# Patient Record
Sex: FEMALE | Race: WHITE | NOT HISPANIC OR LATINO
[De-identification: names, ages, dates, MRNs, and addresses within clinical notes are randomized per-mention and may not be internally consistent; named-entity substitution may affect disease eponyms.]

---

## 2017-01-17 ENCOUNTER — APPOINTMENT (OUTPATIENT)
Dept: OBGYN | Facility: CLINIC | Age: 82
End: 2017-01-17

## 2017-01-17 VITALS
HEIGHT: 62 IN | BODY MASS INDEX: 23.92 KG/M2 | WEIGHT: 130 LBS | SYSTOLIC BLOOD PRESSURE: 144 MMHG | DIASTOLIC BLOOD PRESSURE: 70 MMHG

## 2017-01-19 LAB — BACTERIA UR CULT: NORMAL

## 2017-02-25 ENCOUNTER — EMERGENCY (EMERGENCY)
Facility: HOSPITAL | Age: 82
LOS: 1 days | Discharge: HOME | End: 2017-02-25
Attending: EMERGENCY MEDICINE | Admitting: EMERGENCY MEDICINE
Payer: MEDICARE

## 2017-02-25 VITALS
RESPIRATION RATE: 18 BRPM | TEMPERATURE: 98 F | SYSTOLIC BLOOD PRESSURE: 159 MMHG | HEART RATE: 100 BPM | DIASTOLIC BLOOD PRESSURE: 86 MMHG | OXYGEN SATURATION: 96 %

## 2017-02-25 LAB
ALBUMIN SERPL ELPH-MCNC: 3.6 G/DL — SIGNIFICANT CHANGE UP (ref 3.3–5)
ALP SERPL-CCNC: 99 U/L — SIGNIFICANT CHANGE UP (ref 40–120)
ALT FLD-CCNC: 12 U/L — SIGNIFICANT CHANGE UP (ref 4–33)
APPEARANCE UR: CLEAR — SIGNIFICANT CHANGE UP
APTT BLD: 32.8 SEC — SIGNIFICANT CHANGE UP (ref 27.5–37.4)
AST SERPL-CCNC: 26 U/L — SIGNIFICANT CHANGE UP (ref 4–32)
BASOPHILS # BLD AUTO: 0.01 K/UL — SIGNIFICANT CHANGE UP (ref 0–0.2)
BASOPHILS NFR BLD AUTO: 0.1 % — SIGNIFICANT CHANGE UP (ref 0–2)
BILIRUB SERPL-MCNC: 0.4 MG/DL — SIGNIFICANT CHANGE UP (ref 0.2–1.2)
BILIRUB UR-MCNC: NEGATIVE — SIGNIFICANT CHANGE UP
BLOOD UR QL VISUAL: NEGATIVE — SIGNIFICANT CHANGE UP
BUN SERPL-MCNC: 14 MG/DL — SIGNIFICANT CHANGE UP (ref 7–23)
CALCIUM SERPL-MCNC: 8.9 MG/DL — SIGNIFICANT CHANGE UP (ref 8.4–10.5)
CHLORIDE SERPL-SCNC: 98 MMOL/L — SIGNIFICANT CHANGE UP (ref 98–107)
CO2 SERPL-SCNC: 25 MMOL/L — SIGNIFICANT CHANGE UP (ref 22–31)
COLOR SPEC: SIGNIFICANT CHANGE UP
CREAT SERPL-MCNC: 1.13 MG/DL — SIGNIFICANT CHANGE UP (ref 0.5–1.3)
EOSINOPHIL # BLD AUTO: 0.08 K/UL — SIGNIFICANT CHANGE UP (ref 0–0.5)
EOSINOPHIL NFR BLD AUTO: 1.2 % — SIGNIFICANT CHANGE UP (ref 0–6)
GLUCOSE SERPL-MCNC: 114 MG/DL — HIGH (ref 70–99)
GLUCOSE UR-MCNC: NEGATIVE — SIGNIFICANT CHANGE UP
HBA1C BLD-MCNC: 6.2 % — HIGH (ref 4–5.6)
HCT VFR BLD CALC: 35.3 % — SIGNIFICANT CHANGE UP (ref 34.5–45)
HGB BLD-MCNC: 11.4 G/DL — LOW (ref 11.5–15.5)
IMM GRANULOCYTES NFR BLD AUTO: 0.1 % — SIGNIFICANT CHANGE UP (ref 0–1.5)
INR BLD: 0.93 — SIGNIFICANT CHANGE UP (ref 0.87–1.18)
KETONES UR-MCNC: NEGATIVE — SIGNIFICANT CHANGE UP
LEUKOCYTE ESTERASE UR-ACNC: HIGH
LYMPHOCYTES # BLD AUTO: 1.73 K/UL — SIGNIFICANT CHANGE UP (ref 1–3.3)
LYMPHOCYTES # BLD AUTO: 25.2 % — SIGNIFICANT CHANGE UP (ref 13–44)
MCHC RBC-ENTMCNC: 28.6 PG — SIGNIFICANT CHANGE UP (ref 27–34)
MCHC RBC-ENTMCNC: 32.3 % — SIGNIFICANT CHANGE UP (ref 32–36)
MCV RBC AUTO: 88.7 FL — SIGNIFICANT CHANGE UP (ref 80–100)
MONOCYTES # BLD AUTO: 0.87 K/UL — SIGNIFICANT CHANGE UP (ref 0–0.9)
MONOCYTES NFR BLD AUTO: 12.7 % — SIGNIFICANT CHANGE UP (ref 2–14)
NEUTROPHILS # BLD AUTO: 4.17 K/UL — SIGNIFICANT CHANGE UP (ref 1.8–7.4)
NEUTROPHILS NFR BLD AUTO: 60.7 % — SIGNIFICANT CHANGE UP (ref 43–77)
NITRITE UR-MCNC: NEGATIVE — SIGNIFICANT CHANGE UP
NON-SQ EPI CELLS # UR AUTO: <1 — SIGNIFICANT CHANGE UP
PH UR: 6.5 — SIGNIFICANT CHANGE UP (ref 4.6–8)
PLATELET # BLD AUTO: 255 K/UL — SIGNIFICANT CHANGE UP (ref 150–400)
PMV BLD: 10.4 FL — SIGNIFICANT CHANGE UP (ref 7–13)
POTASSIUM SERPL-MCNC: 3.7 MMOL/L — SIGNIFICANT CHANGE UP (ref 3.5–5.3)
POTASSIUM SERPL-SCNC: 3.7 MMOL/L — SIGNIFICANT CHANGE UP (ref 3.5–5.3)
PROT SERPL-MCNC: 6.9 G/DL — SIGNIFICANT CHANGE UP (ref 6–8.3)
PROT UR-MCNC: NEGATIVE — SIGNIFICANT CHANGE UP
PROTHROM AB SERPL-ACNC: 10.6 SEC — SIGNIFICANT CHANGE UP (ref 10–13.1)
RBC # BLD: 3.98 M/UL — SIGNIFICANT CHANGE UP (ref 3.8–5.2)
RBC # FLD: 14.8 % — HIGH (ref 10.3–14.5)
RBC CASTS # UR COMP ASSIST: SIGNIFICANT CHANGE UP (ref 0–?)
SODIUM SERPL-SCNC: 140 MMOL/L — SIGNIFICANT CHANGE UP (ref 135–145)
SP GR SPEC: 1.01 — SIGNIFICANT CHANGE UP (ref 1–1.03)
SQUAMOUS # UR AUTO: SIGNIFICANT CHANGE UP
UROBILINOGEN FLD QL: NORMAL E.U. — SIGNIFICANT CHANGE UP (ref 0.1–0.2)
WBC # BLD: 6.87 K/UL — SIGNIFICANT CHANGE UP (ref 3.8–10.5)
WBC # FLD AUTO: 6.87 K/UL — SIGNIFICANT CHANGE UP (ref 3.8–10.5)
WBC UR QL: SIGNIFICANT CHANGE UP (ref 0–?)

## 2017-02-25 PROCEDURE — 93010 ELECTROCARDIOGRAM REPORT: CPT | Mod: 59

## 2017-02-25 PROCEDURE — 99285 EMERGENCY DEPT VISIT HI MDM: CPT | Mod: 25,GC

## 2017-02-25 PROCEDURE — 71020: CPT | Mod: 26

## 2017-02-25 PROCEDURE — 70450 CT HEAD/BRAIN W/O DYE: CPT | Mod: 26

## 2017-02-25 RX ORDER — ASPIRIN/CALCIUM CARB/MAGNESIUM 324 MG
81 TABLET ORAL DAILY
Qty: 0 | Refills: 0 | Status: DISCONTINUED | OUTPATIENT
Start: 2017-02-25 | End: 2017-03-01

## 2017-02-25 RX ORDER — SODIUM CHLORIDE 9 MG/ML
1000 INJECTION INTRAMUSCULAR; INTRAVENOUS; SUBCUTANEOUS ONCE
Qty: 0 | Refills: 0 | Status: COMPLETED | OUTPATIENT
Start: 2017-02-25 | End: 2017-02-25

## 2017-02-25 RX ADMIN — SODIUM CHLORIDE 1000 MILLILITER(S): 9 INJECTION INTRAMUSCULAR; INTRAVENOUS; SUBCUTANEOUS at 18:47

## 2017-02-25 NOTE — ED ADULT NURSE NOTE - OBJECTIVE STATEMENT
Patient daughter reported that when she asked her mom in am how she slept her speech was slurred. Patient had a cold for the past week, went to urgent care -on ABT at the moment and Proair, breathing tx. After that she was trying to drink some coffee and drooled a little. At the moment patient A& O x4 steady gait, no weakness.

## 2017-02-25 NOTE — ED PROVIDER NOTE - ATTENDING CONTRIBUTION TO CARE
89F p/w episode of slurred speech and difficulty swallowing x a few minutes today, now improved.  Denies weakness of arms or legs, has mild baseline gait instability, and no visual disturbance.  Has been having cough recently, on proair and cefdinir rx'ed by PMD.  VS:  unremarkable    GEN - NAD; well appearing; A+O x3   HEAD - NC/AT     ENT - PEERL, EOMI, mucous membranes  moist , no discharge      NECK: Neck supple, non-tender without lymphadenopathy, no masses, no JVD  PULM - CTA b/l,  symmetric breath sounds  COR -  normal heart sounds    ABD - , ND, NT, soft, no guarding, no rebound, no masses    BACK - no CVA tenderness, nontender spine     EXTREMS - no edema, no deformity, warm and well perfused    SKIN - no rash or bruising      NEUROLOGIC - alert, CN 2-12 intact, sensation nl, motor 5/5 RUE/LUE/RLE/LLE.  Face symmetric, speech fluent.    IMP:  89F p/w TIA.  Neuro exam unremarkable at present, speech fluent, able to swallow well.  Will check labs, urine, CXR, CT head, neuro eval.  Neuro rec CDU + MRI for continued neuro monitoring as well as medical treatment.

## 2017-02-25 NOTE — ED PROVIDER NOTE - OBJECTIVE STATEMENT
89 y/o F w/ Hx HTN, HLD pw slurred speech.  Pt found by daughter in Am w/ slurred speech and difficulty drinking liquids.  Symptoms lasted for approx 2 min resolved, second episode lasting for 15 seconds.  Notes also 3 days of cough on cefdinir/proair.     PMD Hiral 89 y/o F w/ Hx HTN, HLD pw slurred speech.  Pt found by daughter in Am w/ slurred speech and difficulty drinking liquids.  Symptoms lasted for approx 2 min resolved, second episode lasting for 15 seconds.  Notes also 3 days of cough on cefdinir/proair.   took 325 ASA @ home.  PMD Hiral

## 2017-02-25 NOTE — ED ADULT NURSE REASSESSMENT NOTE - NS ED NURSE REASSESS COMMENT FT1
Patient hemodynamically stable, does not complain of pain, resting comfortably on the stretcher.  Report given to Ella in CDU, patient awaiting transport.

## 2017-02-25 NOTE — ED CDU PROVIDER NOTE - PROGRESS NOTE DETAILS
CDU DONALD Villa Addendum------  This patient was signed out to me by CDU DONALD Hodges and CDU attending Dr. Johnson on 02/25/17 at 1900 hrs; test results reviewed.  In interim, pt has been resting comfortably; no complaints in interim.  Pt. pending MRI/MRA study; neuro team is consulting on pt.  Pt stable at present; will continue to monitor / reassess. EDUARDO Villa Addendum----  Pt. resting comfortably in interim; no issues to date; will continue to monitor / reassess. CDU DONALD Larios- patient states she is feeling better. Speech is back to normal as per patient. Exam : heart- RRR s1s2 nl Lungs- clear bilaterally  abd - soft NT ND extrem- no edema or cyanosis. Motor strength intact, sensation intact.  Plan for MRI today and neurology follow up. Continue to monitor, CDU DONALD Larios - Patient seen by neurologist dr Nissenbaum. MRI reviewed by him . 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 Plan to add Lipitor 40mg daily and Aspirin 80 mg daily. Follow up with neurology as outpatient. CDU DONALD Larios - Patient's PMD Dr Blackman's office called to discuss neurologist's recommendations and MRI findings. Dr Chinchilla covering for the group and has been updated on the findings. He will let Dr Blackman know of the neurologists recommendations and plan. CDU DISCHARGE NOTE -  Attending : Patient had uneventful stay in CDU.     VSS:  Lungs CTA b/l, CV: RR , Ext: no pedal edema, Neuro AOx3 , nonfocal.  Stable for discharge.  Labs and tests reviewed with the patient and copies provided. The patient is to follow up with their doctor as discussed.

## 2017-02-25 NOTE — ED CDU PROVIDER NOTE - CHPI ED SYMPTOMS NEG
no vomiting/no nausea/no numbness/no blurred vision/no fever/no loss of consciousness/no dizziness/? facial weakness/no confusion

## 2017-02-25 NOTE — ED ADULT TRIAGE NOTE - CHIEF COMPLAINT QUOTE
pt presents with daughter for 2 episodes of slurred speech at 8am this morning, now resolved. pt also reports that when she eats, it feels like the food is falling out of her mouth. per daughter, pt was drinking coffee and noticed it falling out of the side of her mouth. daughter also reports pt had an episode of unsteady gait. FS: 126 in triage pt evaluated by Dr. Rsoales. no stroke code called PMHX: htn, high cholesterol

## 2017-02-25 NOTE — ED CDU PROVIDER NOTE - PLAN OF CARE
Resolution of symptoms Your A1C level was elevated. This will need to be followed with your PMD. Avoid food high in sugar and carbohydrates. Read the hand outs provided. Follow up with your PMD Dr Blackman within 3-5 days. Call for an appointment - [phone]. Bring copies of your ER lab reports with you to MD appointment. Stay hydrated and get plenty of rest. Discuss Holter monitoring and echocardiogram  with Dr Blackman at next appointment. Follow up with neurologist Dr Nissenbaum within 5 days. Call [phone] for an appointment.  Return to ER if symptoms return or worsen, severe pain, weakness or numbness occurs, difficulty speaking or ambulating or if other concerns arise.  Your A1C level was elevated. This will need to be followed with your PMD. Avoid food high in sugar and carbohydrates. Read the hand outs provided. Follow up with your PMD Dr Blackman within 3-5 days. Call for an appointment - [phone]. Bring copies of your ER lab reports with you to MD appointment. Stay hydrated and get plenty of rest. Discuss Holter monitoring and echocardiogram  with Dr Blackman at next appointment. Follow up with neurologist Dr Nissenbaum within 5 days. Call [phone] for an appointment. Take Aspirin 81mg once daily. Take Lipitor 40 mg once daily at night.  Return to ER if symptoms return or worsen, severe pain, weakness or numbness occurs, difficulty speaking or ambulating or if other concerns arise.  Your A1C level was elevated. This will need to be followed with your PMD. Avoid food high in sugar and carbohydrates. Read the hand outs provided.

## 2017-02-25 NOTE — ED CDU PROVIDER NOTE - MEDICAL DECISION MAKING DETAILS
89 yo with slurred speech and poss. facial weakness, no sx at time of exam. ASA given PTA, CT head neg., seen by Neuro, for MRI and TIA w/u.

## 2017-02-26 VITALS
OXYGEN SATURATION: 98 % | DIASTOLIC BLOOD PRESSURE: 83 MMHG | RESPIRATION RATE: 16 BRPM | TEMPERATURE: 99 F | SYSTOLIC BLOOD PRESSURE: 127 MMHG | HEART RATE: 98 BPM

## 2017-02-26 LAB
CHOLEST SERPL-MCNC: 182 MG/DL — SIGNIFICANT CHANGE UP (ref 120–199)
HDLC SERPL-MCNC: 65 MG/DL — SIGNIFICANT CHANGE UP (ref 45–65)
LIPID PNL WITH DIRECT LDL SERPL: 83 MG/DL — SIGNIFICANT CHANGE UP
TRIGL SERPL-MCNC: 124 MG/DL — SIGNIFICANT CHANGE UP (ref 10–149)

## 2017-02-26 PROCEDURE — 70548 MR ANGIOGRAPHY NECK W/DYE: CPT | Mod: 26

## 2017-02-26 PROCEDURE — 70551 MRI BRAIN STEM W/O DYE: CPT | Mod: 26

## 2017-02-26 RX ORDER — ASPIRIN/CALCIUM CARB/MAGNESIUM 324 MG
1 TABLET ORAL
Qty: 30 | Refills: 0
Start: 2017-02-26 | End: 2017-03-28

## 2017-02-26 RX ORDER — ATORVASTATIN CALCIUM 80 MG/1
40 TABLET, FILM COATED ORAL AT BEDTIME
Qty: 0 | Refills: 0 | Status: DISCONTINUED | OUTPATIENT
Start: 2017-02-26 | End: 2017-03-01

## 2017-02-26 RX ORDER — ATORVASTATIN CALCIUM 80 MG/1
1 TABLET, FILM COATED ORAL
Qty: 30 | Refills: 0
Start: 2017-02-26 | End: 2017-03-28

## 2017-02-26 RX ADMIN — Medication 81 MILLIGRAM(S): at 11:47

## 2017-03-14 ENCOUNTER — APPOINTMENT (OUTPATIENT)
Dept: OBGYN | Facility: CLINIC | Age: 82
End: 2017-03-14

## 2017-03-21 ENCOUNTER — APPOINTMENT (OUTPATIENT)
Dept: OBGYN | Facility: CLINIC | Age: 82
End: 2017-03-21
Payer: MEDICARE

## 2017-03-21 VITALS
DIASTOLIC BLOOD PRESSURE: 80 MMHG | BODY MASS INDEX: 23.19 KG/M2 | SYSTOLIC BLOOD PRESSURE: 120 MMHG | HEIGHT: 62 IN | WEIGHT: 126 LBS

## 2017-03-21 DIAGNOSIS — Z87.42 PERSONAL HISTORY OF OTHER DISEASES OF THE FEMALE GENITAL TRACT: ICD-10-CM

## 2017-03-21 DIAGNOSIS — I63.9 CEREBRAL INFARCTION, UNSPECIFIED: ICD-10-CM

## 2017-03-21 PROCEDURE — 99213 OFFICE O/P EST LOW 20 MIN: CPT

## 2017-03-21 RX ORDER — METRONIDAZOLE 500 MG/1
500 TABLET ORAL TWICE DAILY
Qty: 10 | Refills: 0 | Status: COMPLETED | COMMUNITY
Start: 2017-01-17 | End: 2017-03-21

## 2017-03-22 PROBLEM — I63.9 STROKE: Status: ACTIVE | Noted: 2017-03-22

## 2017-03-22 PROBLEM — Z87.42 HISTORY OF VAGINAL DISCHARGE: Status: RESOLVED | Noted: 2017-01-17 | Resolved: 2017-03-22

## 2017-05-23 ENCOUNTER — APPOINTMENT (OUTPATIENT)
Dept: OBGYN | Facility: CLINIC | Age: 82
End: 2017-05-23

## 2017-05-23 VITALS
BODY MASS INDEX: 24.29 KG/M2 | DIASTOLIC BLOOD PRESSURE: 68 MMHG | WEIGHT: 132 LBS | SYSTOLIC BLOOD PRESSURE: 138 MMHG | HEIGHT: 62 IN

## 2017-05-25 RX ORDER — ATORVASTATIN CALCIUM 40 MG/1
40 TABLET, FILM COATED ORAL
Qty: 30 | Refills: 0 | Status: DISCONTINUED | COMMUNITY
Start: 2017-02-26 | End: 2017-05-25

## 2017-05-25 RX ORDER — PNEUMOCOCCAL 23-VAL P-SAC VAC 25MCG/0.5
25 VIAL (ML) INJECTION
Qty: 1 | Refills: 0 | Status: COMPLETED | COMMUNITY
Start: 2017-04-27

## 2017-07-25 ENCOUNTER — APPOINTMENT (OUTPATIENT)
Dept: OBGYN | Facility: CLINIC | Age: 82
End: 2017-07-25

## 2017-07-25 VITALS
SYSTOLIC BLOOD PRESSURE: 140 MMHG | HEIGHT: 62 IN | WEIGHT: 134 LBS | BODY MASS INDEX: 24.66 KG/M2 | DIASTOLIC BLOOD PRESSURE: 70 MMHG

## 2017-10-13 ENCOUNTER — APPOINTMENT (OUTPATIENT)
Dept: OBGYN | Facility: CLINIC | Age: 82
End: 2017-10-13
Payer: MEDICARE

## 2017-10-13 VITALS
WEIGHT: 133 LBS | DIASTOLIC BLOOD PRESSURE: 80 MMHG | SYSTOLIC BLOOD PRESSURE: 133 MMHG | HEIGHT: 62 IN | BODY MASS INDEX: 24.48 KG/M2

## 2017-10-13 PROCEDURE — G0101: CPT

## 2017-12-12 ENCOUNTER — APPOINTMENT (OUTPATIENT)
Dept: OBGYN | Facility: CLINIC | Age: 82
End: 2017-12-12
Payer: MEDICARE

## 2017-12-12 VITALS
SYSTOLIC BLOOD PRESSURE: 138 MMHG | BODY MASS INDEX: 24.84 KG/M2 | WEIGHT: 135 LBS | DIASTOLIC BLOOD PRESSURE: 60 MMHG | HEIGHT: 62 IN

## 2017-12-12 PROCEDURE — 99213 OFFICE O/P EST LOW 20 MIN: CPT

## 2018-03-05 ENCOUNTER — APPOINTMENT (OUTPATIENT)
Dept: OBGYN | Facility: CLINIC | Age: 83
End: 2018-03-05
Payer: MEDICARE

## 2018-03-05 VITALS
WEIGHT: 138 LBS | DIASTOLIC BLOOD PRESSURE: 100 MMHG | HEIGHT: 62 IN | SYSTOLIC BLOOD PRESSURE: 140 MMHG | BODY MASS INDEX: 25.4 KG/M2

## 2018-03-05 PROCEDURE — 99213 OFFICE O/P EST LOW 20 MIN: CPT | Mod: 25

## 2018-03-05 PROCEDURE — 57150 TREAT VAGINA INFECTION: CPT

## 2018-05-06 ENCOUNTER — EMERGENCY (EMERGENCY)
Facility: HOSPITAL | Age: 83
LOS: 0 days | Discharge: HOME | End: 2018-05-06
Attending: EMERGENCY MEDICINE
Payer: MEDICARE

## 2018-05-06 VITALS
HEART RATE: 83 BPM | DIASTOLIC BLOOD PRESSURE: 77 MMHG | WEIGHT: 132.94 LBS | OXYGEN SATURATION: 93 % | SYSTOLIC BLOOD PRESSURE: 134 MMHG | RESPIRATION RATE: 16 BRPM | HEIGHT: 60 IN | TEMPERATURE: 98 F

## 2018-05-06 VITALS
OXYGEN SATURATION: 96 % | TEMPERATURE: 97 F | RESPIRATION RATE: 18 BRPM | DIASTOLIC BLOOD PRESSURE: 62 MMHG | HEART RATE: 77 BPM | SYSTOLIC BLOOD PRESSURE: 138 MMHG

## 2018-05-06 DIAGNOSIS — G45.9 TRANSIENT CEREBRAL ISCHEMIC ATTACK, UNSPECIFIED: ICD-10-CM

## 2018-05-06 DIAGNOSIS — R53.1 WEAKNESS: ICD-10-CM

## 2018-05-06 DIAGNOSIS — H81.10 BENIGN PAROXYSMAL VERTIGO, UNSPECIFIED EAR: ICD-10-CM

## 2018-05-06 DIAGNOSIS — Z79.82 LONG TERM (CURRENT) USE OF ASPIRIN: ICD-10-CM

## 2018-05-06 DIAGNOSIS — I10 ESSENTIAL (PRIMARY) HYPERTENSION: ICD-10-CM

## 2018-05-06 LAB
ALBUMIN SERPL ELPH-MCNC: 2.9 G/DL — LOW (ref 3.3–5)
ALP SERPL-CCNC: 105 U/L — SIGNIFICANT CHANGE UP (ref 40–120)
ALT FLD-CCNC: 14 U/L — SIGNIFICANT CHANGE UP (ref 12–78)
ANION GAP SERPL CALC-SCNC: 7 MMOL/L — SIGNIFICANT CHANGE UP (ref 5–17)
AST SERPL-CCNC: 18 U/L — SIGNIFICANT CHANGE UP (ref 15–37)
BILIRUB SERPL-MCNC: 0.3 MG/DL — SIGNIFICANT CHANGE UP (ref 0.2–1.2)
BUN SERPL-MCNC: 11 MG/DL — SIGNIFICANT CHANGE UP (ref 7–23)
CALCIUM SERPL-MCNC: 8.4 MG/DL — LOW (ref 8.5–10.1)
CHLORIDE SERPL-SCNC: 106 MMOL/L — SIGNIFICANT CHANGE UP (ref 96–108)
CO2 SERPL-SCNC: 31 MMOL/L — SIGNIFICANT CHANGE UP (ref 22–31)
CREAT SERPL-MCNC: 0.98 MG/DL — SIGNIFICANT CHANGE UP (ref 0.5–1.3)
GLUCOSE SERPL-MCNC: 127 MG/DL — HIGH (ref 70–99)
HCT VFR BLD CALC: 34 % — LOW (ref 34.5–45)
HGB BLD-MCNC: 10.8 G/DL — LOW (ref 11.5–15.5)
MCHC RBC-ENTMCNC: 28.3 PG — SIGNIFICANT CHANGE UP (ref 27–34)
MCHC RBC-ENTMCNC: 31.8 GM/DL — LOW (ref 32–36)
MCV RBC AUTO: 89.2 FL — SIGNIFICANT CHANGE UP (ref 80–100)
NRBC # BLD: 0 /100 WBCS — SIGNIFICANT CHANGE UP (ref 0–0)
PLATELET # BLD AUTO: 254 K/UL — SIGNIFICANT CHANGE UP (ref 150–400)
POTASSIUM SERPL-MCNC: 2.9 MMOL/L — CRITICAL LOW (ref 3.5–5.3)
POTASSIUM SERPL-SCNC: 2.9 MMOL/L — CRITICAL LOW (ref 3.5–5.3)
PROT SERPL-MCNC: 6.4 GM/DL — SIGNIFICANT CHANGE UP (ref 6–8.3)
RBC # BLD: 3.81 M/UL — SIGNIFICANT CHANGE UP (ref 3.8–5.2)
RBC # FLD: 14.9 % — HIGH (ref 10.3–14.5)
SODIUM SERPL-SCNC: 144 MMOL/L — SIGNIFICANT CHANGE UP (ref 135–145)
WBC # BLD: 8.32 K/UL — SIGNIFICANT CHANGE UP (ref 3.8–10.5)
WBC # FLD AUTO: 8.32 K/UL — SIGNIFICANT CHANGE UP (ref 3.8–10.5)

## 2018-05-06 PROCEDURE — 70450 CT HEAD/BRAIN W/O DYE: CPT | Mod: 26

## 2018-05-06 PROCEDURE — 99284 EMERGENCY DEPT VISIT MOD MDM: CPT | Mod: 25

## 2018-05-06 RX ORDER — POTASSIUM CHLORIDE 20 MEQ
10 PACKET (EA) ORAL ONCE
Refills: 0 | Status: COMPLETED | OUTPATIENT
Start: 2018-05-06 | End: 2018-05-06

## 2018-05-06 RX ORDER — MECLIZINE HCL 12.5 MG
1 TABLET ORAL
Qty: 15 | Refills: 0
Start: 2018-05-06 | End: 2018-05-10

## 2018-05-06 RX ORDER — MECLIZINE HCL 12.5 MG
25 TABLET ORAL ONCE
Refills: 0 | Status: COMPLETED | OUTPATIENT
Start: 2018-05-06 | End: 2018-05-06

## 2018-05-06 RX ORDER — SODIUM CHLORIDE 9 MG/ML
500 INJECTION INTRAMUSCULAR; INTRAVENOUS; SUBCUTANEOUS ONCE
Refills: 0 | Status: COMPLETED | OUTPATIENT
Start: 2018-05-06 | End: 2018-05-06

## 2018-05-06 RX ORDER — POTASSIUM CHLORIDE 20 MEQ
10 PACKET (EA) ORAL ONCE
Refills: 0 | Status: DISCONTINUED | OUTPATIENT
Start: 2018-05-06 | End: 2018-05-06

## 2018-05-06 RX ORDER — POTASSIUM CHLORIDE 20 MEQ
40 PACKET (EA) ORAL ONCE
Refills: 0 | Status: COMPLETED | OUTPATIENT
Start: 2018-05-06 | End: 2018-05-06

## 2018-05-06 RX ADMIN — Medication 100 MILLIEQUIVALENT(S): at 09:44

## 2018-05-06 RX ADMIN — Medication 25 MILLIGRAM(S): at 08:22

## 2018-05-06 RX ADMIN — Medication 40 MILLIEQUIVALENT(S): at 09:52

## 2018-05-06 RX ADMIN — SODIUM CHLORIDE 500 MILLILITER(S): 9 INJECTION INTRAMUSCULAR; INTRAVENOUS; SUBCUTANEOUS at 08:22

## 2018-05-06 NOTE — ED PROVIDER NOTE - PHYSICAL EXAMINATION
Vitals: WNL  Gen: AAOx3, NAD, sitting comfortably in stretcher, calm, cooperative, no active vomiting, fully alert and answering all questions appropriately with good recall; good historian, daughter at bedside  Head: ncat, perrla, eomi b/l, no nystagmus  Neck: supple, no lymphadenopathy, no midline deviation  Heart: rrr, no m/r/g  Lungs: CTA b/l, no rales/ronchi/wheezes  Abd: soft, nontender, non-distended, no rebound or guarding  Ext: no clubbing/cyanosis/edema  Neuro: sensation and muscle strength intact b/l, steady gait

## 2018-05-06 NOTE — ED PROVIDER NOTE - OBJECTIVE STATEMENT
89 yo F with vertigo early this morning.  Pt. was calling out from bed that she couldn't move.  Pt. had an intense spinning sensation that was made worse with sitting up.  Pt. felt nauseas 2/2 spinning.  Symptoms resolved by the time she came to ER.  In bed patient is apprehensive about sitting up to reproduce symptoms.  She denies inciting event.  She was feeling fine otherwise.  Daughter at bedside was concerned because she had a TIA in the past.  ROS: negative for fever, cough, headache, chest pain, shortness of breath, abd pain, nausea, vomiting, diarrhea, rash, paresthesia, and weakness.   PMH: HTN, gerd, TIA; Meds: ASA, famotidine, Lipitor, amlodipine, furosemide; SH: Denies smoking/drinking/drug use

## 2018-05-06 NOTE — ED PROVIDER NOTE - PROGRESS NOTE DETAILS
Results reported to patient--hypokalemia, potassium replaced, pt. will f/u with pcp for further eval and recheck of levels, ct normal, remainder of labs wnl  Pt. reports feeling better after meds  pt. agrees to f/u with primary care outpt.  pt. understands to return to ED if symptoms worsen; will d/c with meclizine

## 2018-05-06 NOTE — ED PROVIDER NOTE - MEDICAL DECISION MAKING DETAILS
89 yo F with acute benign vertigo most likely, hx TIA, r/o acute stroke  -basic labs, iv line, ns bolus 500 mL, meclizine, ct brain, ua, cx

## 2018-05-08 ENCOUNTER — APPOINTMENT (OUTPATIENT)
Dept: OBGYN | Facility: CLINIC | Age: 83
End: 2018-05-08
Payer: MEDICARE

## 2018-05-08 PROCEDURE — 99213 OFFICE O/P EST LOW 20 MIN: CPT

## 2018-08-21 ENCOUNTER — APPOINTMENT (OUTPATIENT)
Dept: OBGYN | Facility: CLINIC | Age: 83
End: 2018-08-21
Payer: MEDICARE

## 2018-08-21 VITALS — DIASTOLIC BLOOD PRESSURE: 60 MMHG | HEIGHT: 62 IN | SYSTOLIC BLOOD PRESSURE: 116 MMHG

## 2018-08-21 DIAGNOSIS — N89.8 OTHER SPECIFIED NONINFLAMMATORY DISORDERS OF VAGINA: ICD-10-CM

## 2018-08-21 PROCEDURE — 57150 TREAT VAGINA INFECTION: CPT

## 2018-08-21 PROCEDURE — 99213 OFFICE O/P EST LOW 20 MIN: CPT | Mod: 25

## 2018-09-05 ENCOUNTER — APPOINTMENT (OUTPATIENT)
Dept: OBGYN | Facility: CLINIC | Age: 83
End: 2018-09-05
Payer: MEDICARE

## 2018-09-05 DIAGNOSIS — R30.0 DYSURIA: ICD-10-CM

## 2018-09-05 PROCEDURE — 99211 OFF/OP EST MAY X REQ PHY/QHP: CPT

## 2018-09-06 PROBLEM — R30.0 BURNING WITH URINATION: Status: ACTIVE | Noted: 2018-09-05

## 2018-09-07 RX ORDER — CIPROFLOXACIN HYDROCHLORIDE 500 MG/1
500 TABLET, FILM COATED ORAL
Qty: 6 | Refills: 0 | Status: ACTIVE | COMMUNITY
Start: 2018-09-07 | End: 1900-01-01

## 2018-09-08 LAB — BACTERIA UR CULT: ABNORMAL

## 2018-11-19 ENCOUNTER — APPOINTMENT (OUTPATIENT)
Dept: OBGYN | Facility: CLINIC | Age: 83
End: 2018-11-19
Payer: MEDICARE

## 2018-11-19 VITALS
DIASTOLIC BLOOD PRESSURE: 70 MMHG | HEIGHT: 62 IN | WEIGHT: 133 LBS | SYSTOLIC BLOOD PRESSURE: 106 MMHG | BODY MASS INDEX: 24.48 KG/M2

## 2018-11-19 PROCEDURE — 99213 OFFICE O/P EST LOW 20 MIN: CPT

## 2019-02-19 ENCOUNTER — APPOINTMENT (OUTPATIENT)
Dept: OBGYN | Facility: CLINIC | Age: 84
End: 2019-02-19

## 2019-03-08 ENCOUNTER — APPOINTMENT (OUTPATIENT)
Dept: OBGYN | Facility: CLINIC | Age: 84
End: 2019-03-08
Payer: MEDICARE

## 2019-03-08 VITALS
DIASTOLIC BLOOD PRESSURE: 60 MMHG | WEIGHT: 132 LBS | HEIGHT: 62 IN | BODY MASS INDEX: 24.29 KG/M2 | SYSTOLIC BLOOD PRESSURE: 138 MMHG

## 2019-03-08 PROCEDURE — 99213 OFFICE O/P EST LOW 20 MIN: CPT

## 2019-03-11 NOTE — PHYSICAL EXAM
[No Lesions] : no genitalia lesions [Vulvar Atrophy] : vulvar atrophy [Normal] : vagina [Labia Majora] : labia major [Atrophy] : atrophy [No Bleeding] : there was no active vaginal bleeding [[email or web address]] : pessary cleaned

## 2019-05-14 ENCOUNTER — RX RENEWAL (OUTPATIENT)
Age: 84
End: 2019-05-14

## 2019-05-14 DIAGNOSIS — I10 ESSENTIAL (PRIMARY) HYPERTENSION: ICD-10-CM

## 2019-05-15 ENCOUNTER — APPOINTMENT (OUTPATIENT)
Dept: CARDIOLOGY | Facility: CLINIC | Age: 84
End: 2019-05-15

## 2019-06-04 ENCOUNTER — APPOINTMENT (OUTPATIENT)
Dept: OBGYN | Facility: CLINIC | Age: 84
End: 2019-06-04
Payer: MEDICARE

## 2019-06-04 VITALS
BODY MASS INDEX: 25.49 KG/M2 | DIASTOLIC BLOOD PRESSURE: 80 MMHG | WEIGHT: 135 LBS | HEIGHT: 61 IN | SYSTOLIC BLOOD PRESSURE: 140 MMHG

## 2019-06-04 DIAGNOSIS — Z46.89 ENCOUNTER FOR FITTING AND ADJUSTMENT OF OTHER SPECIFIED DEVICES: ICD-10-CM

## 2019-06-04 PROCEDURE — 99213 OFFICE O/P EST LOW 20 MIN: CPT

## 2019-06-04 NOTE — PHYSICAL EXAM
[No Lesions] : no genitalia lesions [Vulvar Atrophy] : vulvar atrophy [Normal] : vagina [Atrophy] : atrophy [Labia Majora] : labia major [No Bleeding] : there was no active vaginal bleeding [Uterine Adnexae] : were not tender and not enlarged

## 2019-07-02 ENCOUNTER — EMERGENCY (EMERGENCY)
Facility: HOSPITAL | Age: 84
LOS: 1 days | Discharge: HOME | End: 2019-07-02
Attending: EMERGENCY MEDICINE | Admitting: EMERGENCY MEDICINE
Payer: MEDICARE

## 2019-07-02 VITALS
RESPIRATION RATE: 16 BRPM | TEMPERATURE: 97 F | OXYGEN SATURATION: 100 % | DIASTOLIC BLOOD PRESSURE: 73 MMHG | HEART RATE: 62 BPM | SYSTOLIC BLOOD PRESSURE: 127 MMHG

## 2019-07-02 VITALS
RESPIRATION RATE: 16 BRPM | HEART RATE: 88 BPM | OXYGEN SATURATION: 99 % | DIASTOLIC BLOOD PRESSURE: 43 MMHG | SYSTOLIC BLOOD PRESSURE: 109 MMHG

## 2019-07-02 PROCEDURE — 99283 EMERGENCY DEPT VISIT LOW MDM: CPT | Mod: GC

## 2019-07-02 RX ORDER — MULTIVIT WITH MIN/MFOLATE/K2 340-15/3 G
1 POWDER (GRAM) ORAL ONCE
Refills: 0 | Status: COMPLETED | OUTPATIENT
Start: 2019-07-02 | End: 2019-07-02

## 2019-07-02 RX ADMIN — Medication 30 MILLILITER(S): at 19:44

## 2019-07-02 RX ADMIN — Medication 1 ENEMA: at 16:53

## 2019-07-02 RX ADMIN — Medication 1 BOTTLE: at 18:28

## 2019-07-02 NOTE — ED PROVIDER NOTE - PROGRESS NOTE DETAILS
pt feeling much better after digital impaction yielded large volume solid stool. will dc to f/u with pcp.

## 2019-07-02 NOTE — ED PROVIDER NOTE - NS ED ROS FT
GENERAL: No fever or chills  EYES: no change in vision  HEENT: no trouble swallowing or speaking  CARDIAC: no chest pain or palpitations   PULMONARY: no cough or SOB  GI: +constipation. no abdominal pain, nausea, vomiting, diarrhea  : No changes in urination  SKIN: no rashes  NEURO: no headache, numbness, or weakness  MSK: No joint pain     ~Waldo Zabala PGY1

## 2019-07-02 NOTE — ED ADULT TRIAGE NOTE - CHIEF COMPLAINT QUOTE
states " I am constipated since 1 week and unable to move " feels the pressure and pass a small hard stool and unable to move the rest ".

## 2019-07-02 NOTE — ED PROVIDER NOTE - NSFOLLOWUPINSTRUCTIONS_ED_ALL_ED_FT
Follow up with your PCP in 24-48 hours.   Take fiber supplements as instructed.   Return to the ER if you develop any new or worsening symptoms such as fever, chest pain, shortness of breath, numbness, weakness, abdominal pain, nausea, vomiting, or visual changes.

## 2019-07-02 NOTE — ED PROVIDER NOTE - PHYSICAL EXAMINATION
Gen: AAOx3, non-toxic  Head: NCAT  HEENT: EOMI, oral mucosa moist, normal conjunctiva  Lung: CTAB, no respiratory distress, no wheezes/rhonchi/rales B/L, speaking in full sentences  CV: RRR, no murmurs, rubs or gallops  Abd: soft, NTND, no guarding, no CVA tenderness  Rectal: no bleeding. small external hemorrhoids  MSK: no visible deformities  Neuro: No focal sensory or motor deficits  Skin: Warm, well perfused, no rash  Psych: normal affect.     ~Waldo Zabala PGY1

## 2019-07-02 NOTE — ED ADULT NURSE NOTE - OBJECTIVE STATEMENT
pt received at intake in rm 2 alert and oriented x 3. pt states she has not been able to pass stool for a week but has been passing gas. pt reports feeling full because she cannot pass stool. pt denies sob, chest pain, fevers, chills, N/V/D. BS present x 4 quadrants, abdomen is soft, distended and non tender to touch.

## 2019-07-02 NOTE — ED PROVIDER NOTE - CLINICAL SUMMARY MEDICAL DECISION MAKING FREE TEXT BOX
+constipation x 1.5 weeks. no abdominal pain or vomiting. abdomen non-tender. no concern for obstruction or acute surgical process. pt well appearing, no other symptoms. will give enema and reassess.

## 2019-07-02 NOTE — ED PROVIDER NOTE - OBJECTIVE STATEMENT
89F with PMH of HTN and HLD p/w constipation x 1.5 weeks. Last BM was last Thursday, states it was hard/small volume. Before that she reports more or less daily BMs. Also reports one episode of small volume BRBPR when trying to go to the bathroom 2 days ago. Denies any other symptoms including fever, night sweats, abdominal pain, nausea, vomiting, decreased PO intake, chest pain, SOB, or urinary symptoms. 89F with PMH of HTN and HLD p/w constipation x 1.5 weeks. Last BM was last Thursday, states it was hard/small volume. Before that she reports more or less daily BMs. Denies any other symptoms including fever, night sweats, abdominal pain, nausea, vomiting, decreased PO intake, chest pain, SOB, or urinary symptoms.

## 2019-07-02 NOTE — ED PROVIDER NOTE - ATTENDING CONTRIBUTION TO CARE
agree with resident note  "89F with PMH of HTN and HLD p/w constipation x 1.5 weeks. Last BM was last Thursday, states it was hard/small volume. Before that she reports more or less daily BMs. "  Denies fever, prior abdominal surgeries, vomiting    PE: well appearing; VSS: CTAB/L; s1 s2 no m/r/g abd soft/NT/ND ext: no edema    Imp: constipation; will give laxitives; if successful will d/c if not rectal by resident for possible disimpaction; if still not improved may warrant a CT

## 2019-07-06 ENCOUNTER — EMERGENCY (EMERGENCY)
Facility: HOSPITAL | Age: 84
LOS: 0 days | Discharge: HOME | End: 2019-07-06
Attending: EMERGENCY MEDICINE
Payer: MEDICARE

## 2019-07-06 VITALS
HEART RATE: 99 BPM | TEMPERATURE: 97 F | HEIGHT: 62 IN | OXYGEN SATURATION: 95 % | RESPIRATION RATE: 16 BRPM | WEIGHT: 139.99 LBS | DIASTOLIC BLOOD PRESSURE: 61 MMHG | SYSTOLIC BLOOD PRESSURE: 115 MMHG

## 2019-07-06 VITALS
RESPIRATION RATE: 16 BRPM | DIASTOLIC BLOOD PRESSURE: 62 MMHG | OXYGEN SATURATION: 97 % | TEMPERATURE: 98 F | HEART RATE: 87 BPM | SYSTOLIC BLOOD PRESSURE: 128 MMHG

## 2019-07-06 DIAGNOSIS — E78.00 PURE HYPERCHOLESTEROLEMIA, UNSPECIFIED: ICD-10-CM

## 2019-07-06 DIAGNOSIS — E87.6 HYPOKALEMIA: ICD-10-CM

## 2019-07-06 DIAGNOSIS — K59.00 CONSTIPATION, UNSPECIFIED: ICD-10-CM

## 2019-07-06 DIAGNOSIS — Z79.82 LONG TERM (CURRENT) USE OF ASPIRIN: ICD-10-CM

## 2019-07-06 DIAGNOSIS — I10 ESSENTIAL (PRIMARY) HYPERTENSION: ICD-10-CM

## 2019-07-06 DIAGNOSIS — R42 DIZZINESS AND GIDDINESS: ICD-10-CM

## 2019-07-06 LAB
ALBUMIN SERPL ELPH-MCNC: 2.6 G/DL — LOW (ref 3.3–5)
ALP SERPL-CCNC: 164 U/L — HIGH (ref 40–120)
ALT FLD-CCNC: 16 U/L — SIGNIFICANT CHANGE UP (ref 12–78)
ANION GAP SERPL CALC-SCNC: 11 MMOL/L — SIGNIFICANT CHANGE UP (ref 5–17)
APPEARANCE UR: CLEAR — SIGNIFICANT CHANGE UP
AST SERPL-CCNC: 19 U/L — SIGNIFICANT CHANGE UP (ref 15–37)
BACTERIA # UR AUTO: ABNORMAL
BASOPHILS # BLD AUTO: 0.04 K/UL — SIGNIFICANT CHANGE UP (ref 0–0.2)
BASOPHILS NFR BLD AUTO: 0.5 % — SIGNIFICANT CHANGE UP (ref 0–2)
BILIRUB SERPL-MCNC: 0.5 MG/DL — SIGNIFICANT CHANGE UP (ref 0.2–1.2)
BILIRUB UR-MCNC: NEGATIVE — SIGNIFICANT CHANGE UP
BUN SERPL-MCNC: 13 MG/DL — SIGNIFICANT CHANGE UP (ref 7–23)
CALCIUM SERPL-MCNC: 8.4 MG/DL — LOW (ref 8.5–10.1)
CHLORIDE SERPL-SCNC: 97 MMOL/L — SIGNIFICANT CHANGE UP (ref 96–108)
CO2 SERPL-SCNC: 32 MMOL/L — HIGH (ref 22–31)
COLOR SPEC: YELLOW — SIGNIFICANT CHANGE UP
CREAT SERPL-MCNC: 1.38 MG/DL — HIGH (ref 0.5–1.3)
DIFF PNL FLD: NEGATIVE — SIGNIFICANT CHANGE UP
EOSINOPHIL # BLD AUTO: 0.06 K/UL — SIGNIFICANT CHANGE UP (ref 0–0.5)
EOSINOPHIL NFR BLD AUTO: 0.8 % — SIGNIFICANT CHANGE UP (ref 0–6)
EPI CELLS # UR: ABNORMAL
GLUCOSE SERPL-MCNC: 112 MG/DL — HIGH (ref 70–99)
GLUCOSE UR QL: NEGATIVE MG/DL — SIGNIFICANT CHANGE UP
HCT VFR BLD CALC: 37.4 % — SIGNIFICANT CHANGE UP (ref 34.5–45)
HGB BLD-MCNC: 11.8 G/DL — SIGNIFICANT CHANGE UP (ref 11.5–15.5)
IMM GRANULOCYTES NFR BLD AUTO: 0.3 % — SIGNIFICANT CHANGE UP (ref 0–1.5)
KETONES UR-MCNC: NEGATIVE — SIGNIFICANT CHANGE UP
LEUKOCYTE ESTERASE UR-ACNC: ABNORMAL
LYMPHOCYTES # BLD AUTO: 2.17 K/UL — SIGNIFICANT CHANGE UP (ref 1–3.3)
LYMPHOCYTES # BLD AUTO: 29.4 % — SIGNIFICANT CHANGE UP (ref 13–44)
MCHC RBC-ENTMCNC: 28.5 PG — SIGNIFICANT CHANGE UP (ref 27–34)
MCHC RBC-ENTMCNC: 31.6 GM/DL — LOW (ref 32–36)
MCV RBC AUTO: 90.3 FL — SIGNIFICANT CHANGE UP (ref 80–100)
MONOCYTES # BLD AUTO: 0.73 K/UL — SIGNIFICANT CHANGE UP (ref 0–0.9)
MONOCYTES NFR BLD AUTO: 9.9 % — SIGNIFICANT CHANGE UP (ref 2–14)
NEUTROPHILS # BLD AUTO: 4.35 K/UL — SIGNIFICANT CHANGE UP (ref 1.8–7.4)
NEUTROPHILS NFR BLD AUTO: 59.1 % — SIGNIFICANT CHANGE UP (ref 43–77)
NITRITE UR-MCNC: NEGATIVE — SIGNIFICANT CHANGE UP
NRBC # BLD: 0 /100 WBCS — SIGNIFICANT CHANGE UP (ref 0–0)
NT-PROBNP SERPL-SCNC: 247 PG/ML — SIGNIFICANT CHANGE UP (ref 0–450)
PH UR: 8 — SIGNIFICANT CHANGE UP (ref 5–8)
PLATELET # BLD AUTO: 330 K/UL — SIGNIFICANT CHANGE UP (ref 150–400)
POTASSIUM SERPL-MCNC: 2.6 MMOL/L — CRITICAL LOW (ref 3.5–5.3)
POTASSIUM SERPL-SCNC: 2.6 MMOL/L — CRITICAL LOW (ref 3.5–5.3)
PROT SERPL-MCNC: 7 GM/DL — SIGNIFICANT CHANGE UP (ref 6–8.3)
PROT UR-MCNC: NEGATIVE MG/DL — SIGNIFICANT CHANGE UP
RBC # BLD: 4.14 M/UL — SIGNIFICANT CHANGE UP (ref 3.8–5.2)
RBC # FLD: 15.5 % — HIGH (ref 10.3–14.5)
RBC CASTS # UR COMP ASSIST: SIGNIFICANT CHANGE UP /HPF (ref 0–4)
SODIUM SERPL-SCNC: 140 MMOL/L — SIGNIFICANT CHANGE UP (ref 135–145)
SP GR SPEC: 1.01 — SIGNIFICANT CHANGE UP (ref 1.01–1.02)
UROBILINOGEN FLD QL: NEGATIVE MG/DL — SIGNIFICANT CHANGE UP
WBC # BLD: 7.37 K/UL — SIGNIFICANT CHANGE UP (ref 3.8–10.5)
WBC # FLD AUTO: 7.37 K/UL — SIGNIFICANT CHANGE UP (ref 3.8–10.5)
WBC UR QL: ABNORMAL

## 2019-07-06 PROCEDURE — 99284 EMERGENCY DEPT VISIT MOD MDM: CPT

## 2019-07-06 RX ORDER — POTASSIUM CHLORIDE 20 MEQ
40 PACKET (EA) ORAL ONCE
Refills: 0 | Status: COMPLETED | OUTPATIENT
Start: 2019-07-06 | End: 2019-07-06

## 2019-07-06 RX ORDER — CIPROFLOXACIN LACTATE 400MG/40ML
500 VIAL (ML) INTRAVENOUS ONCE
Refills: 0 | Status: COMPLETED | OUTPATIENT
Start: 2019-07-06 | End: 2019-07-06

## 2019-07-06 RX ORDER — POTASSIUM CHLORIDE 20 MEQ
10 PACKET (EA) ORAL ONCE
Refills: 0 | Status: COMPLETED | OUTPATIENT
Start: 2019-07-06 | End: 2019-07-06

## 2019-07-06 RX ORDER — SODIUM CHLORIDE 9 MG/ML
500 INJECTION INTRAMUSCULAR; INTRAVENOUS; SUBCUTANEOUS ONCE
Refills: 0 | Status: COMPLETED | OUTPATIENT
Start: 2019-07-06 | End: 2019-07-06

## 2019-07-06 RX ADMIN — Medication 10 MILLIEQUIVALENT(S): at 21:45

## 2019-07-06 RX ADMIN — Medication 10 MILLIEQUIVALENT(S): at 22:52

## 2019-07-06 RX ADMIN — Medication 40 MILLIEQUIVALENT(S): at 19:45

## 2019-07-06 RX ADMIN — Medication 500 MILLIGRAM(S): at 23:12

## 2019-07-06 RX ADMIN — SODIUM CHLORIDE 500 MILLILITER(S): 9 INJECTION INTRAMUSCULAR; INTRAVENOUS; SUBCUTANEOUS at 19:45

## 2019-07-06 RX ADMIN — Medication 100 MILLIEQUIVALENT(S): at 21:52

## 2019-07-06 RX ADMIN — Medication 100 MILLIEQUIVALENT(S): at 19:45

## 2019-07-06 RX ADMIN — SODIUM CHLORIDE 500 MILLILITER(S): 9 INJECTION INTRAMUSCULAR; INTRAVENOUS; SUBCUTANEOUS at 20:45

## 2019-07-06 NOTE — ED ADULT NURSE NOTE - NSIMPLEMENTINTERV_GEN_ALL_ED
Implemented All Fall Risk Interventions:  [place] to call system. Call bell, personal items and telephone within reach. Instruct patient to call for assistance. Room bathroom lighting operational. Non-slip footwear when patient is off stretcher. Physically safe environment: no spills, clutter or unnecessary equipment. Stretcher in lowest position, wheels locked, appropriate side rails in place. Provide visual cue, wrist band, yellow gown, etc. Monitor gait and stability. Monitor for mental status changes and reorient to person, place, and time. Review medications for side effects contributing to fall risk. Reinforce activity limits and safety measures with patient and family.

## 2019-07-06 NOTE — ED PROVIDER NOTE - PROGRESS NOTE DETAILS
patient signed out pending ua. ua reviewed. patient given cipro. she is in good condition and d/tim with care instructions. f/up with pmd. Discussed results and outcome of testing with the patient.  Patient advised to please follow up with their primary care doctor within the next 24 hours and return to the Emergency Department for worsening symptoms or any other concerns.  Patient advised that their doctor may call  to follow up on the specific results of the tests performed today in the emergency department.

## 2019-07-06 NOTE — ED PROVIDER NOTE - OBJECTIVE STATEMENT
Pt is a 89 yo lady with a pmhx of HTn, HL who presents to the ED with lightheadedness. It started this morning. Had a rectal disimpaction after constipation. No n/v/d, no chest pain, no sob, no spinning sensation, no headache. Daughter feels she may be dehydrated.

## 2019-07-06 NOTE — ED ADULT NURSE NOTE - OBJECTIVE STATEMENT
pt c/o dizziness and weakness started today, unable to stand. denies loc. recently seen for constipation on tues. pt denies dizziness at this time. at baseline ambulatory with minimal assist.

## 2019-07-07 LAB
CULTURE RESULTS: SIGNIFICANT CHANGE UP
SPECIMEN SOURCE: SIGNIFICANT CHANGE UP

## 2019-07-30 ENCOUNTER — MEDICATION RENEWAL (OUTPATIENT)
Age: 84
End: 2019-07-30

## 2019-08-06 ENCOUNTER — RX RENEWAL (OUTPATIENT)
Age: 84
End: 2019-08-06

## 2019-08-08 ENCOUNTER — MEDICATION RENEWAL (OUTPATIENT)
Age: 84
End: 2019-08-08

## 2019-08-09 ENCOUNTER — MEDICATION RENEWAL (OUTPATIENT)
Age: 84
End: 2019-08-09

## 2019-08-09 RX ORDER — ATORVASTATIN CALCIUM 10 MG/1
10 TABLET, FILM COATED ORAL DAILY
Qty: 90 | Refills: 1 | Status: ACTIVE | COMMUNITY
Start: 2017-05-30 | End: 1900-01-01

## 2019-09-06 ENCOUNTER — APPOINTMENT (OUTPATIENT)
Dept: OBGYN | Facility: CLINIC | Age: 84
End: 2019-09-06

## 2019-10-03 ENCOUNTER — MEDICATION RENEWAL (OUTPATIENT)
Age: 84
End: 2019-10-03

## 2019-10-04 ENCOUNTER — APPOINTMENT (OUTPATIENT)
Dept: OBGYN | Facility: CLINIC | Age: 84
End: 2019-10-04
Payer: MEDICARE

## 2019-10-04 VITALS
SYSTOLIC BLOOD PRESSURE: 130 MMHG | HEIGHT: 61 IN | BODY MASS INDEX: 23.79 KG/M2 | DIASTOLIC BLOOD PRESSURE: 60 MMHG | WEIGHT: 126 LBS

## 2019-10-04 DIAGNOSIS — N81.10 CYSTOCELE, UNSPECIFIED: ICD-10-CM

## 2019-10-04 PROCEDURE — 99213 OFFICE O/P EST LOW 20 MIN: CPT

## 2019-10-04 NOTE — PHYSICAL EXAM
[No Lesions] : no genitalia lesions [Normal] : vagina [Labia Majora] : labia major [No Bleeding] : there was no active vaginal bleeding

## 2019-10-15 ENCOUNTER — MEDICATION RENEWAL (OUTPATIENT)
Age: 84
End: 2019-10-15

## 2019-11-14 RX ORDER — FUROSEMIDE 40 MG/1
40 TABLET ORAL
Qty: 90 | Refills: 1 | Status: ACTIVE | COMMUNITY
Start: 2017-05-19 | End: 1900-01-01

## 2019-11-21 ENCOUNTER — APPOINTMENT (OUTPATIENT)
Dept: CARDIOLOGY | Facility: CLINIC | Age: 84
End: 2019-11-21

## 2019-11-21 DIAGNOSIS — Z00.00 ENCOUNTER FOR GENERAL ADULT MEDICAL EXAMINATION W/OUT ABNORMAL FINDINGS: ICD-10-CM

## 2019-12-05 ENCOUNTER — LABORATORY RESULT (OUTPATIENT)
Age: 84
End: 2019-12-05

## 2019-12-14 LAB
25(OH)D3 SERPL-MCNC: 30 NG/ML
ALBUMIN SERPL ELPH-MCNC: 3.3 G/DL
ALP BLD-CCNC: 146 U/L
ALT SERPL-CCNC: 5 U/L
ANION GAP SERPL CALC-SCNC: 14 MMOL/L
APPEARANCE: ABNORMAL
AST SERPL-CCNC: 9 U/L
BASOPHILS # BLD AUTO: 0.04 K/UL
BASOPHILS NFR BLD AUTO: 0.6 %
BILIRUB SERPL-MCNC: 0.3 MG/DL
BILIRUBIN URINE: NEGATIVE
BLOOD URINE: NEGATIVE
BUN SERPL-MCNC: 15 MG/DL
CALCIUM SERPL-MCNC: 8.8 MG/DL
CHLORIDE SERPL-SCNC: 99 MMOL/L
CHOLEST SERPL-MCNC: 166 MG/DL
CHOLEST/HDLC SERPL: 2.5 RATIO
CO2 SERPL-SCNC: 30 MMOL/L
COLOR: YELLOW
CREAT SERPL-MCNC: 1.36 MG/DL
EOSINOPHIL # BLD AUTO: 0.07 K/UL
EOSINOPHIL NFR BLD AUTO: 1.1 %
ESTIMATED AVERAGE GLUCOSE: 126 MG/DL
GLUCOSE QUALITATIVE U: NEGATIVE
GLUCOSE SERPL-MCNC: 84 MG/DL
HBA1C MFR BLD HPLC: 6 %
HCT VFR BLD CALC: 36.4 %
HDLC SERPL-MCNC: 66 MG/DL
HGB BLD-MCNC: 11.3 G/DL
IMM GRANULOCYTES NFR BLD AUTO: 0.2 %
KETONES URINE: NEGATIVE
LDLC SERPL CALC-MCNC: 78 MG/DL
LEUKOCYTE ESTERASE URINE: ABNORMAL
LYMPHOCYTES # BLD AUTO: 2.34 K/UL
LYMPHOCYTES NFR BLD AUTO: 35.2 %
MAGNESIUM SERPL-MCNC: 2.1 MG/DL
MAN DIFF?: NORMAL
MCHC RBC-ENTMCNC: 29 PG
MCHC RBC-ENTMCNC: 31 GM/DL
MCV RBC AUTO: 93.3 FL
MONOCYTES # BLD AUTO: 0.49 K/UL
MONOCYTES NFR BLD AUTO: 7.4 %
NEUTROPHILS # BLD AUTO: 3.7 K/UL
NEUTROPHILS NFR BLD AUTO: 55.5 %
NITRITE URINE: NEGATIVE
PH URINE: 5.5
PHOSPHATE SERPL-MCNC: 3.7 MG/DL
PLATELET # BLD AUTO: 318 K/UL
POTASSIUM SERPL-SCNC: 3.6 MMOL/L
PROT SERPL-MCNC: 5.8 G/DL
PROTEIN URINE: NORMAL
RBC # BLD: 3.9 M/UL
RBC # FLD: 15.4 %
SODIUM SERPL-SCNC: 143 MMOL/L
SPECIFIC GRAVITY URINE: 1.02
T3RU NFR SERPL: 1 TBI
T4 FREE SERPL-MCNC: 0.9 NG/DL
T4 SERPL-MCNC: 5.3 UG/DL
TRIGL SERPL-MCNC: 110 MG/DL
TSH SERPL-ACNC: 1.84 UIU/ML
URATE SERPL-MCNC: 10.5 MG/DL
UROBILINOGEN URINE: ABNORMAL
WBC # FLD AUTO: 6.65 K/UL

## 2019-12-15 ENCOUNTER — RX RENEWAL (OUTPATIENT)
Age: 84
End: 2019-12-15

## 2020-01-04 ENCOUNTER — INPATIENT (INPATIENT)
Facility: HOSPITAL | Age: 85
LOS: 11 days | Discharge: NURSING, REHAB OR LONG-TERM CARE | End: 2020-01-16
Attending: INTERNAL MEDICINE | Admitting: INTERNAL MEDICINE
Payer: MEDICARE

## 2020-01-04 VITALS
TEMPERATURE: 98 F | OXYGEN SATURATION: 100 % | WEIGHT: 130.07 LBS | HEIGHT: 64 IN | DIASTOLIC BLOOD PRESSURE: 56 MMHG | SYSTOLIC BLOOD PRESSURE: 105 MMHG | HEART RATE: 80 BPM | RESPIRATION RATE: 19 BRPM

## 2020-01-04 DIAGNOSIS — I10 ESSENTIAL (PRIMARY) HYPERTENSION: ICD-10-CM

## 2020-01-04 DIAGNOSIS — R55 SYNCOPE AND COLLAPSE: ICD-10-CM

## 2020-01-04 DIAGNOSIS — S72.002A FRACTURE OF UNSPECIFIED PART OF NECK OF LEFT FEMUR, INITIAL ENCOUNTER FOR CLOSED FRACTURE: ICD-10-CM

## 2020-01-04 DIAGNOSIS — E78.00 PURE HYPERCHOLESTEROLEMIA, UNSPECIFIED: ICD-10-CM

## 2020-01-04 DIAGNOSIS — K29.50 UNSPECIFIED CHRONIC GASTRITIS WITHOUT BLEEDING: ICD-10-CM

## 2020-01-04 DIAGNOSIS — E87.6 HYPOKALEMIA: ICD-10-CM

## 2020-01-04 LAB
ALBUMIN SERPL ELPH-MCNC: 2.4 G/DL — LOW (ref 3.3–5)
ALLERGY+IMMUNOLOGY DIAG STUDY NOTE: SIGNIFICANT CHANGE UP
ALP SERPL-CCNC: 218 U/L — HIGH (ref 40–120)
ALT FLD-CCNC: 11 U/L — LOW (ref 12–78)
ANION GAP SERPL CALC-SCNC: 9 MMOL/L — SIGNIFICANT CHANGE UP (ref 5–17)
APTT BLD: 32.7 SEC — SIGNIFICANT CHANGE UP (ref 27.5–36.3)
AST SERPL-CCNC: 14 U/L — LOW (ref 15–37)
BASOPHILS # BLD AUTO: 0.05 K/UL — SIGNIFICANT CHANGE UP (ref 0–0.2)
BASOPHILS NFR BLD AUTO: 0.5 % — SIGNIFICANT CHANGE UP (ref 0–2)
BILIRUB SERPL-MCNC: 0.5 MG/DL — SIGNIFICANT CHANGE UP (ref 0.2–1.2)
BLD GP AB SCN SERPL QL: SIGNIFICANT CHANGE UP
BUN SERPL-MCNC: 15 MG/DL — SIGNIFICANT CHANGE UP (ref 7–23)
CALCIUM SERPL-MCNC: 8.5 MG/DL — SIGNIFICANT CHANGE UP (ref 8.5–10.1)
CHLORIDE SERPL-SCNC: 101 MMOL/L — SIGNIFICANT CHANGE UP (ref 96–108)
CO2 SERPL-SCNC: 29 MMOL/L — SIGNIFICANT CHANGE UP (ref 22–31)
CREAT SERPL-MCNC: 1.37 MG/DL — HIGH (ref 0.5–1.3)
DIR ANTIGLOB POLYSPECIFIC INTERPRETATION: SIGNIFICANT CHANGE UP
EOSINOPHIL # BLD AUTO: 0.11 K/UL — SIGNIFICANT CHANGE UP (ref 0–0.5)
EOSINOPHIL NFR BLD AUTO: 1 % — SIGNIFICANT CHANGE UP (ref 0–6)
GLUCOSE SERPL-MCNC: 139 MG/DL — HIGH (ref 70–99)
HCT VFR BLD CALC: 36.3 % — SIGNIFICANT CHANGE UP (ref 34.5–45)
HGB BLD-MCNC: 11.8 G/DL — SIGNIFICANT CHANGE UP (ref 11.5–15.5)
IMM GRANULOCYTES NFR BLD AUTO: 0.6 % — SIGNIFICANT CHANGE UP (ref 0–1.5)
INR BLD: 0.99 RATIO — SIGNIFICANT CHANGE UP (ref 0.88–1.16)
LYMPHOCYTES # BLD AUTO: 1.68 K/UL — SIGNIFICANT CHANGE UP (ref 1–3.3)
LYMPHOCYTES # BLD AUTO: 15.5 % — SIGNIFICANT CHANGE UP (ref 13–44)
MCHC RBC-ENTMCNC: 29.9 PG — SIGNIFICANT CHANGE UP (ref 27–34)
MCHC RBC-ENTMCNC: 32.5 GM/DL — SIGNIFICANT CHANGE UP (ref 32–36)
MCV RBC AUTO: 92.1 FL — SIGNIFICANT CHANGE UP (ref 80–100)
MONOCYTES # BLD AUTO: 0.61 K/UL — SIGNIFICANT CHANGE UP (ref 0–0.9)
MONOCYTES NFR BLD AUTO: 5.6 % — SIGNIFICANT CHANGE UP (ref 2–14)
NEUTROPHILS # BLD AUTO: 8.35 K/UL — HIGH (ref 1.8–7.4)
NEUTROPHILS NFR BLD AUTO: 76.8 % — SIGNIFICANT CHANGE UP (ref 43–77)
NRBC # BLD: 0 /100 WBCS — SIGNIFICANT CHANGE UP (ref 0–0)
PLATELET # BLD AUTO: 350 K/UL — SIGNIFICANT CHANGE UP (ref 150–400)
POTASSIUM SERPL-MCNC: 2.9 MMOL/L — CRITICAL LOW (ref 3.5–5.3)
POTASSIUM SERPL-SCNC: 2.9 MMOL/L — CRITICAL LOW (ref 3.5–5.3)
PROT SERPL-MCNC: 6.4 GM/DL — SIGNIFICANT CHANGE UP (ref 6–8.3)
PROTHROM AB SERPL-ACNC: 11.1 SEC — SIGNIFICANT CHANGE UP (ref 10–12.9)
RBC # BLD: 3.94 M/UL — SIGNIFICANT CHANGE UP (ref 3.8–5.2)
RBC # FLD: 14.6 % — HIGH (ref 10.3–14.5)
SODIUM SERPL-SCNC: 139 MMOL/L — SIGNIFICANT CHANGE UP (ref 135–145)
WBC # BLD: 10.87 K/UL — HIGH (ref 3.8–10.5)
WBC # FLD AUTO: 10.87 K/UL — HIGH (ref 3.8–10.5)

## 2020-01-04 PROCEDURE — 73502 X-RAY EXAM HIP UNI 2-3 VIEWS: CPT | Mod: 26,LT

## 2020-01-04 PROCEDURE — 71045 X-RAY EXAM CHEST 1 VIEW: CPT | Mod: 26

## 2020-01-04 PROCEDURE — 72125 CT NECK SPINE W/O DYE: CPT | Mod: 26

## 2020-01-04 PROCEDURE — 99221 1ST HOSP IP/OBS SF/LOW 40: CPT | Mod: 57,GC

## 2020-01-04 PROCEDURE — 93010 ELECTROCARDIOGRAM REPORT: CPT

## 2020-01-04 PROCEDURE — 70450 CT HEAD/BRAIN W/O DYE: CPT | Mod: 26

## 2020-01-04 PROCEDURE — 99285 EMERGENCY DEPT VISIT HI MDM: CPT

## 2020-01-04 PROCEDURE — 86077 PHYS BLOOD BANK SERV XMATCH: CPT

## 2020-01-04 PROCEDURE — 73552 X-RAY EXAM OF FEMUR 2/>: CPT | Mod: 26,LT

## 2020-01-04 RX ORDER — AMLODIPINE BESYLATE 2.5 MG/1
5 TABLET ORAL DAILY
Refills: 0 | Status: DISCONTINUED | OUTPATIENT
Start: 2020-01-04 | End: 2020-01-06

## 2020-01-04 RX ORDER — ONDANSETRON 8 MG/1
4 TABLET, FILM COATED ORAL ONCE
Refills: 0 | Status: COMPLETED | OUTPATIENT
Start: 2020-01-04 | End: 2020-01-04

## 2020-01-04 RX ORDER — MORPHINE SULFATE 50 MG/1
2 CAPSULE, EXTENDED RELEASE ORAL EVERY 6 HOURS
Refills: 0 | Status: DISCONTINUED | OUTPATIENT
Start: 2020-01-04 | End: 2020-01-06

## 2020-01-04 RX ORDER — MORPHINE SULFATE 50 MG/1
2 CAPSULE, EXTENDED RELEASE ORAL ONCE
Refills: 0 | Status: DISCONTINUED | OUTPATIENT
Start: 2020-01-04 | End: 2020-01-04

## 2020-01-04 RX ORDER — SODIUM CHLORIDE 9 MG/ML
1000 INJECTION INTRAMUSCULAR; INTRAVENOUS; SUBCUTANEOUS ONCE
Refills: 0 | Status: COMPLETED | OUTPATIENT
Start: 2020-01-04 | End: 2020-01-04

## 2020-01-04 RX ORDER — HEPARIN SODIUM 5000 [USP'U]/ML
5000 INJECTION INTRAVENOUS; SUBCUTANEOUS EVERY 8 HOURS
Refills: 0 | Status: COMPLETED | OUTPATIENT
Start: 2020-01-04 | End: 2020-01-05

## 2020-01-04 RX ORDER — SODIUM CHLORIDE 9 MG/ML
1000 INJECTION, SOLUTION INTRAVENOUS
Refills: 0 | Status: DISCONTINUED | OUTPATIENT
Start: 2020-01-04 | End: 2020-01-06

## 2020-01-04 RX ORDER — ACETAMINOPHEN 500 MG
1000 TABLET ORAL ONCE
Refills: 0 | Status: COMPLETED | OUTPATIENT
Start: 2020-01-04 | End: 2020-01-04

## 2020-01-04 RX ORDER — FAMOTIDINE 10 MG/ML
20 INJECTION INTRAVENOUS DAILY
Refills: 0 | Status: DISCONTINUED | OUTPATIENT
Start: 2020-01-04 | End: 2020-01-06

## 2020-01-04 RX ORDER — POTASSIUM CHLORIDE 20 MEQ
40 PACKET (EA) ORAL ONCE
Refills: 0 | Status: COMPLETED | OUTPATIENT
Start: 2020-01-04 | End: 2020-01-04

## 2020-01-04 RX ORDER — ATORVASTATIN CALCIUM 80 MG/1
10 TABLET, FILM COATED ORAL AT BEDTIME
Refills: 0 | Status: DISCONTINUED | OUTPATIENT
Start: 2020-01-04 | End: 2020-01-06

## 2020-01-04 RX ORDER — POTASSIUM CHLORIDE 20 MEQ
10 PACKET (EA) ORAL
Refills: 0 | Status: COMPLETED | OUTPATIENT
Start: 2020-01-04 | End: 2020-01-04

## 2020-01-04 RX ADMIN — Medication 100 MILLIEQUIVALENT(S): at 15:00

## 2020-01-04 RX ADMIN — Medication 10 MILLIEQUIVALENT(S): at 14:48

## 2020-01-04 RX ADMIN — Medication 100 MILLIEQUIVALENT(S): at 17:08

## 2020-01-04 RX ADMIN — ONDANSETRON 4 MILLIGRAM(S): 8 TABLET, FILM COATED ORAL at 10:39

## 2020-01-04 RX ADMIN — Medication 40 MILLIEQUIVALENT(S): at 13:44

## 2020-01-04 RX ADMIN — ATORVASTATIN CALCIUM 10 MILLIGRAM(S): 80 TABLET, FILM COATED ORAL at 22:41

## 2020-01-04 RX ADMIN — SODIUM CHLORIDE 1000 MILLILITER(S): 9 INJECTION INTRAMUSCULAR; INTRAVENOUS; SUBCUTANEOUS at 11:45

## 2020-01-04 RX ADMIN — Medication 1000 MILLIGRAM(S): at 11:30

## 2020-01-04 RX ADMIN — MORPHINE SULFATE 2 MILLIGRAM(S): 50 CAPSULE, EXTENDED RELEASE ORAL at 11:13

## 2020-01-04 RX ADMIN — Medication 100 MILLIEQUIVALENT(S): at 13:48

## 2020-01-04 RX ADMIN — SODIUM CHLORIDE 75 MILLILITER(S): 9 INJECTION, SOLUTION INTRAVENOUS at 22:42

## 2020-01-04 RX ADMIN — HEPARIN SODIUM 5000 UNIT(S): 5000 INJECTION INTRAVENOUS; SUBCUTANEOUS at 22:41

## 2020-01-04 RX ADMIN — SODIUM CHLORIDE 1000 MILLILITER(S): 9 INJECTION INTRAMUSCULAR; INTRAVENOUS; SUBCUTANEOUS at 10:40

## 2020-01-04 RX ADMIN — MORPHINE SULFATE 2 MILLIGRAM(S): 50 CAPSULE, EXTENDED RELEASE ORAL at 11:30

## 2020-01-04 RX ADMIN — Medication 400 MILLIGRAM(S): at 11:18

## 2020-01-04 RX ADMIN — Medication 1000 MILLIGRAM(S): at 11:32

## 2020-01-04 RX ADMIN — MORPHINE SULFATE 2 MILLIGRAM(S): 50 CAPSULE, EXTENDED RELEASE ORAL at 20:58

## 2020-01-04 NOTE — H&P ADULT - PROBLEM SELECTOR PROBLEM 5
Attempted to contact patient, no answer, left voice message to call back.    
Pt calls back. See her BP readings below.   She states she has bilateral edema in lower extremities on a daily basis. Below the knees. She works for [employer] and it is after she stands all day. In the AM it is better.   She states she cannot get any kind of OTC compression stockings on, they are too small.     No pain. About the same in size.     She states she used to have a water pill, but then would be in the bathroom all day.     Please advise   
Pt calls back. She states the stockings she has are prescription compression stockings that she got a few years ago from Cardiology in [place]. She cannot get them pulled on anymore. She recalls getting them at a medical supply store.   She is going to call the supply store to see what size/compression they are.   She will call back if needed.   
Pt calls, giving update on BPs. States she was instructed to decrease Lisinopril from 10mg to 5 mg daily. Has been taking the medication at night, but is skipping tonight's dose to transition to taking in AM.    03/17 AM - 137/81            PM - 145/85  03/18 AM - 142/96            PM - none  03/19 AM - 112/72            PM - 135/83  03/20 AM - 145/92    Routed to PCP as update.  
The instructions were to take 1/2 tablet for 2-3 nights, then go up to the full tablet. She can stay at 5 mg for another week, but then if still getting readings over 140/90, go up to 10 mg.   I can do DME to get larger stockings through the [facility] [employer] supply service. If they can not get them large enough, she may have to get fitted jobst stockings. Does she want me to send order?  
Other chronic gastritis

## 2020-01-04 NOTE — H&P ADULT - ASSESSMENT
IMPROVE VTE Individual Risk Assessment    RISK                                                                Points    [  ] Previous VTE                                                  3    [  ] Thrombophilia                                               2    [  ] Lower limb paralysis                                      2        (unable to hold up >15 seconds)      [  ] Current Cancer                                              2         (within 6 months)    [  ] Immobilization > 24 hrs                                1    [  ] ICU/CCU stay > 24 hours                              1    [  ] Age > 60                                                      1    IMPROVE VTE Score _________    IMPROVE Score 0-1: Low Risk, No VTE prophylaxis required for most patients, encourage ambulation.   IMPROVE Score 2-3: At risk, pharmacologic VTE prophylaxis is indicated for most patients (in the absence of a contraindication)  IMPROVE Score > or = 4: High Risk, pharmacologic VTE prophylaxis is indicated for most patients (in the absence of a contraindication)    89 y/o presents to ED s/p fall this morning. Pt states she does not recall how she fell. Pt's left leg collapsed under her and she fell on right side. Pt normally ambulates alone, the event was unwitnessed and she denies head trauma, +left hip pain, and dry mouth. Pt could not ambulate after fall secondary to pain. Pt denies neck, back pain

## 2020-01-04 NOTE — CONSULT NOTE ADULT - SUBJECTIVE AND OBJECTIVE BOX
89y Female community ambulator presents c/o left hip pain and inability to ambulate sp mechanical fall. She did have head strike, questionable syncopal event (CT head/CSp negative in ED). Denies numbness/tingling. Denies fever/chills. Denies pain/injury elsewhere. She ambulates without assistance at baseline, Aspirin 81 mg for anticoagulation.     FALL , HIP PAIN  MEWS Score  Other chronic gastritis  Hypercholesterolemia  Benign hypertension  Closed fracture of left hip, initial encounter  Other chronic gastritis  Hypercholesterolemia  Benign hypertension  Syncope and collapse  Closed fracture of left hip, initial encounter  FALL , HIP PAIN  Syncope and collapse    PAST MEDICAL & SURGICAL HISTORY:  Other chronic gastritis  Hypercholesterolemia  Benign hypertension    MEDICATIONS  (STANDING):  amLODIPine   Tablet 5 milliGRAM(s) Oral daily  atorvastatin 10 milliGRAM(s) Oral at bedtime  famotidine    Tablet 20 milliGRAM(s) Oral daily  potassium chloride  10 mEq/100 mL IVPB 10 milliEquivalent(s) IV Intermittent every 1 hour    Allergies    No Known Allergies    Intolerances                            11.8   10.87 )-----------( 350      ( 04 Jan 2020 11:14 )             36.3     04 Jan 2020 11:14    139    |  101    |  15     ----------------------------<  139    2.9     |  29     |  1.37     Ca    8.5        04 Jan 2020 11:14    TPro  6.4    /  Alb  2.4    /  TBili  0.5    /  DBili  x      /  AST  14     /  ALT  11     /  AlkPhos  218    04 Jan 2020 11:14    PT/INR - ( 04 Jan 2020 11:14 )   PT: 11.1 sec;   INR: 0.99 ratio         PTT - ( 04 Jan 2020 11:14 )  PTT:32.7 sec  Vital Signs Last 24 Hrs  T(C): 36.3 (01-04-20 @ 12:03), Max: 36.6 (01-04-20 @ 09:37)  T(F): 97.4 (01-04-20 @ 12:03), Max: 97.8 (01-04-20 @ 09:37)  HR: 87 (01-04-20 @ 14:03) (77 - 87)  BP: 116/63 (01-04-20 @ 14:03) (83/43 - 126/65)  BP(mean): --  RR: 21 (01-04-20 @ 14:03) (13 - 21)  SpO2: 100% (01-04-20 @ 09:37) (100% - 100%)    Imaging:   XR L Hip: L femoral neck fracture    Physical Exam  General: NAD, resting comfortably  LLE:   Skin intact  +TTP over hip  Limited ROM d/t pain, unable to SLR  +TA/EHL/FHL/GS  SILT L2-S1  DP/PT 2+  Compartments soft and compressible  Calves NTTP b/l    BLUE: No bony tenderness or deformity, skin intact  RLE: No bony tenderness or deformity, skin intact  Spine: No tenderness or stepoffs, skin intact    A/P: 89y Female with L femoral neck fracture  -Pain control  -NPO after midnight for OR on 1/6  -NWB LLE, bedrest  -DVT ppx-hold prior to midnight day of surgery  -FU labs/imaging  -Admit to medical team  -Medical clearance/optimization for OR appreciated.  -Discussed above with Ortho Attending

## 2020-01-04 NOTE — ED PROVIDER NOTE - SECONDARY DIAGNOSIS.
Patient calling, stated that he had surgery on 03/02/17 by Dr. Chappell and will like to know if he is to come in to have staples removed.  Patient will also like to know if there are any restrictions and when is he able to return to work.  Best contact number for patient is [phone].   Syncope and collapse

## 2020-01-04 NOTE — H&P ADULT - HISTORY OF PRESENT ILLNESS
HPI Objective Statement: 89 y/o presents to ED s/p fall this morning. Pt states she does not recall how she fell. Pt's left leg collapsed under her and she fell on right side. Pt normally ambulates alone, the event was unwitnessed and she denies head trauma, +left hip pain, and dry mouth. Pt could not ambulate after fall secondary to pain. Pt denies neck, back pain, Ha, dizziness, abdominal pain (-) chest pain (-) sob Pt reports last bowel movement unknown. Pt lives at home   Admitted with fracture Left Hip.

## 2020-01-04 NOTE — CONSULT NOTE ADULT - ATTENDING COMMENTS
left femoral neck fx, displaced.  plan for left hip hemiarthroplasty.  med mgmt.  pain control.  dvtp left femoral neck fx, displaced.  plan for left hip hemiarthroplasty. patient not cleared for surgery today.  will plan for surgery 1/6.  med mgmt.  pain control.  dvtp

## 2020-01-04 NOTE — ED ADULT TRIAGE NOTE - CHIEF COMPLAINT QUOTE
patient  c/o of L hip pain patient had an unwitnessed fall today , can not remember what happened , patient had\s a hematoma R eye, patient A&Ox3 at the time of triage denied chest pain denied difficulty breathing , denied headache

## 2020-01-04 NOTE — ED ADULT NURSE NOTE - OBJECTIVE STATEMENT
pt alert, orient x 2, s/p fall, found on bathroom floor, hematoma to left forehead noted, pt c/o left hip pain. pt also c/o constipation

## 2020-01-04 NOTE — H&P ADULT - NSHPPHYSICALEXAM_GEN_ALL_CORE
GENERAL: NAD, well-groomed, well-developed  HEAD:  Atraumatic, Normocephalic  EYES: EOMI, PERRLA\, conjunctiva and sclera clear. Hematoma rt. eye.  ENMT:  Moist mucous membranes, Good dentition, No lesions  NECK: Supple, No JVD, Normal thyroid  NERVOUS SYSTEM:  Alert & Oriented X3, Good concentration; Motor Strength 5/5 B/L upper and lower extremities; DTRs 2+ intact and symmetric  CHEST/LUNG: Clear to percussion bilaterally; No rales, rhonchi, wheezing, or rubs  HEART: Regular rate and rhythm; No murmurs, rubs, or gallops  ABDOMEN: Soft, Nontender, Nondistended; Bowel sounds present  EXTREMITIES:  2+ Peripheral Pulses, No clubbing, cyanosis, or edema. Left leg externally rotated.   LYMPH: No lymphadenopathy noted  SKIN: No rashes or lesions      Vital Signs Last 24 Hrs  T(C): 36.3 (04 Jan 2020 12:03), Max: 36.6 (04 Jan 2020 09:37)  T(F): 97.4 (04 Jan 2020 12:03), Max: 97.8 (04 Jan 2020 09:37)  HR: 87 (04 Jan 2020 14:03) (77 - 87)  BP: 116/63 (04 Jan 2020 14:03) (83/43 - 126/65)  BP(mean): --  RR: 21 (04 Jan 2020 14:03) (13 - 21)  SpO2: 100% (04 Jan 2020 09:37) (100% - 100%) GENERAL: NAD, well-groomed, well-developed  HEAD:  Atraumatic, Normocephalic  EYES: EOMI, PERRLA\, conjunctiva and sclera clear. Roxy Orbital ecchymosis left eye with supra orbital hematoma.  ENMT:  Moist mucous membranes, Good dentition, No lesions  NECK: Supple, No JVD, Normal thyroid  NERVOUS SYSTEM:  Alert & Oriented X3, Good concentration; Motor Strength 5/5 B/L upper and lower extremities; DTRs 2+ intact and symmetric  CHEST/LUNG: Clear to percussion bilaterally; No rales, rhonchi, wheezing, or rubs  HEART: Regular rate and rhythm; No murmurs, rubs, or gallops  ABDOMEN: Soft, Nontender, Nondistended; Bowel sounds present  EXTREMITIES:  2+ Peripheral Pulses, No clubbing, cyanosis, or edema. Left leg externally rotated.   LYMPH: No lymphadenopathy noted  SKIN: No rashes or lesions      Vital Signs Last 24 Hrs  T(C): 36.3 (04 Jan 2020 12:03), Max: 36.6 (04 Jan 2020 09:37)  T(F): 97.4 (04 Jan 2020 12:03), Max: 97.8 (04 Jan 2020 09:37)  HR: 87 (04 Jan 2020 14:03) (77 - 87)  BP: 116/63 (04 Jan 2020 14:03) (83/43 - 126/65)  BP(mean): --  RR: 21 (04 Jan 2020 14:03) (13 - 21)  SpO2: 100% (04 Jan 2020 09:37) (100% - 100%)

## 2020-01-04 NOTE — ED PROVIDER NOTE - OBJECTIVE STATEMENT
89 y/o presents to ED s/p fall this morning. Pt states she does not recall how she fell. Pt's left leg collapsed under her and she fell on right side. Pt normally ambulates alone. Pt reports head trauma, left hip pain, and dry mouth. Pt could not ambulate after fall secondary to pain. Pt denies neck, back pain, Ha, dizziness, abdominal pain. Pt reports last bowel movement unknown. Pt lives at home and was BIB by son and step daughter. 89 y/o presents to ED s/p fall this morning. Pt states she does not recall how she fell. Pt's left leg collapsed under her and she fell on right side. Pt normally ambulates alone, the event was unwitnessed and she denies head trauma, +left hip pain, and dry mouth. Pt could not ambulate after fall secondary to pain. Pt denies neck, back pain, Ha, dizziness, abdominal pain (-) chest pain (-) sob Pt reports last bowel movement unknown. Pt lives at home and was BIB by son and step daughter.

## 2020-01-04 NOTE — H&P ADULT - NSHPLABSRESULTS_GEN_ALL_CORE
11.8   10.87 )-----------( 350      ( 04 Jan 2020 11:14 )             36.3     01-04    139  |  101  |  15  ----------------------------<  139<H>  2.9<LL>   |  29  |  1.37<H>    Ca    8.5      04 Jan 2020 11:14    TPro  6.4  /  Alb  2.4<L>  /  TBili  0.5  /  DBili  x   /  AST  14<L>  /  ALT  11<L>  /  AlkPhos  218<H>  01-04    PT/INR - ( 04 Jan 2020 11:14 )   PT: 11.1 sec;   INR: 0.99 ratio         PTT - ( 04 Jan 2020 11:14 )  PTT:32.7 sec    CAPILLARY BLOOD GLUCOSE                Urine Culture:      Blood Culture:    < from: CT Head No Cont (01.04.20 @ 13:21) >    IMPRESSION:       No evidence of acute intracranial abnormality.  No evidence of hemorrhage.      Age-related involutional changes as above.    < end of copied text >    < from: CT Cervical Spine No Cont (01.04.20 @ 13:22) >    Cervical vertebral body heights are maintained. No vertebral fracture is seen. No destructive bone lesion is found.  Alignment is preserved.  Facet joints appear intact and aligned.    Cervical intervertebral disc spaces show multilevel degenerative disc disease and spondylosis with loss of intervertebral disc height and associated degenerative endplate changes. There is multilevel narrowing of the neural foramina on a degenerative basis. There is no evidence of high-grade central canal stenosis.   MR would be required to evaluate the intervertebral discs at higher sensitivity for disc pathology.    The skull base appears intact.  No neck mass is recognized.  Paraspinal soft tissues appear intact. Visualized lymph nodes appear to be within physiologic size limits.    < end of copied text >

## 2020-01-04 NOTE — ED PROVIDER NOTE - CLINICAL SUMMARY MEDICAL DECISION MAKING FREE TEXT BOX
pt presented s/p syncope with a left hip fracture, pt admitted for syncope workup and orthopedic intervention

## 2020-01-04 NOTE — ED PROVIDER NOTE - CARE PLAN
Principal Discharge DX:	Closed fracture of left hip, initial encounter  Secondary Diagnosis:	Syncope and collapse

## 2020-01-04 NOTE — ED PROVIDER NOTE - PSYCHIATRIC, MLM
Nori Alert and oriented to person, place, time/situation. normal mood and affect. no apparent risk to self or others.

## 2020-01-04 NOTE — ED PROVIDER NOTE - MUSCULOSKELETAL, MLM
Spine appears normal, range of motion is not limited, + right hip tenderness, kyphosis on spine in thoracic area, left LE shortened and externally rotated

## 2020-01-05 ENCOUNTER — TRANSCRIPTION ENCOUNTER (OUTPATIENT)
Age: 85
End: 2020-01-05

## 2020-01-05 LAB
ANION GAP SERPL CALC-SCNC: 10 MMOL/L — SIGNIFICANT CHANGE UP (ref 5–17)
BASOPHILS # BLD AUTO: 0.02 K/UL — SIGNIFICANT CHANGE UP (ref 0–0.2)
BASOPHILS NFR BLD AUTO: 0.2 % — SIGNIFICANT CHANGE UP (ref 0–2)
BUN SERPL-MCNC: 13 MG/DL — SIGNIFICANT CHANGE UP (ref 7–23)
CALCIUM SERPL-MCNC: 7.9 MG/DL — LOW (ref 8.5–10.1)
CHLORIDE SERPL-SCNC: 103 MMOL/L — SIGNIFICANT CHANGE UP (ref 96–108)
CO2 SERPL-SCNC: 27 MMOL/L — SIGNIFICANT CHANGE UP (ref 22–31)
CREAT SERPL-MCNC: 1.37 MG/DL — HIGH (ref 0.5–1.3)
EOSINOPHIL # BLD AUTO: 0.01 K/UL — SIGNIFICANT CHANGE UP (ref 0–0.5)
EOSINOPHIL NFR BLD AUTO: 0.1 % — SIGNIFICANT CHANGE UP (ref 0–6)
GLUCOSE SERPL-MCNC: 114 MG/DL — HIGH (ref 70–99)
HCT VFR BLD CALC: 35.6 % — SIGNIFICANT CHANGE UP (ref 34.5–45)
HGB BLD-MCNC: 11.2 G/DL — LOW (ref 11.5–15.5)
IMM GRANULOCYTES NFR BLD AUTO: 0.3 % — SIGNIFICANT CHANGE UP (ref 0–1.5)
LYMPHOCYTES # BLD AUTO: 1.04 K/UL — SIGNIFICANT CHANGE UP (ref 1–3.3)
LYMPHOCYTES # BLD AUTO: 10.5 % — LOW (ref 13–44)
MCHC RBC-ENTMCNC: 29.6 PG — SIGNIFICANT CHANGE UP (ref 27–34)
MCHC RBC-ENTMCNC: 31.5 GM/DL — LOW (ref 32–36)
MCV RBC AUTO: 93.9 FL — SIGNIFICANT CHANGE UP (ref 80–100)
MONOCYTES # BLD AUTO: 0.79 K/UL — SIGNIFICANT CHANGE UP (ref 0–0.9)
MONOCYTES NFR BLD AUTO: 8 % — SIGNIFICANT CHANGE UP (ref 2–14)
NEUTROPHILS # BLD AUTO: 8.02 K/UL — HIGH (ref 1.8–7.4)
NEUTROPHILS NFR BLD AUTO: 80.9 % — HIGH (ref 43–77)
NRBC # BLD: 0 /100 WBCS — SIGNIFICANT CHANGE UP (ref 0–0)
PLATELET # BLD AUTO: 321 K/UL — SIGNIFICANT CHANGE UP (ref 150–400)
POTASSIUM SERPL-MCNC: 3.7 MMOL/L — SIGNIFICANT CHANGE UP (ref 3.5–5.3)
POTASSIUM SERPL-SCNC: 3.7 MMOL/L — SIGNIFICANT CHANGE UP (ref 3.5–5.3)
RBC # BLD: 3.79 M/UL — LOW (ref 3.8–5.2)
RBC # FLD: 15.1 % — HIGH (ref 10.3–14.5)
SODIUM SERPL-SCNC: 140 MMOL/L — SIGNIFICANT CHANGE UP (ref 135–145)
WBC # BLD: 9.91 K/UL — SIGNIFICANT CHANGE UP (ref 3.8–10.5)
WBC # FLD AUTO: 9.91 K/UL — SIGNIFICANT CHANGE UP (ref 3.8–10.5)

## 2020-01-05 PROCEDURE — 93880 EXTRACRANIAL BILAT STUDY: CPT | Mod: 26

## 2020-01-05 RX ORDER — ACETAMINOPHEN 500 MG
1000 TABLET ORAL ONCE
Refills: 0 | Status: COMPLETED | OUTPATIENT
Start: 2020-01-05 | End: 2020-01-05

## 2020-01-05 RX ADMIN — MORPHINE SULFATE 2 MILLIGRAM(S): 50 CAPSULE, EXTENDED RELEASE ORAL at 11:16

## 2020-01-05 RX ADMIN — Medication 400 MILLIGRAM(S): at 23:57

## 2020-01-05 RX ADMIN — MORPHINE SULFATE 2 MILLIGRAM(S): 50 CAPSULE, EXTENDED RELEASE ORAL at 18:15

## 2020-01-05 RX ADMIN — ATORVASTATIN CALCIUM 10 MILLIGRAM(S): 80 TABLET, FILM COATED ORAL at 23:03

## 2020-01-05 RX ADMIN — AMLODIPINE BESYLATE 5 MILLIGRAM(S): 2.5 TABLET ORAL at 06:18

## 2020-01-05 RX ADMIN — HEPARIN SODIUM 5000 UNIT(S): 5000 INJECTION INTRAVENOUS; SUBCUTANEOUS at 13:14

## 2020-01-05 RX ADMIN — MORPHINE SULFATE 2 MILLIGRAM(S): 50 CAPSULE, EXTENDED RELEASE ORAL at 11:36

## 2020-01-05 RX ADMIN — FAMOTIDINE 20 MILLIGRAM(S): 10 INJECTION INTRAVENOUS at 11:16

## 2020-01-05 RX ADMIN — HEPARIN SODIUM 5000 UNIT(S): 5000 INJECTION INTRAVENOUS; SUBCUTANEOUS at 06:18

## 2020-01-05 RX ADMIN — MORPHINE SULFATE 2 MILLIGRAM(S): 50 CAPSULE, EXTENDED RELEASE ORAL at 05:40

## 2020-01-05 RX ADMIN — MORPHINE SULFATE 2 MILLIGRAM(S): 50 CAPSULE, EXTENDED RELEASE ORAL at 05:22

## 2020-01-05 RX ADMIN — HEPARIN SODIUM 5000 UNIT(S): 5000 INJECTION INTRAVENOUS; SUBCUTANEOUS at 23:03

## 2020-01-05 RX ADMIN — MORPHINE SULFATE 2 MILLIGRAM(S): 50 CAPSULE, EXTENDED RELEASE ORAL at 18:30

## 2020-01-05 NOTE — PROGRESS NOTE ADULT - SUBJECTIVE AND OBJECTIVE BOX
Patient is a 89y old  Female who presents with a chief complaint of Fall at home. Fracture left hip. (05 Jan 2020 08:40)      INTERVAL HPI/OVERNIGHT EVENTS:    MEDICATIONS  (STANDING):  amLODIPine   Tablet 5 milliGRAM(s) Oral daily  atorvastatin 10 milliGRAM(s) Oral at bedtime  famotidine    Tablet 20 milliGRAM(s) Oral daily  heparin  Injectable 5000 Unit(s) SubCutaneous every 8 hours  lactated ringers. 1000 milliLiter(s) (75 mL/Hr) IV Continuous <Continuous>    MEDICATIONS  (PRN):  morphine  - Injectable 2 milliGRAM(s) IV Push every 6 hours PRN Severe Pain (7 - 10)      Allergies    No Known Allergies    Intolerances        REVIEW OF SYSTEMS:  CONSTITUTIONAL: No fever, weight loss, or fatigue  EYES: No eye pain, visual disturbances, or discharge  ENMT:  No difficulty hearing, tinnitus, vertigo; No sinus or throat pain  NECK: No pain or stiffness  BREASTS: No pain, masses, or nipple discharge  RESPIRATORY: No cough, wheezing, chills or hemoptysis; No shortness of breath  CARDIOVASCULAR: No chest pain, palpitations, dizziness, or leg swelling  GASTROINTESTINAL: No abdominal or epigastric pain. No nausea, vomiting, or hematemesis; No diarrhea or constipation. No melena or hematochezia.  GENITOURINARY: No dysuria, frequency, hematuria, or incontinence  NEUROLOGICAL: No headaches, memory loss, loss of strength, numbness, or tremors  SKIN: No itching, burning, rashes, or lesions   LYMPH NODES: No enlarged glands  ENDOCRINE: No heat or cold intolerance; No hair loss  MUSCULOSKELETAL: No joint pain or swelling; No muscle, back, or extremity pain  PSYCHIATRIC: No depression, anxiety, mood swings, or difficulty sleeping  HEME/LYMPH: No easy bruising, or bleeding gums  ALLERGY AND IMMUNOLOGIC: No hives or eczema    Vital Signs Last 24 Hrs  T(C): 36.8 (05 Jan 2020 05:15), Max: 36.8 (05 Jan 2020 00:10)  T(F): 98.3 (05 Jan 2020 05:15), Max: 98.3 (05 Jan 2020 00:10)  HR: 108 (05 Jan 2020 05:15) (77 - 108)  BP: 128/55 (05 Jan 2020 05:15) (83/43 - 139/71)  BP(mean): --  RR: 18 (05 Jan 2020 05:15) (13 - 25)  SpO2: 90% (05 Jan 2020 05:15) (90% - 100%)    PHYSICAL EXAM:  GENERAL: NAD, well-groomed, well-developed  HEAD:  Atraumatic, Normocephalic  EYES: EOMI, PERRL, conjunctiva and sclera clear  ENMT:  Moist mucous membranes, Good dentition, No lesions  NECK: Supple, No JVD, Normal thyroid  NERVOUS SYSTEM:  Alert & Oriented X3, Good concentration; Motor Strength 5/5 B/L upper and lower extremities; DTRs 2+ intact and symmetric  CHEST/LUNG: Clear to percussion bilaterally; No rales, rhonchi, wheezing, or rubs  HEART: Regular rate and rhythm; No murmurs, rubs, or gallops  ABDOMEN: Soft, Nontender, Nondistended; Bowel sounds present  EXTREMITIES:  2+ Peripheral Pulses, No clubbing, cyanosis, or edema  LYMPH: No lymphadenopathy noted  SKIN: No rashes or lesions    LABS:                        11.2   9.91  )-----------( 321      ( 05 Jan 2020 07:39 )             35.6     01-05    140  |  103  |  13  ----------------------------<  114<H>  3.7   |  27  |  1.37<H>    Ca    7.9<L>      05 Jan 2020 07:39    TPro  6.4  /  Alb  2.4<L>  /  TBili  0.5  /  DBili  x   /  AST  14<L>  /  ALT  11<L>  /  AlkPhos  218<H>  01-04    PT/INR - ( 04 Jan 2020 11:14 )   PT: 11.1 sec;   INR: 0.99 ratio         PTT - ( 04 Jan 2020 11:14 )  PTT:32.7 sec    CAPILLARY BLOOD GLUCOSE                        RADIOLOGY & ADDITIONAL TESTS:    Imaging Personally Reviewed:  [ ] YES  [ ] NO    Consultant(s) Notes Reviewed:  [ ] YES  [ ] NO    Care Discussed with Consultants/Other Providers [ ] YES  [ ] NO    PROBLEMS:  CLOSED FRACTURE OF LEFT HIP; INITIAL ENCOUNTER;SYNCOPE  FALL , HIP PAIN  Closed fracture of left hip, initial encounter  Hypokalemia due to excessive renal loss of potassium  Other chronic gastritis  Hypercholesterolemia  Benign hypertension  Syncope and collapse  Closed fracture of left hip, initial encounter      Care discussed with family,         [  ]   yes  [  ]  No    imp:    stable[ ]    unstable[  ]     improving [   ]       unchanged  [  ]                Plans:  Continue present plans  [  ]               New consult [  ]   specialty  .......               order aaron[  ]    test name.                  Discharge Planning  [  ]

## 2020-01-05 NOTE — CONSULT NOTE ADULT - SUBJECTIVE AND OBJECTIVE BOX
HPI:  HPI:  HPI Objective Statement: 89 y/o presents to ED s/p fall this morning. Pt states she does not recall how she fell. Pt's left leg collapsed under her and she fell on right side. Pt normally ambulates alone, the event was unwitnessed and she denies head trauma, +left hip pain, and dry mouth. Pt could not ambulate after fall secondary to pain. Pt denies neck, back pain, Ha, dizziness, abdominal pain (-) chest pain (-) sob Pt reports last bowel movement unknown. Pt lives at home   Admitted with fracture Left Hip. (2020 15:04)      Chief Complaint:  Patient is a 89y old  Female who presents with a chief complaint of Fall at home. Fracture left hip. (2020 09:37)      Review of Systems:    General:  No wt loss, fevers, chills, night sweats  Eyes:  Good vision, no reported pain  ENT:  No sore throat, pain, runny nose, dysphagia  CV:  No pain, palpitations, hypo/hypertension  Resp:  No dyspnea, cough, tachypnea, wheezing  GI:  No pain, nausea, vomiting, diarrhea, constipation         Social History/Family History  SOCHX:   tobacco,  -  alcohol    FMHX: FA/MO  - contributory       Discussed with:  PMD, Family    Physical Exam:    Vital Signs:  Vital Signs Last 24 Hrs  T(C): 36.5 (2020 17:05), Max: 36.8 (2020 00:10)  T(F): 97.7 (2020 17:05), Max: 98.3 (2020 00:10)  HR: 112 (2020 17:05) (94 - 112)  BP: 153/76 (2020 17:05) (126/57 - 153/76)  BP(mean): --  RR: 18 (2020 17:05) (17 - 18)  SpO2: 94% (2020 17:05) (90% - 94%)  Daily     Daily Weight in k.2 (2020 05:15)  I&O's Summary    2020 07:01  -  2020 22:46  --------------------------------------------------------  IN: 100 mL / OUT: 0 mL / NET: 100 mL          Chest:  Full & symmetric excursion, no increased effort, breath sounds clear  Cardiovascular:  Regular rhythm, S1, S2, no murmur/rub/S3/S4, no carotid/femoral/abdominal bruit, radial/pedal pulses 2+  Abdomen:  Soft, non-tender, non-distended, normoactive bowel sounds, no HSM    Laboratory:                          11.2   9.91  )-----------( 321      ( 2020 07:39 )             35.6         140  |  103  |  13  ----------------------------<  114<H>  3.7   |  27  |  1.37<H>    Ca    7.9<L>      2020 07:39    TPro  6.4  /  Alb  2.4<L>  /  TBili  0.5  /  DBili  x   /  AST  14<L>  /  ALT  11<L>  /  AlkPhos  218<H>  01-04          CAPILLARY BLOOD GLUCOSE        LIVER FUNCTIONS - ( 2020 11:14 )  Alb: 2.4 g/dL / Pro: 6.4 gm/dL / ALK PHOS: 218 U/L / ALT: 11 U/L / AST: 14 U/L / GGT: x           PT/INR - ( 2020 11:14 )   PT: 11.1 sec;   INR: 0.99 ratio         PTT - ( 2020 11:14 )  PTT:32.7 sec          Assessment:  I am asked to assess this patient for cardiovascular risk assessment for left hip surgery  The patient's EKG is without acute changes  The patient's lab testing and other diagnostic testing are reviewed  There is no significant murmur on exam  The patient's cardiovascular risk assessment is increased based on her age, however she is deemed acceptable risk for this orthopedic surgery for corrective surgery of the left hip  May proceed with surgery

## 2020-01-05 NOTE — PROGRESS NOTE ADULT - ASSESSMENT
IMPROVE VTE Individual Risk Assessment    RISK                                                                Points    [  ] Previous VTE                                                  3    [  ] Thrombophilia                                               2    [  ] Lower limb paralysis                                      2        (unable to hold up >15 seconds)      [  ] Current Cancer                                              2         (within 6 months)    [  ] Immobilization > 24 hrs                                1    [  ] ICU/CCU stay > 24 hours                              1    [  ] Age > 60                                                      1    IMPROVE VTE Score _________    IMPROVE Score 0-1: Low Risk, No VTE prophylaxis required for most patients, encourage ambulation.   IMPROVE Score 2-3: At risk, pharmacologic VTE prophylaxis is indicated for most patients (in the absence of a contraindication)  IMPROVE Score > or = 4: High Risk, pharmacologic VTE prophylaxis is indicated for most patients (in the absence of a contraindication)    89 y/o presents to ED s/p fall this morning. Pt states she does not recall how she fell. Pt's left leg collapsed under her and she fell on right side. Pt normally ambulates alone, the event was unwitnessed and she denies head trauma, +left hip pain, and dry mouth. Pt could not ambulate after fall secondary to pain. Pt denies neck, back pain.  01/05/2020 : Pt. asymptomatic. Hypokalemia resolved. Cardiology consult pending. Orthopedics f/u noted. Monitor CBC.

## 2020-01-05 NOTE — PROGRESS NOTE ADULT - ATTENDING COMMENTS
plan for surgical intervention 1/6.  cards ceval pending plan for surgical intervention 1/6.  cards eval pending

## 2020-01-05 NOTE — PROGRESS NOTE ADULT - SUBJECTIVE AND OBJECTIVE BOX
Orthopedics      Patient seen and examined at bedside. Feeling well. Pain controlled. No n/v. No acute events overnight.    Vital Signs Last 24 Hrs  T(C): 36.8 (01-05-20 @ 05:15), Max: 36.8 (01-05-20 @ 00:10)  T(F): 98.3 (01-05-20 @ 05:15), Max: 98.3 (01-05-20 @ 00:10)  HR: 108 (01-05-20 @ 05:15) (77 - 108)  BP: 128/55 (01-05-20 @ 05:15) (83/43 - 139/71)  BP(mean): --  RR: 18 (01-05-20 @ 05:15) (13 - 25)  SpO2: 90% (01-05-20 @ 05:15) (90% - 100%)                        11.2   9.91  )-----------( 321      ( 05 Jan 2020 07:39 )             35.6     05 Jan 2020 07:39    140    |  103    |  13     ----------------------------<  114    3.7     |  27     |  1.37     Ca    7.9        05 Jan 2020 07:39    TPro  6.4    /  Alb  2.4    /  TBili  0.5    /  DBili  x      /  AST  14     /  ALT  11     /  AlkPhos  218    04 Jan 2020 11:14    PT/INR - ( 04 Jan 2020 11:14 )   PT: 11.1 sec;   INR: 0.99 ratio         PTT - ( 04 Jan 2020 11:14 )  PTT:32.7 sec    Exam:  Gen: NAD, resting comfortably  LLE:  NTTP calves b/l  +EHL/FHL/TA/GS  SILT L2-S1  Compartments soft and compressible  2+ Pulses palpable      A/P: 89yF with L FN fracture going to OR on Monday  -FU AM labs  -Pain control  -NWB LLE  -DVT ppx  -Incentive Spirometry  -Elevation to extremity  -Appreciate medical/cardiac clearance prior to OR for urgent surgery

## 2020-01-06 ENCOUNTER — RESULT REVIEW (OUTPATIENT)
Age: 85
End: 2020-01-06

## 2020-01-06 ENCOUNTER — TRANSCRIPTION ENCOUNTER (OUTPATIENT)
Age: 85
End: 2020-01-06

## 2020-01-06 LAB
ANION GAP SERPL CALC-SCNC: 6 MMOL/L — SIGNIFICANT CHANGE UP (ref 5–17)
ANION GAP SERPL CALC-SCNC: 6 MMOL/L — SIGNIFICANT CHANGE UP (ref 5–17)
APPEARANCE UR: ABNORMAL
APTT BLD: 42.3 SEC — HIGH (ref 27.5–36.3)
BILIRUB UR-MCNC: NEGATIVE — SIGNIFICANT CHANGE UP
BUN SERPL-MCNC: 14 MG/DL — SIGNIFICANT CHANGE UP (ref 7–23)
BUN SERPL-MCNC: 14 MG/DL — SIGNIFICANT CHANGE UP (ref 7–23)
CALCIUM SERPL-MCNC: 8.1 MG/DL — LOW (ref 8.5–10.1)
CALCIUM SERPL-MCNC: 8.4 MG/DL — LOW (ref 8.5–10.1)
CHLORIDE SERPL-SCNC: 105 MMOL/L — SIGNIFICANT CHANGE UP (ref 96–108)
CHLORIDE SERPL-SCNC: 107 MMOL/L — SIGNIFICANT CHANGE UP (ref 96–108)
CO2 SERPL-SCNC: 25 MMOL/L — SIGNIFICANT CHANGE UP (ref 22–31)
CO2 SERPL-SCNC: 28 MMOL/L — SIGNIFICANT CHANGE UP (ref 22–31)
COLOR SPEC: ABNORMAL
CREAT SERPL-MCNC: 0.98 MG/DL — SIGNIFICANT CHANGE UP (ref 0.5–1.3)
CREAT SERPL-MCNC: 1.04 MG/DL — SIGNIFICANT CHANGE UP (ref 0.5–1.3)
DIFF PNL FLD: ABNORMAL
GLUCOSE SERPL-MCNC: 102 MG/DL — HIGH (ref 70–99)
GLUCOSE SERPL-MCNC: 90 MG/DL — SIGNIFICANT CHANGE UP (ref 70–99)
GLUCOSE UR QL: NEGATIVE MG/DL — SIGNIFICANT CHANGE UP
HCT VFR BLD CALC: 32.7 % — LOW (ref 34.5–45)
HCT VFR BLD CALC: 33.3 % — LOW (ref 34.5–45)
HGB BLD-MCNC: 10.5 G/DL — LOW (ref 11.5–15.5)
HGB BLD-MCNC: 10.6 G/DL — LOW (ref 11.5–15.5)
INR BLD: 1.08 RATIO — SIGNIFICANT CHANGE UP (ref 0.88–1.16)
KETONES UR-MCNC: ABNORMAL
LEUKOCYTE ESTERASE UR-ACNC: ABNORMAL
MAGNESIUM SERPL-MCNC: 1.8 MG/DL — SIGNIFICANT CHANGE UP (ref 1.6–2.6)
MCHC RBC-ENTMCNC: 29.7 PG — SIGNIFICANT CHANGE UP (ref 27–34)
MCHC RBC-ENTMCNC: 29.9 PG — SIGNIFICANT CHANGE UP (ref 27–34)
MCHC RBC-ENTMCNC: 31.8 GM/DL — LOW (ref 32–36)
MCHC RBC-ENTMCNC: 32.1 GM/DL — SIGNIFICANT CHANGE UP (ref 32–36)
MCV RBC AUTO: 92.6 FL — SIGNIFICANT CHANGE UP (ref 80–100)
MCV RBC AUTO: 94.1 FL — SIGNIFICANT CHANGE UP (ref 80–100)
NITRITE UR-MCNC: NEGATIVE — SIGNIFICANT CHANGE UP
NRBC # BLD: 0 /100 WBCS — SIGNIFICANT CHANGE UP (ref 0–0)
NRBC # BLD: 0 /100 WBCS — SIGNIFICANT CHANGE UP (ref 0–0)
PH UR: 5 — SIGNIFICANT CHANGE UP (ref 5–8)
PHOSPHATE SERPL-MCNC: 3.3 MG/DL — SIGNIFICANT CHANGE UP (ref 2.5–4.5)
PLATELET # BLD AUTO: 249 K/UL — SIGNIFICANT CHANGE UP (ref 150–400)
PLATELET # BLD AUTO: 276 K/UL — SIGNIFICANT CHANGE UP (ref 150–400)
POTASSIUM SERPL-MCNC: 4 MMOL/L — SIGNIFICANT CHANGE UP (ref 3.5–5.3)
POTASSIUM SERPL-MCNC: 4 MMOL/L — SIGNIFICANT CHANGE UP (ref 3.5–5.3)
POTASSIUM SERPL-SCNC: 4 MMOL/L — SIGNIFICANT CHANGE UP (ref 3.5–5.3)
POTASSIUM SERPL-SCNC: 4 MMOL/L — SIGNIFICANT CHANGE UP (ref 3.5–5.3)
PROT UR-MCNC: 30 MG/DL
PROTHROM AB SERPL-ACNC: 12.1 SEC — SIGNIFICANT CHANGE UP (ref 10–12.9)
RBC # BLD: 3.53 M/UL — LOW (ref 3.8–5.2)
RBC # BLD: 3.54 M/UL — LOW (ref 3.8–5.2)
RBC # FLD: 14.9 % — HIGH (ref 10.3–14.5)
RBC # FLD: 15.1 % — HIGH (ref 10.3–14.5)
SODIUM SERPL-SCNC: 138 MMOL/L — SIGNIFICANT CHANGE UP (ref 135–145)
SODIUM SERPL-SCNC: 139 MMOL/L — SIGNIFICANT CHANGE UP (ref 135–145)
SP GR SPEC: 1.01 — SIGNIFICANT CHANGE UP (ref 1.01–1.02)
UROBILINOGEN FLD QL: 1 MG/DL
WBC # BLD: 10.97 K/UL — HIGH (ref 3.8–10.5)
WBC # BLD: 9.18 K/UL — SIGNIFICANT CHANGE UP (ref 3.8–10.5)
WBC # FLD AUTO: 10.97 K/UL — HIGH (ref 3.8–10.5)
WBC # FLD AUTO: 9.18 K/UL — SIGNIFICANT CHANGE UP (ref 3.8–10.5)

## 2020-01-06 PROCEDURE — 72170 X-RAY EXAM OF PELVIS: CPT | Mod: 26

## 2020-01-06 PROCEDURE — 88305 TISSUE EXAM BY PATHOLOGIST: CPT | Mod: 26

## 2020-01-06 PROCEDURE — 27236 TREAT THIGH FRACTURE: CPT | Mod: LT

## 2020-01-06 PROCEDURE — 88311 DECALCIFY TISSUE: CPT | Mod: 26

## 2020-01-06 RX ORDER — POLYETHYLENE GLYCOL 3350 17 G/17G
17 POWDER, FOR SOLUTION ORAL DAILY
Refills: 0 | Status: DISCONTINUED | OUTPATIENT
Start: 2020-01-06 | End: 2020-01-16

## 2020-01-06 RX ORDER — SODIUM CHLORIDE 9 MG/ML
1000 INJECTION, SOLUTION INTRAVENOUS
Refills: 0 | Status: DISCONTINUED | OUTPATIENT
Start: 2020-01-06 | End: 2020-01-12

## 2020-01-06 RX ORDER — FUROSEMIDE 40 MG
40 TABLET ORAL DAILY
Refills: 0 | Status: DISCONTINUED | OUTPATIENT
Start: 2020-01-12 | End: 2020-01-16

## 2020-01-06 RX ORDER — PANTOPRAZOLE SODIUM 20 MG/1
40 TABLET, DELAYED RELEASE ORAL
Refills: 0 | Status: DISCONTINUED | OUTPATIENT
Start: 2020-01-06 | End: 2020-01-16

## 2020-01-06 RX ORDER — OXYCODONE HYDROCHLORIDE 5 MG/1
10 TABLET ORAL EVERY 4 HOURS
Refills: 0 | Status: DISCONTINUED | OUTPATIENT
Start: 2020-01-06 | End: 2020-01-12

## 2020-01-06 RX ORDER — MORPHINE SULFATE 50 MG/1
4 CAPSULE, EXTENDED RELEASE ORAL
Refills: 0 | Status: DISCONTINUED | OUTPATIENT
Start: 2020-01-06 | End: 2020-01-06

## 2020-01-06 RX ORDER — ATORVASTATIN CALCIUM 80 MG/1
10 TABLET, FILM COATED ORAL AT BEDTIME
Refills: 0 | Status: DISCONTINUED | OUTPATIENT
Start: 2020-01-12 | End: 2020-01-16

## 2020-01-06 RX ORDER — MORPHINE SULFATE 50 MG/1
2 CAPSULE, EXTENDED RELEASE ORAL
Refills: 0 | Status: DISCONTINUED | OUTPATIENT
Start: 2020-01-06 | End: 2020-01-06

## 2020-01-06 RX ORDER — SENNA PLUS 8.6 MG/1
2 TABLET ORAL AT BEDTIME
Refills: 0 | Status: DISCONTINUED | OUTPATIENT
Start: 2020-01-06 | End: 2020-01-16

## 2020-01-06 RX ORDER — ONDANSETRON 8 MG/1
4 TABLET, FILM COATED ORAL EVERY 6 HOURS
Refills: 0 | Status: DISCONTINUED | OUTPATIENT
Start: 2020-01-06 | End: 2020-01-16

## 2020-01-06 RX ORDER — HYDROMORPHONE HYDROCHLORIDE 2 MG/ML
0.5 INJECTION INTRAMUSCULAR; INTRAVENOUS; SUBCUTANEOUS EVERY 4 HOURS
Refills: 0 | Status: DISCONTINUED | OUTPATIENT
Start: 2020-01-06 | End: 2020-01-11

## 2020-01-06 RX ORDER — FAMOTIDINE 10 MG/ML
20 INJECTION INTRAVENOUS DAILY
Refills: 0 | Status: DISCONTINUED | OUTPATIENT
Start: 2020-01-12 | End: 2020-01-16

## 2020-01-06 RX ORDER — SODIUM CHLORIDE 9 MG/ML
1000 INJECTION, SOLUTION INTRAVENOUS
Refills: 0 | Status: DISCONTINUED | OUTPATIENT
Start: 2020-01-06 | End: 2020-01-06

## 2020-01-06 RX ORDER — ENOXAPARIN SODIUM 100 MG/ML
30 INJECTION SUBCUTANEOUS EVERY 12 HOURS
Refills: 0 | Status: DISCONTINUED | OUTPATIENT
Start: 2020-01-07 | End: 2020-01-16

## 2020-01-06 RX ORDER — ONDANSETRON 8 MG/1
4 TABLET, FILM COATED ORAL ONCE
Refills: 0 | Status: DISCONTINUED | OUTPATIENT
Start: 2020-01-06 | End: 2020-01-06

## 2020-01-06 RX ORDER — CEFAZOLIN SODIUM 1 G
2000 VIAL (EA) INJECTION EVERY 8 HOURS
Refills: 0 | Status: COMPLETED | OUTPATIENT
Start: 2020-01-06 | End: 2020-01-07

## 2020-01-06 RX ORDER — ACETAMINOPHEN 500 MG
650 TABLET ORAL EVERY 6 HOURS
Refills: 0 | Status: DISCONTINUED | OUTPATIENT
Start: 2020-01-06 | End: 2020-01-16

## 2020-01-06 RX ORDER — AMLODIPINE BESYLATE 2.5 MG/1
5 TABLET ORAL DAILY
Refills: 0 | Status: DISCONTINUED | OUTPATIENT
Start: 2020-01-12 | End: 2020-01-16

## 2020-01-06 RX ORDER — OXYCODONE HYDROCHLORIDE 5 MG/1
5 TABLET ORAL EVERY 4 HOURS
Refills: 0 | Status: DISCONTINUED | OUTPATIENT
Start: 2020-01-06 | End: 2020-01-13

## 2020-01-06 RX ORDER — ASPIRIN/CALCIUM CARB/MAGNESIUM 324 MG
81 TABLET ORAL DAILY
Refills: 0 | Status: DISCONTINUED | OUTPATIENT
Start: 2020-01-12 | End: 2020-01-16

## 2020-01-06 RX ADMIN — Medication 100 MILLIGRAM(S): at 23:07

## 2020-01-06 RX ADMIN — MORPHINE SULFATE 2 MILLIGRAM(S): 50 CAPSULE, EXTENDED RELEASE ORAL at 05:51

## 2020-01-06 RX ADMIN — SODIUM CHLORIDE 75 MILLILITER(S): 9 INJECTION, SOLUTION INTRAVENOUS at 23:08

## 2020-01-06 RX ADMIN — AMLODIPINE BESYLATE 5 MILLIGRAM(S): 2.5 TABLET ORAL at 05:36

## 2020-01-06 RX ADMIN — SODIUM CHLORIDE 75 MILLILITER(S): 9 INJECTION, SOLUTION INTRAVENOUS at 05:36

## 2020-01-06 RX ADMIN — MORPHINE SULFATE 2 MILLIGRAM(S): 50 CAPSULE, EXTENDED RELEASE ORAL at 12:00

## 2020-01-06 RX ADMIN — Medication 1000 MILLIGRAM(S): at 00:27

## 2020-01-06 RX ADMIN — FAMOTIDINE 20 MILLIGRAM(S): 10 INJECTION INTRAVENOUS at 11:47

## 2020-01-06 RX ADMIN — MORPHINE SULFATE 2 MILLIGRAM(S): 50 CAPSULE, EXTENDED RELEASE ORAL at 05:36

## 2020-01-06 RX ADMIN — MORPHINE SULFATE 2 MILLIGRAM(S): 50 CAPSULE, EXTENDED RELEASE ORAL at 11:47

## 2020-01-06 RX ADMIN — SODIUM CHLORIDE 100 MILLILITER(S): 9 INJECTION, SOLUTION INTRAVENOUS at 15:16

## 2020-01-06 NOTE — PROGRESS NOTE ADULT - SUBJECTIVE AND OBJECTIVE BOX
89 y/o presents to ED s/p fall this morning. Pt states she does not recall how she fell. Pt's left leg collapsed under her and she fell on right side. Pt normally ambulates alone, the event was unwitnessed and she denies head trauma, +left hip pain, and dry mouth. Pt could not ambulate after fall secondary to pain. Pt denies neck, back pain, Ha, dizziness, abdominal pain (-) chest pain (-) sob Pt reports last bowel movement unknown. Pt lives at home   Admitted with fracture Left Hip. (2020 15:04)      Chief Complaint:  Patient is a 89y old  Female who presents with a chief complaint of Fall at home. Fracture left hip. (2020 09:37)      Review of Systems:    General:  No wt loss, fevers, chills, night sweats  Eyes:  Good vision, no reported pain  ENT:  No sore throat, pain, runny nose, dysphagia  CV:  No pain, palpitations, hypo/hypertension  Resp:  No dyspnea, cough, tachypnea, wheezing  GI:  No pain, nausea, vomiting, diarrhea, constipation         Social History/Family History  SOCHX:   tobacco,  -  alcohol    FMHX: FA/MO  - contributory       Discussed with:  PMD, Family    Physical Exam:    Vital Signs:  Vital Signs Last 24 Hrs  T(C): 36.5 (2020 17:05), Max: 36.8 (2020 00:10)  T(F): 97.7 (2020 17:05), Max: 98.3 (2020 00:10)  HR: 112 (2020 17:05) (94 - 112)  BP: 153/76 (2020 17:05) (126/57 - 153/76)  BP(mean): --  RR: 18 (2020 17:05) (17 - 18)  SpO2: 94% (2020 17:05) (90% - 94%)  Daily     Daily Weight in k.2 (2020 05:15)  I&O's Summary    2020 07:01  -  2020 22:46  --------------------------------------------------------  IN: 100 mL / OUT: 0 mL / NET: 100 mL          Chest:  Full & symmetric excursion, no increased effort, breath sounds clear  Cardiovascular:  Regular rhythm, S1, S2, no murmur/rub/S3/S4, no carotid/femoral/abdominal bruit, radial/pedal pulses 2+  Abdomen:  Soft, non-tender, non-distended, normoactive bowel sounds, no HSM    Laboratory:                          11.2   9.91  )-----------( 321      ( 2020 07:39 )             35.6     01-05    140  |  103  |  13  ----------------------------<  114<H>  3.7   |  27  |  1.37<H>    Ca    7.9<L>      2020 07:39    TPro  6.4  /  Alb  2.4<L>  /  TBili  0.5  /  DBili  x   /  AST  14<L>  /  ALT  11<L>  /  AlkPhos  218<H>  01-04          CAPILLARY BLOOD GLUCOSE        LIVER FUNCTIONS - ( 2020 11:14 )  Alb: 2.4 g/dL / Pro: 6.4 gm/dL / ALK PHOS: 218 U/L / ALT: 11 U/L / AST: 14 U/L / GGT: x           PT/INR - ( 2020 11:14 )   PT: 11.1 sec;   INR: 0.99 ratio         PTT - ( 2020 11:14 )  PTT:32.7 sec          Assessment:  I am asked to assess this patient for cardiovascular risk assessment for left hip surgery  The patient's EKG is without acute changes  The patient's lab testing and other diagnostic testing are reviewed  There is no significant murmur on exam  The patient's cardiovascular risk assessment is increased based on her age, however she is deemed acceptable risk for this orthopedic surgery for corrective surgery of the left hip  May proceed with surgery

## 2020-01-06 NOTE — DISCHARGE NOTE PROVIDER - NSDCMRMEDTOKEN_GEN_ALL_CORE_FT
amLODIPine 5 mg oral tablet: 1 tab(s) orally once a day  aspirin 81 mg oral tablet: 1 tab(s) orally once a day  atorvastatin 10 mg oral tablet: orally once a day (at bedtime)  Colace: orally once a day  famotidine 10 mg oral tablet: orally once a day (at bedtime)  furosemide 40 mg oral tablet: 1 tab(s) orally once a day amLODIPine 5 mg oral tablet: 1 tab(s) orally once a day  aspirin 81 mg oral tablet: 1 tab(s) orally once a day  atorvastatin 10 mg oral tablet: orally once a day (at bedtime)  Colace: orally once a day  enoxaparin: 40 milligram(s) subcutaneous once a day for 30 days  famotidine 10 mg oral tablet: orally once a day (at bedtime)  furosemide 40 mg oral tablet: 1 tab(s) orally once a day acetaminophen 325 mg oral tablet: 2 tab(s) orally every 6 hours, As needed, Temp greater or equal to 38C (100.4F), Mild Pain (1 - 3)  acetaminophen 325 mg oral tablet: 2 tab(s) orally every 4 hours, As needed, Temp greater or equal to 38C (100.4F), Mild Pain (1 - 3)  amLODIPine 5 mg oral tablet: 1 tab(s) orally once a day  aspirin 81 mg oral delayed release tablet: 1 tab(s) orally once a day  atorvastatin 10 mg oral tablet: 1 tab(s) orally once a day (at bedtime)  enoxaparin: 40 milligram(s) subcutaneous once a day for 30 days  famotidine 20 mg oral tablet: 1 tab(s) orally once a day  furosemide 40 mg oral tablet: 1 tab(s) orally once a day  pantoprazole 40 mg oral delayed release tablet: 1 tab(s) orally once a day (before a meal)  senna oral tablet: 2 tab(s) orally once a day (at bedtime), As needed, Constipation

## 2020-01-06 NOTE — DISCHARGE NOTE PROVIDER - CARE PROVIDERS DIRECT ADDRESSES
,[email or web address]

## 2020-01-06 NOTE — DISCHARGE NOTE PROVIDER - NSDCCPCAREPLAN_GEN_ALL_CORE_FT
PRINCIPAL DISCHARGE DIAGNOSIS  Diagnosis: Closed fracture of left hip, initial encounter  Assessment and Plan of Treatment: Discharge Instructions  Hip Hemiarthroplasty  1. Diet: Resume previous diet  2. Activity: WBAT. Rolling walker. Posterior Hip Dislocation Precautions. Abduction Pillow while in bed for 6 weeks. Daily Physical Therapy. Rolling walker.  3. Call with: fever over 101, wound redness, drainage or open area, calf pain/calf swelling.  4. Wound Care: Remove old and Place new Aquacel bandage to hip wound in 7days. If Aquacel not available use Tegaderm and dry gauze (cannot shower with this). No bandage needed after staple removal.  5. RN to Remove Staples Post Op Day #14 (1/20) so long as wound is healed, no drainage or open area.   6. OK to Shower with Aquacel.  Avoid direct water beating on bandage.   7. DVT PE Prophylaxis: see med rec for details  8. Labs: Check H&H weekly while on Anticoagulation  9. Follow Up: Orthopedics, 2 weeks in office. Call to schedule.         SECONDARY DISCHARGE DIAGNOSES  Diagnosis: Syncope and collapse  Assessment and Plan of Treatment: PRINCIPAL DISCHARGE DIAGNOSIS  Diagnosis: Closed fracture of left hip, initial encounter  Assessment and Plan of Treatment: Discharge Instructions  Hip Hemiarthroplasty  1. Diet: Resume previous diet  2. Activity: WBAT. Rolling walker. Posterior Hip Dislocation Precautions. Abduction Pillow while in bed for 6 weeks. Daily Physical Therapy. Rolling walker.  3. Call with: fever over 101, wound redness, drainage or open area, calf pain/calf swelling.  4. Wound Care: Remove old and Place new Aquacel bandage to hip wound in 7days. If Aquacel not available use Tegaderm and dry gauze (cannot shower with this). No bandage needed after staple removal.  5. RN to Remove Staples Post Op Day #14 (1/20) so long as wound is healed, no drainage or open area.   6. OK to Shower with Aquacel.  Avoid direct water beating on bandage.   7. DVT PE Prophylaxis: Lovenox 40 mg for 30 days  8. Labs: Check H&H weekly while on Anticoagulation  9. Follow Up: Orthopedics, 2 weeks in office. Call to schedule.         SECONDARY DISCHARGE DIAGNOSES  Diagnosis: Syncope and collapse  Assessment and Plan of Treatment:

## 2020-01-06 NOTE — DISCHARGE NOTE PROVIDER - PROVIDER TOKENS
PROVIDER:[TOKEN:[[id number]]] PROVIDER:[TOKEN:[[id number]]],PROVIDER:[TOKEN:[[id number]]]

## 2020-01-06 NOTE — PROGRESS NOTE ADULT - ASSESSMENT
IMPROVE VTE Individual Risk Assessment    RISK                                                                Points    [  ] Previous VTE                                                  3    [  ] Thrombophilia                                               2    [  ] Lower limb paralysis                                      2        (unable to hold up >15 seconds)      [  ] Current Cancer                                              2         (within 6 months)    [  ] Immobilization > 24 hrs                                1    [  ] ICU/CCU stay > 24 hours                              1    [  ] Age > 60                                                      1    IMPROVE VTE Score _________    IMPROVE Score 0-1: Low Risk, No VTE prophylaxis required for most patients, encourage ambulation.   IMPROVE Score 2-3: At risk, pharmacologic VTE prophylaxis is indicated for most patients (in the absence of a contraindication)  IMPROVE Score > or = 4: High Risk, pharmacologic VTE prophylaxis is indicated for most patients (in the absence of a contraindication)    89 y/o presents to ED s/p fall this morning. Pt states she does not recall how she fell. Pt's left leg collapsed under her and she fell on right side. Pt normally ambulates alone, the event was unwitnessed and she denies head trauma, +left hip pain, and dry mouth. Pt could not ambulate after fall secondary to pain. Pt denies neck, back pain.  01/05/2020 : Pt. asymptomatic. Hypokalemia resolved. Cardiology consult pending. Orthopedics f/u noted. Monitor CBC.  01/06/2020 : Pt. for surgery. Cardiology consult noted. Cleared for Corrective hip surgery with age related CV risks. Carotid doppler without any significant stenosis.

## 2020-01-06 NOTE — DISCHARGE NOTE PROVIDER - CARE PROVIDER_API CALL
Sammy Encarnacion)  Orthopaedic Surgery  [address] [facility] [facility], [address]  [place] [address]  Phone: [phone]  Fax: [phone]  Follow Up Time: Sammy Encarnacion)  Orthopaedic Surgery  [address] [facility], [address]  [place] [address]  Phone: [phone]  Fax: [phone]  Follow Up Time:     Kip Diez)  Internal Medicine  [address] [place] [address]  Phone: [phone]  Fax: [phone]  Follow Up Time:

## 2020-01-06 NOTE — PROGRESS NOTE ADULT - SUBJECTIVE AND OBJECTIVE BOX
Patient is a 89y old  Female who presents with a chief complaint of Fall at home. Fracture left hip. (2020 06:16)      INTERVAL HPI/OVERNIGHT EVENTS:    MEDICATIONS  (STANDING):  amLODIPine   Tablet 5 milliGRAM(s) Oral daily  atorvastatin 10 milliGRAM(s) Oral at bedtime  famotidine    Tablet 20 milliGRAM(s) Oral daily  lactated ringers. 1000 milliLiter(s) (75 mL/Hr) IV Continuous <Continuous>    MEDICATIONS  (PRN):  morphine  - Injectable 2 milliGRAM(s) IV Push every 6 hours PRN Severe Pain (7 - 10)      Allergies    No Known Allergies    Intolerances        REVIEW OF SYSTEMS:  CONSTITUTIONAL: No fever, weight loss, or fatigue  EYES: No eye pain, visual disturbances, or discharge  ENMT:  No difficulty hearing, tinnitus, vertigo; No sinus or throat pain  NECK: No pain or stiffness  BREASTS: No pain, masses, or nipple discharge  RESPIRATORY: No cough, wheezing, chills or hemoptysis; No shortness of breath  CARDIOVASCULAR: No chest pain, palpitations, dizziness, or leg swelling  GASTROINTESTINAL: No abdominal or epigastric pain. No nausea, vomiting, or hematemesis; No diarrhea or constipation. No melena or hematochezia.  GENITOURINARY: No dysuria, frequency, hematuria, or incontinence  NEUROLOGICAL: No headaches, memory loss, loss of strength, numbness, or tremors  SKIN: No itching, burning, rashes, or lesions   LYMPH NODES: No enlarged glands  ENDOCRINE: No heat or cold intolerance; No hair loss  MUSCULOSKELETAL: No joint pain or swelling; No muscle, back, or extremity pain  PSYCHIATRIC: No depression, anxiety, mood swings, or difficulty sleeping  HEME/LYMPH: No easy bruising, or bleeding gums  ALLERGY AND IMMUNOLOGIC: No hives or eczema    Vital Signs Last 24 Hrs  T(C): 36.4 (2020 05:26), Max: 36.6 (2020 12:12)  T(F): 97.6 (2020 05:26), Max: 97.8 (2020 12:12)  HR: 94 (2020 05:26) (94 - 112)  BP: 129/65 (2020 05:26) (121/79 - 153/76)  BP(mean): --  RR: 19 (2020 05:26) (18 - 19)  SpO2: 94% (2020 05:26) (92% - 94%)    PHYSICAL EXAM:  GENERAL: NAD, well-groomed, well-developed  HEAD:  Atraumatic, Normocephalic  EYES: EOMI, PERRL, conjunctiva and sclera clear  ENMT:  Moist mucous membranes, Good dentition, No lesions  NECK: Supple, No JVD, Normal thyroid  NERVOUS SYSTEM:  Alert & Oriented X3, Good concentration; Motor Strength 5/5 B/L upper  extremities;  CHEST/LUNG: Clear to percussion bilaterally; No rales, rhonchi, wheezing, or rubs  HEART: Regular rate and rhythm; No murmurs, rubs, or gallops  ABDOMEN: Soft, Nontender, Nondistended; Bowel sounds present  EXTREMITIES:  2+ Peripheral Pulses, No clubbing, cyanosis, or edema. Left hip tenderness.  LYMPH: No lymphadenopathy noted  SKIN: No rashes or lesions    LABS:                        10.5   10.97 )-----------( 276      ( 2020 04:42 )             32.7     01-06    139  |  105  |  14  ----------------------------<  102<H>  4.0   |  28  |  1.04    Ca    8.4<L>      2020 04:42  Phos  3.3     -06  Mg     1.8     -06    TPro  6.4  /  Alb  2.4<L>  /  TBili  0.5  /  DBili  x   /  AST  14<L>  /  ALT  11<L>  /  AlkPhos  218<H>  -04    PT/INR - ( 2020 04:42 )   PT: 12.1 sec;   INR: 1.08 ratio         PTT - ( 2020 04:42 )  PTT:42.3 sec  Urinalysis Basic - ( 2020 06:31 )    Color: Linda / Appearance: Slightly Turbid / S.015 / pH: x  Gluc: x / Ketone: Trace  / Bili: Negative / Urobili: 1 mg/dL   Blood: x / Protein: 30 mg/dL / Nitrite: Negative   Leuk Esterase: Moderate / RBC: 3-5 /HPF / WBC >50   Sq Epi: x / Non Sq Epi: Moderate / Bacteria: Many      CAPILLARY BLOOD GLUCOSE                        RADIOLOGY & ADDITIONAL TESTS:  < from: US Duplex Carotid Arteries Complete, Bilateral (20 @ 11:27) >  1.  Right carotid artery: No evidence of hemodynamically significant stenosis.   2.  Left carotid artery: No evidence of hemodynamically significant stenosis.  3.  Vertebral arteries: Antegrade flow.    < end of copied text >    Imaging Personally Reviewed:  [ ] YES  [ ] NO    Consultant(s) Notes Reviewed:  [ ] YES  [ ] NO    Care Discussed with Consultants/Other Providers [ ] YES  [ ] NO    PROBLEMS:  CLOSED FRACTURE OF LEFT HIP; INITIAL ENCOUNTER;SYNCOPE  FALL , HIP PAIN  Closed fracture of left hip, initial encounter  Hypokalemia due to excessive renal loss of potassium  Other chronic gastritis  Hypercholesterolemia  Benign hypertension  Syncope and collapse  Closed fracture of left hip, initial encounter      Care discussed with family,         [  ]   yes  [  ]  No    imp:    stable[ ]    unstable[  ]     improving [   ]       unchanged  [  ]                Plans:  Continue present plans  [  ]               New consult [  ]   specialty  .......               order aaron[  ]    test name.                  Discharge Planning  [  ]

## 2020-01-06 NOTE — PROGRESS NOTE ADULT - SUBJECTIVE AND OBJECTIVE BOX
Orthopedics    Patient seen and examined at bedside. Feeling well. Pain controlled. No n/v. No acute events overnight.    Vital Signs Last 24 Hrs  T(C): 36.4 (06 Jan 2020 05:26), Max: 36.6 (05 Jan 2020 12:12)  T(F): 97.6 (06 Jan 2020 05:26), Max: 97.8 (05 Jan 2020 12:12)  HR: 94 (06 Jan 2020 05:26) (94 - 112)  BP: 129/65 (06 Jan 2020 05:26) (121/79 - 153/76)  BP(mean): --  RR: 19 (06 Jan 2020 05:26) (18 - 19)  SpO2: 94% (06 Jan 2020 05:26) (92% - 94%)    Exam:  Gen: NAD, resting comfortably  LLE:  NTTP calves b/l  +EHL/FHL/TA/GS  SILT L2-S1  Compartments soft and compressible  2+ Pulses palpable      A/P: 89yF with L Femoral neck fracture  -plan for OR today for Hemiarthroplasty  -NPO, IVF while NPO  -hold all chemical dvt ppx  -cardiology cleared, per Dr. Neumann, TTE not necessary  -FU medicine clearance  -FU AM labs  -Pain control  -NWB LLE  -DVT ppx  -Incentive Spirometry  -Elevation to extremity

## 2020-01-06 NOTE — PROGRESS NOTE ADULT - SUBJECTIVE AND OBJECTIVE BOX
Orthopedics Post-op Check  POD 0  Patient seen and examined at bedside. Pain is controlled. Pt feeling well. No nausea or vomiting.    Vital Signs Last 24 Hrs  T(C): 37 (01-06-20 @ 17:07), Max: 37 (01-06-20 @ 17:07)  T(F): 98.6 (01-06-20 @ 17:07), Max: 98.6 (01-06-20 @ 17:07)  HR: 104 (01-06-20 @ 17:07) (82 - 105)  BP: 142/79 (01-06-20 @ 17:07) (90/45 - 142/79)  BP(mean): 79 (01-06-20 @ 15:18) (68 - 82)  RR: 19 (01-06-20 @ 17:07) (14 - 20)  SpO2: 95% (01-06-20 @ 17:07) (92% - 100%)                        10.6   9.18  )-----------( 249      ( 06 Jan 2020 15:45 )             33.3     06 Jan 2020 15:45    138    |  107    |  14     ----------------------------<  90     4.0     |  25     |  0.98     Ca    8.1        06 Jan 2020 15:45  Phos  3.3       06 Jan 2020 04:42  Mg     1.8       06 Jan 2020 04:42      PT/INR - ( 06 Jan 2020 04:42 )   PT: 12.1 sec;   INR: 1.08 ratio         PTT - ( 06 Jan 2020 04:42 )  PTT:42.3 sec    Exam:  Gen: NAD, resting comfortably  LLE  Dressing c/d/i  +EHL/FHL/TA/GS  SILT L2-S1  +DP/PT 2+  Calf NTTP b/l  Compartments soft and compressible    A/P:  89yFemale Stable POD 0  s/p L hip hemiarthroplasty    -FU labs  -WBAT LLE  -Pain control  -PT/OT  -Ppx ABX  -DVT PE ppx- hold until POD1  -Incentive spirometry

## 2020-01-06 NOTE — DISCHARGE NOTE PROVIDER - HOSPITAL COURSE
89 y/o presents to ED s/p fall this morning. Pt states she does not recall how she fell. Pt's left leg collapsed under her and she fell on right side. Pt normally ambulates alone, the event was unwitnessed and she denies head trauma, +left hip pain, and dry mouth. Pt could not ambulate after fall secondary to pain. Pt denies neck, back pain.    01/05/2020 : Pt. asymptomatic. Hypokalemia resolved. Cardiology consult pending. Orthopedics f/u noted. Monitor CBC.    01/06/2020 : Pt. for surgery. Cardiology consult noted. Cleared for Corrective hip surgery with age related CV risks. Carotid doppler without any significant stenosis.    01/07/2020 : S/P Left Hemiarthroplasty. Alert, awake. PT evaluation.    01/08/2020 : Pt. c/o epigastric discomfort. Had 2 bowel movements yesterday. Abd. soft. Non tender. BS present in all 4 quadrants. PT evaluation noted. For STR.    01/09/2020 : Pt. Lethargic. Arousable. Lungs clear. No epigastric discomfort. Cardiology F/U noted. Troponin normal. PT.    01/10/2020 : Still lethargic this AM. PT to follow. Neurology consult if no change in mental status.    01/11/2020 : Remains [id number] and drowsy. Arousable. Denies any chest pain. Pain left hip. Had chest pain yesterday. EKG unchanged. Cardiology F/U noted. PT. Discharge planning.    01/12/2020 : Pt. more alert. Chest discomfort on coughing only. Lungs clear. PT. Discharge to rehab.     01/13/2020 : More alert, still lethargic. Continues to have pain in the epigastrium and lower chest. BS pos. Abdomen distenths. Neurology consult noted. Plan X-ray abdomen to R/O fecal retention. MR brain.    01/14/2020 : More alert. No fecal retention. For STR.    01/15/2020 : remains lethargic. C/O epigastric discomfort.. Poor PO intake. For Rehab.    0116/2020 : MR noted, no acute pathology. Remains lethargic. For Rehab. Follow CBC. Last Hb 8.5. Pt on Lovenox.

## 2020-01-07 LAB
ANION GAP SERPL CALC-SCNC: 8 MMOL/L — SIGNIFICANT CHANGE UP (ref 5–17)
BUN SERPL-MCNC: 17 MG/DL — SIGNIFICANT CHANGE UP (ref 7–23)
CALCIUM SERPL-MCNC: 8.4 MG/DL — LOW (ref 8.5–10.1)
CHLORIDE SERPL-SCNC: 104 MMOL/L — SIGNIFICANT CHANGE UP (ref 96–108)
CO2 SERPL-SCNC: 26 MMOL/L — SIGNIFICANT CHANGE UP (ref 22–31)
CREAT SERPL-MCNC: 1.06 MG/DL — SIGNIFICANT CHANGE UP (ref 0.5–1.3)
GLUCOSE SERPL-MCNC: 123 MG/DL — HIGH (ref 70–99)
HCT VFR BLD CALC: 32 % — LOW (ref 34.5–45)
HGB BLD-MCNC: 10.3 G/DL — LOW (ref 11.5–15.5)
MCHC RBC-ENTMCNC: 29.9 PG — SIGNIFICANT CHANGE UP (ref 27–34)
MCHC RBC-ENTMCNC: 32.2 GM/DL — SIGNIFICANT CHANGE UP (ref 32–36)
MCV RBC AUTO: 93 FL — SIGNIFICANT CHANGE UP (ref 80–100)
NRBC # BLD: 0 /100 WBCS — SIGNIFICANT CHANGE UP (ref 0–0)
PLATELET # BLD AUTO: 263 K/UL — SIGNIFICANT CHANGE UP (ref 150–400)
POTASSIUM SERPL-MCNC: 4 MMOL/L — SIGNIFICANT CHANGE UP (ref 3.5–5.3)
POTASSIUM SERPL-SCNC: 4 MMOL/L — SIGNIFICANT CHANGE UP (ref 3.5–5.3)
RBC # BLD: 3.44 M/UL — LOW (ref 3.8–5.2)
RBC # FLD: 14.9 % — HIGH (ref 10.3–14.5)
SODIUM SERPL-SCNC: 138 MMOL/L — SIGNIFICANT CHANGE UP (ref 135–145)
WBC # BLD: 13.23 K/UL — HIGH (ref 3.8–10.5)
WBC # FLD AUTO: 13.23 K/UL — HIGH (ref 3.8–10.5)

## 2020-01-07 RX ADMIN — OXYCODONE HYDROCHLORIDE 5 MILLIGRAM(S): 5 TABLET ORAL at 21:22

## 2020-01-07 RX ADMIN — OXYCODONE HYDROCHLORIDE 5 MILLIGRAM(S): 5 TABLET ORAL at 02:51

## 2020-01-07 RX ADMIN — ENOXAPARIN SODIUM 30 MILLIGRAM(S): 100 INJECTION SUBCUTANEOUS at 17:00

## 2020-01-07 RX ADMIN — PANTOPRAZOLE SODIUM 40 MILLIGRAM(S): 20 TABLET, DELAYED RELEASE ORAL at 06:39

## 2020-01-07 RX ADMIN — ENOXAPARIN SODIUM 30 MILLIGRAM(S): 100 INJECTION SUBCUTANEOUS at 06:39

## 2020-01-07 RX ADMIN — POLYETHYLENE GLYCOL 3350 17 GRAM(S): 17 POWDER, FOR SOLUTION ORAL at 11:16

## 2020-01-07 RX ADMIN — Medication 100 MILLIGRAM(S): at 06:39

## 2020-01-07 RX ADMIN — OXYCODONE HYDROCHLORIDE 5 MILLIGRAM(S): 5 TABLET ORAL at 22:33

## 2020-01-07 RX ADMIN — OXYCODONE HYDROCHLORIDE 5 MILLIGRAM(S): 5 TABLET ORAL at 01:51

## 2020-01-07 RX ADMIN — SODIUM CHLORIDE 75 MILLILITER(S): 9 INJECTION, SOLUTION INTRAVENOUS at 15:22

## 2020-01-07 NOTE — PHYSICAL THERAPY INITIAL EVALUATION ADULT - PERTINENT HX OF CURRENT PROBLEM, REHAB EVAL
89 y/o presents to ED s/p fall this morning. Pt states she does not recall how she fell. Pt's left leg collapsed under her and she fell on right side. Pt normally ambulates alone, the event was unwitnessed and she denies head trauma, +left hip pain, and dry mouth. Pt could not ambulate after fall secondary to pain. 89 y/o presents to ED s/p fall. Pt states she does not recall how she fell. Pt's left leg collapsed under her and she fell on right side. Pt normally ambulates alone, the event was unwitnessed and she denies head trauma, +left hip pain, and dry mouth. Pt could not ambulate after fall secondary to pain.

## 2020-01-07 NOTE — PHYSICAL THERAPY INITIAL EVALUATION ADULT - ACTIVE RANGE OF MOTION EXAMINATION, REHAB EVAL
R LE AROM WFLs, L hip flexion AROM 5 degrees, abduction 5 degrees/bilateral upper extremity Active ROM was WFL (within functional limits)

## 2020-01-07 NOTE — PHYSICAL THERAPY INITIAL EVALUATION ADULT - PLANNED THERAPY INTERVENTIONS, PT EVAL
balance training/gait training/transfer training/strengthening/bed mobility training balance training/bed mobility training/ROM/gait training/strengthening/transfer training

## 2020-01-07 NOTE — PHYSICAL THERAPY INITIAL EVALUATION ADULT - GAIT TRAINING, PT EVAL
pt will be able to return to community ambulation distances >500 feet with appropriate device x6 weeks

## 2020-01-07 NOTE — PROGRESS NOTE ADULT - SUBJECTIVE AND OBJECTIVE BOX
Orthopedics      Patient seen and examined at bedside. Feeling well. Pain controlled. No n/v. No acute events overnight.    Vital Signs Last 24 Hrs  T(C): 36.3 (01-07-20 @ 04:00), Max: 37 (01-06-20 @ 17:07)  T(F): 97.4 (01-07-20 @ 04:00), Max: 98.6 (01-06-20 @ 17:07)  HR: 81 (01-07-20 @ 04:00) (81 - 104)  BP: 118/64 (01-07-20 @ 04:00) (90/45 - 142/79)  BP(mean): 79 (01-06-20 @ 15:18) (68 - 82)  RR: 17 (01-07-20 @ 04:00) (14 - 20)  SpO2: 97% (01-07-20 @ 04:00) (93% - 100%)                        10.6   9.18  )-----------( 249      ( 06 Jan 2020 15:45 )             33.3     06 Jan 2020 15:45    138    |  107    |  14     ----------------------------<  90     4.0     |  25     |  0.98     Ca    8.1        06 Jan 2020 15:45  Phos  3.3       06 Jan 2020 04:42  Mg     1.8       06 Jan 2020 04:42      PT/INR - ( 06 Jan 2020 04:42 )   PT: 12.1 sec;   INR: 1.08 ratio         PTT - ( 06 Jan 2020 04:42 )  PTT:42.3 sec    Exam:  Gen: NAD, resting comfortably  LLE:  Dressing c/d/i  NTTP calves b/l  +EHL/FHL/TA/GS  SILT L2-S1  Compartments soft and compressible  2+ Pulses palpable      A/P: 89yF POD 1 s/p L hip hemiarthroplasty  -FU AM labs  -Pain control  -WBAT LLE  -DVT ppx  -Incentive Spirometry  -Elevation to extremity  -Medical management appreciated

## 2020-01-07 NOTE — PHYSICAL THERAPY INITIAL EVALUATION ADULT - RANGE OF MOTION, PT EVAL
pt will increase ROM to L Hip AROM WFLs for increased strength with ambulation, transfers, ADLs by 6 weeks

## 2020-01-07 NOTE — PHYSICAL THERAPY INITIAL EVALUATION ADULT - FOLLOWS COMMANDS/ANSWERS QUESTIONS, REHAB EVAL
able to follow single-step instructions/100% of the time/able to follow multistep instructions/verbal cueing with carryover

## 2020-01-07 NOTE — PROGRESS NOTE ADULT - SUBJECTIVE AND OBJECTIVE BOX
Patient is a 89y old  Female who presents with a chief complaint of Fall at home. Fracture left hip. (2020 06:16)      INTERVAL HPI/OVERNIGHT EVENTS:    MEDICATIONS  (STANDING):  enoxaparin Injectable 30 milliGRAM(s) SubCutaneous every 12 hours  lactated ringers. 1000 milliLiter(s) (75 mL/Hr) IV Continuous <Continuous>  pantoprazole    Tablet 40 milliGRAM(s) Oral before breakfast  polyethylene glycol 3350 17 Gram(s) Oral daily    MEDICATIONS  (PRN):  acetaminophen   Tablet .. 650 milliGRAM(s) Oral every 6 hours PRN Temp greater or equal to 38C (100.4F), Mild Pain (1 - 3)  aluminum hydroxide/magnesium hydroxide/simethicone Suspension 30 milliLiter(s) Oral four times a day PRN Indigestion  HYDROmorphone  Injectable 0.5 milliGRAM(s) IV Push every 4 hours PRN Severe Pain (7 - 10)  ondansetron Injectable 4 milliGRAM(s) IV Push every 6 hours PRN Nausea and/or Vomiting  oxyCODONE    IR 5 milliGRAM(s) Oral every 4 hours PRN Moderate Pain (4 - 6)  oxyCODONE    IR 10 milliGRAM(s) Oral every 4 hours PRN Severe Pain (7 - 10)  senna 2 Tablet(s) Oral at bedtime PRN Constipation      Allergies    No Known Allergies    Intolerances        REVIEW OF SYSTEMS:  CONSTITUTIONAL: No fever, weight loss, or fatigue  EYES: No eye pain, visual disturbances, or discharge  ENMT:  No difficulty hearing, tinnitus, vertigo; No sinus or throat pain  NECK: No pain or stiffness  BREASTS: No pain, masses, or nipple discharge  RESPIRATORY: No cough, wheezing, chills or hemoptysis; No shortness of breath  CARDIOVASCULAR: No chest pain, palpitations, dizziness, or leg swelling  GASTROINTESTINAL: No abdominal or epigastric pain. No nausea, vomiting, or hematemesis; No diarrhea or constipation. No melena or hematochezia.  GENITOURINARY: No dysuria, frequency, hematuria, or incontinence  NEUROLOGICAL: No headaches, memory loss, loss of strength, numbness, or tremors  SKIN: No itching, burning, rashes, or lesions   LYMPH NODES: No enlarged glands  ENDOCRINE: No heat or cold intolerance; No hair loss  MUSCULOSKELETAL: No joint pain or swelling; No muscle, back, or extremity pain  PSYCHIATRIC: No depression, anxiety, mood swings, or difficulty sleeping  HEME/LYMPH: No easy bruising, or bleeding gums  ALLERGY AND IMMUNOLOGIC: No hives or eczema    Vital Signs Last 24 Hrs  T(C): 36.3 (2020 08:00), Max: 37 (2020 17:07)  T(F): 97.4 (2020 08:00), Max: 98.6 (2020 17:07)  HR: 88 (2020 08:00) (81 - 104)  BP: 124/68 (2020 08:00) (90/45 - 142/79)  BP(mean): 79 (2020 15:18) (68 - 82)  RR: 17 (2020 08:00) (14 - 20)  SpO2: 97% (2020 08:00) (93% - 100%)    PHYSICAL EXAM:  GENERAL: NAD, well-groomed, well-developed  HEAD:  Atraumatic, Normocephalic  EYES: EOMI, PERRL, conjunctiva and sclera clear  ENMT:  Moist mucous membranes, Good dentition, No lesions  NECK: Supple, No JVD, Normal thyroid  NERVOUS SYSTEM:  Alert & Oriented X3, Good concentration; Motor Strength 5/5 B/L upper and lower extremities; DTRs 2+ intact and symmetric  CHEST/LUNG: Clear to percussion bilaterally; No rales, rhonchi, wheezing, or rubs  HEART: Regular rate and rhythm; No murmurs, rubs, or gallops  ABDOMEN: Soft, Nontender, Nondistended; Bowel sounds present  EXTREMITIES:  2+ Peripheral Pulses, No clubbing, cyanosis, or edema  LYMPH: No lymphadenopathy noted  SKIN: No rashes or lesions    LABS:                        10.3   13.23 )-----------( 263      ( 2020 08:29 )             32.0     01-07    138  |  104  |  17  ----------------------------<  123<H>  4.0   |  26  |  1.06    Ca    8.4<L>      2020 08:29  Phos  3.3     01-06  Mg     1.8     01-06      PT/INR - ( 2020 04:42 )   PT: 12.1 sec;   INR: 1.08 ratio         PTT - ( 2020 04:42 )  PTT:42.3 sec  Urinalysis Basic - ( 2020 06:31 )    Color: Linda / Appearance: Slightly Turbid / S.015 / pH: x  Gluc: x / Ketone: Trace  / Bili: Negative / Urobili: 1 mg/dL   Blood: x / Protein: 30 mg/dL / Nitrite: Negative   Leuk Esterase: Moderate / RBC: 3-5 /HPF / WBC >50   Sq Epi: x / Non Sq Epi: Moderate / Bacteria: Many      CAPILLARY BLOOD GLUCOSE      RADIOLOGY & ADDITIONAL TESTS:    Imaging Personally Reviewed:  [ ] YES  [ ] NO    Consultant(s) Notes Reviewed:  [ ] YES  [ ] NO    Care Discussed with Consultants/Other Providers [ ] YES  [ ] NO    PROBLEMS:  CLOSED FRACTURE OF LEFT HIP; INITIAL ENCOUNTER;SYNCOPE  FALL , HIP PAIN  Closed fracture of left hip, initial encounter  Hypokalemia due to excessive renal loss of potassium  Other chronic gastritis  Hypercholesterolemia  Benign hypertension  Syncope and collapse  Closed fracture of left hip, initial encounter      Care discussed with family,         [  ]   yes  [  ]  No    imp:    stable[ ]    unstable[  ]     improving [   ]       unchanged  [  ]                Plans:  Continue present plans  [  ]               New consult [  ]   specialty  .......               order aaron[  ]    test name.                  Discharge Planning  [  ]

## 2020-01-07 NOTE — PROGRESS NOTE ADULT - ASSESSMENT
IMPROVE VTE Individual Risk Assessment    RISK                                                                Points    [  ] Previous VTE                                                  3    [  ] Thrombophilia                                               2    [  ] Lower limb paralysis                                      2        (unable to hold up >15 seconds)      [  ] Current Cancer                                              2         (within 6 months)    [  ] Immobilization > 24 hrs                                1    [  ] ICU/CCU stay > 24 hours                              1    [  ] Age > 60                                                      1    IMPROVE VTE Score _________    IMPROVE Score 0-1: Low Risk, No VTE prophylaxis required for most patients, encourage ambulation.   IMPROVE Score 2-3: At risk, pharmacologic VTE prophylaxis is indicated for most patients (in the absence of a contraindication)  IMPROVE Score > or = 4: High Risk, pharmacologic VTE prophylaxis is indicated for most patients (in the absence of a contraindication)    89 y/o presents to ED s/p fall this morning. Pt states she does not recall how she fell. Pt's left leg collapsed under her and she fell on right side. Pt normally ambulates alone, the event was unwitnessed and she denies head trauma, +left hip pain, and dry mouth. Pt could not ambulate after fall secondary to pain. Pt denies neck, back pain.  01/05/2020 : Pt. asymptomatic. Hypokalemia resolved. Cardiology consult pending. Orthopedics f/u noted. Monitor CBC.  01/06/2020 : Pt. for surgery. Cardiology consult noted. Cleared for Corrective hip surgery with age related CV risks. Carotid doppler without any significant stenosis.  01/07/2020 : S/P Left Hemiarthroplasty. Alert, awake. PT evaluation.

## 2020-01-07 NOTE — PHYSICAL THERAPY INITIAL EVALUATION ADULT - ADDITIONAL COMMENTS
Pt lives in a private house with daughter and son in law, there are 4-5 steps to enter with B handrails that are wide apart. Once inside there are no further steps to negotiate. Pt and family live on main level, bathroom is a walk in shower with grab bars, regular toilet height, no grab bars around toilet. Pt does not have/use dme, no previous falls. Pt is mostly a household ambulator.

## 2020-01-08 LAB
ANION GAP SERPL CALC-SCNC: 7 MMOL/L — SIGNIFICANT CHANGE UP (ref 5–17)
BUN SERPL-MCNC: 18 MG/DL — SIGNIFICANT CHANGE UP (ref 7–23)
CALCIUM SERPL-MCNC: 8.1 MG/DL — LOW (ref 8.5–10.1)
CHLORIDE SERPL-SCNC: 102 MMOL/L — SIGNIFICANT CHANGE UP (ref 96–108)
CO2 SERPL-SCNC: 27 MMOL/L — SIGNIFICANT CHANGE UP (ref 22–31)
CREAT SERPL-MCNC: 1.13 MG/DL — SIGNIFICANT CHANGE UP (ref 0.5–1.3)
GLUCOSE SERPL-MCNC: 110 MG/DL — HIGH (ref 70–99)
HCT VFR BLD CALC: 30.6 % — LOW (ref 34.5–45)
HGB BLD-MCNC: 9.8 G/DL — LOW (ref 11.5–15.5)
MCHC RBC-ENTMCNC: 29.7 PG — SIGNIFICANT CHANGE UP (ref 27–34)
MCHC RBC-ENTMCNC: 32 GM/DL — SIGNIFICANT CHANGE UP (ref 32–36)
MCV RBC AUTO: 92.7 FL — SIGNIFICANT CHANGE UP (ref 80–100)
NRBC # BLD: 0 /100 WBCS — SIGNIFICANT CHANGE UP (ref 0–0)
PLATELET # BLD AUTO: 291 K/UL — SIGNIFICANT CHANGE UP (ref 150–400)
POTASSIUM SERPL-MCNC: 3.9 MMOL/L — SIGNIFICANT CHANGE UP (ref 3.5–5.3)
POTASSIUM SERPL-SCNC: 3.9 MMOL/L — SIGNIFICANT CHANGE UP (ref 3.5–5.3)
RBC # BLD: 3.3 M/UL — LOW (ref 3.8–5.2)
RBC # FLD: 15 % — HIGH (ref 10.3–14.5)
SODIUM SERPL-SCNC: 136 MMOL/L — SIGNIFICANT CHANGE UP (ref 135–145)
TROPONIN I SERPL-MCNC: <.015 NG/ML — SIGNIFICANT CHANGE UP (ref 0.01–0.04)
WBC # BLD: 11.36 K/UL — HIGH (ref 3.8–10.5)
WBC # FLD AUTO: 11.36 K/UL — HIGH (ref 3.8–10.5)

## 2020-01-08 PROCEDURE — 93010 ELECTROCARDIOGRAM REPORT: CPT

## 2020-01-08 RX ORDER — ENOXAPARIN SODIUM 100 MG/ML
40 INJECTION SUBCUTANEOUS
Qty: 0 | Refills: 0 | DISCHARGE
Start: 2020-01-08

## 2020-01-08 RX ADMIN — PANTOPRAZOLE SODIUM 40 MILLIGRAM(S): 20 TABLET, DELAYED RELEASE ORAL at 09:29

## 2020-01-08 RX ADMIN — ENOXAPARIN SODIUM 30 MILLIGRAM(S): 100 INJECTION SUBCUTANEOUS at 17:48

## 2020-01-08 RX ADMIN — OXYCODONE HYDROCHLORIDE 10 MILLIGRAM(S): 5 TABLET ORAL at 05:18

## 2020-01-08 RX ADMIN — ENOXAPARIN SODIUM 30 MILLIGRAM(S): 100 INJECTION SUBCUTANEOUS at 05:21

## 2020-01-08 RX ADMIN — Medication 30 MILLILITER(S): at 13:50

## 2020-01-08 RX ADMIN — OXYCODONE HYDROCHLORIDE 10 MILLIGRAM(S): 5 TABLET ORAL at 14:20

## 2020-01-08 RX ADMIN — OXYCODONE HYDROCHLORIDE 10 MILLIGRAM(S): 5 TABLET ORAL at 13:18

## 2020-01-08 NOTE — OCCUPATIONAL THERAPY INITIAL EVALUATION ADULT - PERTINENT HX OF CURRENT PROBLEM, REHAB EVAL
89 y/o presents to [facility] s/p fall. Pt states she does not recall how she fell. Pt's left leg collapsed under her and she fell on right side. Pt normally ambulates alone, the event was unwitnessed and she denies head trauma, +left hip pain, and dry mouth. Pt could not ambulate after fall secondary to pain. Patient had x-ray of femur done on 1/4/20 which revealed impacted fracture of the left femoral neck. Patient is currently s/p L hemihip arthroplasty POD 2

## 2020-01-08 NOTE — PROGRESS NOTE ADULT - ASSESSMENT
IMPROVE VTE Individual Risk Assessment    RISK                                                                Points    [  ] Previous VTE                                                  3    [  ] Thrombophilia                                               2    [  ] Lower limb paralysis                                      2        (unable to hold up >15 seconds)      [  ] Current Cancer                                              2         (within 6 months)    [  ] Immobilization > 24 hrs                                1    [  ] ICU/CCU stay > 24 hours                              1    [  ] Age > 60                                                      1    IMPROVE VTE Score _________    IMPROVE Score 0-1: Low Risk, No VTE prophylaxis required for most patients, encourage ambulation.   IMPROVE Score 2-3: At risk, pharmacologic VTE prophylaxis is indicated for most patients (in the absence of a contraindication)  IMPROVE Score > or = 4: High Risk, pharmacologic VTE prophylaxis is indicated for most patients (in the absence of a contraindication)    89 y/o presents to ED s/p fall this morning. Pt states she does not recall how she fell. Pt's left leg collapsed under her and she fell on right side. Pt normally ambulates alone, the event was unwitnessed and she denies head trauma, +left hip pain, and dry mouth. Pt could not ambulate after fall secondary to pain. Pt denies neck, back pain.  01/05/2020 : Pt. asymptomatic. Hypokalemia resolved. Cardiology consult pending. Orthopedics f/u noted. Monitor CBC.  01/06/2020 : Pt. for surgery. Cardiology consult noted. Cleared for Corrective hip surgery with age related CV risks. Carotid doppler without any significant stenosis.  01/07/2020 : S/P Left Hemiarthroplasty. Alert, awake. PT evaluation.  01/08/2020 : Pt. c/o epigastric discomfort. Had 2 bowel movements yesterday. Abd. soft. Non tender. BS present in all 4 quadrants. PT evaluation noted. For STR.

## 2020-01-08 NOTE — OCCUPATIONAL THERAPY INITIAL EVALUATION ADULT - BALANCE TRAINING, PT EVAL
Patient will be able to increase static and dynamic sitting/standing by 1/2 grade in order to participate in self care tasks and functional mobility/transfers within 5 weeks

## 2020-01-08 NOTE — OCCUPATIONAL THERAPY INITIAL EVALUATION ADULT - ADDITIONAL COMMENTS
As per PT eval and patients family, Pt lives in a private house with daughter and son in law, there are 4-5 steps to enter with B handrails that are wide apart. Once inside there are no further steps to negotiate. Pt and family live on main level, bathroom is a walk in shower with grab bars, regular toilet height, no grab bars around toilet. Pt does not have/use dme, no previous falls. Pt is mostly a household ambulator.

## 2020-01-08 NOTE — CHART NOTE - NSCHARTNOTEFT_GEN_A_CORE
Hospitalist Medicine DNP    CC: RN Called to see patient C/O Chest discomfort.  Patient is seen and evaluated  denying any chest pain, SOB, Indigestion. pain radiation.     HPI:  HPI Objective Statement: 89 y/o presents to ED s/p fall this morning. Pt states she does not recall how she fell. Pt's left leg collapsed under her and she fell on right side. Pt normally ambulates alone, the event was unwitnessed and she denies head trauma, +left hip pain, and dry mouth. Pt could not ambulate after fall secondary to pain. Pt denies neck, back pain, Ha, dizziness, abdominal pain (-) chest pain (-) sob Pt reports last bowel movement unknown. Pt lives at home   Admitted with fracture Left Hip. (04 Jan 2020 15:04)    Vital Signs Last 24 Hrs  T(C): 37 (08 Jan 2020 11:23), Max: 37 (08 Jan 2020 11:23)  T(F): 98.6 (08 Jan 2020 11:23), Max: 98.6 (08 Jan 2020 11:23)  HR: 106 (08 Jan 2020 12:20) (89 - 109)  BP: 117/47 (08 Jan 2020 12:20) (115/53 - 132/61)  BP(mean): --  RR: 20 (08 Jan 2020 11:23) (17 - 20)  SpO2: 94% (08 Jan 2020 12:20) (94% - 97%)    REVIEW OF SYSTEMS:  RESPIRATORY: No cough, wheezing, chills or hemoptysis; No shortness of breath  CARDIOVASCULAR: No chest pain, palpitations, dizziness, or leg swelling  GASTROINTESTINAL: No abdominal or epigastric pain. No nausea, vomiting, or hematemesis; No diarrhea or constipation. No melena or hematochezia.  GENITOURINARY: No dysuria, frequency, hematuria, or incontinence  NEUROLOGICAL: No headaches, memory loss, loss of strength, numbness, or tremors    PHYSICAL EXAM:  GENERAL: NAD, well-groomed, well-developed  NERVOUS SYSTEM:  Alert & Oriented X3, Good concentration; Motor Strength 5/5 B/L upper and lower extremities; DTRs 2+ intact and symmetric  CHEST/LUNG: Clear to percussion bilaterally; No rales, rhonchi, wheezing, or rubs  HEART: Regular rate and rhythm; No murmurs, rubs, or gallops  ABDOMEN: Soft, Nontender, Nondistended; Bowel sounds present  EXTREMITIES:  2+ Peripheral Pulses, No clubbing, cyanosis, or edema    Assessment: Patient 89y with PAST MEDICAL & SURGICAL HISTORY:  Other chronic gastritis, Hypercholesterolemia, Benign hypertension.    admitted with CLOSED FRACTURE OF LEFT HIP; INITIAL Encounter; syncope  FALL , HIP PAIN.    Plan:  STAT EKG. done NSR @ 87 bpm.  Troponin STAT.  Cardiology consult Dr. Neumann called.  Maalox 30 ml po stat given.  Diet changed from clear liquid to Regular DASH /TLC Diet  - Continue current treatment  - follow up labs  - D/w  aware and agree with the plan  - will continue to follow up  Time Spent: 30 Hospitalist Medicine DNP    CC: RN Called to see patient C/O Chest discomfort.  Patient is seen and evaluated  denying any chest pain, SOB, Indigestion. pain radiation.     HPI:  HPI Objective Statement: 89 y/o presents to ED s/p fall this morning. Pt states she does not recall how she fell. Pt's left leg collapsed under her and she fell on right side. Pt normally ambulates alone, the event was unwitnessed and she denies head trauma, +left hip pain, and dry mouth. Pt could not ambulate after fall secondary to pain. Pt denies neck, back pain, Ha, dizziness, abdominal pain (-) chest pain (-) sob Pt reports last bowel movement unknown. Pt lives at home   Admitted with fracture Left Hip. (04 Jan 2020 15:04)    Vital Signs Last 24 Hrs  T(C): 37 (08 Jan 2020 11:23), Max: 37 (08 Jan 2020 11:23)  T(F): 98.6 (08 Jan 2020 11:23), Max: 98.6 (08 Jan 2020 11:23)  HR: 106 (08 Jan 2020 12:20) (89 - 109)  BP: 117/47 (08 Jan 2020 12:20) (115/53 - 132/61)  BP(mean): --  RR: 20 (08 Jan 2020 11:23) (17 - 20)  SpO2: 94% (08 Jan 2020 12:20) (94% - 97%)    REVIEW OF SYSTEMS:  RESPIRATORY: No cough, wheezing, chills or hemoptysis; No shortness of breath  CARDIOVASCULAR: No chest pain, palpitations, dizziness, or leg swelling  GASTROINTESTINAL: No abdominal or epigastric pain. No nausea, vomiting, or hematemesis; No diarrhea or constipation. No melena or hematochezia.  GENITOURINARY: No dysuria, frequency, hematuria, or incontinence  NEUROLOGICAL: No headaches, memory loss, loss of strength, numbness, or tremors    PHYSICAL EXAM:  GENERAL: NAD, well-groomed, well-developed  NERVOUS SYSTEM:  Alert & Oriented X3, Good concentration; Motor Strength 5/5 B/L upper and lower extremities; DTRs 2+ intact and symmetric  CHEST/LUNG: Clear to percussion bilaterally; No rales, rhonchi, wheezing, or rubs  HEART: Regular rate and rhythm; No murmurs, rubs, or gallops  ABDOMEN: Soft, Nontender, Nondistended; Bowel sounds present  EXTREMITIES:  2+ Peripheral Pulses, No clubbing, cyanosis, or edema    Assessment: Patient 89y with PAST MEDICAL & SURGICAL HISTORY:  chronic gastritis, Hypercholesterolemia, Benign hypertension.    admitted with CLOSED FRACTURE OF LEFT HIP; syncope  FALL , HIP PAIN.    Plan:  STAT EKG. done NSR @ 87 bpm.  Troponin STAT.  Cardiology consult Dr. Neumann called.  Maalox 30 ml po stat given.  Diet changed from clear liquid to Regular DASH /TLC Diet  - Continue current treatment  - follow up labs  - D/w  aware and agree with the plan  - will continue to follow up  Time Spent: 30

## 2020-01-08 NOTE — OCCUPATIONAL THERAPY INITIAL EVALUATION ADULT - STRENGTHENING, PT EVAL
Pt will increase right lower extremity strength to 5/5 to improve functional strength needed to engage in functional tasks by 4 weeks

## 2020-01-08 NOTE — PROGRESS NOTE ADULT - SUBJECTIVE AND OBJECTIVE BOX
Orthopedics      Patient seen and examined at bedside. Feeling well. Pain controlled. No n/v. No acute events overnight.    Vital Signs Last 24 Hrs  T(C): 36.3 (01-08-20 @ 05:43), Max: 36.7 (01-08-20 @ 00:14)  T(F): 97.3 (01-08-20 @ 05:43), Max: 98 (01-08-20 @ 00:14)  HR: 90 (01-08-20 @ 05:43) (79 - 109)  BP: 117/53 (01-08-20 @ 05:43) (111/60 - 132/61)  BP(mean): --  RR: 20 (01-08-20 @ 05:43) (17 - 20)  SpO2: 94% (01-08-20 @ 05:43) (94% - 98%)                        10.3   13.23 )-----------( 263      ( 07 Jan 2020 08:29 )             32.0     07 Jan 2020 08:29    138    |  104    |  17     ----------------------------<  123    4.0     |  26     |  1.06     Ca    8.4        07 Jan 2020 08:29          Exam:  Gen: NAD, resting comfortably  LLE:  Dressing c/d/i  NTTP calves b/l  +EHL/FHL/TA/GS  SILT L2-S1  Compartments soft and compressible  2+ Pulses palpable      A/P: 89yF POD 2 s/p L hip hemiarthroplasty  -FU AM labs  -Pain control  -WBAT LLE  -DVT ppx  -Incentive Spirometry  -Elevation to extremity  -Medical management appreciated

## 2020-01-08 NOTE — OCCUPATIONAL THERAPY INITIAL EVALUATION ADULT - GENERAL OBSERVATIONS, REHAB EVAL
Chart reviewed. Events to date noted. Patient is a two person assist for functional tasks and transfers/mobility. PT Selena has the pt and was willing to assist. Pt was encountered supine in bed; NAD, abductor wedge +, benson +, O2 nasal cannula on 3LO2, S/P L hemihip arthroplasty POD 2, LLE WBAT, L hip dressing clean, dry and peeling up from distal end of incision site, AXOX1, confused, cooperative, followed commands

## 2020-01-08 NOTE — OCCUPATIONAL THERAPY INITIAL EVALUATION ADULT - BED MOBILITY TRAINING, PT EVAL
Patient will be able to perform bed mobility tasks moderate assistance, using least restrictive device, within 5 weeks.

## 2020-01-08 NOTE — OCCUPATIONAL THERAPY INITIAL EVALUATION ADULT - RANGE OF MOTION EXAMINATION, LOWER EXTREMITY
LLE AROM hip flexion grossly limited by more then 75%, LLE AROM knee flexion grossly limited by more then 25%, LLE AROM distally to knee WFL/Right LE Active ROM was WFL   (within functional limits)

## 2020-01-08 NOTE — OCCUPATIONAL THERAPY INITIAL EVALUATION ADULT - TRANSFER SAFETY CONCERNS NOTED: SIT/STAND, REHAB EVAL
decreased balance during turns/decreased weight-shifting ability/inability to maintain weight-bearing restrictions w/o assist/decreased sequencing ability/decreased step length

## 2020-01-08 NOTE — PROGRESS NOTE ADULT - SUBJECTIVE AND OBJECTIVE BOX
89 y/o presents to ED s/p fall this morning. Pt states she does not recall how she fell. Pt's left leg collapsed under her and she fell on right side. Pt normally ambulates alone, the event was unwitnessed and she denies head trauma, +left hip pain, and dry mouth. Pt could not ambulate after fall secondary to pain. Pt denies neck, back pain, Ha, dizziness, abdominal pain (-) chest pain (-) sob Pt reports last bowel movement unknown. Pt lives at home   Admitted with fracture Left Hip. (2020 15:04)      Chief Complaint:  Patient is a 89y old  Female who presents with a chief complaint of Fall at home. Fracture left hip. (2020 09:37)      Review of Systems:    General:  No wt loss, fevers, chills, night sweats  Eyes:  Good vision, no reported pain  ENT:  No sore throat, pain, runny nose, dysphagia  CV:  No pain, palpitations, hypo/hypertension  Resp:  No dyspnea, cough, tachypnea, wheezing  GI:  No pain, nausea, vomiting, diarrhea, constipation         Social History/Family History  SOCHX:   tobacco,  -  alcohol    FMHX: FA/MO  - contributory       Discussed with:  PMD, Family    Physical Exam:    Vital Signs:  Vital Signs Last 24 Hrs  T(C): 36.5 (2020 17:05), Max: 36.8 (2020 00:10)  T(F): 97.7 (2020 17:05), Max: 98.3 (2020 00:10)  HR: 112 (2020 17:05) (94 - 112)  BP: 153/76 (2020 17:05) (126/57 - 153/76)  BP(mean): --  RR: 18 (2020 17:05) (17 - 18)  SpO2: 94% (2020 17:05) (90% - 94%)  Daily     Daily Weight in k.2 (2020 05:15)  I&O's Summary    2020 07:01  -  2020 22:46  --------------------------------------------------------  IN: 100 mL / OUT: 0 mL / NET: 100 mL          Chest:  Full & symmetric excursion, no increased effort, breath sounds clear  Cardiovascular:  Regular rhythm, S1, S2, no murmur/rub/S3/S4, no carotid/femoral/abdominal bruit, radial/pedal pulses 2+  Abdomen:  Soft, non-tender, non-distended, normoactive bowel sounds, no HSM    Laboratory:                          11.2   9.91  )-----------( 321      ( 2020 07:39 )             35.6     01-05    140  |  103  |  13  ----------------------------<  114<H>  3.7   |  27  |  1.37<H>    Ca    7.9<L>      2020 07:39    TPro  6.4  /  Alb  2.4<L>  /  TBili  0.5  /  DBili  x   /  AST  14<L>  /  ALT  11<L>  /  AlkPhos  218<H>  01-04          CAPILLARY BLOOD GLUCOSE        LIVER FUNCTIONS - ( 2020 11:14 )  Alb: 2.4 g/dL / Pro: 6.4 gm/dL / ALK PHOS: 218 U/L / ALT: 11 U/L / AST: 14 U/L / GGT: x           PT/INR - ( 2020 11:14 )   PT: 11.1 sec;   INR: 0.99 ratio         PTT - ( 2020 11:14 )  PTT:32.7 sec          Assessment:  I am asked to assess this patient for cardiovascular risk assessment for left hip surgery  The patient's EKG is without acute changes  The patient stated CP, Troponin is normal  TTE ordered  EKG with T wave change that is likely lead placement no real change V1-V3    The patient's lab testing and other diagnostic testing are reviewed  post hip surgery, stable

## 2020-01-08 NOTE — OCCUPATIONAL THERAPY INITIAL EVALUATION ADULT - TRANSFER TRAINING, PT EVAL
Patient will be able to perform functional transfers, using least restrictive device, moderate within 5-6 weeks.

## 2020-01-08 NOTE — PROGRESS NOTE ADULT - SUBJECTIVE AND OBJECTIVE BOX
Patient is a 89y old  Female who presents with a chief complaint of Fall at home. Fracture left hip. (08 Jan 2020 06:16)      INTERVAL HPI/OVERNIGHT EVENTS:    MEDICATIONS  (STANDING):  enoxaparin Injectable 30 milliGRAM(s) SubCutaneous every 12 hours  lactated ringers. 1000 milliLiter(s) (75 mL/Hr) IV Continuous <Continuous>  pantoprazole    Tablet 40 milliGRAM(s) Oral before breakfast  polyethylene glycol 3350 17 Gram(s) Oral daily    MEDICATIONS  (PRN):  acetaminophen   Tablet .. 650 milliGRAM(s) Oral every 6 hours PRN Temp greater or equal to 38C (100.4F), Mild Pain (1 - 3)  aluminum hydroxide/magnesium hydroxide/simethicone Suspension 30 milliLiter(s) Oral four times a day PRN Indigestion  bisacodyl Suppository 10 milliGRAM(s) Rectal daily PRN If no bowel movement by POD#2  HYDROmorphone  Injectable 0.5 milliGRAM(s) IV Push every 4 hours PRN Severe Pain (7 - 10)  ondansetron Injectable 4 milliGRAM(s) IV Push every 6 hours PRN Nausea and/or Vomiting  oxyCODONE    IR 5 milliGRAM(s) Oral every 4 hours PRN Moderate Pain (4 - 6)  oxyCODONE    IR 10 milliGRAM(s) Oral every 4 hours PRN Severe Pain (7 - 10)  senna 2 Tablet(s) Oral at bedtime PRN Constipation      Allergies    No Known Allergies    Intolerances        REVIEW OF SYSTEMS:  CONSTITUTIONAL: No fever, weight loss, or fatigue  EYES: No eye pain, visual disturbances, or discharge  ENMT:  No difficulty hearing, tinnitus, vertigo; No sinus or throat pain  NECK: No pain or stiffness  BREASTS: No pain, masses, or nipple discharge  RESPIRATORY: No cough, wheezing, chills or hemoptysis; No shortness of breath  CARDIOVASCULAR: No chest pain, palpitations, dizziness, or leg swelling  GASTROINTESTINAL: No abdominal or epigastric pain. No nausea, vomiting, or hematemesis; No diarrhea or constipation. No melena or hematochezia.  GENITOURINARY: No dysuria, frequency, hematuria, or incontinence  NEUROLOGICAL: No headaches, memory loss, loss of strength, numbness, or tremors  SKIN: No itching, burning, rashes, or lesions   LYMPH NODES: No enlarged glands  ENDOCRINE: No heat or cold intolerance; No hair loss  MUSCULOSKELETAL: No joint pain or swelling; No muscle, back, or extremity pain  PSYCHIATRIC: No depression, anxiety, mood swings, or difficulty sleeping  HEME/LYMPH: No easy bruising, or bleeding gums  ALLERGY AND IMMUNOLOGIC: No hives or eczema    Vital Signs Last 24 Hrs  T(C): 36.3 (08 Jan 2020 05:43), Max: 36.7 (08 Jan 2020 00:14)  T(F): 97.3 (08 Jan 2020 05:43), Max: 98 (08 Jan 2020 00:14)  HR: 90 (08 Jan 2020 05:43) (90 - 109)  BP: 117/53 (08 Jan 2020 05:43) (111/60 - 132/61)  BP(mean): --  RR: 20 (08 Jan 2020 05:43) (17 - 20)  SpO2: 94% (08 Jan 2020 05:43) (94% - 98%)    PHYSICAL EXAM:  GENERAL: NAD, well-groomed, well-developed  HEAD:  Atraumatic, Normocephalic  EYES: EOMI, PERRL, conjunctiva and sclera clear  ENMT:  Moist mucous membranes, Good dentition, No lesions  NECK: Supple, No JVD, Normal thyroid  NERVOUS SYSTEM:  Alert & Oriented X3, Good concentration; Motor Strength 5/5 B/L upper and lower extremities; DTRs 2+ intact and symmetric  CHEST/LUNG: Clear to percussion bilaterally; No rales, rhonchi, wheezing, or rubs  HEART: Regular rate and rhythm; No murmurs, rubs, or gallops  ABDOMEN: Soft, Nontender, Nondistended; Bowel sounds present  EXTREMITIES:  2+ Peripheral Pulses, No clubbing, cyanosis, or edema  LYMPH: No lymphadenopathy noted  SKIN: No rashes or lesions    LABS:                        9.8    11.36 )-----------( 291      ( 08 Jan 2020 07:29 )             30.6     01-08    136  |  102  |  18  ----------------------------<  110<H>  3.9   |  27  |  1.13    Ca    8.1<L>      08 Jan 2020 07:29          CAPILLARY BLOOD GLUCOSE      RADIOLOGY & ADDITIONAL TESTS:    Imaging Personally Reviewed:  [ ] YES  [ ] NO    Consultant(s) Notes Reviewed:  [ ] YES  [ ] NO    Care Discussed with Consultants/Other Providers [ ] YES  [ ] NO    PROBLEMS:  CLOSED FRACTURE OF LEFT HIP; INITIAL ENCOUNTER;SYNCOPE  FALL , HIP PAIN  Closed fracture of left hip, initial encounter  Hypokalemia due to excessive renal loss of potassium  Other chronic gastritis  Hypercholesterolemia  Benign hypertension  Syncope and collapse  Closed fracture of left hip, initial encounter      Care discussed with family,         [  ]   yes  [  ]  No    imp:    stable[ ]    unstable[  ]     improving [   ]       unchanged  [  ]                Plans:  Continue present plans  [  ]               New consult [  ]   specialty  .......               order aaron[  ]    test name.                  Discharge Planning  [  ]

## 2020-01-08 NOTE — OCCUPATIONAL THERAPY INITIAL EVALUATION ADULT - RANGE OF MOTION, PT EVAL
Pt will have WFL L Hip ROM (within posterior approach precautions) in order to perform mobility independently with 5-6 weeks.

## 2020-01-08 NOTE — OCCUPATIONAL THERAPY INITIAL EVALUATION ADULT - ADL RETRAINING, OT EVAL
Patient will be able to perform functional tasks with moderate assistance, using least restrictive device, within 5 weeks.

## 2020-01-09 DIAGNOSIS — E44.0 MODERATE PROTEIN-CALORIE MALNUTRITION: ICD-10-CM

## 2020-01-09 LAB
ANION GAP SERPL CALC-SCNC: 6 MMOL/L — SIGNIFICANT CHANGE UP (ref 5–17)
BUN SERPL-MCNC: 20 MG/DL — SIGNIFICANT CHANGE UP (ref 7–23)
CALCIUM SERPL-MCNC: 8.1 MG/DL — LOW (ref 8.5–10.1)
CHLORIDE SERPL-SCNC: 104 MMOL/L — SIGNIFICANT CHANGE UP (ref 96–108)
CO2 SERPL-SCNC: 28 MMOL/L — SIGNIFICANT CHANGE UP (ref 22–31)
CREAT SERPL-MCNC: 1.12 MG/DL — SIGNIFICANT CHANGE UP (ref 0.5–1.3)
GLUCOSE SERPL-MCNC: 93 MG/DL — SIGNIFICANT CHANGE UP (ref 70–99)
HCT VFR BLD CALC: 28 % — LOW (ref 34.5–45)
HGB BLD-MCNC: 8.9 G/DL — LOW (ref 11.5–15.5)
MCHC RBC-ENTMCNC: 29.7 PG — SIGNIFICANT CHANGE UP (ref 27–34)
MCHC RBC-ENTMCNC: 31.8 GM/DL — LOW (ref 32–36)
MCV RBC AUTO: 93.3 FL — SIGNIFICANT CHANGE UP (ref 80–100)
NRBC # BLD: 0 /100 WBCS — SIGNIFICANT CHANGE UP (ref 0–0)
PLATELET # BLD AUTO: 262 K/UL — SIGNIFICANT CHANGE UP (ref 150–400)
POTASSIUM SERPL-MCNC: 4.7 MMOL/L — SIGNIFICANT CHANGE UP (ref 3.5–5.3)
POTASSIUM SERPL-SCNC: 4.7 MMOL/L — SIGNIFICANT CHANGE UP (ref 3.5–5.3)
RBC # BLD: 3 M/UL — LOW (ref 3.8–5.2)
RBC # FLD: 15.5 % — HIGH (ref 10.3–14.5)
SODIUM SERPL-SCNC: 138 MMOL/L — SIGNIFICANT CHANGE UP (ref 135–145)
SURGICAL PATHOLOGY STUDY: SIGNIFICANT CHANGE UP
WBC # BLD: 7.87 K/UL — SIGNIFICANT CHANGE UP (ref 3.8–10.5)
WBC # FLD AUTO: 7.87 K/UL — SIGNIFICANT CHANGE UP (ref 3.8–10.5)

## 2020-01-09 PROCEDURE — 93306 TTE W/DOPPLER COMPLETE: CPT | Mod: 26

## 2020-01-09 RX ADMIN — PANTOPRAZOLE SODIUM 40 MILLIGRAM(S): 20 TABLET, DELAYED RELEASE ORAL at 05:26

## 2020-01-09 RX ADMIN — ENOXAPARIN SODIUM 30 MILLIGRAM(S): 100 INJECTION SUBCUTANEOUS at 17:45

## 2020-01-09 RX ADMIN — ENOXAPARIN SODIUM 30 MILLIGRAM(S): 100 INJECTION SUBCUTANEOUS at 05:25

## 2020-01-09 RX ADMIN — OXYCODONE HYDROCHLORIDE 5 MILLIGRAM(S): 5 TABLET ORAL at 17:43

## 2020-01-09 RX ADMIN — OXYCODONE HYDROCHLORIDE 5 MILLIGRAM(S): 5 TABLET ORAL at 18:43

## 2020-01-09 RX ADMIN — OXYCODONE HYDROCHLORIDE 10 MILLIGRAM(S): 5 TABLET ORAL at 03:11

## 2020-01-09 RX ADMIN — OXYCODONE HYDROCHLORIDE 10 MILLIGRAM(S): 5 TABLET ORAL at 02:11

## 2020-01-09 NOTE — DIETITIAN INITIAL EVALUATION ADULT. - ENERGY NEEDS
Ht (cm): 160.0  Wt (kg):  62.8 kg ( 01-09 )     IBW:  52.1kg    %IBW:   120%     UBW:                   %UBW:

## 2020-01-09 NOTE — CHART NOTE - NSCHARTNOTEFT_GEN_A_CORE
Upon Nutritional Assessment by the Registered Dietitian your patient was determined to meet criteria / has evidence of the following diagnosis/diagnoses:          [ ]  Mild Protein Calorie Malnutrition        [x ]  Moderate Protein Calorie Malnutrition        [ ] Severe Protein Calorie Malnutrition        [ ] Unspecified Protein Calorie Malnutrition        [ ] Underweight / BMI <19        [ ] Morbid Obesity / BMI > 40      Findings as based on:  •  Comprehensive nutrition assessment and consultation  •  Calorie counts (nutrient intake analysis)  •  Food acceptance and intake status from observations by staff  •  Follow up  •  Patient education  •  Intervention secondary to interdisciplinary rounds  •   concerns      Treatment:    The following diet has been recommended: Regular, Low sodium, Ensure Enlive x 3/day (provides 1050 kcal, 60 g protein)       PROVIDER Section:     By signing this assessment you are acknowledging and agree with the diagnosis/diagnoses assigned by the Registered Dietitian    Comments:

## 2020-01-09 NOTE — DIETITIAN INITIAL EVALUATION ADULT. - PERTINENT MEDS FT
MEDICATIONS  (STANDING):  enoxaparin Injectable 30 milliGRAM(s) SubCutaneous every 12 hours  lactated ringers. 1000 milliLiter(s) (75 mL/Hr) IV Continuous <Continuous>  pantoprazole    Tablet 40 milliGRAM(s) Oral before breakfast  polyethylene glycol 3350 17 Gram(s) Oral daily    MEDICATIONS  (PRN):  acetaminophen   Tablet .. 650 milliGRAM(s) Oral every 6 hours PRN Temp greater or equal to 38C (100.4F), Mild Pain (1 - 3)  aluminum hydroxide/magnesium hydroxide/simethicone Suspension 30 milliLiter(s) Oral four times a day PRN Indigestion  bisacodyl Suppository 10 milliGRAM(s) Rectal daily PRN If no bowel movement by POD#2  HYDROmorphone  Injectable 0.5 milliGRAM(s) IV Push every 4 hours PRN Severe Pain (7 - 10)  ondansetron Injectable 4 milliGRAM(s) IV Push every 6 hours PRN Nausea and/or Vomiting  oxyCODONE    IR 5 milliGRAM(s) Oral every 4 hours PRN Moderate Pain (4 - 6)  oxyCODONE    IR 10 milliGRAM(s) Oral every 4 hours PRN Severe Pain (7 - 10)  senna 2 Tablet(s) Oral at bedtime PRN Constipation

## 2020-01-09 NOTE — DIETITIAN INITIAL EVALUATION ADULT. - OTHER INFO
Pt alert, confused at times. Pt with assistance from adult children. Shopping & cooking duties are shared. Denies any N/V/D/C per RN pt with diarrhea yesterday, currently resolved. No difficulties with chewing or swallowing. Pt reported poor appetite, no GI distress at this time but does not feel well enough to eat. Per chart review pt consuming avg ~50% of meals. Family requested ensure to supplement between, pt does not have preferences of flavor. Pt unable to recall UBW or diet PTA. Provided encouragement for pt to improve PO intake to promote wound healing, pt verbally agreed. Pt alert, confused at times. Pt with assistance from adult children. Shopping & cooking duties are shared. Denies any N/V/D/C per RN pt with diarrhea yesterday, currently resolved. H/o chronic gastritis noted. No difficulties with chewing or swallowing. Pt reported poor appetite, no GI distress at this time but does not feel well enough to eat. Per chart review pt consuming avg ~50% of meals. Family requested ensure to supplement between, pt does not have preferences of flavor. Pt unable to recall UBW or diet PTA. Provided encouragement for pt to improve PO intake to promote wound healing, pt verbally agreed.

## 2020-01-09 NOTE — DIETITIAN INITIAL EVALUATION ADULT. - PERTINENT LABORATORY DATA
01-09 Na138 mmol/L Glu 93 mg/dL K+ 4.7 mmol/L Cr  1.12 mg/dL BUN 20 mg/dL 01-06 Phos 3.3 mg/dL 01-04 Alb 2.4 g/dL<L>

## 2020-01-09 NOTE — DIETITIAN INITIAL EVALUATION ADULT. - PHYSICAL APPEARANCE
BMI 24.8; no edema noted/well nourished/other (specify) Nutrition focused physical exam conducted:  Subcutaneous fat loss: [ moderate ] Orbital fat pads region, [ moderate ]Buccal fat region, [ severe ]Triceps region,  [ WDL ]Ribs region.  Muscle wasting: [ moderate ]Temples region, [ mild-moderate ]Clavicle region, [ WDL ]Shoulder region, [ moderate ]Scapula region, [ moderate ]Interosseous region,  [ moderate-severe ]thigh region, [severe ]Calf region

## 2020-01-09 NOTE — PROGRESS NOTE ADULT - SUBJECTIVE AND OBJECTIVE BOX
Patient is a 89y old  Female who presents with a chief complaint of Fall at home. Fracture left hip. (09 Jan 2020 06:17)      INTERVAL HPI/OVERNIGHT EVENTS:    MEDICATIONS  (STANDING):  enoxaparin Injectable 30 milliGRAM(s) SubCutaneous every 12 hours  lactated ringers. 1000 milliLiter(s) (75 mL/Hr) IV Continuous <Continuous>  pantoprazole    Tablet 40 milliGRAM(s) Oral before breakfast  polyethylene glycol 3350 17 Gram(s) Oral daily    MEDICATIONS  (PRN):  acetaminophen   Tablet .. 650 milliGRAM(s) Oral every 6 hours PRN Temp greater or equal to 38C (100.4F), Mild Pain (1 - 3)  aluminum hydroxide/magnesium hydroxide/simethicone Suspension 30 milliLiter(s) Oral four times a day PRN Indigestion  bisacodyl Suppository 10 milliGRAM(s) Rectal daily PRN If no bowel movement by POD#2  HYDROmorphone  Injectable 0.5 milliGRAM(s) IV Push every 4 hours PRN Severe Pain (7 - 10)  ondansetron Injectable 4 milliGRAM(s) IV Push every 6 hours PRN Nausea and/or Vomiting  oxyCODONE    IR 5 milliGRAM(s) Oral every 4 hours PRN Moderate Pain (4 - 6)  oxyCODONE    IR 10 milliGRAM(s) Oral every 4 hours PRN Severe Pain (7 - 10)  senna 2 Tablet(s) Oral at bedtime PRN Constipation      Allergies    No Known Allergies    Intolerances        REVIEW OF SYSTEMS:  CONSTITUTIONAL: No fever, weight loss, or fatigue  EYES: No eye pain, visual disturbances, or discharge  ENMT:  No difficulty hearing, tinnitus, vertigo; No sinus or throat pain  NECK: No pain or stiffness  BREASTS: No pain, masses, or nipple discharge  RESPIRATORY: No cough, wheezing, chills or hemoptysis; No shortness of breath  CARDIOVASCULAR: No chest pain, palpitations, dizziness, or leg swelling  GASTROINTESTINAL: No abdominal or epigastric pain. No nausea, vomiting, or hematemesis; No diarrhea or constipation. No melena or hematochezia.  GENITOURINARY: No dysuria, frequency, hematuria, or incontinence  NEUROLOGICAL: No headaches, memory loss, loss of strength, numbness, or tremors  SKIN: No itching, burning, rashes, or lesions   LYMPH NODES: No enlarged glands  ENDOCRINE: No heat or cold intolerance; No hair loss  MUSCULOSKELETAL: No joint pain or swelling; No muscle, back, or extremity pain  PSYCHIATRIC: No depression, anxiety, mood swings, or difficulty sleeping  HEME/LYMPH: No easy bruising, or bleeding gums  ALLERGY AND IMMUNOLOGIC: No hives or eczema    Vital Signs Last 24 Hrs  T(C): 37.3 (09 Jan 2020 11:55), Max: 37.3 (09 Jan 2020 11:55)  T(F): 99.1 (09 Jan 2020 11:55), Max: 99.1 (09 Jan 2020 11:55)  HR: 102 (09 Jan 2020 11:55) (90 - 106)  BP: 125/57 (09 Jan 2020 11:55) (110/49 - 125/57)  BP(mean): --  RR: 18 (09 Jan 2020 11:55) (18 - 20)  SpO2: 94% (09 Jan 2020 11:55) (93% - 95%)    PHYSICAL EXAM:  GENERAL: NAD, well-groomed, well-developed  HEAD:  Atraumatic, Normocephalic  EYES: EOMI, PERRL, conjunctiva and sclera clear  ENMT:  Moist mucous membranes, Good dentition, No lesions  NECK: Supple, No JVD, Normal thyroid  NERVOUS SYSTEM:  Alert & Oriented X3, Good concentration; Motor Strength 5/5 B/L upper and lower extremities; DTRs 2+ intact and symmetric  CHEST/LUNG: Clear to percussion bilaterally; No rales, rhonchi, wheezing, or rubs  HEART: Regular rate and rhythm; No murmurs, rubs, or gallops  ABDOMEN: Soft, Nontender, Nondistended; Bowel sounds present  EXTREMITIES:  2+ Peripheral Pulses, No clubbing, cyanosis, or edema  LYMPH: No lymphadenopathy noted  SKIN: No rashes or lesions    LABS:                        8.9    7.87  )-----------( 262      ( 09 Jan 2020 07:54 )             28.0     01-09    138  |  104  |  20  ----------------------------<  93  4.7   |  28  |  1.12    Ca    8.1<L>      09 Jan 2020 07:54          CAPILLARY BLOOD GLUCOSE          CARDIAC MARKERS ( 08 Jan 2020 15:36 )  <.015 ng/mL / x     / x     / x     / x                    RADIOLOGY & ADDITIONAL TESTS:    Imaging Personally Reviewed:  [ ] YES  [ ] NO    Consultant(s) Notes Reviewed:  [ ] YES  [ ] NO    Care Discussed with Consultants/Other Providers [ ] YES  [ ] NO    PROBLEMS:  CLOSED FRACTURE OF LEFT HIP; INITIAL ENCOUNTER;SYNCOPE  FALL , HIP PAIN  Closed fracture of left hip, initial encounter  Hypokalemia due to excessive renal loss of potassium  Other chronic gastritis  Hypercholesterolemia  Benign hypertension  Syncope and collapse  Closed fracture of left hip, initial encounter      Care discussed with family,         [  ]   yes  [  ]  No    imp:    stable[ ]    unstable[  ]     improving [   ]       unchanged  [  ]                Plans:  Continue present plans  [  ]               New consult [  ]   specialty  .......               order aaron[  ]    test name.                  Discharge Planning  [  ]

## 2020-01-09 NOTE — PROGRESS NOTE ADULT - SUBJECTIVE AND OBJECTIVE BOX
Orthopedics      Patient seen and examined at bedside. Feeling well. Pain controlled. No n/v. No acute events overnight.    Vital Signs Last 24 Hrs  T(C): 36.6 (09 Jan 2020 05:15), Max: 37 (08 Jan 2020 11:23)  T(F): 97.8 (09 Jan 2020 05:15), Max: 98.6 (08 Jan 2020 11:23)  HR: 100 (09 Jan 2020 05:15) (89 - 106)  BP: 122/56 (09 Jan 2020 05:15) (110/49 - 122/56)  BP(mean): --  RR: 18 (09 Jan 2020 05:15) (18 - 20)  SpO2: 95% (09 Jan 2020 05:15) (93% - 95%)      Exam:  Gen: NAD, resting comfortably  LLE:  Dressing c/d/i  NTTP calves b/l  +EHL/FHL/TA/GS  SILT L2-S1  Compartments soft and compressible  2+ Pulses palpable      A/P: 89yF POD 3 s/p L hip hemiarthroplasty  -Dressing changed  -FU AM labs  -Pain control  -WBAT LLE  -DVT ppx  -Incentive Spirometry  -Elevation to extremity  -Medical management appreciated

## 2020-01-09 NOTE — PROGRESS NOTE ADULT - ASSESSMENT
IMPROVE VTE Individual Risk Assessment    RISK                                                                Points    [  ] Previous VTE                                                  3    [  ] Thrombophilia                                               2    [  ] Lower limb paralysis                                      2        (unable to hold up >15 seconds)      [  ] Current Cancer                                              2         (within 6 months)    [  ] Immobilization > 24 hrs                                1    [  ] ICU/CCU stay > 24 hours                              1    [  ] Age > 60                                                      1    IMPROVE VTE Score _________    IMPROVE Score 0-1: Low Risk, No VTE prophylaxis required for most patients, encourage ambulation.   IMPROVE Score 2-3: At risk, pharmacologic VTE prophylaxis is indicated for most patients (in the absence of a contraindication)  IMPROVE Score > or = 4: High Risk, pharmacologic VTE prophylaxis is indicated for most patients (in the absence of a contraindication)    89 y/o presents to ED s/p fall this morning. Pt states she does not recall how she fell. Pt's left leg collapsed under her and she fell on right side. Pt normally ambulates alone, the event was unwitnessed and she denies head trauma, +left hip pain, and dry mouth. Pt could not ambulate after fall secondary to pain. Pt denies neck, back pain.  01/05/2020 : Pt. asymptomatic. Hypokalemia resolved. Cardiology consult pending. Orthopedics f/u noted. Monitor CBC.  01/06/2020 : Pt. for surgery. Cardiology consult noted. Cleared for Corrective hip surgery with age related CV risks. Carotid doppler without any significant stenosis.  01/07/2020 : S/P Left Hemiarthroplasty. Alert, awake. PT evaluation.  01/08/2020 : Pt. c/o epigastric discomfort. Had 2 bowel movements yesterday. Abd. soft. Non tender. BS present in all 4 quadrants. PT evaluation noted. For STR.  01/09/2020 : Pt. Lethargic. Arousable. Lungs clear. No epigastric discomfort. Cardiology F/U noted. Troponin normal. PT.

## 2020-01-09 NOTE — PROGRESS NOTE ADULT - SUBJECTIVE AND OBJECTIVE BOX
89 y/o presents to ED s/p fall this morning. Pt states she does not recall how she fell. Pt's left leg collapsed under her and she fell on right side. Pt normally ambulates alone, the event was unwitnessed and she denies head trauma, +left hip pain, and dry mouth. Pt could not ambulate after fall secondary to pain. Pt denies neck, back pain, Ha, dizziness, abdominal pain (-) chest pain (-) sob Pt reports last bowel movement unknown. Pt lives at home   Admitted with fracture Left Hip. (2020 15:04)      Chief Complaint:  Patient is a 89y old  Female who presents with a chief complaint of Fall at home. Fracture left hip. (2020 09:37)      Review of Systems:    General:  No wt loss, fevers, chills, night sweats  Eyes:  Good vision, no reported pain  ENT:  No sore throat, pain, runny nose, dysphagia  CV:  No pain, palpitations, hypo/hypertension  Resp:  No dyspnea, cough, tachypnea, wheezing  GI:  No pain, nausea, vomiting, diarrhea, constipation         Social History/Family History  SOCHX:   tobacco,  -  alcohol    FMHX: FA/MO  - contributory       Discussed with:  PMD, Family    Physical Exam:    Vital Signs:  Vital Signs Last 24 Hrs  T(C): 36.5 (2020 17:05), Max: 36.8 (2020 00:10)  T(F): 97.7 (2020 17:05), Max: 98.3 (2020 00:10)  HR: 112 (2020 17:05) (94 - 112)  BP: 153/76 (2020 17:05) (126/57 - 153/76)  BP(mean): --  RR: 18 (2020 17:05) (17 - 18)  SpO2: 94% (2020 17:05) (90% - 94%)  Daily     Daily Weight in k.2 (2020 05:15)  I&O's Summary    2020 07:01  -  2020 22:46  --------------------------------------------------------  IN: 100 mL / OUT: 0 mL / NET: 100 mL          Chest:  Full & symmetric excursion, no increased effort, breath sounds clear  Cardiovascular:  Regular rhythm, S1, S2, no murmur/rub/S3/S4, no carotid/femoral/abdominal bruit, radial/pedal pulses 2+  Abdomen:  Soft, non-tender, non-distended, normoactive bowel sounds, no HSM    Laboratory:                          11.2   9.91  )-----------( 321      ( 2020 07:39 )             35.6     01-05    140  |  103  |  13  ----------------------------<  114<H>  3.7   |  27  |  1.37<H>    Ca    7.9<L>      2020 07:39    TPro  6.4  /  Alb  2.4<L>  /  TBili  0.5  /  DBili  x   /  AST  14<L>  /  ALT  11<L>  /  AlkPhos  218<H>  01-04          CAPILLARY BLOOD GLUCOSE        LIVER FUNCTIONS - ( 2020 11:14 )  Alb: 2.4 g/dL / Pro: 6.4 gm/dL / ALK PHOS: 218 U/L / ALT: 11 U/L / AST: 14 U/L / GGT: x           PT/INR - ( 2020 11:14 )   PT: 11.1 sec;   INR: 0.99 ratio         PTT - ( 2020 11:14 )  PTT:32.7 sec          Assessment:  I am asked to assess this patient for cardiovascular risk assessment for left hip surgery  The patient's EKG is without acute changes  The patient stated CP, Troponin is normal  TTE ordered, noted normal ejection fraction, and some mild to moderate valvular heart disease, not concerning at this point  EKG with T wave change that is likely lead placement no real change V1-V3  The patient's lab testing and other diagnostic testing are reviewed  post hip surgery, stable

## 2020-01-10 ENCOUNTER — APPOINTMENT (OUTPATIENT)
Dept: OBGYN | Facility: CLINIC | Age: 85
End: 2020-01-10

## 2020-01-10 LAB
ANION GAP SERPL CALC-SCNC: 7 MMOL/L — SIGNIFICANT CHANGE UP (ref 5–17)
BUN SERPL-MCNC: 16 MG/DL — SIGNIFICANT CHANGE UP (ref 7–23)
CALCIUM SERPL-MCNC: 8.2 MG/DL — LOW (ref 8.5–10.1)
CHLORIDE SERPL-SCNC: 104 MMOL/L — SIGNIFICANT CHANGE UP (ref 96–108)
CK MB BLD-MCNC: 1.3 % — SIGNIFICANT CHANGE UP (ref 0–3.5)
CK MB CFR SERPL CALC: 2.1 NG/ML — SIGNIFICANT CHANGE UP (ref 0.5–3.6)
CK SERPL-CCNC: 158 U/L — SIGNIFICANT CHANGE UP (ref 26–192)
CO2 SERPL-SCNC: 26 MMOL/L — SIGNIFICANT CHANGE UP (ref 22–31)
CREAT SERPL-MCNC: 0.81 MG/DL — SIGNIFICANT CHANGE UP (ref 0.5–1.3)
GLUCOSE SERPL-MCNC: 90 MG/DL — SIGNIFICANT CHANGE UP (ref 70–99)
HCT VFR BLD CALC: 28.3 % — LOW (ref 34.5–45)
HGB BLD-MCNC: 9.1 G/DL — LOW (ref 11.5–15.5)
MCHC RBC-ENTMCNC: 30 PG — SIGNIFICANT CHANGE UP (ref 27–34)
MCHC RBC-ENTMCNC: 32.2 GM/DL — SIGNIFICANT CHANGE UP (ref 32–36)
MCV RBC AUTO: 93.4 FL — SIGNIFICANT CHANGE UP (ref 80–100)
NRBC # BLD: 0 /100 WBCS — SIGNIFICANT CHANGE UP (ref 0–0)
PLATELET # BLD AUTO: 261 K/UL — SIGNIFICANT CHANGE UP (ref 150–400)
POTASSIUM SERPL-MCNC: 4.2 MMOL/L — SIGNIFICANT CHANGE UP (ref 3.5–5.3)
POTASSIUM SERPL-SCNC: 4.2 MMOL/L — SIGNIFICANT CHANGE UP (ref 3.5–5.3)
RBC # BLD: 3.03 M/UL — LOW (ref 3.8–5.2)
RBC # FLD: 15.3 % — HIGH (ref 10.3–14.5)
SODIUM SERPL-SCNC: 137 MMOL/L — SIGNIFICANT CHANGE UP (ref 135–145)
TROPONIN I SERPL-MCNC: <.015 NG/ML — SIGNIFICANT CHANGE UP (ref 0.01–0.04)
WBC # BLD: 9.22 K/UL — SIGNIFICANT CHANGE UP (ref 3.8–10.5)
WBC # FLD AUTO: 9.22 K/UL — SIGNIFICANT CHANGE UP (ref 3.8–10.5)

## 2020-01-10 PROCEDURE — 93010 ELECTROCARDIOGRAM REPORT: CPT

## 2020-01-10 RX ORDER — NITROGLYCERIN 6.5 MG
0.4 CAPSULE, EXTENDED RELEASE ORAL
Refills: 0 | Status: DISCONTINUED | OUTPATIENT
Start: 2020-01-10 | End: 2020-01-16

## 2020-01-10 RX ADMIN — HYDROMORPHONE HYDROCHLORIDE 0.5 MILLIGRAM(S): 2 INJECTION INTRAMUSCULAR; INTRAVENOUS; SUBCUTANEOUS at 12:51

## 2020-01-10 RX ADMIN — HYDROMORPHONE HYDROCHLORIDE 0.5 MILLIGRAM(S): 2 INJECTION INTRAMUSCULAR; INTRAVENOUS; SUBCUTANEOUS at 17:33

## 2020-01-10 RX ADMIN — Medication 30 MILLILITER(S): at 15:44

## 2020-01-10 RX ADMIN — ENOXAPARIN SODIUM 30 MILLIGRAM(S): 100 INJECTION SUBCUTANEOUS at 06:15

## 2020-01-10 RX ADMIN — Medication 0.4 MILLIGRAM(S): at 12:50

## 2020-01-10 RX ADMIN — HYDROMORPHONE HYDROCHLORIDE 0.5 MILLIGRAM(S): 2 INJECTION INTRAMUSCULAR; INTRAVENOUS; SUBCUTANEOUS at 17:28

## 2020-01-10 RX ADMIN — SODIUM CHLORIDE 75 MILLILITER(S): 9 INJECTION, SOLUTION INTRAVENOUS at 01:20

## 2020-01-10 RX ADMIN — ENOXAPARIN SODIUM 30 MILLIGRAM(S): 100 INJECTION SUBCUTANEOUS at 17:32

## 2020-01-10 RX ADMIN — PANTOPRAZOLE SODIUM 40 MILLIGRAM(S): 20 TABLET, DELAYED RELEASE ORAL at 06:15

## 2020-01-10 NOTE — CHART NOTE - NSCHARTNOTEFT_GEN_A_CORE
NP Medicine    called by RN for patient with complaints of cp    seen patient at bedside  ICU Vital Signs Last 24 Hrs  T(C): 37.1 (10 Jason 2020 17:25), Max: 37.8 (09 Jan 2020 23:55)  T(F): 98.7 (10 Jason 2020 17:25), Max: 100 (09 Jan 2020 23:55)  HR: 106 (10 Jason 2020 17:25) (95 - 115)  BP: 136/71 (10 Jason 2020 17:25) (111/53 - 139/56)  BP(mean): --  ABP: --  ABP(mean): --  RR: 18 (10 Jason 2020 17:25) (18 - 18)  SpO2: 95% (10 Jason 2020 17:25) (91% - 95%)      89 y/o female patient PMHx HTN, HLD, Hypokalemia s/p a fall at home; she sustained a Left hip fracture status post Left hip ORIF,  She states she now has cp in the mid-sternal area that has been intermittent. She states the pain is not new, it was worked up a few days ago, and she was seen by Cardio. The pain  is a 4/7,non-radiating, no alleviating factors, aggravating factors is with movement   GENERAL: NAD well-developed  HEAD:  Atraumatic, Normocephalic  EYES: EOMI, PERRLA, conjunctiva and sclera clear  ENMT: No tonsillar erythema, exudates, or enlargement; Moist mucous membranes, Good dentition, No lesions  NECK: Supple, No JVD, Normal thyroid  NERVOUS SYSTEM:  Awake and Alert, periods of confusion; Motor Strength 3/5 B/L upper and lower extremities; DTRs 2+ intact and symmetric  CHEST/LUNG: Clear to percussion bilaterally; No rales, rhonchi, wheezing, or rubs  HEART: Regular rate and tachy ; No murmurs, rubs, or gallops  ABDOMEN: Soft, Nontender, Nondistended; Bowel sounds present  EXTREMITIES:  2+ Peripheral Pulses, No clubbing, cyanosis, or edema  LYMPH: No lymphadenopathy   SKIN: No rashes or lesions, dsg c/d/i    A/P  89 y/o female PMHx HTN, HLD, Hypokalemia s/p a fall at home, and she sustained a fracture status post ORIF. She states she now has cp in the mid-sternal pain does not radiate, 4/7.  musculoskeletal vs ACS   cardio following and worked up for cp, TTE and EKG, cardiac enzymes within normal limits  EKG- sinus tachycardia probable r/t pain  nitro x1  dilaudid for pain NP Medicine    called by RN for patient with complaints of cp    seen patient at bedside  ICU Vital Signs Last 24 Hrs  T(C): 37.1 (10 Jason 2020 17:25), Max: 37.8 (09 Jan 2020 23:55)  T(F): 98.7 (10 Jason 2020 17:25), Max: 100 (09 Jan 2020 23:55)  HR: 106 (10 Jason 2020 17:25) (95 - 115)  BP: 136/71 (10 Jason 2020 17:25) (111/53 - 139/56)  BP(mean): --  ABP: --  ABP(mean): --  RR: 18 (10 Jason 2020 17:25) (18 - 18)  SpO2: 95% (10 Jason 2020 17:25) (91% - 95%)                   9.1    9.22  )-----------( 261      ( 10 Jason 2020 08:26 )             28.3   CARDIAC MARKERS ( 10 Jason 2020 15:22 )  <.015 ng/mL / x     / 158 U/L / x     / 2.1 ng/mL    89 y/o female patient PMHx HTN, HLD, Hypokalemia s/p a fall at home; she sustained a Left hip fracture status post Left hip ORIF,  She states she now has cp in the mid-sternal area that has been intermittent. She states the pain is not new, it was worked up a few days ago, and she was seen by Cardio. The pain  is a 4/7,non-radiating, no alleviating factors, aggravating factors is with movement   GENERAL: NAD well-developed  HEAD:  Atraumatic, Normocephalic  EYES: EOMI, PERRLA, conjunctiva and sclera clear  ENMT: No tonsillar erythema, exudates, or enlargement; Moist mucous membranes, Good dentition, No lesions  NECK: Supple, No JVD, Normal thyroid  NERVOUS SYSTEM:  Awake and Alert, periods of confusion; Motor Strength 3/5 B/L upper and lower extremities; DTRs 2+ intact and symmetric  CHEST/LUNG: Clear to percussion bilaterally; No rales, rhonchi, wheezing, or rubs  HEART: Regular rate and tachy ; No murmurs, rubs, or gallops  ABDOMEN: Soft, Nontender, Nondistended; Bowel sounds present  EXTREMITIES:  2+ Peripheral Pulses, No clubbing, cyanosis, or edema  LYMPH: No lymphadenopathy   SKIN: No rashes or lesions, dsg c/d/i    A/P  89 y/o female PMHx HTN, HLD, Hypokalemia s/p a fall at home, and she sustained a fracture status post ORIF. She states she now has cp in the mid-sternal pain does not radiate, 4/7.  musculoskeletal vs ACS   cardio following and worked up for cp, TTE and EKG, cardiac enzymes within normal limits  EKG- sinus tachycardia probable r/t pain  nitro x1  dilaudid for pain

## 2020-01-10 NOTE — PROGRESS NOTE ADULT - ASSESSMENT
IMPROVE VTE Individual Risk Assessment    RISK                                                                Points    [  ] Previous VTE                                                  3    [  ] Thrombophilia                                               2    [  ] Lower limb paralysis                                      2        (unable to hold up >15 seconds)      [  ] Current Cancer                                              2         (within 6 months)    [  ] Immobilization > 24 hrs                                1    [  ] ICU/CCU stay > 24 hours                              1    [  ] Age > 60                                                      1    IMPROVE VTE Score _________    IMPROVE Score 0-1: Low Risk, No VTE prophylaxis required for most patients, encourage ambulation.   IMPROVE Score 2-3: At risk, pharmacologic VTE prophylaxis is indicated for most patients (in the absence of a contraindication)  IMPROVE Score > or = 4: High Risk, pharmacologic VTE prophylaxis is indicated for most patients (in the absence of a contraindication)    89 y/o presents to ED s/p fall this morning. Pt states she does not recall how she fell. Pt's left leg collapsed under her and she fell on right side. Pt normally ambulates alone, the event was unwitnessed and she denies head trauma, +left hip pain, and dry mouth. Pt could not ambulate after fall secondary to pain. Pt denies neck, back pain.  01/05/2020 : Pt. asymptomatic. Hypokalemia resolved. Cardiology consult pending. Orthopedics f/u noted. Monitor CBC.  01/06/2020 : Pt. for surgery. Cardiology consult noted. Cleared for Corrective hip surgery with age related CV risks. Carotid doppler without any significant stenosis.  01/07/2020 : S/P Left Hemiarthroplasty. Alert, awake. PT evaluation.  01/08/2020 : Pt. c/o epigastric discomfort. Had 2 bowel movements yesterday. Abd. soft. Non tender. BS present in all 4 quadrants. PT evaluation noted. For STR.  01/09/2020 : Pt. Lethargic. Arousable. Lungs clear. No epigastric discomfort. Cardiology F/U noted. Troponin normal. PT.  01/10/2020 : Still lethargic this AM. PT to follow. Neurology consult if no change in mental status.

## 2020-01-10 NOTE — PROGRESS NOTE ADULT - SUBJECTIVE AND OBJECTIVE BOX
Patient seen and examined. Left hip pain controlled.    Physical exam  VS:  Vital Signs Last 24 Hrs  T(C): 37 (10 Jason 2020 04:55), Max: 37.8 (09 Jan 2020 23:55)  T(F): 98.6 (10 Jason 2020 04:55), Max: 100 (09 Jan 2020 23:55)  HR: 103 (10 Jason 2020 04:55) (102 - 112)  BP: 111/53 (10 Jason 2020 04:55) (111/53 - 134/70)  BP(mean): --  RR: 18 (10 Jason 2020 04:55) (18 - 18)  SpO2: 91% (10 Jason 2020 04:55) (91% - 94%)  Gen: NAD  Left LE: Dressing intact. +EHL/FHL/TA/GSC. SILT L3-S1. Capillary refill brisk. Compartments soft and compressible without tenderness.      89F s/p left hip hemiarthroplasty POD# 4    Weight bear as tolerated with posterior hip precautions  Pain control  DVT prophylaxis  Abduction while supine/seated  Physical therapy  Ortho stable for discharge if h/h stable

## 2020-01-10 NOTE — PROGRESS NOTE ADULT - SUBJECTIVE AND OBJECTIVE BOX
89 y/o presents to ED s/p fall this morning. Pt states she does not recall how she fell. Pt's left leg collapsed under her and she fell on right side. Pt normally ambulates alone, the event was unwitnessed and she denies head trauma, +left hip pain, and dry mouth. Pt could not ambulate after fall secondary to pain. Pt denies neck, back pain, Ha, dizziness, abdominal pain (-) chest pain (-) sob Pt reports last bowel movement unknown. Pt lives at home   Admitted with fracture Left Hip. (2020 15:04)      Chief Complaint:  Patient is a 89y old  Female who presents with a chief complaint of Fall at home. Fracture left hip. (2020 09:37)      Review of Systems:    General:  No wt loss, fevers, chills, night sweats  Eyes:  Good vision, no reported pain  ENT:  No sore throat, pain, runny nose, dysphagia  CV:  No pain, palpitations, hypo/hypertension  Resp:  No dyspnea, cough, tachypnea, wheezing  GI:  No pain, nausea, vomiting, diarrhea, constipation         Social History/Family History  SOCHX:   tobacco,  -  alcohol    FMHX: FA/MO  - contributory       Discussed with:  PMD, Family    Physical Exam:    Vital Signs:  Vital Signs Last 24 Hrs  T(C): 36.5 (2020 17:05), Max: 36.8 (2020 00:10)  T(F): 97.7 (2020 17:05), Max: 98.3 (2020 00:10)  HR: 112 (2020 17:05) (94 - 112)  BP: 153/76 (2020 17:05) (126/57 - 153/76)  BP(mean): --  RR: 18 (2020 17:05) (17 - 18)  SpO2: 94% (2020 17:05) (90% - 94%)  Daily     Daily Weight in k.2 (2020 05:15)  I&O's Summary    2020 07:01  -  2020 22:46  --------------------------------------------------------  IN: 100 mL / OUT: 0 mL / NET: 100 mL          Chest:  Full & symmetric excursion, no increased effort, breath sounds clear  Cardiovascular:  Regular rhythm, S1, S2, no murmur/rub/S3/S4, no carotid/femoral/abdominal bruit, radial/pedal pulses 2+  Abdomen:  Soft, non-tender, non-distended, normoactive bowel sounds, no HSM    Laboratory:                          11.2   9.91  )-----------( 321      ( 2020 07:39 )             35.6     01-05    140  |  103  |  13  ----------------------------<  114<H>  3.7   |  27  |  1.37<H>    Ca    7.9<L>      2020 07:39    TPro  6.4  /  Alb  2.4<L>  /  TBili  0.5  /  DBili  x   /  AST  14<L>  /  ALT  11<L>  /  AlkPhos  218<H>  01-04          CAPILLARY BLOOD GLUCOSE        LIVER FUNCTIONS - ( 2020 11:14 )  Alb: 2.4 g/dL / Pro: 6.4 gm/dL / ALK PHOS: 218 U/L / ALT: 11 U/L / AST: 14 U/L / GGT: x           PT/INR - ( 2020 11:14 )   PT: 11.1 sec;   INR: 0.99 ratio         PTT - ( 2020 11:14 )  PTT:32.7 sec          Assessment:  I am asked to assess this patient for cardiovascular risk assessment for left hip surgery  The patient's EKG is without acute changes  The patient stated CP, Troponin is normal  TTE ordered, noted normal ejection fraction, and some mild to moderate valvular heart disease, not concerning at this point  EKG with T wave change that is likely lead placement no real change V1-V3  The patient's lab testing and other diagnostic testing are reviewed  post hip surgery, stable  an episode of chest pain today, treated for musculoskeletal type pain

## 2020-01-10 NOTE — PROGRESS NOTE ADULT - SUBJECTIVE AND OBJECTIVE BOX
Patient is a 89y old  Female who presents with a chief complaint of Fall at home. Fracture left hip. (10 Jason 2020 06:29)      INTERVAL HPI/OVERNIGHT EVENTS:    MEDICATIONS  (STANDING):  enoxaparin Injectable 30 milliGRAM(s) SubCutaneous every 12 hours  lactated ringers. 1000 milliLiter(s) (75 mL/Hr) IV Continuous <Continuous>  pantoprazole    Tablet 40 milliGRAM(s) Oral before breakfast  polyethylene glycol 3350 17 Gram(s) Oral daily    MEDICATIONS  (PRN):  acetaminophen   Tablet .. 650 milliGRAM(s) Oral every 6 hours PRN Temp greater or equal to 38C (100.4F), Mild Pain (1 - 3)  aluminum hydroxide/magnesium hydroxide/simethicone Suspension 30 milliLiter(s) Oral four times a day PRN Indigestion  bisacodyl Suppository 10 milliGRAM(s) Rectal daily PRN If no bowel movement by POD#2  HYDROmorphone  Injectable 0.5 milliGRAM(s) IV Push every 4 hours PRN Severe Pain (7 - 10)  nitroglycerin     SubLingual 0.4 milliGRAM(s) SubLingual every 5 minutes PRN Chest Pain  ondansetron Injectable 4 milliGRAM(s) IV Push every 6 hours PRN Nausea and/or Vomiting  oxyCODONE    IR 5 milliGRAM(s) Oral every 4 hours PRN Moderate Pain (4 - 6)  oxyCODONE    IR 10 milliGRAM(s) Oral every 4 hours PRN Severe Pain (7 - 10)  senna 2 Tablet(s) Oral at bedtime PRN Constipation      Allergies    No Known Allergies    Intolerances        REVIEW OF SYSTEMS: Lethargic  CONSTITUTIONAL: No fever, weight loss, or fatigue  EYES: No eye pain, visual disturbances, or discharge  ENMT:  No difficulty hearing, tinnitus, vertigo; No sinus or throat pain  NECK: No pain or stiffness  BREASTS: No pain, masses, or nipple discharge  RESPIRATORY: No cough, wheezing, chills or hemoptysis; No shortness of breath  CARDIOVASCULAR: No chest pain, palpitations, dizziness, or leg swelling  GASTROINTESTINAL: No abdominal or epigastric pain. No nausea, vomiting, or hematemesis; No diarrhea or constipation. No melena or hematochezia.  GENITOURINARY: No dysuria, frequency, hematuria, or incontinence  NEUROLOGICAL: No headaches, memory loss, loss of strength, numbness, or tremors  SKIN: No itching, burning, rashes, or lesions   LYMPH NODES: No enlarged glands  ENDOCRINE: No heat or cold intolerance; No hair loss  MUSCULOSKELETAL: No joint pain or swelling; No muscle, back, or extremity pain  PSYCHIATRIC: No depression, anxiety, mood swings, or difficulty sleeping  HEME/LYMPH: No easy bruising, or bleeding gums  ALLERGY AND IMMUNOLOGIC: No hives or eczema    Vital Signs Last 24 Hrs  T(C): 36.6 (10 Jason 2020 11:30), Max: 37.8 (09 Jan 2020 23:55)  T(F): 97.8 (10 Jason 2020 11:30), Max: 100 (09 Jan 2020 23:55)  HR: 110 (10 Jason 2020 12:45) (95 - 115)  BP: 125/67 (10 Jason 2020 12:45) (111/53 - 139/56)  BP(mean): --  RR: 18 (10 Jason 2020 11:30) (18 - 18)  SpO2: 95% (10 Jason 2020 11:30) (91% - 95%)    PHYSICAL EXAM:  GENERAL: NAD, well-groomed, well-developed  HEAD:  Atraumatic, Normocephalic  EYES: EOMI, PERRL, conjunctiva and sclera clear  ENMT:  Moist mucous membranes, Good dentition, No lesions  NECK: Supple, No JVD, Normal thyroid  NERVOUS SYSTEM:  Alert & Oriented X3, Good concentration; Motor Strength 5/5 B/L upper and lower extremities; DTRs 2+ intact and symmetric  CHEST/LUNG: Clear to percussion bilaterally; No rales, rhonchi, wheezing, or rubs  HEART: Regular rate and rhythm; No murmurs, rubs, or gallops  ABDOMEN: Soft, Nontender, Nondistended; Bowel sounds present  EXTREMITIES:  2+ Peripheral Pulses, No clubbing, cyanosis, or edema  LYMPH: No lymphadenopathy noted  SKIN: No rashes or lesions    LABS:                        9.1    9.22  )-----------( 261      ( 10 Jason 2020 08:26 )             28.3     01-10    137  |  104  |  16  ----------------------------<  90  4.2   |  26  |  0.81    Ca    8.2<L>      10 Jason 2020 08:26          CAPILLARY BLOOD GLUCOSE          CARDIAC MARKERS ( 10 Jaosn 2020 15:22 )  <.015 ng/mL / x     / 158 U/L / x     / 2.1 ng/mL                RADIOLOGY & ADDITIONAL TESTS:  < from: TTE Echo Doppler w/o Cont (01.09.20 @ 09:14) >  Summary:   1. Left ventricular ejection fraction, by visual estimation, is 60 to 65%.   2. Spectral Doppler shows impaired relaxation pattern of left ventricular myocardial filling (Grade I diastolic dysfunction).   3. Mild mitral annular calcification.   4. Mild mitral valve regurgitation.   5. Mild-moderate tricuspid regurgitation.   6. Estimated pulmonary artery systolic pressure is 41.5 mmHg assuming a right atrial pressure of 5 mmHg, which is consistent with mild pulmonary hypertension.      < end of copied text >    Imaging Personally Reviewed:  [ ] YES  [ ] NO    Consultant(s) Notes Reviewed:  [ ] YES  [ ] NO    Care Discussed with Consultants/Other Providers [ ] YES  [ ] NO    PROBLEMS:  CLOSED FRACTURE OF LEFT HIP; INITIAL ENCOUNTER;SYNCOPE  FALL , HIP PAIN  Closed fracture of left hip, initial encounter  Moderate protein-calorie malnutrition  Hypokalemia due to excessive renal loss of potassium  Other chronic gastritis  Hypercholesterolemia  Benign hypertension  Syncope and collapse  Closed fracture of left hip, initial encounter      Care discussed with family,         [  ]   yes  [  ]  No    imp:    stable[ ]    unstable[  ]     improving [   ]       unchanged  [  ]                Plans:  Continue present plans  [  ]               New consult [  ]   specialty  .......               order aaron[  ]    test name.                  Discharge Planning  [  ]

## 2020-01-11 LAB
ANION GAP SERPL CALC-SCNC: 4 MMOL/L — LOW (ref 5–17)
BUN SERPL-MCNC: 18 MG/DL — SIGNIFICANT CHANGE UP (ref 7–23)
CALCIUM SERPL-MCNC: 8.4 MG/DL — LOW (ref 8.5–10.1)
CHLORIDE SERPL-SCNC: 105 MMOL/L — SIGNIFICANT CHANGE UP (ref 96–108)
CO2 SERPL-SCNC: 29 MMOL/L — SIGNIFICANT CHANGE UP (ref 22–31)
CREAT SERPL-MCNC: 0.84 MG/DL — SIGNIFICANT CHANGE UP (ref 0.5–1.3)
GLUCOSE SERPL-MCNC: 135 MG/DL — HIGH (ref 70–99)
HCT VFR BLD CALC: 26.9 % — LOW (ref 34.5–45)
HGB BLD-MCNC: 8.5 G/DL — LOW (ref 11.5–15.5)
MCHC RBC-ENTMCNC: 29.9 PG — SIGNIFICANT CHANGE UP (ref 27–34)
MCHC RBC-ENTMCNC: 31.6 GM/DL — LOW (ref 32–36)
MCV RBC AUTO: 94.7 FL — SIGNIFICANT CHANGE UP (ref 80–100)
NRBC # BLD: 0 /100 WBCS — SIGNIFICANT CHANGE UP (ref 0–0)
PLATELET # BLD AUTO: 283 K/UL — SIGNIFICANT CHANGE UP (ref 150–400)
POTASSIUM SERPL-MCNC: 4.2 MMOL/L — SIGNIFICANT CHANGE UP (ref 3.5–5.3)
POTASSIUM SERPL-SCNC: 4.2 MMOL/L — SIGNIFICANT CHANGE UP (ref 3.5–5.3)
RBC # BLD: 2.84 M/UL — LOW (ref 3.8–5.2)
RBC # FLD: 15.4 % — HIGH (ref 10.3–14.5)
SODIUM SERPL-SCNC: 138 MMOL/L — SIGNIFICANT CHANGE UP (ref 135–145)
WBC # BLD: 8.58 K/UL — SIGNIFICANT CHANGE UP (ref 3.8–10.5)
WBC # FLD AUTO: 8.58 K/UL — SIGNIFICANT CHANGE UP (ref 3.8–10.5)

## 2020-01-11 RX ADMIN — HYDROMORPHONE HYDROCHLORIDE 0.5 MILLIGRAM(S): 2 INJECTION INTRAMUSCULAR; INTRAVENOUS; SUBCUTANEOUS at 03:06

## 2020-01-11 RX ADMIN — ENOXAPARIN SODIUM 30 MILLIGRAM(S): 100 INJECTION SUBCUTANEOUS at 18:11

## 2020-01-11 RX ADMIN — OXYCODONE HYDROCHLORIDE 5 MILLIGRAM(S): 5 TABLET ORAL at 10:31

## 2020-01-11 RX ADMIN — OXYCODONE HYDROCHLORIDE 5 MILLIGRAM(S): 5 TABLET ORAL at 11:31

## 2020-01-11 RX ADMIN — HYDROMORPHONE HYDROCHLORIDE 0.5 MILLIGRAM(S): 2 INJECTION INTRAMUSCULAR; INTRAVENOUS; SUBCUTANEOUS at 02:51

## 2020-01-11 RX ADMIN — OXYCODONE HYDROCHLORIDE 5 MILLIGRAM(S): 5 TABLET ORAL at 15:53

## 2020-01-11 RX ADMIN — OXYCODONE HYDROCHLORIDE 5 MILLIGRAM(S): 5 TABLET ORAL at 14:53

## 2020-01-11 RX ADMIN — ENOXAPARIN SODIUM 30 MILLIGRAM(S): 100 INJECTION SUBCUTANEOUS at 05:48

## 2020-01-11 RX ADMIN — SODIUM CHLORIDE 75 MILLILITER(S): 9 INJECTION, SOLUTION INTRAVENOUS at 02:51

## 2020-01-11 NOTE — PROGRESS NOTE ADULT - ASSESSMENT
IMPROVE VTE Individual Risk Assessment    RISK                                                                Points    [  ] Previous VTE                                                  3    [  ] Thrombophilia                                               2    [  ] Lower limb paralysis                                      2        (unable to hold up >15 seconds)      [  ] Current Cancer                                              2         (within 6 months)    [  ] Immobilization > 24 hrs                                1    [  ] ICU/CCU stay > 24 hours                              1    [  ] Age > 60                                                      1    IMPROVE VTE Score _________    IMPROVE Score 0-1: Low Risk, No VTE prophylaxis required for most patients, encourage ambulation.   IMPROVE Score 2-3: At risk, pharmacologic VTE prophylaxis is indicated for most patients (in the absence of a contraindication)  IMPROVE Score > or = 4: High Risk, pharmacologic VTE prophylaxis is indicated for most patients (in the absence of a contraindication)    89 y/o presents to ED s/p fall this morning. Pt states she does not recall how she fell. Pt's left leg collapsed under her and she fell on right side. Pt normally ambulates alone, the event was unwitnessed and she denies head trauma, +left hip pain, and dry mouth. Pt could not ambulate after fall secondary to pain. Pt denies neck, back pain.  01/05/2020 : Pt. asymptomatic. Hypokalemia resolved. Cardiology consult pending. Orthopedics f/u noted. Monitor CBC.  01/06/2020 : Pt. for surgery. Cardiology consult noted. Cleared for Corrective hip surgery with age related CV risks. Carotid doppler without any significant stenosis.  01/07/2020 : S/P Left Hemiarthroplasty. Alert, awake. PT evaluation.  01/08/2020 : Pt. c/o epigastric discomfort. Had 2 bowel movements yesterday. Abd. soft. Non tender. BS present in all 4 quadrants. PT evaluation noted. For STR.  01/09/2020 : Pt. Lethargic. Arousable. Lungs clear. No epigastric discomfort. Cardiology F/U noted. Troponin normal. PT.  01/10/2020 : Still lethargic this AM. PT to follow. Neurology consult if no change in mental status.  01/11/2020 : Remains [id number] and drowsy. Arousable. Denies any chest pain. Pain left hip. Had chest pain yesterday. EKG unchanged. Cardiology F/U noted. PT. DEischarge planning.

## 2020-01-11 NOTE — PROGRESS NOTE ADULT - SUBJECTIVE AND OBJECTIVE BOX
89 y/o presents to ED s/p fall this morning. Pt states she does not recall how she fell. Pt's left leg collapsed under her and she fell on right side. Pt normally ambulates alone, the event was unwitnessed and she denies head trauma, +left hip pain, and dry mouth. Pt could not ambulate after fall secondary to pain. Pt denies neck, back pain, Ha, dizziness, abdominal pain (-) chest pain (-) sob Pt reports last bowel movement unknown. Pt lives at home   Admitted with fracture Left Hip. (2020 15:04)      Chief Complaint:  Patient is a 89y old  Female who presents with a chief complaint of Fall at home. Fracture left hip. (2020 09:37)      Review of Systems:    General:  No wt loss, fevers, chills, night sweats  Eyes:  Good vision, no reported pain  ENT:  No sore throat, pain, runny nose, dysphagia  CV:  No pain, palpitations, hypo/hypertension  Resp:  No dyspnea, cough, tachypnea, wheezing  GI:  No pain, nausea, vomiting, diarrhea, constipation         Social History/Family History  SOCHX:   tobacco,  -  alcohol    FMHX: FA/MO  - contributory       Discussed with:  PMD, Family    Physical Exam:    Vital Signs:  Vital Signs Last 24 Hrs  T(C): 36.5 (2020 17:05), Max: 36.8 (2020 00:10)  T(F): 97.7 (2020 17:05), Max: 98.3 (2020 00:10)  HR: 112 (2020 17:05) (94 - 112)  BP: 153/76 (2020 17:05) (126/57 - 153/76)  BP(mean): --  RR: 18 (2020 17:05) (17 - 18)  SpO2: 94% (2020 17:05) (90% - 94%)  Daily     Daily Weight in k.2 (2020 05:15)  I&O's Summary    2020 07:01  -  2020 22:46  --------------------------------------------------------  IN: 100 mL / OUT: 0 mL / NET: 100 mL          Chest:  Full & symmetric excursion, no increased effort, breath sounds clear  Cardiovascular:  Regular rhythm, S1, S2, no murmur/rub/S3/S4, no carotid/femoral/abdominal bruit, radial/pedal pulses 2+  Abdomen:  Soft, non-tender, non-distended, normoactive bowel sounds, no HSM    Laboratory:                          11.2   9.91  )-----------( 321      ( 2020 07:39 )             35.6     01-05    140  |  103  |  13  ----------------------------<  114<H>  3.7   |  27  |  1.37<H>    Ca    7.9<L>      2020 07:39    TPro  6.4  /  Alb  2.4<L>  /  TBili  0.5  /  DBili  x   /  AST  14<L>  /  ALT  11<L>  /  AlkPhos  218<H>  01-04          CAPILLARY BLOOD GLUCOSE        LIVER FUNCTIONS - ( 2020 11:14 )  Alb: 2.4 g/dL / Pro: 6.4 gm/dL / ALK PHOS: 218 U/L / ALT: 11 U/L / AST: 14 U/L / GGT: x           PT/INR - ( 2020 11:14 )   PT: 11.1 sec;   INR: 0.99 ratio         PTT - ( 2020 11:14 )  PTT:32.7 sec          Assessment:  I am asked to assess this patient for cardiovascular risk assessment for left hip surgery  The patient's EKG is without acute changes  The patient stated CP, Troponin is normal  TTE ordered, noted normal ejection fraction, and some mild to moderate valvular heart disease, not concerning at this point  EKG with T wave change that is likely lead placement no real change V1-V3  The patient's lab testing and other diagnostic testing are reviewed  post hip surgery, stable  an episode of chest pain  treated for musculoskeletal type pain

## 2020-01-11 NOTE — PROGRESS NOTE ADULT - SUBJECTIVE AND OBJECTIVE BOX
Orthopedics      Patient seen and examined at bedside. Feeling well. Pain controlled. No n/v. No acute events overnight.    Vital Signs Last 24 Hrs  T(C): 36.3 (01-11-20 @ 05:55), Max: 37.1 (01-10-20 @ 17:25)  T(F): 97.4 (01-11-20 @ 05:55), Max: 98.7 (01-10-20 @ 17:25)  HR: 97 (01-11-20 @ 05:55) (95 - 115)  BP: 133/60 (01-11-20 @ 05:55) (124/65 - 146/79)  BP(mean): --  RR: 18 (01-11-20 @ 05:55) (18 - 18)  SpO2: 100% (01-11-20 @ 05:55) (95% - 100%)                        9.1    9.22  )-----------( 261      ( 10 Jason 2020 08:26 )             28.3     10 Jason 2020 08:26    137    |  104    |  16     ----------------------------<  90     4.2     |  26     |  0.81     Ca    8.2        10 Jason 2020 08:26          Exam:  Gen: NAD, resting comfortably  LLE:  Dressing c/d/i  NTTP calves b/l  +EHL/FHL/TA/GS  SILT L2-S1  Compartments soft and compressible  2+ Pulses palpable      A/P: 89yF POD 5 s/p L hip hemiarthroplasty  -FU AM labs  -Pain control  -WBAT LLE  -DVT ppx  -Incentive Spirometry  -Elevation to extremity  -Medical management appreciated

## 2020-01-11 NOTE — PROGRESS NOTE ADULT - SUBJECTIVE AND OBJECTIVE BOX
Patient is a 89y old  Female who presents with a chief complaint of Fall at home. Fracture left hip. (11 Jan 2020 07:12)      INTERVAL HPI/OVERNIGHT EVENTS:    MEDICATIONS  (STANDING):  enoxaparin Injectable 30 milliGRAM(s) SubCutaneous every 12 hours  lactated ringers. 1000 milliLiter(s) (75 mL/Hr) IV Continuous <Continuous>  pantoprazole    Tablet 40 milliGRAM(s) Oral before breakfast  polyethylene glycol 3350 17 Gram(s) Oral daily    MEDICATIONS  (PRN):  acetaminophen   Tablet .. 650 milliGRAM(s) Oral every 6 hours PRN Temp greater or equal to 38C (100.4F), Mild Pain (1 - 3)  aluminum hydroxide/magnesium hydroxide/simethicone Suspension 30 milliLiter(s) Oral four times a day PRN Indigestion  bisacodyl Suppository 10 milliGRAM(s) Rectal daily PRN If no bowel movement by POD#2  HYDROmorphone  Injectable 0.5 milliGRAM(s) IV Push every 4 hours PRN Severe Pain (7 - 10)  nitroglycerin     SubLingual 0.4 milliGRAM(s) SubLingual every 5 minutes PRN Chest Pain  ondansetron Injectable 4 milliGRAM(s) IV Push every 6 hours PRN Nausea and/or Vomiting  oxyCODONE    IR 5 milliGRAM(s) Oral every 4 hours PRN Moderate Pain (4 - 6)  oxyCODONE    IR 10 milliGRAM(s) Oral every 4 hours PRN Severe Pain (7 - 10)  senna 2 Tablet(s) Oral at bedtime PRN Constipation      Allergies    No Known Allergies    Intolerances        REVIEW OF SYSTEMS: NA    Vital Signs Last 24 Hrs  T(C): 36.3 (11 Jan 2020 05:55), Max: 37.1 (10 Jason 2020 17:25)  T(F): 97.4 (11 Jan 2020 05:55), Max: 98.7 (10 Jason 2020 17:25)  HR: 97 (11 Jan 2020 05:55) (96 - 110)  BP: 133/60 (11 Jan 2020 05:55) (125/67 - 146/79)  BP(mean): --  RR: 18 (11 Jan 2020 05:55) (18 - 18)  SpO2: 100% (11 Jan 2020 05:55) (95% - 100%)    PHYSICAL EXAM:  GENERAL: NAD, well-developed  HEAD:  Atraumatic, Normocephalic  EYES: EOMI, PERRL, conjunctiva and sclera clear  ENMT:  Moist mucous membranes, Good dentition, No lesions  NECK: Supple, No JVD, Normal thyroid  NERVOUS SYSTEM:  Alert & Oriented X3, Good concentration; Motor Strength 4/5 B/L upper and lower extremities;CHEST/LUNG: Clear to percussion bilaterally; No rales, rhonchi, wheezing, or rubs  HEART: Regular rate and rhythm; No murmurs, rubs, or gallops  ABDOMEN: Soft, Nontender, Nondistended; Bowel sounds present  EXTREMITIES:  2+ Peripheral Pulses, No clubbing, cyanosis, or edema  LYMPH: No lymphadenopathy noted  SKIN: No rashes or lesions    LABS:                        8.5    8.58  )-----------( 283      ( 11 Jan 2020 10:36 )             26.9     01-11    138  |  105  |  18  ----------------------------<  135<H>  4.2   |  29  |  0.84    Ca    8.4<L>      11 Jan 2020 10:36          CAPILLARY BLOOD GLUCOSE          CARDIAC MARKERS ( 10 Jason 2020 15:22 )  <.015 ng/mL / x     / 158 U/L / x     / 2.1 ng/mL                RADIOLOGY & ADDITIONAL TESTS:    Imaging Personally Reviewed:  [ ] YES  [ ] NO    Consultant(s) Notes Reviewed:  [ ] YES  [ ] NO    Care Discussed with Consultants/Other Providers [ ] YES  [ ] NO    PROBLEMS:  CLOSED FRACTURE OF LEFT HIP; INITIAL ENCOUNTER;SYNCOPE  FALL , HIP PAIN  Closed fracture of left hip, initial encounter  Moderate protein-calorie malnutrition  Hypokalemia due to excessive renal loss of potassium  Other chronic gastritis  Hypercholesterolemia  Benign hypertension  Syncope and collapse  Closed fracture of left hip, initial encounter      Care discussed with family,         [  ]   yes  [  ]  No    imp:    stable[ ]    unstable[  ]     improving [   ]       unchanged  [  ]                Plans:  Continue present plans  [  ]               New consult [  ]   specialty  .......               order aaron[  ]    test name.                  Discharge Planning  [  ]

## 2020-01-12 LAB
ANION GAP SERPL CALC-SCNC: 7 MMOL/L — SIGNIFICANT CHANGE UP (ref 5–17)
BUN SERPL-MCNC: 18 MG/DL — SIGNIFICANT CHANGE UP (ref 7–23)
CALCIUM SERPL-MCNC: 8.2 MG/DL — LOW (ref 8.5–10.1)
CHLORIDE SERPL-SCNC: 107 MMOL/L — SIGNIFICANT CHANGE UP (ref 96–108)
CO2 SERPL-SCNC: 25 MMOL/L — SIGNIFICANT CHANGE UP (ref 22–31)
CREAT SERPL-MCNC: 0.74 MG/DL — SIGNIFICANT CHANGE UP (ref 0.5–1.3)
GLUCOSE SERPL-MCNC: 88 MG/DL — SIGNIFICANT CHANGE UP (ref 70–99)
HCT VFR BLD CALC: 27.9 % — LOW (ref 34.5–45)
HGB BLD-MCNC: 8.8 G/DL — LOW (ref 11.5–15.5)
MCHC RBC-ENTMCNC: 29.6 PG — SIGNIFICANT CHANGE UP (ref 27–34)
MCHC RBC-ENTMCNC: 31.5 GM/DL — LOW (ref 32–36)
MCV RBC AUTO: 93.9 FL — SIGNIFICANT CHANGE UP (ref 80–100)
NRBC # BLD: 0 /100 WBCS — SIGNIFICANT CHANGE UP (ref 0–0)
PLATELET # BLD AUTO: 296 K/UL — SIGNIFICANT CHANGE UP (ref 150–400)
POTASSIUM SERPL-MCNC: 4.4 MMOL/L — SIGNIFICANT CHANGE UP (ref 3.5–5.3)
POTASSIUM SERPL-SCNC: 4.4 MMOL/L — SIGNIFICANT CHANGE UP (ref 3.5–5.3)
RBC # BLD: 2.97 M/UL — LOW (ref 3.8–5.2)
RBC # FLD: 15.6 % — HIGH (ref 10.3–14.5)
SODIUM SERPL-SCNC: 139 MMOL/L — SIGNIFICANT CHANGE UP (ref 135–145)
WBC # BLD: 7.38 K/UL — SIGNIFICANT CHANGE UP (ref 3.8–10.5)
WBC # FLD AUTO: 7.38 K/UL — SIGNIFICANT CHANGE UP (ref 3.8–10.5)

## 2020-01-12 RX ADMIN — ATORVASTATIN CALCIUM 10 MILLIGRAM(S): 80 TABLET, FILM COATED ORAL at 21:41

## 2020-01-12 RX ADMIN — OXYCODONE HYDROCHLORIDE 5 MILLIGRAM(S): 5 TABLET ORAL at 05:26

## 2020-01-12 RX ADMIN — OXYCODONE HYDROCHLORIDE 10 MILLIGRAM(S): 5 TABLET ORAL at 22:45

## 2020-01-12 RX ADMIN — OXYCODONE HYDROCHLORIDE 10 MILLIGRAM(S): 5 TABLET ORAL at 21:42

## 2020-01-12 RX ADMIN — ENOXAPARIN SODIUM 30 MILLIGRAM(S): 100 INJECTION SUBCUTANEOUS at 05:26

## 2020-01-12 RX ADMIN — ENOXAPARIN SODIUM 30 MILLIGRAM(S): 100 INJECTION SUBCUTANEOUS at 17:51

## 2020-01-12 RX ADMIN — POLYETHYLENE GLYCOL 3350 17 GRAM(S): 17 POWDER, FOR SOLUTION ORAL at 11:30

## 2020-01-12 RX ADMIN — PANTOPRAZOLE SODIUM 40 MILLIGRAM(S): 20 TABLET, DELAYED RELEASE ORAL at 05:26

## 2020-01-12 NOTE — CONSULT NOTE ADULT - SUBJECTIVE AND OBJECTIVE BOX
Subjective Complaints:  Historian:       Consult requested by ER doctor:                  Attending: DR Diez    HPI:  HPI Objective Statement: 89 y/o presents to ED s/p fall this morning. Pt states she does not recall how she fell. Pt's left leg collapsed under her and she fell on right side. Pt normally ambulates alone, the event was unwitnessed and she denies head trauma, +left hip pain, and dry mouth. Pt could not ambulate after fall secondary to pain. Pt denies neck, back pain, Ha, dizziness, abdominal pain (-) chest pain (-) sob Pt reports last bowel movement unknown. Pt lives at home   Admitted with fracture Left Hip. (04 Jan 2020 15:04)    MARIAN HUGHES    PAST MEDICAL & SURGICAL HISTORY:  Other chronic gastritis  Hypercholesterolemia  Benign hypertension  89yFemale    MEDICATIONS  (STANDING):  enoxaparin Injectable 30 milliGRAM(s) SubCutaneous every 12 hours  lactated ringers. 1000 milliLiter(s) (75 mL/Hr) IV Continuous <Continuous>  pantoprazole    Tablet 40 milliGRAM(s) Oral before breakfast  polyethylene glycol 3350 17 Gram(s) Oral daily    MEDICATIONS  (PRN):  acetaminophen   Tablet .. 650 milliGRAM(s) Oral every 6 hours PRN Temp greater or equal to 38C (100.4F), Mild Pain (1 - 3)  aluminum hydroxide/magnesium hydroxide/simethicone Suspension 30 milliLiter(s) Oral four times a day PRN Indigestion  bisacodyl Suppository 10 milliGRAM(s) Rectal daily PRN If no bowel movement by POD#2  HYDROmorphone  Injectable 0.5 milliGRAM(s) IV Push every 4 hours PRN Severe Pain (7 - 10)  nitroglycerin     SubLingual 0.4 milliGRAM(s) SubLingual every 5 minutes PRN Chest Pain  ondansetron Injectable 4 milliGRAM(s) IV Push every 6 hours PRN Nausea and/or Vomiting  oxyCODONE    IR 5 milliGRAM(s) Oral every 4 hours PRN Moderate Pain (4 - 6)  oxyCODONE    IR 10 milliGRAM(s) Oral every 4 hours PRN Severe Pain (7 - 10)  senna 2 Tablet(s) Oral at bedtime PRN Constipation      Allergies    No Known Allergies    Intolerances      FAMILY HISTORY:      REVIEW OF SYSTEMS:  General:  No wt loss, fevers, chills, night sweats  Eyes:  Good vision, no reported pain  ENT:  No sore throat, pain, runny nose, dysphagia  CV:  No pain, palpitatioins, hypo/hypertension  Resp:  No dyspnea, cough, tachypnea, wheezing  GI:  No pain, nausea, vomiting, diarrhea, constipatiion  :  No pain, bleeding, incontinence, nocturia  Muscle:  No pain, weakness  Breast:  No pain, abscess, mass, discharge  Neuro:  No weakness, tingling, memory problems  Psych:  No fatigue, insomnia, mood problems, depression  Endocrine:  No polyuria, polydypsia, cold/heat intolerance  Heme:  No petechiae, ecchymosis, easy bruisability  Skin:  No rash, tattoos, scars, edema      Vital Signs Last 24 Hrs  T(C): 36.2 (12 Jan 2020 10:59), Max: 37.1 (11 Jan 2020 17:36)  T(F): 97.2 (12 Jan 2020 10:59), Max: 98.7 (11 Jan 2020 17:36)  HR: 93 (12 Jan 2020 11:48) (93 - 106)  BP: 125/66 (12 Jan 2020 11:48) (110/65 - 142/61)  BP(mean): 80 (12 Jan 2020 10:59) (80 - 80)  RR: 21 (12 Jan 2020 11:48) (16 - 21)  SpO2: 97% (12 Jan 2020 11:48) (95% - 98%)    GENERAL PHYSICAL EXAM:  General:  Appears stated age, well-groomed, well-nourished, no distress  HEENT:  NC/AT, patent nares w/ pink mucosa, OP clear w/o lesions, PERRL, EOMI, conjunctivae clear, no thyromegaly, nodules, adenopathy, no JVD  Chest:  Full & symmetric excursion, no increased effort, breath sounds clear  Cardiovascular:  Regular rhythm, S1, S2, no murmur/rub/S3/S4, no carotid/femoral/abdominal bruit, radial/pedal pulses 2+, no edema  Abdomen:  Soft, non-tender, non-distended, normoactive bowel sounds, no HSM  Extremities:  Gait & station:   Digits:   Nails:   Joints, Bones, Muscles:   ROM:   Stability:  Skin:  No rash/erythema/ecchymoses/petechiae/wounds/abscess/warm/dry  Musculoskeletal:  Full ROM in all joints w/o swelling/tenderness/effusion    NEUROLOGICAL EXAM:  HENT:  Normocephalic head; atraumatic head.  Neck supple.  ENT: normal looking.  Mental State:  sleepy but arousable   oriented to person, place  Coherent.  Speech clear and intact.  Cooperative.  Responds appropriately.    Cranial Nerves:  II-XII:   Pupils round and reactive to light and accommodation.  Extraocular movements full.  Visual fields full (no homonymous hemianopsia).  Visual acuity wnl.  Facial symmetry intact.  Tongue midline.  Motor Functions:  Motor strength is 5/5except left lower extremity limited by surgery    Sensory Functions:   Intact to touch and pinprick to face and extremities.    Reflexes:  Deep tendon reflexes normoactive to biceps, knees and ankles.  Babinski absent (present).  Cerebellar Testing:    Finger to nose intact.  Nystagmus absent.  Gait unable to stand     LABS:                        8.8    7.38  )-----------( 296      ( 12 Jan 2020 07:23 )             27.9     01-12    139  |  107  |  18  ----------------------------<  88  4.4   |  25  |  0.74    Ca    8.2<L>      12 Jan 2020 07:23              RADIOLOGY & ADDITIONAL STUDIES:    Basic Metabolic Panel: AM Sched. Collection: 12-Jan-2020 05:00 (01-12 @ 00:00)  Complete Blood Count: AM Sched. Collection: 12-Jan-2020 05:00 (01-12 @ 00:00)      Assessment & Opinion:    Recommendations:  Brain MRI.  Carotid doppler.  Echocardiogram.  EEG.   DVT prophylaxis as ordered.  Medications:

## 2020-01-12 NOTE — CONSULT NOTE ADULT - CONSULT REQUESTED DATE/TIME
Subjective:       Patient ID: Celia Hyman is a 17 m.o. female.    Chief Complaint:  Asthma and other (allergy to fin fish, develops vomiting)      HPI    Pt presents w mother and older brother, Nadir Griffith. Celia has hx vomiting w fish on multiple occasions, w various types of fish, incl salmon, catfish, tilapia, tuna. Vomiting starts w/in 20 min of fish ingestion. Also notes assoc facial flushing, redness, but not urticaria.. No assoc angioedema or wheeze. Benadryl minimize facial redness and may decrease likelihood of repeated vomiting.  Tolerates shellfish w/o problem.  Has positive fish immunoCAPs    Occ rhinitis sx's contolled w prn cetirizine     Also w hx WARI vs asthma, followed by pulmonary, improved control w recent addition of Flovent 110     No hx eczema      Environmental History: Pets in the home: dogs (1).  Tobacco Smoke in Home: no    Past Medical History:   Diagnosis Date    Exposure to second hand smoke in pediatric patient     dad smokes    Food allergy     hives after eating fish    Otitis media     Wheezing-associated respiratory infection        Family History   Problem Relation Age of Onset    Asthma Father     Allergies Brother    mom w PN allergy      Review of Systems   Constitutional: Negative for activity change, chills and fever.   HENT: Negative for congestion, ear discharge and rhinorrhea.    Eyes: Negative for discharge, redness and itching.   Respiratory: Negative for cough and wheezing.    Cardiovascular: Negative for cyanosis.   Gastrointestinal: Negative for constipation, diarrhea and vomiting.   Genitourinary: Negative for decreased urine volume.   Musculoskeletal: Negative for joint swelling.   Skin: Negative for rash.   Neurological: Negative for weakness.   Hematological: Does not bruise/bleed easily.   Psychiatric/Behavioral: Negative for agitation and sleep disturbance.        Objective:   Physical Exam   Constitutional: She appears well-developed and  12-Jan-2020 well-nourished. She is active. No distress.   HENT:   Head: Atraumatic.   Right Ear: Tympanic membrane normal.   Left Ear: Tympanic membrane normal.   Nose: Nose normal. No nasal discharge.   Mouth/Throat: Mucous membranes are moist. No tonsillar exudate. Oropharynx is clear. Pharynx is normal.   Eyes: Conjunctivae are normal. Right eye exhibits no discharge. Left eye exhibits no discharge.   Neck: Normal range of motion. No neck adenopathy.   Cardiovascular: Normal rate and regular rhythm.   Pulmonary/Chest: Effort normal and breath sounds normal. No nasal flaring. No respiratory distress. She has no wheezes. She exhibits no retraction.   Abdominal: Soft. Bowel sounds are normal. She exhibits no distension. There is no tenderness.   Musculoskeletal: Normal range of motion. She exhibits no edema.   Lymphadenopathy:     She has no cervical adenopathy.   Neurological: She is alert. She exhibits normal muscle tone.   Skin: Skin is warm. No rash noted. No pallor.   Nursing note and vitals reviewed.    Results for OSCAR CASTRO (MRN [id number]) as of 9/4/2018 12:05   Ref. Range 7/31/2018 10:35 7/31/2018 10:35   A. fumigatus Class Unknown CLASS 0    ALLERGEN SALMON IGE Latest Ref Range: <0.35 kU/L 0.73 (H)    Aspergillus Fumigatus IgE Latest Ref Range: <0.35 kU/L <0.35    Cat Dander Latest Ref Range: <0.35 kU/L <0.35    Cat Epithelium Class Unknown CLASS 0    Cockroach, IgE Latest Ref Range: <0.35 kU/L CLASS 0 <0.35   D. farinae Latest Ref Range: <0.35 kU/L <0.35    D. farinae Class Unknown CLASS 0    Dog Dander Class Unknown CLASS 0    Dog Dander, IgE Latest Ref Range: <0.35 kU/L <0.35    Flounder (Megrim) Latest Ref Range: <0.35 kU/L 0.52 (H)    Flounder Class Unknown CLASS I    Kaleb Grass Latest Ref Range: <0.35 kU/L <0.35    Kaleb Grass Class Unknown CLASS 0    Lobster Class Unknown CLASS 0    Lobster IgE Latest Ref Range: <0.35 kU/L <0.35    Oyster Latest Ref Range: <0.35 kU/L <0.35    Oyster Class Unknown  CLASS 0    Ragweed, Short, Class Unknown CLASS 0    Ragweed, Short, IgE Latest Ref Range: <0.35 kU/L <0.35    Alvarado Class Unknown CLASS II    Shrimp Class Unknown CLASS 0    Shrimp IgE Latest Ref Range: <0.35 kU/L <0.35    IgE Latest Ref Range: 0 - 60 IU/mL 44        Assessment:       1. Fish allergy    2. Wheezing-associated respiratory infection (WARI)     Vs mild persistent asthma     Plan:       Celia was seen today for asthma and other.    Diagnoses and all orders for this visit:    Fish allergy    Wheezing-associated respiratory infection (WARI)    Other orders  -     EPINEPHrine (EPIPEN JR) 0.15 mg/0.3 mL pen injection; Inject 0.3 mLs (0.15 mg total) into the muscle as needed for Anaphylaxis.      1. Strict avoidance fish  2. EpiPen Jr.   Keep on hand  3.  Keep benadryl on hand  4. Food allergy action plan.   5. Avoidance education  6. Extra caution at restaurants, parties, and during travel  7. Re-eval fish allergy one year  8. Continue asthma plan as per pulm. Counseled that hx wheeze is risk factor for more severe adverse reaction to food allergens

## 2020-01-12 NOTE — PROGRESS NOTE ADULT - SUBJECTIVE AND OBJECTIVE BOX
89 y/o presents to ED s/p fall this morning. Pt states she does not recall how she fell. Pt's left leg collapsed under her and she fell on right side. Pt normally ambulates alone, the event was unwitnessed and she denies head trauma, +left hip pain, and dry mouth. Pt could not ambulate after fall secondary to pain. Pt denies neck, back pain, Ha, dizziness, abdominal pain (-) chest pain (-) sob Pt reports last bowel movement unknown. Pt lives at home   Admitted with fracture Left Hip. (2020 15:04)      Chief Complaint:  Patient is a 89y old  Female who presents with a chief complaint of Fall at home. Fracture left hip. (2020 09:37)      Review of Systems:    General:  No wt loss, fevers, chills, night sweats  Eyes:  Good vision, no reported pain  ENT:  No sore throat, pain, runny nose, dysphagia  CV:  No pain, palpitations, hypo/hypertension  Resp:  No dyspnea, cough, tachypnea, wheezing  GI:  No pain, nausea, vomiting, diarrhea, constipation         Social History/Family History  SOCHX:   tobacco,  -  alcohol    FMHX: FA/MO  - contributory       Discussed with:  PMD, Family    Physical Exam:    Vital Signs:  Vital Signs Last 24 Hrs  T(C): 36.5 (2020 17:05), Max: 36.8 (2020 00:10)  T(F): 97.7 (2020 17:05), Max: 98.3 (2020 00:10)  HR: 112 (2020 17:05) (94 - 112)  BP: 153/76 (2020 17:05) (126/57 - 153/76)  BP(mean): --  RR: 18 (2020 17:05) (17 - 18)  SpO2: 94% (2020 17:05) (90% - 94%)  Daily     Daily Weight in k.2 (2020 05:15)  I&O's Summary    2020 07:01  -  2020 22:46  --------------------------------------------------------  IN: 100 mL / OUT: 0 mL / NET: 100 mL          Chest:  Full & symmetric excursion, no increased effort, breath sounds clear  Cardiovascular:  Regular rhythm, S1, S2, no murmur/rub/S3/S4, no carotid/femoral/abdominal bruit, radial/pedal pulses 2+  Abdomen:  Soft, non-tender, non-distended, normoactive bowel sounds, no HSM    Laboratory:                          11.2   9.91  )-----------( 321      ( 2020 07:39 )             35.6     01-05    140  |  103  |  13  ----------------------------<  114<H>  3.7   |  27  |  1.37<H>    Ca    7.9<L>      2020 07:39    TPro  6.4  /  Alb  2.4<L>  /  TBili  0.5  /  DBili  x   /  AST  14<L>  /  ALT  11<L>  /  AlkPhos  218<H>  01-04          CAPILLARY BLOOD GLUCOSE        LIVER FUNCTIONS - ( 2020 11:14 )  Alb: 2.4 g/dL / Pro: 6.4 gm/dL / ALK PHOS: 218 U/L / ALT: 11 U/L / AST: 14 U/L / GGT: x           PT/INR - ( 2020 11:14 )   PT: 11.1 sec;   INR: 0.99 ratio         PTT - ( 2020 11:14 )  PTT:32.7 sec          Assessment:  I am asked to assess this patient for cardiovascular risk assessment for left hip surgery  The patient's EKG is without acute changes  The patient stated CP, Troponin is normal  TTE ordered, noted normal ejection fraction, and some mild to moderate valvular heart disease, not concerning at this point  EKG with T wave change that is likely lead placement no real change V1-V3  The patient's lab testing and other diagnostic testing are reviewed  post hip surgery, stable  an episode of chest pain treated for musculoskeletal type pain  cardiovascular status appears stable for discharge  to rehabilitation

## 2020-01-12 NOTE — PROGRESS NOTE ADULT - SUBJECTIVE AND OBJECTIVE BOX
Patient is a 89y old  Female who presents with a chief complaint of Fall at home. Fracture left hip. (11 Jan 2020 11:29)      INTERVAL HPI/OVERNIGHT EVENTS:    MEDICATIONS  (STANDING):  enoxaparin Injectable 30 milliGRAM(s) SubCutaneous every 12 hours  lactated ringers. 1000 milliLiter(s) (75 mL/Hr) IV Continuous <Continuous>  pantoprazole    Tablet 40 milliGRAM(s) Oral before breakfast  polyethylene glycol 3350 17 Gram(s) Oral daily    MEDICATIONS  (PRN):  acetaminophen   Tablet .. 650 milliGRAM(s) Oral every 6 hours PRN Temp greater or equal to 38C (100.4F), Mild Pain (1 - 3)  aluminum hydroxide/magnesium hydroxide/simethicone Suspension 30 milliLiter(s) Oral four times a day PRN Indigestion  bisacodyl Suppository 10 milliGRAM(s) Rectal daily PRN If no bowel movement by POD#2  HYDROmorphone  Injectable 0.5 milliGRAM(s) IV Push every 4 hours PRN Severe Pain (7 - 10)  nitroglycerin     SubLingual 0.4 milliGRAM(s) SubLingual every 5 minutes PRN Chest Pain  ondansetron Injectable 4 milliGRAM(s) IV Push every 6 hours PRN Nausea and/or Vomiting  oxyCODONE    IR 5 milliGRAM(s) Oral every 4 hours PRN Moderate Pain (4 - 6)  oxyCODONE    IR 10 milliGRAM(s) Oral every 4 hours PRN Severe Pain (7 - 10)  senna 2 Tablet(s) Oral at bedtime PRN Constipation      Allergies    No Known Allergies    Intolerances        REVIEW OF SYSTEMS:  CONSTITUTIONAL: No fever, weight loss, or fatigue  EYES: No eye pain, visual disturbances, or discharge  ENMT:  No difficulty hearing, tinnitus, vertigo; No sinus or throat pain  NECK: No pain or stiffness  BREASTS: No pain, masses, or nipple discharge  RESPIRATORY: No cough, wheezing, chills or hemoptysis; No shortness of breath  CARDIOVASCULAR: No chest pain, palpitations, dizziness, or leg swelling  GASTROINTESTINAL: No abdominal or epigastric pain. No nausea, vomiting, or hematemesis; No diarrhea or constipation. No melena or hematochezia.  GENITOURINARY: No dysuria, frequency, hematuria, or incontinence  NEUROLOGICAL: No headaches, memory loss, loss of strength, numbness, or tremors  SKIN: No itching, burning, rashes, or lesions   LYMPH NODES: No enlarged glands  ENDOCRINE: No heat or cold intolerance; No hair loss  MUSCULOSKELETAL: No joint pain or swelling; No muscle, back, or extremity pain  PSYCHIATRIC: No depression, anxiety, mood swings, or difficulty sleeping  HEME/LYMPH: No easy bruising, or bleeding gums  ALLERGY AND IMMUNOLOGIC: No hives or eczema    Vital Signs Last 24 Hrs  T(C): 36.2 (12 Jan 2020 10:59), Max: 37.1 (11 Jan 2020 17:36)  T(F): 97.2 (12 Jan 2020 10:59), Max: 98.7 (11 Jan 2020 17:36)  HR: 93 (12 Jan 2020 11:48) (93 - 106)  BP: 125/66 (12 Jan 2020 11:48) (110/65 - 142/61)  BP(mean): 80 (12 Jan 2020 10:59) (80 - 80)  RR: 21 (12 Jan 2020 11:48) (16 - 21)  SpO2: 97% (12 Jan 2020 11:48) (95% - 98%)    PHYSICAL EXAM:  GENERAL: NAD, well-groomed, well-developed  HEAD:  Atraumatic, Normocephalic  EYES: EOMI, PERRL, conjunctiva and sclera clear  ENMT:  Moist mucous membranes, Good dentition, No lesions  NECK: Supple, No JVD, Normal thyroid  NERVOUS SYSTEM:  Alert & Oriented X3, Good concentration; Motor Strength 5/5 B/L upper and lower extremities; DTRs 2+ intact and symmetric  CHEST/LUNG: Clear to percussion bilaterally; No rales, rhonchi, wheezing, or rubs  HEART: Regular rate and rhythm; No murmurs, rubs, or gallops  ABDOMEN: Soft, Nontender, Nondistended; Bowel sounds present  EXTREMITIES:  2+ Peripheral Pulses, No clubbing, cyanosis, or edema  LYMPH: No lymphadenopathy noted  SKIN: No rashes or lesions    LABS:                        8.8    7.38  )-----------( 296      ( 12 Jan 2020 07:23 )             27.9     01-12    139  |  107  |  18  ----------------------------<  88  4.4   |  25  |  0.74    Ca    8.2<L>      12 Jan 2020 07:23          CAPILLARY BLOOD GLUCOSE          CARDIAC MARKERS ( 10 Jason 2020 15:22 )  <.015 ng/mL / x     / 158 U/L / x     / 2.1 ng/mL                RADIOLOGY & ADDITIONAL TESTS:    Imaging Personally Reviewed:  [ ] YES  [ ] NO    Consultant(s) Notes Reviewed:  [ ] YES  [ ] NO    Care Discussed with Consultants/Other Providers [ ] YES  [ ] NO    PROBLEMS:  CLOSED FRACTURE OF LEFT HIP; INITIAL ENCOUNTER;SYNCOPE  FALL , HIP PAIN  Closed fracture of left hip, initial encounter  Moderate protein-calorie malnutrition  Hypokalemia due to excessive renal loss of potassium  Other chronic gastritis  Hypercholesterolemia  Benign hypertension  Syncope and collapse  Closed fracture of left hip, initial encounter      Care discussed with family,         [  ]   yes  [  ]  No    imp:    stable[ ]    unstable[  ]     improving [   ]       unchanged  [  ]                Plans:  Continue present plans  [  ]               New consult [  ]   specialty  .......               order aaron[  ]    test name.                  Discharge Planning  [  ]

## 2020-01-12 NOTE — PROGRESS NOTE ADULT - ASSESSMENT
IMPROVE VTE Individual Risk Assessment    RISK                                                                Points    [  ] Previous VTE                                                  3    [  ] Thrombophilia                                               2    [  ] Lower limb paralysis                                      2        (unable to hold up >15 seconds)      [  ] Current Cancer                                              2         (within 6 months)    [  ] Immobilization > 24 hrs                                1    [  ] ICU/CCU stay > 24 hours                              1    [  ] Age > 60                                                      1    IMPROVE VTE Score _________    IMPROVE Score 0-1: Low Risk, No VTE prophylaxis required for most patients, encourage ambulation.   IMPROVE Score 2-3: At risk, pharmacologic VTE prophylaxis is indicated for most patients (in the absence of a contraindication)  IMPROVE Score > or = 4: High Risk, pharmacologic VTE prophylaxis is indicated for most patients (in the absence of a contraindication)    89 y/o presents to ED s/p fall this morning. Pt states she does not recall how she fell. Pt's left leg collapsed under her and she fell on right side. Pt normally ambulates alone, the event was unwitnessed and she denies head trauma, +left hip pain, and dry mouth. Pt could not ambulate after fall secondary to pain. Pt denies neck, back pain.  01/05/2020 : Pt. asymptomatic. Hypokalemia resolved. Cardiology consult pending. Orthopedics f/u noted. Monitor CBC.  01/06/2020 : Pt. for surgery. Cardiology consult noted. Cleared for Corrective hip surgery with age related CV risks. Carotid doppler without any significant stenosis.  01/07/2020 : S/P Left Hemiarthroplasty. Alert, awake. PT evaluation.  01/08/2020 : Pt. c/o epigastric discomfort. Had 2 bowel movements yesterday. Abd. soft. Non tender. BS present in all 4 quadrants. PT evaluation noted. For STR.  01/09/2020 : Pt. Lethargic. Arousable. Lungs clear. No epigastric discomfort. Cardiology F/U noted. Troponin normal. PT.  01/10/2020 : Still lethargic this AM. PT to follow. Neurology consult if no change in mental status.  01/11/2020 : Remains [id number] and drowsy. Arousable. Denies any chest pain. Pain left hip. Had chest pain yesterday. EKG unchanged. Cardiology F/U noted. PT. Discharge planning.  01/12/2020 : Pt. more alert. Chest discomfort on coughing only. Lungs cklear. PT. Discharge to rehab.

## 2020-01-13 LAB
ANION GAP SERPL CALC-SCNC: 7 MMOL/L — SIGNIFICANT CHANGE UP (ref 5–17)
BUN SERPL-MCNC: 20 MG/DL — SIGNIFICANT CHANGE UP (ref 7–23)
CALCIUM SERPL-MCNC: 8.2 MG/DL — LOW (ref 8.5–10.1)
CHLORIDE SERPL-SCNC: 106 MMOL/L — SIGNIFICANT CHANGE UP (ref 96–108)
CO2 SERPL-SCNC: 27 MMOL/L — SIGNIFICANT CHANGE UP (ref 22–31)
CREAT SERPL-MCNC: 0.92 MG/DL — SIGNIFICANT CHANGE UP (ref 0.5–1.3)
GLUCOSE SERPL-MCNC: 99 MG/DL — SIGNIFICANT CHANGE UP (ref 70–99)
HCT VFR BLD CALC: 28.7 % — LOW (ref 34.5–45)
HGB BLD-MCNC: 8.8 G/DL — LOW (ref 11.5–15.5)
MCHC RBC-ENTMCNC: 29.2 PG — SIGNIFICANT CHANGE UP (ref 27–34)
MCHC RBC-ENTMCNC: 30.7 GM/DL — LOW (ref 32–36)
MCV RBC AUTO: 95.3 FL — SIGNIFICANT CHANGE UP (ref 80–100)
NRBC # BLD: 0 /100 WBCS — SIGNIFICANT CHANGE UP (ref 0–0)
PLATELET # BLD AUTO: 362 K/UL — SIGNIFICANT CHANGE UP (ref 150–400)
POTASSIUM SERPL-MCNC: 4.5 MMOL/L — SIGNIFICANT CHANGE UP (ref 3.5–5.3)
POTASSIUM SERPL-SCNC: 4.5 MMOL/L — SIGNIFICANT CHANGE UP (ref 3.5–5.3)
RBC # BLD: 3.01 M/UL — LOW (ref 3.8–5.2)
RBC # FLD: 15.8 % — HIGH (ref 10.3–14.5)
SODIUM SERPL-SCNC: 140 MMOL/L — SIGNIFICANT CHANGE UP (ref 135–145)
WBC # BLD: 7.5 K/UL — SIGNIFICANT CHANGE UP (ref 3.8–10.5)
WBC # FLD AUTO: 7.5 K/UL — SIGNIFICANT CHANGE UP (ref 3.8–10.5)

## 2020-01-13 PROCEDURE — 74018 RADEX ABDOMEN 1 VIEW: CPT | Mod: 26

## 2020-01-13 RX ADMIN — OXYCODONE HYDROCHLORIDE 5 MILLIGRAM(S): 5 TABLET ORAL at 12:13

## 2020-01-13 RX ADMIN — AMLODIPINE BESYLATE 5 MILLIGRAM(S): 2.5 TABLET ORAL at 05:20

## 2020-01-13 RX ADMIN — OXYCODONE HYDROCHLORIDE 5 MILLIGRAM(S): 5 TABLET ORAL at 11:13

## 2020-01-13 RX ADMIN — Medication 40 MILLIGRAM(S): at 05:20

## 2020-01-13 RX ADMIN — ENOXAPARIN SODIUM 30 MILLIGRAM(S): 100 INJECTION SUBCUTANEOUS at 05:18

## 2020-01-13 RX ADMIN — FAMOTIDINE 20 MILLIGRAM(S): 10 INJECTION INTRAVENOUS at 11:13

## 2020-01-13 RX ADMIN — POLYETHYLENE GLYCOL 3350 17 GRAM(S): 17 POWDER, FOR SOLUTION ORAL at 11:09

## 2020-01-13 RX ADMIN — ENOXAPARIN SODIUM 30 MILLIGRAM(S): 100 INJECTION SUBCUTANEOUS at 17:37

## 2020-01-13 RX ADMIN — PANTOPRAZOLE SODIUM 40 MILLIGRAM(S): 20 TABLET, DELAYED RELEASE ORAL at 05:19

## 2020-01-13 RX ADMIN — Medication 81 MILLIGRAM(S): at 11:13

## 2020-01-13 RX ADMIN — ATORVASTATIN CALCIUM 10 MILLIGRAM(S): 80 TABLET, FILM COATED ORAL at 22:16

## 2020-01-13 NOTE — PROGRESS NOTE ADULT - SUBJECTIVE AND OBJECTIVE BOX
89 y/o presents to ED s/p fall this morning. Pt states she does not recall how she fell. Pt's left leg collapsed under her and she fell on right side. Pt normally ambulates alone, the event was unwitnessed and she denies head trauma, +left hip pain, and dry mouth. Pt could not ambulate after fall secondary to pain. Pt denies neck, back pain, Ha, dizziness, abdominal pain (-) chest pain (-) sob Pt reports last bowel movement unknown. Pt lives at home   Admitted with fracture Left Hip. (2020 15:04)      Chief Complaint:  Patient is a 89y old  Female who presents with a chief complaint of Fall at home. Fracture left hip. (2020 09:37)      Review of Systems:    General:  No wt loss, fevers, chills, night sweats  Eyes:  Good vision, no reported pain  ENT:  No sore throat, pain, runny nose, dysphagia  CV:  No pain, palpitations, hypo/hypertension  Resp:  No dyspnea, cough, tachypnea, wheezing  GI:  No pain, nausea, vomiting, diarrhea, constipation         Social History/Family History  SOCHX:   tobacco,  -  alcohol    FMHX: FA/MO  - contributory       Discussed with:  PMD, Family    Physical Exam:    Vital Signs:  Vital Signs Last 24 Hrs  T(C): 36.5 (2020 17:05), Max: 36.8 (2020 00:10)  T(F): 97.7 (2020 17:05), Max: 98.3 (2020 00:10)  HR: 112 (2020 17:05) (94 - 112)  BP: 153/76 (2020 17:05) (126/57 - 153/76)  BP(mean): --  RR: 18 (2020 17:05) (17 - 18)  SpO2: 94% (2020 17:05) (90% - 94%)  Daily     Daily Weight in k.2 (2020 05:15)  I&O's Summary    2020 07:01  -  2020 22:46  --------------------------------------------------------  IN: 100 mL / OUT: 0 mL / NET: 100 mL          Chest:  Full & symmetric excursion, no increased effort, breath sounds clear  Cardiovascular:  Regular rhythm, S1, S2, no murmur/rub/S3/S4, no carotid/femoral/abdominal bruit, radial/pedal pulses 2+  Abdomen:  Soft, non-tender, non-distended, normoactive bowel sounds, no HSM    Laboratory:                          11.2   9.91  )-----------( 321      ( 2020 07:39 )             35.6     01-05    140  |  103  |  13  ----------------------------<  114<H>  3.7   |  27  |  1.37<H>    Ca    7.9<L>      2020 07:39    TPro  6.4  /  Alb  2.4<L>  /  TBili  0.5  /  DBili  x   /  AST  14<L>  /  ALT  11<L>  /  AlkPhos  218<H>  01-04          CAPILLARY BLOOD GLUCOSE        LIVER FUNCTIONS - ( 2020 11:14 )  Alb: 2.4 g/dL / Pro: 6.4 gm/dL / ALK PHOS: 218 U/L / ALT: 11 U/L / AST: 14 U/L / GGT: x           PT/INR - ( 2020 11:14 )   PT: 11.1 sec;   INR: 0.99 ratio         PTT - ( 2020 11:14 )  PTT:32.7 sec          Assessment:  I am asked to assess this patient for cardiovascular risk assessment for left hip surgery  The patient's EKG is without acute changes  The patient stated CP, Troponin is normal  TTE ordered, noted normal ejection fraction, and some mild to moderate valvular heart disease, not concerning at this point  EKG with T wave change that is likely lead placement no real change V1-V3  The patient's lab testing and other diagnostic testing are reviewed  post hip surgery, stable  an episode of chest pain treated for musculoskeletal type pain  cardiovascular status appears stable for discharge  to rehabilitation

## 2020-01-13 NOTE — PROGRESS NOTE ADULT - SUBJECTIVE AND OBJECTIVE BOX
Patient is a 89y old  Female who presents with a chief complaint of Fall at home. Fracture left hip. (12 Jan 2020 18:53)      INTERVAL HPI/OVERNIGHT EVENTS:    MEDICATIONS  (STANDING):  amLODIPine   Tablet 5 milliGRAM(s) Oral daily  aspirin enteric coated 81 milliGRAM(s) Oral daily  atorvastatin 10 milliGRAM(s) Oral at bedtime  enoxaparin Injectable 30 milliGRAM(s) SubCutaneous every 12 hours  famotidine    Tablet 20 milliGRAM(s) Oral daily  furosemide    Tablet 40 milliGRAM(s) Oral daily  pantoprazole    Tablet 40 milliGRAM(s) Oral before breakfast  polyethylene glycol 3350 17 Gram(s) Oral daily    MEDICATIONS  (PRN):  acetaminophen   Tablet .. 650 milliGRAM(s) Oral every 6 hours PRN Temp greater or equal to 38C (100.4F), Mild Pain (1 - 3)  aluminum hydroxide/magnesium hydroxide/simethicone Suspension 30 milliLiter(s) Oral four times a day PRN Indigestion  bisacodyl Suppository 10 milliGRAM(s) Rectal daily PRN If no bowel movement by POD#2  HYDROmorphone  Injectable 0.5 milliGRAM(s) IV Push every 4 hours PRN Severe Pain (7 - 10)  nitroglycerin     SubLingual 0.4 milliGRAM(s) SubLingual every 5 minutes PRN Chest Pain  ondansetron Injectable 4 milliGRAM(s) IV Push every 6 hours PRN Nausea and/or Vomiting  oxyCODONE    IR 5 milliGRAM(s) Oral every 4 hours PRN Moderate Pain (4 - 6)  oxyCODONE    IR 10 milliGRAM(s) Oral every 4 hours PRN Severe Pain (7 - 10)  senna 2 Tablet(s) Oral at bedtime PRN Constipation      Allergies    No Known Allergies    Intolerances        REVIEW OF SYSTEMS:  CONSTITUTIONAL: No fever, weight loss, or fatigue  EYES: No eye pain, visual disturbances, or discharge  ENMT:  No difficulty hearing, tinnitus, vertigo; No sinus or throat pain  NECK: No pain or stiffness  BREASTS: No pain, masses, or nipple discharge  RESPIRATORY: No cough, wheezing, chills or hemoptysis; No shortness of breath  CARDIOVASCULAR: No chest pain, palpitations, dizziness, or leg swelling  GASTROINTESTINAL: No abdominal or epigastric pain. No nausea, vomiting, or hematemesis; No diarrhea or constipation. No melena or hematochezia.  GENITOURINARY: No dysuria, frequency, hematuria, or incontinence  NEUROLOGICAL: No headaches, memory loss, loss of strength, numbness, or tremors  SKIN: No itching, burning, rashes, or lesions   LYMPH NODES: No enlarged glands  ENDOCRINE: No heat or cold intolerance; No hair loss  MUSCULOSKELETAL: No joint pain or swelling; No muscle, back, or extremity pain  PSYCHIATRIC: No depression, anxiety, mood swings, or difficulty sleeping  HEME/LYMPH: No easy bruising, or bleeding gums  ALLERGY AND IMMUNOLOGIC: No hives or eczema    Vital Signs Last 24 Hrs  T(C): 36.4 (13 Jan 2020 11:44), Max: 36.7 (12 Jan 2020 16:40)  T(F): 97.5 (13 Jan 2020 11:44), Max: 98 (12 Jan 2020 16:40)  HR: 93 (13 Jan 2020 12:30) (93 - 105)  BP: 112/62 (13 Jan 2020 12:30) (103/54 - 138/68)  BP(mean): 92 (12 Jan 2020 16:40) (92 - 92)  RR: 18 (13 Jan 2020 11:44) (18 - 22)  SpO2: 94% (13 Jan 2020 12:30) (94% - 98%)    PHYSICAL EXAM:  GENERAL: NAD, well-groomed, well-developed  HEAD:  Atraumatic, Normocephalic  EYES: EOMI, PERRL, conjunctiva and sclera clear  ENMT:  Moist mucous membranes, Good dentition, No lesions  NECK: Supple, No JVD, Normal thyroid  NERVOUS SYSTEM:  Alert & Oriented X3, Good concentration; Motor Strength 5/5 B/L upper and lower extremities; DTRs 2+ intact and symmetric  CHEST/LUNG: Clear to percussion bilaterally; No rales, rhonchi, wheezing, or rubs  HEART: Regular rate and rhythm; No murmurs, rubs, or gallops  ABDOMEN: Soft, Nontender, Nondistended; Bowel sounds present  EXTREMITIES:  2+ Peripheral Pulses, No clubbing, cyanosis, or edema  LYMPH: No lymphadenopathy noted  SKIN: No rashes or lesions    LABS:                        8.8    7.50  )-----------( 362      ( 13 Jan 2020 08:17 )             28.7     01-13    140  |  106  |  20  ----------------------------<  99  4.5   |  27  |  0.92    Ca    8.2<L>      13 Jan 2020 08:17          CAPILLARY BLOOD GLUCOSE                        RADIOLOGY & ADDITIONAL TESTS:    Imaging Personally Reviewed:  [ ] YES  [ ] NO    Consultant(s) Notes Reviewed:  [ ] YES  [ ] NO    Care Discussed with Consultants/Other Providers [ ] YES  [ ] NO    PROBLEMS:  CLOSED FRACTURE OF LEFT HIP; INITIAL ENCOUNTER;SYNCOPE  FALL , HIP PAIN  Closed fracture of left hip, initial encounter  Moderate protein-calorie malnutrition  Hypokalemia due to excessive renal loss of potassium  Other chronic gastritis  Hypercholesterolemia  Benign hypertension  Syncope and collapse  Closed fracture of left hip, initial encounter      Care discussed with family,         [  ]   yes  [  ]  No    imp:    stable[ ]    unstable[  ]     improving [   ]       unchanged  [  ]                Plans:  Continue present plans  [  ]               New consult [  ]   specialty  .......               order aaron[  ]    test name.                  Discharge Planning  [  ]

## 2020-01-13 NOTE — PROGRESS NOTE ADULT - ASSESSMENT
IMPROVE VTE Individual Risk Assessment    RISK                                                                Points    [  ] Previous VTE                                                  3    [  ] Thrombophilia                                               2    [  ] Lower limb paralysis                                      2        (unable to hold up >15 seconds)      [  ] Current Cancer                                              2         (within 6 months)    [  ] Immobilization > 24 hrs                                1    [  ] ICU/CCU stay > 24 hours                              1    [  ] Age > 60                                                      1    IMPROVE VTE Score _________    IMPROVE Score 0-1: Low Risk, No VTE prophylaxis required for most patients, encourage ambulation.   IMPROVE Score 2-3: At risk, pharmacologic VTE prophylaxis is indicated for most patients (in the absence of a contraindication)  IMPROVE Score > or = 4: High Risk, pharmacologic VTE prophylaxis is indicated for most patients (in the absence of a contraindication)    89 y/o presents to ED s/p fall this morning. Pt states she does not recall how she fell. Pt's left leg collapsed under her and she fell on right side. Pt normally ambulates alone, the event was unwitnessed and she denies head trauma, +left hip pain, and dry mouth. Pt could not ambulate after fall secondary to pain. Pt denies neck, back pain.  01/05/2020 : Pt. asymptomatic. Hypokalemia resolved. Cardiology consult pending. Orthopedics f/u noted. Monitor CBC.  01/06/2020 : Pt. for surgery. Cardiology consult noted. Cleared for Corrective hip surgery with age related CV risks. Carotid doppler without any significant stenosis.  01/07/2020 : S/P Left Hemiarthroplasty. Alert, awake. PT evaluation.  01/08/2020 : Pt. c/o epigastric discomfort. Had 2 bowel movements yesterday. Abd. soft. Non tender. BS present in all 4 quadrants. PT evaluation noted. For STR.  01/09/2020 : Pt. Lethargic. Arousable. Lungs clear. No epigastric discomfort. Cardiology F/U noted. Troponin normal. PT.  01/10/2020 : Still lethargic this AM. PT to follow. Neurology consult if no change in mental status.  01/11/2020 : Remains [id number] and drowsy. Arousable. Denies any chest pain. Pain left hip. Had chest pain yesterday. EKG unchanged. Cardiology F/U noted. PT. Discharge planning.  01/12/2020 : Pt. more alert. Chest discomfort on coughing only. Lungs clear. PT. Discharge to rehab.   01/13/2020 : More alert, still lethargic. Continues to have pain in the epigastrium and lower chest. BS pos. Abdomen distenths. Neurology consult noted. Plan X-ray abdomen to R/O fecal retention. MR brain.

## 2020-01-14 LAB
ANION GAP SERPL CALC-SCNC: 10 MMOL/L — SIGNIFICANT CHANGE UP (ref 5–17)
BUN SERPL-MCNC: 21 MG/DL — SIGNIFICANT CHANGE UP (ref 7–23)
CALCIUM SERPL-MCNC: 8.3 MG/DL — LOW (ref 8.5–10.1)
CHLORIDE SERPL-SCNC: 104 MMOL/L — SIGNIFICANT CHANGE UP (ref 96–108)
CO2 SERPL-SCNC: 26 MMOL/L — SIGNIFICANT CHANGE UP (ref 22–31)
CREAT SERPL-MCNC: 0.86 MG/DL — SIGNIFICANT CHANGE UP (ref 0.5–1.3)
GLUCOSE SERPL-MCNC: 87 MG/DL — SIGNIFICANT CHANGE UP (ref 70–99)
HCT VFR BLD CALC: 29.3 % — LOW (ref 34.5–45)
HGB BLD-MCNC: 9.1 G/DL — LOW (ref 11.5–15.5)
MCHC RBC-ENTMCNC: 29.2 PG — SIGNIFICANT CHANGE UP (ref 27–34)
MCHC RBC-ENTMCNC: 31.1 GM/DL — LOW (ref 32–36)
MCV RBC AUTO: 93.9 FL — SIGNIFICANT CHANGE UP (ref 80–100)
NRBC # BLD: 0 /100 WBCS — SIGNIFICANT CHANGE UP (ref 0–0)
PLATELET # BLD AUTO: 402 K/UL — HIGH (ref 150–400)
POTASSIUM SERPL-MCNC: 4.4 MMOL/L — SIGNIFICANT CHANGE UP (ref 3.5–5.3)
POTASSIUM SERPL-SCNC: 4.4 MMOL/L — SIGNIFICANT CHANGE UP (ref 3.5–5.3)
RBC # BLD: 3.12 M/UL — LOW (ref 3.8–5.2)
RBC # FLD: 15.8 % — HIGH (ref 10.3–14.5)
SODIUM SERPL-SCNC: 140 MMOL/L — SIGNIFICANT CHANGE UP (ref 135–145)
WBC # BLD: 6.57 K/UL — SIGNIFICANT CHANGE UP (ref 3.8–10.5)
WBC # FLD AUTO: 6.57 K/UL — SIGNIFICANT CHANGE UP (ref 3.8–10.5)

## 2020-01-14 RX ORDER — ACETAMINOPHEN 500 MG
650 TABLET ORAL EVERY 4 HOURS
Refills: 0 | Status: DISCONTINUED | OUTPATIENT
Start: 2020-01-14 | End: 2020-01-16

## 2020-01-14 RX ADMIN — Medication 81 MILLIGRAM(S): at 11:40

## 2020-01-14 RX ADMIN — Medication 650 MILLIGRAM(S): at 17:11

## 2020-01-14 RX ADMIN — Medication 30 MILLILITER(S): at 22:16

## 2020-01-14 RX ADMIN — FAMOTIDINE 20 MILLIGRAM(S): 10 INJECTION INTRAVENOUS at 11:41

## 2020-01-14 RX ADMIN — AMLODIPINE BESYLATE 5 MILLIGRAM(S): 2.5 TABLET ORAL at 05:04

## 2020-01-14 RX ADMIN — PANTOPRAZOLE SODIUM 40 MILLIGRAM(S): 20 TABLET, DELAYED RELEASE ORAL at 05:05

## 2020-01-14 RX ADMIN — ATORVASTATIN CALCIUM 10 MILLIGRAM(S): 80 TABLET, FILM COATED ORAL at 22:14

## 2020-01-14 RX ADMIN — ENOXAPARIN SODIUM 30 MILLIGRAM(S): 100 INJECTION SUBCUTANEOUS at 05:04

## 2020-01-14 RX ADMIN — Medication 30 MILLILITER(S): at 15:14

## 2020-01-14 RX ADMIN — Medication 650 MILLIGRAM(S): at 18:11

## 2020-01-14 RX ADMIN — Medication 40 MILLIGRAM(S): at 05:04

## 2020-01-14 RX ADMIN — ENOXAPARIN SODIUM 30 MILLIGRAM(S): 100 INJECTION SUBCUTANEOUS at 17:11

## 2020-01-14 RX ADMIN — POLYETHYLENE GLYCOL 3350 17 GRAM(S): 17 POWDER, FOR SOLUTION ORAL at 11:41

## 2020-01-14 NOTE — PROGRESS NOTE ADULT - SUBJECTIVE AND OBJECTIVE BOX
Patient is a 89y old  Female who presents with a chief complaint of Fall at home. Fracture left hip. (13 Jan 2020 14:19)      INTERVAL HPI/OVERNIGHT EVENTS:    MEDICATIONS  (STANDING):  amLODIPine   Tablet 5 milliGRAM(s) Oral daily  aspirin enteric coated 81 milliGRAM(s) Oral daily  atorvastatin 10 milliGRAM(s) Oral at bedtime  enoxaparin Injectable 30 milliGRAM(s) SubCutaneous every 12 hours  famotidine    Tablet 20 milliGRAM(s) Oral daily  furosemide    Tablet 40 milliGRAM(s) Oral daily  pantoprazole    Tablet 40 milliGRAM(s) Oral before breakfast  polyethylene glycol 3350 17 Gram(s) Oral daily    MEDICATIONS  (PRN):  acetaminophen   Tablet .. 650 milliGRAM(s) Oral every 6 hours PRN Temp greater or equal to 38C (100.4F), Mild Pain (1 - 3)  aluminum hydroxide/magnesium hydroxide/simethicone Suspension 30 milliLiter(s) Oral four times a day PRN Indigestion  bisacodyl Suppository 10 milliGRAM(s) Rectal daily PRN If no bowel movement by POD#2  nitroglycerin     SubLingual 0.4 milliGRAM(s) SubLingual every 5 minutes PRN Chest Pain  ondansetron Injectable 4 milliGRAM(s) IV Push every 6 hours PRN Nausea and/or Vomiting  senna 2 Tablet(s) Oral at bedtime PRN Constipation      Allergies    No Known Allergies    Intolerances        REVIEW OF SYSTEMS:  CONSTITUTIONAL: No fever, weight loss, or fatigue  EYES: No eye pain, visual disturbances, or discharge  ENMT:  No difficulty hearing, tinnitus, vertigo; No sinus or throat pain  NECK: No pain or stiffness  BREASTS: No pain, masses, or nipple discharge  RESPIRATORY: No cough, wheezing, chills or hemoptysis; No shortness of breath  CARDIOVASCULAR: No chest pain, palpitations, dizziness, or leg swelling  GASTROINTESTINAL: No abdominal or epigastric pain. No nausea, vomiting, or hematemesis; No diarrhea or constipation. No melena or hematochezia.  GENITOURINARY: No dysuria, frequency, hematuria, or incontinence  NEUROLOGICAL: No headaches, memory loss, loss of strength, numbness, or tremors  SKIN: No itching, burning, rashes, or lesions   LYMPH NODES: No enlarged glands  ENDOCRINE: No heat or cold intolerance; No hair loss  MUSCULOSKELETAL: No joint pain or swelling; No muscle, back, or extremity pain  PSYCHIATRIC: No depression, anxiety, mood swings, or difficulty sleeping  HEME/LYMPH: No easy bruising, or bleeding gums  ALLERGY AND IMMUNOLOGIC: No hives or eczema    Vital Signs Last 24 Hrs  T(C): 36.8 (14 Jan 2020 05:14), Max: 36.8 (13 Jan 2020 23:26)  T(F): 98.2 (14 Jan 2020 05:14), Max: 98.3 (13 Jan 2020 23:26)  HR: 91 (14 Jan 2020 05:14) (91 - 100)  BP: 137/63 (14 Jan 2020 05:14) (112/62 - 139/72)  BP(mean): --  RR: 18 (14 Jan 2020 05:14) (16 - 18)  SpO2: 97% (14 Jan 2020 05:14) (94% - 97%)    PHYSICAL EXAM:  GENERAL: NAD, well-groomed, well-developed  HEAD:  Atraumatic, Normocephalic  EYES: EOMI, PERRL, conjunctiva and sclera clear  ENMT:  Moist mucous membranes, Good dentition, No lesions  NECK: Supple, No JVD, Normal thyroid  NERVOUS SYSTEM:  Alert & Oriented X3, Good concentration; Motor Strength 5/5 B/L upper and lower extremities; DTRs 2+ intact and symmetric  CHEST/LUNG: Clear to percussion bilaterally; No rales, rhonchi, wheezing, or rubs  HEART: Regular rate and rhythm; No murmurs, rubs, or gallops  ABDOMEN: Soft, Nontender, Nondistended; Bowel sounds present  EXTREMITIES:  2+ Peripheral Pulses, No clubbing, cyanosis, or edema  LYMPH: No lymphadenopathy noted  SKIN: No rashes or lesions    LABS:                        9.1    6.57  )-----------( 402      ( 14 Jan 2020 08:25 )             29.3     01-14    140  |  104  |  21  ----------------------------<  87  4.4   |  26  |  0.86    Ca    8.3<L>      14 Jan 2020 08:25          CAPILLARY BLOOD GLUCOSE      RADIOLOGY & ADDITIONAL TESTS:  < from: Xray Kidney Ureter Bladder (01.13.20 @ 16:25) >  Impression:  No evidence of ileus or obstruction. No radiographic evidence of constipation..      < end of copied text >    Imaging Personally Reviewed:  [ ] YES  [ ] NO    Consultant(s) Notes Reviewed:  [ ] YES  [ ] NO    Care Discussed with Consultants/Other Providers [ ] YES  [ ] NO    PROBLEMS:  CLOSED FRACTURE OF LEFT HIP; INITIAL ENCOUNTER;SYNCOPE  FALL , HIP PAIN  Closed fracture of left hip, initial encounter  Moderate protein-calorie malnutrition  Hypokalemia due to excessive renal loss of potassium  Other chronic gastritis  Hypercholesterolemia  Benign hypertension  Syncope and collapse  Closed fracture of left hip, initial encounter      Care discussed with family,         [  ]   yes  [  ]  No    imp:    stable[ ]    unstable[  ]     improving [   ]       unchanged  [  ]                Plans:  Continue present plans  [  ]               New consult [  ]   specialty  .......               order aaron[  ]    test name.                  Discharge Planning  [  ]

## 2020-01-14 NOTE — PROGRESS NOTE ADULT - ASSESSMENT
IMPROVE VTE Individual Risk Assessment    RISK                                                                Points    [  ] Previous VTE                                                  3    [  ] Thrombophilia                                               2    [  ] Lower limb paralysis                                      2        (unable to hold up >15 seconds)      [  ] Current Cancer                                              2         (within 6 months)    [  ] Immobilization > 24 hrs                                1    [  ] ICU/CCU stay > 24 hours                              1    [  ] Age > 60                                                      1    IMPROVE VTE Score _________    IMPROVE Score 0-1: Low Risk, No VTE prophylaxis required for most patients, encourage ambulation.   IMPROVE Score 2-3: At risk, pharmacologic VTE prophylaxis is indicated for most patients (in the absence of a contraindication)  IMPROVE Score > or = 4: High Risk, pharmacologic VTE prophylaxis is indicated for most patients (in the absence of a contraindication)    89 y/o presents to ED s/p fall this morning. Pt states she does not recall how she fell. Pt's left leg collapsed under her and she fell on right side. Pt normally ambulates alone, the event was unwitnessed and she denies head trauma, +left hip pain, and dry mouth. Pt could not ambulate after fall secondary to pain. Pt denies neck, back pain.  01/05/2020 : Pt. asymptomatic. Hypokalemia resolved. Cardiology consult pending. Orthopedics f/u noted. Monitor CBC.  01/06/2020 : Pt. for surgery. Cardiology consult noted. Cleared for Corrective hip surgery with age related CV risks. Carotid doppler without any significant stenosis.  01/07/2020 : S/P Left Hemiarthroplasty. Alert, awake. PT evaluation.  01/08/2020 : Pt. c/o epigastric discomfort. Had 2 bowel movements yesterday. Abd. soft. Non tender. BS present in all 4 quadrants. PT evaluation noted. For STR.  01/09/2020 : Pt. Lethargic. Arousable. Lungs clear. No epigastric discomfort. Cardiology F/U noted. Troponin normal. PT.  01/10/2020 : Still lethargic this AM. PT to follow. Neurology consult if no change in mental status.  01/11/2020 : Remains [id number] and drowsy. Arousable. Denies any chest pain. Pain left hip. Had chest pain yesterday. EKG unchanged. Cardiology F/U noted. PT. Discharge planning.  01/12/2020 : Pt. more alert. Chest discomfort on coughing only. Lungs clear. PT. Discharge to rehab.   01/13/2020 : More alert, still lethargic. Continues to have pain in the epigastrium and lower chest. BS pos. Abdomen distenths. Neurology consult noted. Plan X-ray abdomen to R/O fecal retention. MR brain.  01/14/2020 : More alert. No fecal retention. For STR.

## 2020-01-14 NOTE — PROGRESS NOTE ADULT - SUBJECTIVE AND OBJECTIVE BOX
89 y/o presents to ED s/p fall this morning. Pt states she does not recall how she fell. Pt's left leg collapsed under her and she fell on right side. Pt normally ambulates alone, the event was unwitnessed and she denies head trauma, +left hip pain, and dry mouth. Pt could not ambulate after fall secondary to pain. Pt denies neck, back pain, Ha, dizziness, abdominal pain (-) chest pain (-) sob Pt reports last bowel movement unknown. Pt lives at home   Admitted with fracture Left Hip. (2020 15:04)      Chief Complaint:  Patient is a 89y old  Female who presents with a chief complaint of Fall at home. Fracture left hip. (2020 09:37)      Review of Systems:    General:  No wt loss, fevers, chills, night sweats  Eyes:  Good vision, no reported pain  ENT:  No sore throat, pain, runny nose, dysphagia  CV:  No pain, palpitations, hypo/hypertension  Resp:  No dyspnea, cough, tachypnea, wheezing  GI:  No pain, nausea, vomiting, diarrhea, constipation         Social History/Family History  SOCHX:   tobacco,  -  alcohol    FMHX: FA/MO  - contributory       Discussed with:  PMD, Family    Physical Exam:    Vital Signs:  Vital Signs Last 24 Hrs  T(C): 36.5 (2020 17:05), Max: 36.8 (2020 00:10)  T(F): 97.7 (2020 17:05), Max: 98.3 (2020 00:10)  HR: 112 (2020 17:05) (94 - 112)  BP: 153/76 (2020 17:05) (126/57 - 153/76)  BP(mean): --  RR: 18 (2020 17:05) (17 - 18)  SpO2: 94% (2020 17:05) (90% - 94%)  Daily     Daily Weight in k.2 (2020 05:15)  I&O's Summary    2020 07:01  -  2020 22:46  --------------------------------------------------------  IN: 100 mL / OUT: 0 mL / NET: 100 mL          Chest:  Full & symmetric excursion, no increased effort, breath sounds clear  Cardiovascular:  Regular rhythm, S1, S2, no murmur/rub/S3/S4, no carotid/femoral/abdominal bruit, radial/pedal pulses 2+  Abdomen:  Soft, non-tender, non-distended, normoactive bowel sounds, no HSM    Laboratory:                          11.2   9.91  )-----------( 321      ( 2020 07:39 )             35.6     01-05    140  |  103  |  13  ----------------------------<  114<H>  3.7   |  27  |  1.37<H>    Ca    7.9<L>      2020 07:39    TPro  6.4  /  Alb  2.4<L>  /  TBili  0.5  /  DBili  x   /  AST  14<L>  /  ALT  11<L>  /  AlkPhos  218<H>  01-04          CAPILLARY BLOOD GLUCOSE        LIVER FUNCTIONS - ( 2020 11:14 )  Alb: 2.4 g/dL / Pro: 6.4 gm/dL / ALK PHOS: 218 U/L / ALT: 11 U/L / AST: 14 U/L / GGT: x           PT/INR - ( 2020 11:14 )   PT: 11.1 sec;   INR: 0.99 ratio         PTT - ( 2020 11:14 )  PTT:32.7 sec          Assessment:  I am asked to assess this patient for cardiovascular risk assessment for left hip surgery  The patient's EKG is without acute changes  The patient stated CP, Troponin is normal  TTE ordered, noted normal ejection fraction, and some mild to moderate valvular heart disease, not concerning at this point  EKG with T wave change that is likely lead placement no real change V1-V3  The patient's lab testing and other diagnostic testing are reviewed  post hip surgery, stable  an episode of chest pain treated for musculoskeletal type pain  cardiovascular status appears stable for discharge  to rehabilitation  no fecal retention, discharge planning continues

## 2020-01-15 LAB
ANION GAP SERPL CALC-SCNC: 8 MMOL/L — SIGNIFICANT CHANGE UP (ref 5–17)
BUN SERPL-MCNC: 22 MG/DL — SIGNIFICANT CHANGE UP (ref 7–23)
CALCIUM SERPL-MCNC: 8.5 MG/DL — SIGNIFICANT CHANGE UP (ref 8.5–10.1)
CHLORIDE SERPL-SCNC: 102 MMOL/L — SIGNIFICANT CHANGE UP (ref 96–108)
CO2 SERPL-SCNC: 28 MMOL/L — SIGNIFICANT CHANGE UP (ref 22–31)
CREAT SERPL-MCNC: 0.91 MG/DL — SIGNIFICANT CHANGE UP (ref 0.5–1.3)
GLUCOSE SERPL-MCNC: 96 MG/DL — SIGNIFICANT CHANGE UP (ref 70–99)
HCT VFR BLD CALC: 28.5 % — LOW (ref 34.5–45)
HGB BLD-MCNC: 9.1 G/DL — LOW (ref 11.5–15.5)
MCHC RBC-ENTMCNC: 29.1 PG — SIGNIFICANT CHANGE UP (ref 27–34)
MCHC RBC-ENTMCNC: 31.9 GM/DL — LOW (ref 32–36)
MCV RBC AUTO: 91.1 FL — SIGNIFICANT CHANGE UP (ref 80–100)
NRBC # BLD: 0 /100 WBCS — SIGNIFICANT CHANGE UP (ref 0–0)
PLATELET # BLD AUTO: 422 K/UL — HIGH (ref 150–400)
POTASSIUM SERPL-MCNC: 4.1 MMOL/L — SIGNIFICANT CHANGE UP (ref 3.5–5.3)
POTASSIUM SERPL-SCNC: 4.1 MMOL/L — SIGNIFICANT CHANGE UP (ref 3.5–5.3)
RBC # BLD: 3.13 M/UL — LOW (ref 3.8–5.2)
RBC # FLD: 15.5 % — HIGH (ref 10.3–14.5)
SODIUM SERPL-SCNC: 138 MMOL/L — SIGNIFICANT CHANGE UP (ref 135–145)
WBC # BLD: 7.25 K/UL — SIGNIFICANT CHANGE UP (ref 3.8–10.5)
WBC # FLD AUTO: 7.25 K/UL — SIGNIFICANT CHANGE UP (ref 3.8–10.5)

## 2020-01-15 PROCEDURE — 70551 MRI BRAIN STEM W/O DYE: CPT | Mod: 26

## 2020-01-15 RX ADMIN — POLYETHYLENE GLYCOL 3350 17 GRAM(S): 17 POWDER, FOR SOLUTION ORAL at 13:21

## 2020-01-15 RX ADMIN — Medication 650 MILLIGRAM(S): at 22:03

## 2020-01-15 RX ADMIN — Medication 81 MILLIGRAM(S): at 13:21

## 2020-01-15 RX ADMIN — AMLODIPINE BESYLATE 5 MILLIGRAM(S): 2.5 TABLET ORAL at 05:27

## 2020-01-15 RX ADMIN — PANTOPRAZOLE SODIUM 40 MILLIGRAM(S): 20 TABLET, DELAYED RELEASE ORAL at 07:54

## 2020-01-15 RX ADMIN — ATORVASTATIN CALCIUM 10 MILLIGRAM(S): 80 TABLET, FILM COATED ORAL at 22:02

## 2020-01-15 RX ADMIN — ENOXAPARIN SODIUM 30 MILLIGRAM(S): 100 INJECTION SUBCUTANEOUS at 05:27

## 2020-01-15 RX ADMIN — Medication 40 MILLIGRAM(S): at 05:27

## 2020-01-15 RX ADMIN — Medication 650 MILLIGRAM(S): at 23:03

## 2020-01-15 RX ADMIN — ENOXAPARIN SODIUM 30 MILLIGRAM(S): 100 INJECTION SUBCUTANEOUS at 17:43

## 2020-01-15 RX ADMIN — FAMOTIDINE 20 MILLIGRAM(S): 10 INJECTION INTRAVENOUS at 13:23

## 2020-01-15 NOTE — PROGRESS NOTE ADULT - ASSESSMENT
IMPROVE VTE Individual Risk Assessment    RISK                                                                Points    [  ] Previous VTE                                                  3    [  ] Thrombophilia                                               2    [  ] Lower limb paralysis                                      2        (unable to hold up >15 seconds)      [  ] Current Cancer                                              2         (within 6 months)    [  ] Immobilization > 24 hrs                                1    [  ] ICU/CCU stay > 24 hours                              1    [  ] Age > 60                                                      1    IMPROVE VTE Score _________    IMPROVE Score 0-1: Low Risk, No VTE prophylaxis required for most patients, encourage ambulation.   IMPROVE Score 2-3: At risk, pharmacologic VTE prophylaxis is indicated for most patients (in the absence of a contraindication)  IMPROVE Score > or = 4: High Risk, pharmacologic VTE prophylaxis is indicated for most patients (in the absence of a contraindication)    89 y/o presents to ED s/p fall this morning. Pt states she does not recall how she fell. Pt's left leg collapsed under her and she fell on right side. Pt normally ambulates alone, the event was unwitnessed and she denies head trauma, +left hip pain, and dry mouth. Pt could not ambulate after fall secondary to pain. Pt denies neck, back pain.  01/05/2020 : Pt. asymptomatic. Hypokalemia resolved. Cardiology consult pending. Orthopedics f/u noted. Monitor CBC.  01/06/2020 : Pt. for surgery. Cardiology consult noted. Cleared for Corrective hip surgery with age related CV risks. Carotid doppler without any significant stenosis.  01/07/2020 : S/P Left Hemiarthroplasty. Alert, awake. PT evaluation.  01/08/2020 : Pt. c/o epigastric discomfort. Had 2 bowel movements yesterday. Abd. soft. Non tender. BS present in all 4 quadrants. PT evaluation noted. For STR.  01/09/2020 : Pt. Lethargic. Arousable. Lungs clear. No epigastric discomfort. Cardiology F/U noted. Troponin normal. PT.  01/10/2020 : Still lethargic this AM. PT to follow. Neurology consult if no change in mental status.  01/11/2020 : Remains [id number] and drowsy. Arousable. Denies any chest pain. Pain left hip. Had chest pain yesterday. EKG unchanged. Cardiology F/U noted. PT. Discharge planning.  01/12/2020 : Pt. more alert. Chest discomfort on coughing only. Lungs clear. PT. Discharge to rehab.   01/13/2020 : More alert, still lethargic. Continues to have pain in the epigastrium and lower chest. BS pos. Abdomen distenths. Neurology consult noted. Plan X-ray abdomen to R/O fecal retention. MR brain.  01/14/2020 : More alert. No fecal retention. For STR.  01/15/2020 : remains lethargic. C/O epigastric discomfort.. Poor PO intake. For Rehab.

## 2020-01-15 NOTE — PROGRESS NOTE ADULT - SUBJECTIVE AND OBJECTIVE BOX
Patient is a 89y old  Female who presents with a chief complaint of Fall at home. Fracture left hip. (14 Jan 2020 23:56)      INTERVAL HPI/OVERNIGHT EVENTS:    MEDICATIONS  (STANDING):  amLODIPine   Tablet 5 milliGRAM(s) Oral daily  aspirin enteric coated 81 milliGRAM(s) Oral daily  atorvastatin 10 milliGRAM(s) Oral at bedtime  enoxaparin Injectable 30 milliGRAM(s) SubCutaneous every 12 hours  famotidine    Tablet 20 milliGRAM(s) Oral daily  furosemide    Tablet 40 milliGRAM(s) Oral daily  pantoprazole    Tablet 40 milliGRAM(s) Oral before breakfast  polyethylene glycol 3350 17 Gram(s) Oral daily    MEDICATIONS  (PRN):  acetaminophen   Tablet .. 650 milliGRAM(s) Oral every 6 hours PRN Temp greater or equal to 38C (100.4F), Mild Pain (1 - 3)  acetaminophen   Tablet .. 650 milliGRAM(s) Oral every 4 hours PRN Temp greater or equal to 38C (100.4F), Mild Pain (1 - 3)  aluminum hydroxide/magnesium hydroxide/simethicone Suspension 30 milliLiter(s) Oral four times a day PRN Indigestion  bisacodyl Suppository 10 milliGRAM(s) Rectal daily PRN If no bowel movement by POD#2  nitroglycerin     SubLingual 0.4 milliGRAM(s) SubLingual every 5 minutes PRN Chest Pain  ondansetron Injectable 4 milliGRAM(s) IV Push every 6 hours PRN Nausea and/or Vomiting  senna 2 Tablet(s) Oral at bedtime PRN Constipation      Allergies    No Known Allergies    Intolerances        REVIEW OF SYSTEMS NA  Vital Signs Last 24 Hrs  T(C): 36.4 (15 Jason 2020 03:35), Max: 36.4 (15 Jason 2020 03:35)  T(F): 97.5 (15 Jason 2020 03:35), Max: 97.5 (15 Jason 2020 03:35)  HR: 93 (15 Jason 2020 03:35) (93 - 100)  BP: 141/66 (15 Jason 2020 03:35) (122/52 - 141/66)  BP(mean): --  RR: 18 (15 Jason 2020 03:35) (18 - 18)  SpO2: 97% (15 Jason 2020 03:35) (93% - 97%)    PHYSICAL EXAM:  GENERAL: NAD, well-groomed, well-developed  HEAD:  Atraumatic, Normocephalic  EYES: EOMI, PERRL, conjunctiva and sclera clear  ENMT:  Moist mucous membranes, Good dentition, No lesions  NECK: Supple, No JVD, Normal thyroid  NERVOUS SYSTEM:  Alert & Oriented X3, Poor concentration; Motor Strength 4/5 B/L upper and lower extremities;   CHEST/LUNG: Clear to percussion bilaterally; No rales, rhonchi, wheezing, or rubs  HEART: Regular rate and rhythm; No murmurs, rubs, or gallops  ABDOMEN: Soft, Nontender, Nondistended; Bowel sounds present  EXTREMITIES:  2+ Peripheral Pulses, No clubbing, cyanosis, or edema  LYMPH: No lymphadenopathy noted  SKIN: No rashes or lesions    LABS:                        9.1    7.25  )-----------( 422      ( 15 Jason 2020 08:21 )             28.5     01-15    138  |  102  |  22  ----------------------------<  96  4.1   |  28  |  0.91    Ca    8.5      15 Jason 2020 08:21          CAPILLARY BLOOD GLUCOSE      RADIOLOGY & ADDITIONAL TESTS:  < from: Xray Kidney Ureter Bladder (01.13.20 @ 16:25) >  Impression:  No evidence of ileus or obstruction. No radiographic evidence of constipation..        < end of copied text >    Imaging Personally Reviewed:  [ ] YES  [ ] NO    Consultant(s) Notes Reviewed:  [ ] YES  [ ] NO    Care Discussed with Consultants/Other Providers [ ] YES  [ ] NO    PROBLEMS:  CLOSED FRACTURE OF LEFT HIP; INITIAL ENCOUNTER;SYNCOPE  FALL , HIP PAIN  Closed fracture of left hip, initial encounter  Moderate protein-calorie malnutrition  Hypokalemia due to excessive renal loss of potassium  Other chronic gastritis  Hypercholesterolemia  Benign hypertension  Syncope and collapse  Closed fracture of left hip, initial encounter      Care discussed with family,         [  ]   yes  [  ]  No    imp:    stable[ ]    unstable[  ]     improving [   ]       unchanged  [  ]                Plans:  Continue present plans  [  ]               New consult [  ]   specialty  .......               order aaron[  ]    test name.                  Discharge Planning  [  ]

## 2020-01-16 ENCOUNTER — TRANSCRIPTION ENCOUNTER (OUTPATIENT)
Age: 85
End: 2020-01-16

## 2020-01-16 VITALS
HEART RATE: 89 BPM | TEMPERATURE: 98 F | OXYGEN SATURATION: 96 % | RESPIRATION RATE: 17 BRPM | SYSTOLIC BLOOD PRESSURE: 128 MMHG | DIASTOLIC BLOOD PRESSURE: 62 MMHG

## 2020-01-16 LAB
ANION GAP SERPL CALC-SCNC: 9 MMOL/L — SIGNIFICANT CHANGE UP (ref 5–17)
BUN SERPL-MCNC: 28 MG/DL — HIGH (ref 7–23)
CALCIUM SERPL-MCNC: 8.4 MG/DL — LOW (ref 8.5–10.1)
CHLORIDE SERPL-SCNC: 103 MMOL/L — SIGNIFICANT CHANGE UP (ref 96–108)
CO2 SERPL-SCNC: 29 MMOL/L — SIGNIFICANT CHANGE UP (ref 22–31)
CREAT SERPL-MCNC: 1.1 MG/DL — SIGNIFICANT CHANGE UP (ref 0.5–1.3)
GLUCOSE SERPL-MCNC: 88 MG/DL — SIGNIFICANT CHANGE UP (ref 70–99)
HCT VFR BLD CALC: 26.8 % — LOW (ref 34.5–45)
HGB BLD-MCNC: 8.5 G/DL — LOW (ref 11.5–15.5)
MCHC RBC-ENTMCNC: 29.1 PG — SIGNIFICANT CHANGE UP (ref 27–34)
MCHC RBC-ENTMCNC: 31.7 GM/DL — LOW (ref 32–36)
MCV RBC AUTO: 91.8 FL — SIGNIFICANT CHANGE UP (ref 80–100)
NRBC # BLD: 0 /100 WBCS — SIGNIFICANT CHANGE UP (ref 0–0)
PLATELET # BLD AUTO: 464 K/UL — HIGH (ref 150–400)
POTASSIUM SERPL-MCNC: 4.2 MMOL/L — SIGNIFICANT CHANGE UP (ref 3.5–5.3)
POTASSIUM SERPL-SCNC: 4.2 MMOL/L — SIGNIFICANT CHANGE UP (ref 3.5–5.3)
RBC # BLD: 2.92 M/UL — LOW (ref 3.8–5.2)
RBC # FLD: 15.9 % — HIGH (ref 10.3–14.5)
SODIUM SERPL-SCNC: 141 MMOL/L — SIGNIFICANT CHANGE UP (ref 135–145)
WBC # BLD: 9.84 K/UL — SIGNIFICANT CHANGE UP (ref 3.8–10.5)
WBC # FLD AUTO: 9.84 K/UL — SIGNIFICANT CHANGE UP (ref 3.8–10.5)

## 2020-01-16 RX ORDER — ASPIRIN/CALCIUM CARB/MAGNESIUM 324 MG
1 TABLET ORAL
Qty: 0 | Refills: 0 | DISCHARGE
Start: 2020-01-16

## 2020-01-16 RX ORDER — PANTOPRAZOLE SODIUM 20 MG/1
1 TABLET, DELAYED RELEASE ORAL
Qty: 0 | Refills: 0 | DISCHARGE
Start: 2020-01-16

## 2020-01-16 RX ORDER — FAMOTIDINE 10 MG/ML
1 INJECTION INTRAVENOUS
Qty: 0 | Refills: 0 | DISCHARGE
Start: 2020-01-16

## 2020-01-16 RX ORDER — DOCUSATE SODIUM 100 MG
0 CAPSULE ORAL
Qty: 0 | Refills: 0 | DISCHARGE

## 2020-01-16 RX ORDER — AMLODIPINE BESYLATE 2.5 MG/1
1 TABLET ORAL
Qty: 0 | Refills: 0 | DISCHARGE

## 2020-01-16 RX ORDER — ASPIRIN/CALCIUM CARB/MAGNESIUM 324 MG
1 TABLET ORAL
Qty: 0 | Refills: 0 | DISCHARGE

## 2020-01-16 RX ORDER — FUROSEMIDE 40 MG
1 TABLET ORAL
Qty: 0 | Refills: 0 | DISCHARGE
Start: 2020-01-16

## 2020-01-16 RX ORDER — FUROSEMIDE 40 MG
1 TABLET ORAL
Qty: 0 | Refills: 0 | DISCHARGE

## 2020-01-16 RX ORDER — AMLODIPINE BESYLATE 2.5 MG/1
1 TABLET ORAL
Qty: 0 | Refills: 0 | DISCHARGE
Start: 2020-01-16

## 2020-01-16 RX ORDER — ATORVASTATIN CALCIUM 80 MG/1
0 TABLET, FILM COATED ORAL
Qty: 0 | Refills: 0 | DISCHARGE

## 2020-01-16 RX ORDER — FAMOTIDINE 10 MG/ML
0 INJECTION INTRAVENOUS
Qty: 0 | Refills: 0 | DISCHARGE

## 2020-01-16 RX ORDER — ACETAMINOPHEN 500 MG
2 TABLET ORAL
Qty: 0 | Refills: 0 | DISCHARGE
Start: 2020-01-16

## 2020-01-16 RX ORDER — SENNA PLUS 8.6 MG/1
2 TABLET ORAL
Qty: 0 | Refills: 0 | DISCHARGE
Start: 2020-01-16

## 2020-01-16 RX ORDER — ATORVASTATIN CALCIUM 80 MG/1
1 TABLET, FILM COATED ORAL
Qty: 0 | Refills: 0 | DISCHARGE
Start: 2020-01-16

## 2020-01-16 RX ADMIN — FAMOTIDINE 20 MILLIGRAM(S): 10 INJECTION INTRAVENOUS at 11:23

## 2020-01-16 RX ADMIN — Medication 81 MILLIGRAM(S): at 11:23

## 2020-01-16 RX ADMIN — Medication 40 MILLIGRAM(S): at 05:33

## 2020-01-16 RX ADMIN — PANTOPRAZOLE SODIUM 40 MILLIGRAM(S): 20 TABLET, DELAYED RELEASE ORAL at 08:05

## 2020-01-16 RX ADMIN — ENOXAPARIN SODIUM 30 MILLIGRAM(S): 100 INJECTION SUBCUTANEOUS at 05:33

## 2020-01-16 RX ADMIN — AMLODIPINE BESYLATE 5 MILLIGRAM(S): 2.5 TABLET ORAL at 05:33

## 2020-01-16 NOTE — CHART NOTE - NSCHARTNOTEFT_GEN_A_CORE
Assessment:  Pt seen for follow up for moderate malnutrition.   Pt was sleeping when seen.   Pt admitted with closed fracture of left hip; syncope. Pt is confused with hypercholesterolemia, chronic gastritis, HTN.    Factors impacting intake: [ ] none [ ] nausea  [ ] vomiting [ ] diarrhea [ ] constipation  [ ]chewing problems [ ] swallowing issues  [X ] other: altered mental status; persistent lack of appetite     Diet Prescription: Diet, Regular:   Low Sodium  Supplement Feeding Modality:  Oral  Ensure Enlive Cans or Servings Per Day:  1       Frequency:  Three Times a day (01-09-20 @ 11:55)    Intake: Overall <50%    Current Weight: 68.2kg 01/16, 60.9 kg 01/04, c wt gain of 7.3 kg  % Weight Change: 11.98%  +3 edema left and right flank, right hip, left and right leg 01/13. Weight gain most likely related to edema.    Nutrition focused physical exam conducted; limited due to pt sleeping ; Subcutaneous fat Exam;  [Mod]  Orbital fat pads region,  [Mod]Buccal fat region,  [ Unable ]triceps region, [ Unable ]ribs region.  Muscle Exam; [ Mild ]temples region, [ Mild ]clavicle region, [ Unable ]shoulder region, [ Unable ]Scapula region, [ Mod ]Interosseous region, [ Unable ]thigh region, [ Unable ]Calf region    Pertinent Medications: MEDICATIONS  (STANDING):  amLODIPine   Tablet 5 milliGRAM(s) Oral daily  aspirin enteric coated 81 milliGRAM(s) Oral daily  atorvastatin 10 milliGRAM(s) Oral at bedtime  enoxaparin Injectable 30 milliGRAM(s) SubCutaneous every 12 hours  famotidine    Tablet 20 milliGRAM(s) Oral daily  furosemide    Tablet 40 milliGRAM(s) Oral daily  pantoprazole    Tablet 40 milliGRAM(s) Oral before breakfast  polyethylene glycol 3350 17 Gram(s) Oral daily    MEDICATIONS  (PRN):  acetaminophen   Tablet .. 650 milliGRAM(s) Oral every 6 hours PRN Temp greater or equal to 38C (100.4F), Mild Pain (1 - 3)  acetaminophen   Tablet .. 650 milliGRAM(s) Oral every 4 hours PRN Temp greater or equal to 38C (100.4F), Mild Pain (1 - 3)  aluminum hydroxide/magnesium hydroxide/simethicone Suspension 30 milliLiter(s) Oral four times a day PRN Indigestion  bisacodyl Suppository 10 milliGRAM(s) Rectal daily PRN If no bowel movement by POD#2  nitroglycerin     SubLingual 0.4 milliGRAM(s) SubLingual every 5 minutes PRN Chest Pain  ondansetron Injectable 4 milliGRAM(s) IV Push every 6 hours PRN Nausea and/or Vomiting  senna 2 Tablet(s) Oral at bedtime PRN Constipation    Pertinent Labs: 01-16 Na141 mmol/L Glu 88 mg/dL K+ 4.2 mmol/L Cr  1.10 mg/dL BUN 28 mg/dL<H>     CAPILLARY BLOOD GLUCOSE        Skin: WNL    Estimated Needs:   [X ] no change since previous assessment 01/09/20  [ ] recalculated:     Previous Nutrition Diagnosis:     Malnutrition Moderate malnutrition in context of chronic illness.     Etiology inadequate protein-energy needs in the presence of chronic gastritis.     Signs/Symptoms Physical Findings: moderate subq fat loss and muscle wasting.     Goal/Expected Outcome Pt to meet >75% protein-energy intake via meals/supplements during LOS.- unmet       New Nutrition Diagnosis:   [X ] Malnutrition; Acute Severe Malnutrition     Related to: s/p left hip fracture, altered mental status    As evidenced by: < 50% nutrition needs > 5 days     Goal: Pt to consume 50-75% of meals/supplements     Interventions:   Recommend  [X] Continue with current diet   [ ] Change Diet To:  [X ] Nutrition Supplement- Ensure Enlive 2 x day=750 calories, 40 grams protein, Magic Cup(thickened supplement) 4 oz 2 x daily=580 calories, 18 grams protein, will add additional nutrient dense foods ie yogurts, puddings    [ ] Nutrition Support  [ ] Other:   Monitoring and Evaluation:   [X ] PO intake [ x ] Tolerance to diet prescription [ x ] weights [ x ] labs[ x ] follow up per protocol  [ ] other:

## 2020-01-16 NOTE — PROGRESS NOTE ADULT - PROBLEM SELECTOR PROBLEM 7
Moderate protein-calorie malnutrition

## 2020-01-16 NOTE — PROGRESS NOTE ADULT - PROBLEM SELECTOR PROBLEM 4
Hypercholesterolemia

## 2020-01-16 NOTE — PROGRESS NOTE ADULT - REASON FOR ADMISSION
Fall at home. Fracture left hip.
Local with sedation
Fall at home. Fracture left hip.

## 2020-01-16 NOTE — PROGRESS NOTE ADULT - PROBLEM SELECTOR PLAN 6
Supplemented.

## 2020-01-16 NOTE — CHART NOTE - NSCHARTNOTEFT_GEN_A_CORE
Upon Nutritional Assessment by the Registered Dietitian your patient was determined to meet criteria / has evidence of the following diagnosis/diagnoses:          [ ]  Mild Protein Calorie Malnutrition        [ ]  Moderate Protein Calorie Malnutrition        [x ] Severe Protein Calorie Malnutrition (acute)        [ ] Unspecified Protein Calorie Malnutrition        [ ] Underweight / BMI <19        [ ] Morbid Obesity / BMI > 40      Findings as based on:  •  Comprehensive nutrition assessment and consultation  •  Calorie counts (nutrient intake analysis)  •  Food acceptance and intake status from observations by staff  •  Follow up  •  Patient education  •  Intervention secondary to interdisciplinary rounds  •   concerns      Treatment:    The following diet has been recommended: Low sodium, Ensure Enlive 2 x day=750 calories, 40 grams protein , will add Magic Cup(thickened supplement) 4 oz 2 x daily=580 calories, 18 grams protein, puddings, yogurt       PROVIDER Section:     By signing this assessment you are acknowledging and agree with the diagnosis/diagnoses assigned by the Registered Dietitian    Comments:

## 2020-01-16 NOTE — PROGRESS NOTE ADULT - PROBLEM SELECTOR PROBLEM 3
Benign hypertension

## 2020-01-16 NOTE — DISCHARGE NOTE NURSING/CASE MANAGEMENT/SOCIAL WORK - PATIENT PORTAL LINK FT
You can access the FollowMyHealth Patient Portal offered by [facility] by registering at the following website: [email or web address]. By joining [employer]’s FollowMyHealth portal, you will also be able to view your health information using other applications (apps) compatible with our system.

## 2020-01-16 NOTE — PROGRESS NOTE ADULT - SUBJECTIVE AND OBJECTIVE BOX
89 y/o presents to ED s/p fall this morning. Pt states she does not recall how she fell. Pt's left leg collapsed under her and she fell on right side. Pt normally ambulates alone, the event was unwitnessed and she denies head trauma, +left hip pain, and dry mouth. Pt could not ambulate after fall secondary to pain. Pt denies neck, back pain, Ha, dizziness, abdominal pain (-) chest pain (-) sob Pt reports last bowel movement unknown. Pt lives at home   Admitted with fracture Left Hip. (2020 15:04)      Chief Complaint:  Patient is a 89y old  Female who presents with a chief complaint of Fall at home. Fracture left hip. (2020 09:37)      Review of Systems:    General:  No wt loss, fevers, chills, night sweats  Eyes:  Good vision, no reported pain  ENT:  No sore throat, pain, runny nose, dysphagia  CV:  No pain, palpitations, hypo/hypertension  Resp:  No dyspnea, cough, tachypnea, wheezing  GI:  No pain, nausea, vomiting, diarrhea, constipation         Social History/Family History  SOCHX:   tobacco,  -  alcohol    FMHX: FA/MO  - contributory       Discussed with:  PMD, Family    Physical Exam:    Vital Signs:  Vital Signs Last 24 Hrs  T(C): 36.5 (2020 17:05), Max: 36.8 (2020 00:10)  T(F): 97.7 (2020 17:05), Max: 98.3 (2020 00:10)  HR: 112 (2020 17:05) (94 - 112)  BP: 153/76 (2020 17:05) (126/57 - 153/76)  BP(mean): --  RR: 18 (2020 17:05) (17 - 18)  SpO2: 94% (2020 17:05) (90% - 94%)  Daily     Daily Weight in k.2 (2020 05:15)  I&O's Summary    2020 07:01  -  2020 22:46  --------------------------------------------------------  IN: 100 mL / OUT: 0 mL / NET: 100 mL          Chest:  Full & symmetric excursion, no increased effort, breath sounds clear  Cardiovascular:  Regular rhythm, S1, S2, no murmur/rub/S3/S4, no carotid/femoral/abdominal bruit, radial/pedal pulses 2+  Abdomen:  Soft, non-tender, non-distended, normoactive bowel sounds, no HSM    Laboratory:                          11.2   9.91  )-----------( 321      ( 2020 07:39 )             35.6     01-05    140  |  103  |  13  ----------------------------<  114<H>  3.7   |  27  |  1.37<H>    Ca    7.9<L>      2020 07:39    TPro  6.4  /  Alb  2.4<L>  /  TBili  0.5  /  DBili  x   /  AST  14<L>  /  ALT  11<L>  /  AlkPhos  218<H>  01-04          CAPILLARY BLOOD GLUCOSE        LIVER FUNCTIONS - ( 2020 11:14 )  Alb: 2.4 g/dL / Pro: 6.4 gm/dL / ALK PHOS: 218 U/L / ALT: 11 U/L / AST: 14 U/L / GGT: x           PT/INR - ( 2020 11:14 )   PT: 11.1 sec;   INR: 0.99 ratio         PTT - ( 2020 11:14 )  PTT:32.7 sec          Assessment:  I am asked to assess this patient for cardiovascular risk assessment for left hip surgery  The patient's EKG is without acute changes  The patient stated CP, Troponin is normal  TTE ordered, noted normal ejection fraction, and some mild to moderate valvular heart disease, not concerning at this point  EKG with T wave change that is likely lead placement no real change V1-V3  The patient's lab testing and other diagnostic testing are reviewed  post hip surgery, stable  an episode of chest pain treated for musculoskeletal type pain  cardiovascular status appears stable for discharge  to rehabilitation  no fecal retention, discharge

## 2020-01-16 NOTE — PROGRESS NOTE ADULT - SUBJECTIVE AND OBJECTIVE BOX
Patient is a 89y old  Female who presents with a chief complaint of Fall at home. Fracture left hip. (16 Jan 2020 06:38)      INTERVAL HPI/OVERNIGHT EVENTS:    MEDICATIONS  (STANDING):  amLODIPine   Tablet 5 milliGRAM(s) Oral daily  aspirin enteric coated 81 milliGRAM(s) Oral daily  atorvastatin 10 milliGRAM(s) Oral at bedtime  enoxaparin Injectable 30 milliGRAM(s) SubCutaneous every 12 hours  famotidine    Tablet 20 milliGRAM(s) Oral daily  furosemide    Tablet 40 milliGRAM(s) Oral daily  pantoprazole    Tablet 40 milliGRAM(s) Oral before breakfast  polyethylene glycol 3350 17 Gram(s) Oral daily    MEDICATIONS  (PRN):  acetaminophen   Tablet .. 650 milliGRAM(s) Oral every 6 hours PRN Temp greater or equal to 38C (100.4F), Mild Pain (1 - 3)  acetaminophen   Tablet .. 650 milliGRAM(s) Oral every 4 hours PRN Temp greater or equal to 38C (100.4F), Mild Pain (1 - 3)  aluminum hydroxide/magnesium hydroxide/simethicone Suspension 30 milliLiter(s) Oral four times a day PRN Indigestion  bisacodyl Suppository 10 milliGRAM(s) Rectal daily PRN If no bowel movement by POD#2  nitroglycerin     SubLingual 0.4 milliGRAM(s) SubLingual every 5 minutes PRN Chest Pain  ondansetron Injectable 4 milliGRAM(s) IV Push every 6 hours PRN Nausea and/or Vomiting  senna 2 Tablet(s) Oral at bedtime PRN Constipation      Allergies    No Known Allergies    Intolerances        REVIEW OF SYSTEMS:NA    Vital Signs Last 24 Hrs  T(C): 37.1 (16 Jan 2020 05:25), Max: 37.6 (15 Jason 2020 23:39)  T(F): 98.8 (16 Jan 2020 05:25), Max: 99.6 (15 Jason 2020 23:39)  HR: 95 (16 Jan 2020 05:25) (95 - 113)  BP: 112/54 (16 Jan 2020 05:25) (103/48 - 138/71)  BP(mean): --  RR: 17 (16 Jan 2020 05:25) (17 - 18)  SpO2: 94% (16 Jan 2020 05:25) (94% - 97%)    PHYSICAL EXAM:  GENERAL: NAD, well-groomed, well-developed  HEAD:  Atraumatic, Normocephalic  EYES: EOMI, PERRL, conjunctiva and sclera clear  ENMT:  Moist mucous membranes, Good dentition, No lesions  NECK: Supple, No JVD, Normal thyroid  NERVOUS SYSTEM:  Alert & Oriented X3, Good concentration; Motor Strength 4/5 B/L upper and lower extremities;   CHEST/LUNG: Clear to percussion bilaterally; No rales, rhonchi, wheezing, or rubs  HEART: Regular rate and rhythm; No murmurs, rubs, or gallops  ABDOMEN: Soft, Nontender, Nondistended; Bowel sounds present  EXTREMITIES:  2+ Peripheral Pulses, No clubbing, cyanosis, or edema  LYMPH: No lymphadenopathy noted  SKIN: No rashes or lesions    LABS:                        8.5    9.84  )-----------( 464      ( 16 Jan 2020 08:20 )             26.8     01-16    141  |  103  |  28<H>  ----------------------------<  88  4.2   |  29  |  1.10    Ca    8.4<L>      16 Jan 2020 08:20          CAPILLARY BLOOD GLUCOSE    < from: MR Head No Cont (01.15.20 @ 12:56) >    EXAM:  MR BRAIN                            PROCEDURE DATE:  01/15/2020          INTERPRETATION:  CLINICAL STATEMENT: Fall    TECHNIQUE: MRI of the brain was performed without gadolinium.    COMPARISON: CT head 1/4/2020    FINDINGS:  There is moderate diffuse parenchymal volume loss. There are T2 prolongation signal abnormalities in the periventricular subcortical white matter likely related to moderate chronic microvascular ischemic changes.    Small chronic infarcts bilateral cerebellum    There is no acute parenchymal hemorrhage, parenchymal mass, mass effect or midline shift. There is no extra-axial fluid collection.  There is no hydrocephalus.  There is no acute infarct.    Mucosal thickening paranasal sinuses. Partial opacification mastoid air cells.    IMPRESSION:  No acute intracranial hemorrhage or acute infarct.                MARCELO CHAVEZ M.D., ATTENDING RADIOLOGIST  This document has been electronically signed. Jason 15 2020  1:05PM                < end of copied text >      RADIOLOGY & ADDITIONAL TESTS:  < from: TTE Echo Doppler w/o Cont (01.09.20 @ 09:14) >    Summary:   1. Left ventricular ejection fraction, by visual estimation, is 60 to 65%.   2. Spectral Doppler shows impaired relaxation pattern of left ventricular myocardial filling (Grade I diastolic dysfunction).   3. Mild mitral annular calcification.   4. Mild mitral valve regurgitation.   5. Mild-moderate tricuspid regurgitation.   6. Estimated pulmonary artery systolic pressure is 41.5 mmHg assuming a right atrial pressure of 5 mmHg, which is consistent with mild pulmonary hypertension.    < end of copied text >    Imaging Personally Reviewed:  [ ] YES  [ ] NO    Consultant(s) Notes Reviewed:  [ ] YES  [ ] NO    Care Discussed with Consultants/Other Providers [ ] YES  [ ] NO    PROBLEMS:  CLOSED FRACTURE OF LEFT HIP; INITIAL ENCOUNTER;SYNCOPE  FALL , HIP PAIN  Closed fracture of left hip, initial encounter  Moderate protein-calorie malnutrition  Hypokalemia due to excessive renal loss of potassium  Other chronic gastritis  Hypercholesterolemia  Benign hypertension  Syncope and collapse  Closed fracture of left hip, initial encounter      Care discussed with family,         [  ]   yes  [  ]  No    imp:    stable[ ]    unstable[  ]     improving [   ]       unchanged  [  ]                Plans:  Continue present plans  [  ]               New consult [  ]   specialty  .......               order aaron[  ]    test name.                  Discharge Planning  [  ]

## 2020-01-16 NOTE — PROGRESS NOTE ADULT - PROBLEM SELECTOR PLAN 7
Ensure enlive tid

## 2020-01-16 NOTE — PROGRESS NOTE ADULT - SUBJECTIVE AND OBJECTIVE BOX
patient seen and examined. sleeping comfortably. abd pillow in place. pain controlled    LLE inc c/d/i  no pain gentle hip rom  able to flex/ext all toes  silt throughout  bcr    pod #10, hospital day #13 s/p L hip camilo for FN fx  -pt, wbat, hip prec  -dvtp  -pain control  -med mgmt  -DISPO GEORGE

## 2020-01-16 NOTE — PROGRESS NOTE ADULT - ASSESSMENT
IMPROVE VTE Individual Risk Assessment    RISK                                                                Points    [  ] Previous VTE                                                  3    [  ] Thrombophilia                                               2    [  ] Lower limb paralysis                                      2        (unable to hold up >15 seconds)      [  ] Current Cancer                                              2         (within 6 months)    [  ] Immobilization > 24 hrs                                1    [  ] ICU/CCU stay > 24 hours                              1    [  ] Age > 60                                                      1    IMPROVE VTE Score _________    IMPROVE Score 0-1: Low Risk, No VTE prophylaxis required for most patients, encourage ambulation.   IMPROVE Score 2-3: At risk, pharmacologic VTE prophylaxis is indicated for most patients (in the absence of a contraindication)  IMPROVE Score > or = 4: High Risk, pharmacologic VTE prophylaxis is indicated for most patients (in the absence of a contraindication)    89 y/o presents to ED s/p fall this morning. Pt states she does not recall how she fell. Pt's left leg collapsed under her and she fell on right side. Pt normally ambulates alone, the event was unwitnessed and she denies head trauma, +left hip pain, and dry mouth. Pt could not ambulate after fall secondary to pain. Pt denies neck, back pain.  01/05/2020 : Pt. asymptomatic. Hypokalemia resolved. Cardiology consult pending. Orthopedics f/u noted. Monitor CBC.  01/06/2020 : Pt. for surgery. Cardiology consult noted. Cleared for Corrective hip surgery with age related CV risks. Carotid doppler without any significant stenosis.  01/07/2020 : S/P Left Hemiarthroplasty. Alert, awake. PT evaluation.  01/08/2020 : Pt. c/o epigastric discomfort. Had 2 bowel movements yesterday. Abd. soft. Non tender. BS present in all 4 quadrants. PT evaluation noted. For STR.  01/09/2020 : Pt. Lethargic. Arousable. Lungs clear. No epigastric discomfort. Cardiology F/U noted. Troponin normal. PT.  01/10/2020 : Still lethargic this AM. PT to follow. Neurology consult if no change in mental status.  01/11/2020 : Remains [id number] and drowsy. Arousable. Denies any chest pain. Pain left hip. Had chest pain yesterday. EKG unchanged. Cardiology F/U noted. PT. Discharge planning.  01/12/2020 : Pt. more alert. Chest discomfort on coughing only. Lungs clear. PT. Discharge to rehab.   01/13/2020 : More alert, still lethargic. Continues to have pain in the epigastrium and lower chest. BS pos. Abdomen distenths. Neurology consult noted. Plan X-ray abdomen to R/O fecal retention. MR brain.  01/14/2020 : More alert. No fecal retention. For STR.  01/15/2020 : remains lethargic. C/O epigastric discomfort.. Poor PO intake. For Rehab.  0116/2020 : MR noted, no acute pathology. Remains lethargic. For Rehab. Follow CBC. Last Hb 8.5. Pt on Lovenox.

## 2020-01-16 NOTE — PROGRESS NOTE ADULT - PROBLEM SELECTOR PROBLEM 6
Hypokalemia due to excessive renal loss of potassium

## 2020-01-16 NOTE — PROGRESS NOTE ADULT - PROBLEM SELECTOR PROBLEM 5
Other chronic gastritis

## 2020-01-22 DIAGNOSIS — S72.002A FRACTURE OF UNSPECIFIED PART OF NECK OF LEFT FEMUR, INITIAL ENCOUNTER FOR CLOSED FRACTURE: ICD-10-CM

## 2020-01-22 DIAGNOSIS — K29.50 UNSPECIFIED CHRONIC GASTRITIS WITHOUT BLEEDING: ICD-10-CM

## 2020-01-22 DIAGNOSIS — R07.89 OTHER CHEST PAIN: ICD-10-CM

## 2020-01-22 DIAGNOSIS — E87.6 HYPOKALEMIA: ICD-10-CM

## 2020-01-22 DIAGNOSIS — I10 ESSENTIAL (PRIMARY) HYPERTENSION: ICD-10-CM

## 2020-01-22 DIAGNOSIS — E43 UNSPECIFIED SEVERE PROTEIN-CALORIE MALNUTRITION: ICD-10-CM

## 2020-01-22 DIAGNOSIS — E78.5 HYPERLIPIDEMIA, UNSPECIFIED: ICD-10-CM
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
## 2020-01-22 DIAGNOSIS — Z79.82 LONG TERM (CURRENT) USE OF ASPIRIN: ICD-10-CM
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
## 2020-01-22 PROBLEM — E78.00 PURE HYPERCHOLESTEROLEMIA, UNSPECIFIED: Chronic | Status: ACTIVE | Noted: 2020-01-04

## 2020-01-25 ENCOUNTER — EMERGENCY (EMERGENCY)
Facility: HOSPITAL | Age: 85
LOS: 1 days | Discharge: HOME | End: 2020-01-25
Attending: STUDENT IN AN ORGANIZED HEALTH CARE EDUCATION/TRAINING PROGRAM
Payer: MEDICARE

## 2020-01-25 VITALS
DIASTOLIC BLOOD PRESSURE: 55 MMHG | OXYGEN SATURATION: 91 % | SYSTOLIC BLOOD PRESSURE: 117 MMHG | TEMPERATURE: 99 F | HEART RATE: 89 BPM | RESPIRATION RATE: 16 BRPM | WEIGHT: 137.35 LBS

## 2020-01-25 VITALS
OXYGEN SATURATION: 96 % | HEART RATE: 101 BPM | SYSTOLIC BLOOD PRESSURE: 142 MMHG | RESPIRATION RATE: 18 BRPM | DIASTOLIC BLOOD PRESSURE: 74 MMHG | TEMPERATURE: 98 F

## 2020-01-25 DIAGNOSIS — T14.90XA INJURY, UNSPECIFIED, INITIAL ENCOUNTER: ICD-10-CM

## 2020-01-25 DIAGNOSIS — Z04.3 ENCOUNTER FOR EXAMINATION AND OBSERVATION FOLLOWING OTHER ACCIDENT: ICD-10-CM

## 2020-01-25 DIAGNOSIS — K29.50 UNSPECIFIED CHRONIC GASTRITIS WITHOUT BLEEDING: ICD-10-CM

## 2020-01-25 DIAGNOSIS — I10 ESSENTIAL (PRIMARY) HYPERTENSION: ICD-10-CM
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
## 2020-01-25 DIAGNOSIS — Z79.82 LONG TERM (CURRENT) USE OF ASPIRIN: ICD-10-CM

## 2020-01-25 DIAGNOSIS — E78.00 PURE HYPERCHOLESTEROLEMIA, UNSPECIFIED: ICD-10-CM

## 2020-01-25 PROCEDURE — 99284 EMERGENCY DEPT VISIT MOD MDM: CPT

## 2020-01-25 PROCEDURE — 70450 CT HEAD/BRAIN W/O DYE: CPT | Mod: 26

## 2020-01-25 PROCEDURE — 72192 CT PELVIS W/O DYE: CPT | Mod: 26

## 2020-01-25 NOTE — ED PROVIDER NOTE - DISCHARGE DATE
Anesthesia POST Procedure Evaluation    Patient: Gregoria Sung   MRN:     [id number] Gender:   female   Age:    23 year old :      1995        Preoperative Diagnosis: Patella Instability   Procedure(s):  Left Knee Arthroscopy,  Medial Patellofemoral Ligament Reconstruction, Medial Patellotibial Ligament Reconstruction   Postop Comments: No value filed.       Anesthesia Type:  General, Regional    Reportable Event: NO     PAIN: Uncomplicated   Sign Out status: Comfortable, Well controlled pain     PONV: No PONV   Sign Out status:  No Nausea or Vomiting     Neuro/Psych: Uneventful perioperative course   Sign Out Status: Preoperative baseline; Age appropriate mentation     Airway/Resp.: Uneventful perioperative course   Sign Out Status: Non labored breathing, age appropriate RR; Resp. Status within EXPECTED Parameters     CV: Uneventful perioperative course   Sign Out status: Appropriate BP and perfusion indices; Appropriate HR/Rhythm     Disposition:   Sign Out in:  PACU  Disposition:  Phase II; Home  Recovery Course: Uneventful  Follow-Up: Not required           Last Anesthesia Record Vitals:  CRNA VITALS  2019 1059 - 2019 1159      2019             Pulse:  92    Ht Rate:  93    SpO2:  97 %    Resp Rate (set):  10          Last PACU/Preop Vitals:  Vitals:    19 1135 19 1145 19 1157   BP: 108/64 109/62 100/66   Pulse: 93 93    Resp: 10 10 13   Temp: 36.7  C (98.1  F) 36.9  C (98.4  F) 37.3  C (99.2  F)   SpO2: 97% 99% 95%         Electronically Signed By: Jaun George MD, MD, 2019, 12:16 PM  
25-Jan-2020

## 2020-01-25 NOTE — ED ADULT NURSE NOTE - CHIEF COMPLAINT QUOTE
sent from [facility], unwitnessed fall, s/p L- hip surgery 1/6/20. on baby aspirin, lovenox

## 2020-01-25 NOTE — ED ADULT TRIAGE NOTE - CHIEF COMPLAINT QUOTE
sent from [facility], unwitnessed fall, s/p L- hip surgery 1/6/20. on baby aspirin, lovenox

## 2020-01-25 NOTE — ED PROVIDER NOTE - PATIENT PORTAL LINK FT
You can access the FollowMyHealth Patient Portal offered by [facility] by registering at the following website: [email or web address]. By joining [employer]’s FollowMyHealth portal, you will also be able to view your health information using other applications (apps) compatible with our system.

## 2020-01-25 NOTE — ED PROVIDER NOTE - PMH
Benign hypertension    High cholesterol    HTN (hypertension)    Hypercholesterolemia    Other chronic gastritis

## 2020-01-25 NOTE — ED PROVIDER NOTE - CLINICAL SUMMARY MEDICAL DECISION MAKING FREE TEXT BOX
pt presented sent from [facility] for evaluation s/p unwitnessed fall, ct head and pelvis show no fracture or bleed, pt remained stable while in the er, pt discharged back to [facility]

## 2020-01-25 NOTE — ED PROVIDER NOTE - MUSCULOSKELETAL, MLM
Spine appears normal, range of motion of her lower extremity limited due to orthpedic soft material between legs to limit range of motion

## 2020-01-25 NOTE — ED PROVIDER NOTE - OBJECTIVE STATEMENT
89 year old female presents today sent from [facility] for evaluation, it is reported that pt had an unwitnessed fall and is s/p left hip repair 89 year old female presents today sent from [facility] for evaluation, it is reported that pt had an unwitnessed fall and is s/p left hip repair pt is unable to provide furrther information due to h/o dementia

## 2020-03-08 ENCOUNTER — EMERGENCY (EMERGENCY)
Facility: HOSPITAL | Age: 85
LOS: 0 days | Discharge: HOME | End: 2020-03-08
Attending: EMERGENCY MEDICINE
Payer: MEDICARE

## 2020-03-08 VITALS
OXYGEN SATURATION: 99 % | RESPIRATION RATE: 16 BRPM | DIASTOLIC BLOOD PRESSURE: 66 MMHG | HEIGHT: 61 IN | SYSTOLIC BLOOD PRESSURE: 111 MMHG | TEMPERATURE: 97 F | WEIGHT: 119.93 LBS | HEART RATE: 80 BPM

## 2020-03-08 DIAGNOSIS — S23.9XXA SPRAIN OF UNSPECIFIED PARTS OF THORAX, INITIAL ENCOUNTER: ICD-10-CM

## 2020-03-08 DIAGNOSIS — Z79.82 LONG TERM (CURRENT) USE OF ASPIRIN: ICD-10-CM
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
## 2020-03-08 DIAGNOSIS — M54.9 DORSALGIA, UNSPECIFIED: ICD-10-CM

## 2020-03-08 PROCEDURE — 72131 CT LUMBAR SPINE W/O DYE: CPT | Mod: 26

## 2020-03-08 PROCEDURE — 72125 CT NECK SPINE W/O DYE: CPT | Mod: 26

## 2020-03-08 PROCEDURE — 72128 CT CHEST SPINE W/O DYE: CPT | Mod: 26

## 2020-03-08 PROCEDURE — 99285 EMERGENCY DEPT VISIT HI MDM: CPT

## 2020-03-08 RX ORDER — TRAMADOL HYDROCHLORIDE 50 MG/1
1 TABLET ORAL
Qty: 15 | Refills: 0
Start: 2020-03-08 | End: 2020-03-12

## 2020-03-08 RX ADMIN — Medication 375 MILLIGRAM(S): at 08:34

## 2020-03-08 NOTE — ED ADULT NURSE NOTE - NSIMPLEMENTINTERV_GEN_ALL_ED
Implemented All Fall Risk Interventions:  [place] to call system. Call bell, personal items and telephone within reach. Instruct patient to call for assistance. Room bathroom lighting operational. Non-slip footwear when patient is off stretcher. Physically safe environment: no spills, clutter or unnecessary equipment. Stretcher in lowest position, wheels locked, appropriate side rails in place. Provide visual cue, wrist band, yellow gown, etc. Monitor gait and stability. Monitor for mental status changes and reorient to person, place, and time. Review medications for side effects contributing to fall risk. Reinforce activity limits and safety measures with patient and family.

## 2020-03-08 NOTE — ED PROVIDER NOTE - OBJECTIVE STATEMENT
Pertinent PMH/PSH/FHx/SHx and Review of Systems contained within:  89F hx htn, hld and recent hip replacement pw back pain. notes 2 days of severe midthoracic and high thoracic back pain. doesn't radiate to extremities. Patient denies numbness, tingling or weakness of the lower extremity and denies retention or incontinence of the bowel or bladder. no trauma. taking tylenol and advil, no major improvement in pain. ros neg for cp, sob, nausea, vomiting, fever, chills, rash, bleeding, ha, vision loss, rhinorrhea, dysuria. brought in by daughter. baseline is walking with walker and assistance  Fh and Sh not otherwise contributory  ROS otherwise negative

## 2020-03-08 NOTE — ED PROVIDER NOTE - PATIENT PORTAL LINK FT
You can access the FollowMyHealth Patient Portal offered by [facility] by registering at the following website: [email or web address]. By joining [employer]’s FollowMyHealth portal, you will also be able to view your health information using other applications (apps) compatible with our system.

## 2020-03-08 NOTE — ED ADULT NURSE NOTE - OBJECTIVE STATEMENT
a&O x2 Discharged 2/21 from [facility], Left Hip replacement, now has upper back pain. pt uses walker with baseline. family requests assistance at home. pt uses extra strength Tylenol given hby daughter but with minimal relief.

## 2020-03-08 NOTE — ED PROVIDER NOTE - NSFOLLOWUPINSTRUCTIONS_ED_ALL_ED_FT
You have a THORACIC 5 Compression Fracture    Take the pain medication as needed.    Follow up with the spinal surgeon.    Wear the TLSO brace.

## 2020-03-08 NOTE — ED PROVIDER NOTE - PHYSICAL EXAMINATION
Gen: Alert, NAD  Head: NC, AT   Eyes: PERRL, EOMI, normal lids/conjunctiva  ENT: normal hearing, patent oropharynx without erythema/exudate, uvula midline  Neck: supple, no tenderness, Trachea midline  Pulm: Bilateral BS, normal resp effort, no wheeze/stridor/retractions  CV: RRR, no M/R/G, 2+ radial and dp pulses bl, no edema  Abd: soft, NT/ND, +BS, no hepatosplenomegaly  Mskel: extremities x4 with normal ROM and no joint effusions. no ctl spine ttp. marked kyphosis   Skin: no rash, no bruising   Neuro: AAOx3, no sensory/motor deficits, CN 2-12 intact

## 2020-03-08 NOTE — ED PROVIDER NOTE - CARE PROVIDER_API CALL
Mirian Eckert (DO)  Orthopaedic Surgery Surgery  [address] [facility], [place] [address]  Phone: [phone]  Fax: [phone]  Follow Up Time:

## 2020-03-08 NOTE — ED PROVIDER NOTE - CLINICAL SUMMARY MEDICAL DECISION MAKING FREE TEXT BOX
thoracic back pain. severe thoracic back pain. severe. ct shows thoracic compression fx. no neuro involvement. analgesia, tlso brace. spine follow up. ok for dc home

## 2020-03-13 ENCOUNTER — INPATIENT (INPATIENT)
Facility: HOSPITAL | Age: 85
LOS: 9 days | Discharge: NURSING, REHAB OR LONG-TERM CARE | End: 2020-03-23
Attending: INTERNAL MEDICINE | Admitting: INTERNAL MEDICINE
Payer: MEDICARE

## 2020-03-13 VITALS
HEART RATE: 85 BPM | SYSTOLIC BLOOD PRESSURE: 101 MMHG | WEIGHT: 145.06 LBS | RESPIRATION RATE: 16 BRPM | DIASTOLIC BLOOD PRESSURE: 59 MMHG | TEMPERATURE: 98 F | HEIGHT: 61 IN | OXYGEN SATURATION: 100 %

## 2020-03-13 LAB
ALBUMIN SERPL ELPH-MCNC: 2.6 G/DL — LOW (ref 3.3–5)
ALP SERPL-CCNC: 149 U/L — HIGH (ref 40–120)
ALT FLD-CCNC: 10 U/L — LOW (ref 12–78)
ANION GAP SERPL CALC-SCNC: 10 MMOL/L — SIGNIFICANT CHANGE UP (ref 5–17)
APPEARANCE UR: ABNORMAL
AST SERPL-CCNC: 11 U/L — LOW (ref 15–37)
BACTERIA # UR AUTO: ABNORMAL
BASOPHILS # BLD AUTO: 0.03 K/UL — SIGNIFICANT CHANGE UP (ref 0–0.2)
BASOPHILS NFR BLD AUTO: 0.4 % — SIGNIFICANT CHANGE UP (ref 0–2)
BILIRUB SERPL-MCNC: 0.6 MG/DL — SIGNIFICANT CHANGE UP (ref 0.2–1.2)
BILIRUB UR-MCNC: NEGATIVE — SIGNIFICANT CHANGE UP
BUN SERPL-MCNC: 22 MG/DL — SIGNIFICANT CHANGE UP (ref 7–23)
CALCIUM SERPL-MCNC: 8.9 MG/DL — SIGNIFICANT CHANGE UP (ref 8.5–10.1)
CHLORIDE SERPL-SCNC: 104 MMOL/L — SIGNIFICANT CHANGE UP (ref 96–108)
CO2 SERPL-SCNC: 27 MMOL/L — SIGNIFICANT CHANGE UP (ref 22–31)
COLOR SPEC: YELLOW — SIGNIFICANT CHANGE UP
CREAT SERPL-MCNC: 1.39 MG/DL — HIGH (ref 0.5–1.3)
DIFF PNL FLD: ABNORMAL
EOSINOPHIL # BLD AUTO: 0.14 K/UL — SIGNIFICANT CHANGE UP (ref 0–0.5)
EOSINOPHIL NFR BLD AUTO: 1.8 % — SIGNIFICANT CHANGE UP (ref 0–6)
EPI CELLS # UR: SIGNIFICANT CHANGE UP
GLUCOSE SERPL-MCNC: 129 MG/DL — HIGH (ref 70–99)
GLUCOSE UR QL: NEGATIVE MG/DL — SIGNIFICANT CHANGE UP
HCT VFR BLD CALC: 34.3 % — LOW (ref 34.5–45)
HGB BLD-MCNC: 10.3 G/DL — LOW (ref 11.5–15.5)
IMM GRANULOCYTES NFR BLD AUTO: 0.1 % — SIGNIFICANT CHANGE UP (ref 0–1.5)
KETONES UR-MCNC: NEGATIVE — SIGNIFICANT CHANGE UP
LEUKOCYTE ESTERASE UR-ACNC: ABNORMAL
LYMPHOCYTES # BLD AUTO: 1.86 K/UL — SIGNIFICANT CHANGE UP (ref 1–3.3)
LYMPHOCYTES # BLD AUTO: 23.3 % — SIGNIFICANT CHANGE UP (ref 13–44)
MCHC RBC-ENTMCNC: 26.9 PG — LOW (ref 27–34)
MCHC RBC-ENTMCNC: 30 GM/DL — LOW (ref 32–36)
MCV RBC AUTO: 89.6 FL — SIGNIFICANT CHANGE UP (ref 80–100)
MONOCYTES # BLD AUTO: 0.74 K/UL — SIGNIFICANT CHANGE UP (ref 0–0.9)
MONOCYTES NFR BLD AUTO: 9.3 % — SIGNIFICANT CHANGE UP (ref 2–14)
NEUTROPHILS # BLD AUTO: 5.2 K/UL — SIGNIFICANT CHANGE UP (ref 1.8–7.4)
NEUTROPHILS NFR BLD AUTO: 65.1 % — SIGNIFICANT CHANGE UP (ref 43–77)
NITRITE UR-MCNC: NEGATIVE — SIGNIFICANT CHANGE UP
NRBC # BLD: 0 /100 WBCS — SIGNIFICANT CHANGE UP (ref 0–0)
PH UR: 5 — SIGNIFICANT CHANGE UP (ref 5–8)
PLATELET # BLD AUTO: 349 K/UL — SIGNIFICANT CHANGE UP (ref 150–400)
POTASSIUM SERPL-MCNC: 3.8 MMOL/L — SIGNIFICANT CHANGE UP (ref 3.5–5.3)
POTASSIUM SERPL-SCNC: 3.8 MMOL/L — SIGNIFICANT CHANGE UP (ref 3.5–5.3)
PROT SERPL-MCNC: 6.3 GM/DL — SIGNIFICANT CHANGE UP (ref 6–8.3)
PROT UR-MCNC: 15 MG/DL
RBC # BLD: 3.83 M/UL — SIGNIFICANT CHANGE UP (ref 3.8–5.2)
RBC # FLD: 15.6 % — HIGH (ref 10.3–14.5)
RBC CASTS # UR COMP ASSIST: ABNORMAL /HPF (ref 0–4)
SODIUM SERPL-SCNC: 141 MMOL/L — SIGNIFICANT CHANGE UP (ref 135–145)
SP GR SPEC: 1.01 — SIGNIFICANT CHANGE UP (ref 1.01–1.02)
UROBILINOGEN FLD QL: NEGATIVE MG/DL — SIGNIFICANT CHANGE UP
WBC # BLD: 7.98 K/UL — SIGNIFICANT CHANGE UP (ref 3.8–10.5)
WBC # FLD AUTO: 7.98 K/UL — SIGNIFICANT CHANGE UP (ref 3.8–10.5)
WBC UR QL: >50

## 2020-03-13 PROCEDURE — 93010 ELECTROCARDIOGRAM REPORT: CPT

## 2020-03-13 PROCEDURE — 71045 X-RAY EXAM CHEST 1 VIEW: CPT | Mod: 26

## 2020-03-13 PROCEDURE — 99285 EMERGENCY DEPT VISIT HI MDM: CPT

## 2020-03-13 PROCEDURE — 99222 1ST HOSP IP/OBS MODERATE 55: CPT

## 2020-03-13 RX ORDER — ACETAMINOPHEN 500 MG
650 TABLET ORAL EVERY 6 HOURS
Refills: 0 | Status: DISCONTINUED | OUTPATIENT
Start: 2020-03-13 | End: 2020-03-23

## 2020-03-13 RX ORDER — FAMOTIDINE 10 MG/ML
20 INJECTION INTRAVENOUS DAILY
Refills: 0 | Status: DISCONTINUED | OUTPATIENT
Start: 2020-03-13 | End: 2020-03-14

## 2020-03-13 RX ORDER — TRAMADOL HYDROCHLORIDE 50 MG/1
50 TABLET ORAL EVERY 8 HOURS
Refills: 0 | Status: DISCONTINUED | OUTPATIENT
Start: 2020-03-13 | End: 2020-03-20

## 2020-03-13 RX ORDER — OXYCODONE AND ACETAMINOPHEN 5; 325 MG/1; MG/1
1 TABLET ORAL ONCE
Refills: 0 | Status: DISCONTINUED | OUTPATIENT
Start: 2020-03-13 | End: 2020-03-13

## 2020-03-13 RX ORDER — INFLUENZA VIRUS VACCINE 15; 15; 15; 15 UG/.5ML; UG/.5ML; UG/.5ML; UG/.5ML
0.5 SUSPENSION INTRAMUSCULAR ONCE
Refills: 0 | Status: DISCONTINUED | OUTPATIENT
Start: 2020-03-13 | End: 2020-03-23

## 2020-03-13 RX ORDER — KETOROLAC TROMETHAMINE 30 MG/ML
15 SYRINGE (ML) INJECTION ONCE
Refills: 0 | Status: DISCONTINUED | OUTPATIENT
Start: 2020-03-13 | End: 2020-03-13

## 2020-03-13 RX ORDER — ENOXAPARIN SODIUM 100 MG/ML
40 INJECTION SUBCUTANEOUS DAILY
Refills: 0 | Status: DISCONTINUED | OUTPATIENT
Start: 2020-03-13 | End: 2020-03-13

## 2020-03-13 RX ORDER — SENNA PLUS 8.6 MG/1
2 TABLET ORAL AT BEDTIME
Refills: 0 | Status: DISCONTINUED | OUTPATIENT
Start: 2020-03-13 | End: 2020-03-23

## 2020-03-13 RX ORDER — ASPIRIN/CALCIUM CARB/MAGNESIUM 324 MG
81 TABLET ORAL DAILY
Refills: 0 | Status: DISCONTINUED | OUTPATIENT
Start: 2020-03-13 | End: 2020-03-23

## 2020-03-13 RX ORDER — HEPARIN SODIUM 5000 [USP'U]/ML
5000 INJECTION INTRAVENOUS; SUBCUTANEOUS EVERY 12 HOURS
Refills: 0 | Status: DISCONTINUED | OUTPATIENT
Start: 2020-03-13 | End: 2020-03-23

## 2020-03-13 RX ORDER — AMLODIPINE BESYLATE 2.5 MG/1
5 TABLET ORAL DAILY
Refills: 0 | Status: DISCONTINUED | OUTPATIENT
Start: 2020-03-13 | End: 2020-03-23

## 2020-03-13 RX ORDER — ATORVASTATIN CALCIUM 80 MG/1
10 TABLET, FILM COATED ORAL AT BEDTIME
Refills: 0 | Status: DISCONTINUED | OUTPATIENT
Start: 2020-03-13 | End: 2020-03-23

## 2020-03-13 RX ADMIN — HEPARIN SODIUM 5000 UNIT(S): 5000 INJECTION INTRAVENOUS; SUBCUTANEOUS at 23:13

## 2020-03-13 RX ADMIN — Medication 650 MILLIGRAM(S): at 18:32

## 2020-03-13 RX ADMIN — OXYCODONE AND ACETAMINOPHEN 1 TABLET(S): 5; 325 TABLET ORAL at 17:26

## 2020-03-13 RX ADMIN — ATORVASTATIN CALCIUM 10 MILLIGRAM(S): 80 TABLET, FILM COATED ORAL at 23:13

## 2020-03-13 RX ADMIN — Medication 15 MILLIGRAM(S): at 18:15

## 2020-03-13 NOTE — ED ADULT TRIAGE NOTE - CHIEF COMPLAINT QUOTE
BIBA , c/o mid and lower  back pain . Patient diagnosed with stress fracture last week , on Tramadol , Naprosyn , and Tylenol , reports non achieving relief for her .

## 2020-03-13 NOTE — ED ADULT NURSE NOTE - SKIN INTEGRITY
normal... Well appearing, well nourished, awake, alert, oriented to person, place, time/situation and in no apparent distress. intact

## 2020-03-13 NOTE — ED PROVIDER NOTE - OBJECTIVE STATEMENT
89 year old female w/PMH of HTN, HLD, presents to the ED today BIBEMS for lower back pain. Pt was diagnosed w/stress fracture last week on Tramadol, Naprosyn and Tylenol. Pt was released from [facility] on February 21st. Pt has been found screaming and crying in pain per daughter. Denies any N/V/D, CP, SOB, dysuria, hematuria or abdominal pain. Pt ambulatory w/walker. Denies any recent travel or sick contacts. 89 year old female w/PMH of HTN, HLD, presents to the ED today BIBEMS for lower back pain. Pt was diagnosed w/stress fracture last week on Tramadol, Naprosyn and Tylenol. Pt was released from [facility] on February 21st. Pt has been found screaming and crying in pain per daughter who has been caring for her, but unable to now. Denies any N/V/D, CP, SOB, dysuria, hematuria or abdominal pain. Pt ambulatory w/walker. Denies any recent travel or sick contacts.

## 2020-03-13 NOTE — H&P ADULT - NSHPREVIEWOFSYSTEMS_GEN_ALL_CORE
CONSTITUTIONAL: back pain  EYES: No eye pain, visual disturbances, or discharge  ENMT:   No sinus or throat pain  NECK: No pain or stiffness  RESPIRATORY: No cough, wheezing,  No shortness of breath  CARDIOVASCULAR: No chest pain, palpitations, dizziness, or leg swelling  GASTROINTESTINAL: No abdominal or epigastric pain. No nausea, vomiting, or hematemesis   GENITOURINARY: No dysuria, frequency, hematuria, or incontinence  NEUROLOGICAL: No headaches, memory loss, loss of strength, numbness, or tremors  SKIN: No itching,

## 2020-03-13 NOTE — ED ADULT NURSE NOTE - NS ED PATIENT SAFETY CONERN FT
pt unable to help self at home, minimal help at home, daughter requesting sw for assistance with resources nursing.

## 2020-03-13 NOTE — H&P ADULT - NSICDXPASTMEDICALHX_GEN_ALL_CORE_FT
PAST MEDICAL HISTORY:  Benign hypertension     High cholesterol     HTN (hypertension)     Hypercholesterolemia     Other chronic gastritis

## 2020-03-13 NOTE — H&P ADULT - ASSESSMENT
89 year old female w/PMH of HTN, HLD, presents to the ED today BIBEMS for lower back pain. Pt was diagnosed w/stress fracture last week on Tramadol, Naprosyn and Tylenol.   She was released from [facility] rehab last month, on February 21st, able to ambulate with a walker .  Pt has been found screaming and crying in pain per daughter who has been caring for her.  Her daughter is unable to care for her now, and was brought to the ER.   She denies headache, dizziness, chest pain, SOB, dysuria, hematuria or abdominal pain.,  any recent travel or sick contacts.    A/P    Intractable lower back pain.  - pain meds as needed.  - PT eval.  - SW eval.  - may need long term care.    HTN  - continue home meds .    chronic gastritis  - on PPI    DVT  - sc heparin.

## 2020-03-13 NOTE — ED ADULT NURSE NOTE - NSIMPLEMENTINTERV_GEN_ALL_ED
Implemented All Fall with Harm Risk Interventions:  [place] to call system. Call bell, personal items and telephone within reach. Instruct patient to call for assistance. Room bathroom lighting operational. Non-slip footwear when patient is off stretcher. Physically safe environment: no spills, clutter or unnecessary equipment. Stretcher in lowest position, wheels locked, appropriate side rails in place. Provide visual cue, wrist band, yellow gown, etc. Monitor gait and stability. Monitor for mental status changes and reorient to person, place, and time. Review medications for side effects contributing to fall risk. Reinforce activity limits and safety measures with patient and family. Provide visual clues: red socks.

## 2020-03-13 NOTE — ED PROVIDER NOTE - CARE PLAN
Principal Discharge DX:	Intractable back pain  Secondary Diagnosis:	Compression fracture of T5 vertebra, sequela

## 2020-03-13 NOTE — ED ADULT NURSE NOTE - OBJECTIVE STATEMENT
pt a&O x2, dementia baseline, unable to care for self. as per daughter unable to care for mother, pt screaming at night in pain. pt c.o of back pain. pt talking tramadol at night but partial relief. pt walks with walker, but unable to walk with walker. PT requesting to speak to SW unable to care for pt at home safely.  pt last given tramadol 1[facility]. [facility] [address] no additional falls, pt uses walker at baseline.

## 2020-03-13 NOTE — H&P ADULT - NSHPPHYSICALEXAM_GEN_ALL_CORE
T(C): 36.6 (13 Mar 2020 17:56), Max: 36.6 (13 Mar 2020 17:56)  T(F): 97.8 (13 Mar 2020 17:56), Max: 97.8 (13 Mar 2020 17:56)  HR: 88 (13 Mar 2020 17:56) (85 - 88)  BP: 104/54 (13 Mar 2020 17:56) (101/59 - 104/54)  BP(mean): --  RR: 17 (13 Mar 2020 17:56) (16 - 17)  SpO2: 95% (13 Mar 2020 17:56) (95% - 100%)    PHYSICAL EXAM:  GENERAL: NAD, well-groomed, well-developed  HEAD:  Atraumatic, Normocephalic  EYES: EOMI, PERRLA, conjunctiva and sclera clear  ENMT: No tonsillar erythema, exudates, or enlargement; Moist mucous membranes   NECK: Supple, No JVD   NERVOUS SYSTEM:  Alert & Oriented.  CHEST/LUNG: Clear to auscultation  bilaterally; No rales, rhonchi, wheezing, or rubs  HEART: Regular rate and rhythm; No murmurs, rubs, or gallops  ABDOMEN: Soft, Nontender, Nondistended; Bowel sounds present  EXTREMITIES:  2+ Peripheral Pulses, No clubbing, cyanosis, or edema  LYMPH: No lymphadenopathy noted  SKIN: No rashes or lesions

## 2020-03-13 NOTE — ED PROVIDER NOTE - CLINICAL SUMMARY MEDICAL DECISION MAKING FREE TEXT BOX
pt presents accompanied with her daughter c/o intractable back pain, pt was prescribed medications, but still has pain and her daughter states she is unable to care for her on her own, looking for long term care, social work is on the case

## 2020-03-13 NOTE — H&P ADULT - HISTORY OF PRESENT ILLNESS
89 year old female w/PMH of HTN, HLD, presents to the ED today BIBEMS for lower back pain. Pt was diagnosed w/stress fracture last week on Tramadol, Naprosyn and Tylenol.   She was released from [facility] rehab last month, on February 21st, able to ambulate with a walker .  Pt has been found screaming and crying in pain per daughter who has been caring for her.  Her daughter is unable to care for her now, and was brought to the ER.   She denies headache, dizziness, chest pain, SOB, dysuria, hematuria or abdominal pain.,  any recent travel or sick contacts.

## 2020-03-14 LAB
ANION GAP SERPL CALC-SCNC: 8 MMOL/L — SIGNIFICANT CHANGE UP (ref 5–17)
BUN SERPL-MCNC: 22 MG/DL — SIGNIFICANT CHANGE UP (ref 7–23)
CALCIUM SERPL-MCNC: 8.7 MG/DL — SIGNIFICANT CHANGE UP (ref 8.5–10.1)
CHLORIDE SERPL-SCNC: 105 MMOL/L — SIGNIFICANT CHANGE UP (ref 96–108)
CO2 SERPL-SCNC: 29 MMOL/L — SIGNIFICANT CHANGE UP (ref 22–31)
CREAT SERPL-MCNC: 1.48 MG/DL — HIGH (ref 0.5–1.3)
GLUCOSE SERPL-MCNC: 95 MG/DL — SIGNIFICANT CHANGE UP (ref 70–99)
HCT VFR BLD CALC: 31.5 % — LOW (ref 34.5–45)
HGB BLD-MCNC: 9.6 G/DL — LOW (ref 11.5–15.5)
MCHC RBC-ENTMCNC: 27.1 PG — SIGNIFICANT CHANGE UP (ref 27–34)
MCHC RBC-ENTMCNC: 30.5 GM/DL — LOW (ref 32–36)
MCV RBC AUTO: 89 FL — SIGNIFICANT CHANGE UP (ref 80–100)
NRBC # BLD: 0 /100 WBCS — SIGNIFICANT CHANGE UP (ref 0–0)
PLATELET # BLD AUTO: 311 K/UL — SIGNIFICANT CHANGE UP (ref 150–400)
POTASSIUM SERPL-MCNC: 3.5 MMOL/L — SIGNIFICANT CHANGE UP (ref 3.5–5.3)
POTASSIUM SERPL-SCNC: 3.5 MMOL/L — SIGNIFICANT CHANGE UP (ref 3.5–5.3)
RBC # BLD: 3.54 M/UL — LOW (ref 3.8–5.2)
RBC # FLD: 15.4 % — HIGH (ref 10.3–14.5)
SODIUM SERPL-SCNC: 142 MMOL/L — SIGNIFICANT CHANGE UP (ref 135–145)
WBC # BLD: 8.39 K/UL — SIGNIFICANT CHANGE UP (ref 3.8–10.5)
WBC # FLD AUTO: 8.39 K/UL — SIGNIFICANT CHANGE UP (ref 3.8–10.5)

## 2020-03-14 PROCEDURE — 99232 SBSQ HOSP IP/OBS MODERATE 35: CPT

## 2020-03-14 RX ORDER — PANTOPRAZOLE SODIUM 20 MG/1
40 TABLET, DELAYED RELEASE ORAL
Refills: 0 | Status: DISCONTINUED | OUTPATIENT
Start: 2020-03-14 | End: 2020-03-23

## 2020-03-14 RX ORDER — MORPHINE SULFATE 50 MG/1
2 CAPSULE, EXTENDED RELEASE ORAL EVERY 6 HOURS
Refills: 0 | Status: DISCONTINUED | OUTPATIENT
Start: 2020-03-14 | End: 2020-03-18

## 2020-03-14 RX ADMIN — MORPHINE SULFATE 2 MILLIGRAM(S): 50 CAPSULE, EXTENDED RELEASE ORAL at 12:13

## 2020-03-14 RX ADMIN — TRAMADOL HYDROCHLORIDE 50 MILLIGRAM(S): 50 TABLET ORAL at 07:05

## 2020-03-14 RX ADMIN — Medication 0.5 MILLIGRAM(S): at 22:10

## 2020-03-14 RX ADMIN — Medication 650 MILLIGRAM(S): at 01:36

## 2020-03-14 RX ADMIN — ATORVASTATIN CALCIUM 10 MILLIGRAM(S): 80 TABLET, FILM COATED ORAL at 22:11

## 2020-03-14 RX ADMIN — HEPARIN SODIUM 5000 UNIT(S): 5000 INJECTION INTRAVENOUS; SUBCUTANEOUS at 17:43

## 2020-03-14 RX ADMIN — MORPHINE SULFATE 2 MILLIGRAM(S): 50 CAPSULE, EXTENDED RELEASE ORAL at 13:00

## 2020-03-14 RX ADMIN — TRAMADOL HYDROCHLORIDE 50 MILLIGRAM(S): 50 TABLET ORAL at 06:04

## 2020-03-14 RX ADMIN — FAMOTIDINE 20 MILLIGRAM(S): 10 INJECTION INTRAVENOUS at 11:39

## 2020-03-14 RX ADMIN — Medication 650 MILLIGRAM(S): at 09:10

## 2020-03-14 RX ADMIN — Medication 81 MILLIGRAM(S): at 11:39

## 2020-03-14 RX ADMIN — AMLODIPINE BESYLATE 5 MILLIGRAM(S): 2.5 TABLET ORAL at 05:02

## 2020-03-14 RX ADMIN — Medication 0.5 MILLIGRAM(S): at 13:41

## 2020-03-14 RX ADMIN — HEPARIN SODIUM 5000 UNIT(S): 5000 INJECTION INTRAVENOUS; SUBCUTANEOUS at 05:02

## 2020-03-14 RX ADMIN — Medication 650 MILLIGRAM(S): at 03:56

## 2020-03-14 RX ADMIN — Medication 650 MILLIGRAM(S): at 08:32

## 2020-03-14 NOTE — CHART NOTE - NSCHARTNOTEFT_GEN_A_CORE
Called by RN to evaluate patient after fall.  As per RN patient was trying to get out the chair and fell on her left side.  Event was witnessed by RN.  RN and patient deny head trauma.  Patient offers no specific complaints.      General: Sitting in bed, NAD, no obvious injuries  HEENT: NCAT  Lungs: CTAB  Cardiac: Clear S1, S2  Abdomen: soft, non-tender  Ext: small abrasion above Left elbow, FROM all extremities    A/P: s/p Fall  -Bed alarm  -fall precautions  -1:1 observation  -Cont to monitor patient  -D/w Dr Kee

## 2020-03-14 NOTE — PROGRESS NOTE ADULT - SUBJECTIVE AND OBJECTIVE BOX
Patient is a 89y old  Female who presents with a chief complaint of acute pain (13 Mar 2020 18:14), still has some back pain       OVERNIGHT EVENTS:    MEDICATIONS  (STANDING):  amLODIPine   Tablet 5 milliGRAM(s) Oral daily  aspirin enteric coated 81 milliGRAM(s) Oral daily  atorvastatin 10 milliGRAM(s) Oral at bedtime  famotidine    Tablet 20 milliGRAM(s) Oral daily  heparin  Injectable 5000 Unit(s) SubCutaneous every 12 hours  influenza   Vaccine 0.5 milliLiter(s) IntraMuscular once    MEDICATIONS  (PRN):  acetaminophen   Tablet .. 650 milliGRAM(s) Oral every 6 hours PRN Temp greater or equal to 38C (100.4F), Mild Pain (1 - 3)  senna 2 Tablet(s) Oral at bedtime PRN Constipation  traMADol 50 milliGRAM(s) Oral every 8 hours PRN Moderate Pain (4 - 6)      REVIEW OF SYSTEMS:  CONSTITUTIONAL: Back pain.  EYES: No eye pain, visual disturbances, or discharge  ENMT:  No difficulty hearing, tinnitus, vertigo; No sinus or throat pain  NECK: No pain or stiffness  RESPIRATORY: No cough, wheezing, chills or hemoptysis; No shortness of breath  CARDIOVASCULAR: No chest pain, palpitations, dizziness, or leg swelling  GASTROINTESTINAL: No abdominal or epigastric pain. No nausea, vomiting, or hematemesis;    GENITOURINARY: No dysuria, frequency, hematuria, or incontinence  NEUROLOGICAL: No headaches, memory loss, loss of strength, numbness, or tremors  SKIN: No itching, burning, rashes, or lesions      Vital Signs Last 24 Hrs  T(C): 35.8 (14 Mar 2020 06:36), Max: 36.7 (13 Mar 2020 20:31)  T(F): 96.4 (14 Mar 2020 06:36), Max: 98 (13 Mar 2020 20:31)  HR: 90 (14 Mar 2020 06:36) (85 - 98)  BP: 124/56 (14 Mar 2020 06:36) (101/59 - 148/67)  BP(mean): --  RR: 18 (14 Mar 2020 06:36) (16 - 18)  SpO2: 100% (14 Mar 2020 06:36) (95% - 100%)    PHYSICAL EXAM:  GENERAL: NAD, well-groomed, well-developed  HEAD:  Atraumatic, Normocephalic  EYES: EOMI, PERRLA, conjunctiva and sclera clear  ENMT: No tonsillar erythema, exudates, or enlargement; Moist mucous membranes   NECK: Supple, No JVD   NERVOUS SYSTEM:  Alert & Oriented X3, Good concentration; Motor Strength 5/5 B/L upper and lower extremities; DTRs 2+ intact and symmetric  CHEST/LUNG: Clear to auscultation  bilaterally; No rales, rhonchi, wheezing, or rubs  HEART: Regular rate and rhythm; No murmurs, rubs, or gallops  ABDOMEN: Soft, Nontender, Nondistended; Bowel sounds present  EXTREMITIES:  2+ Peripheral Pulses, No clubbing, cyanosis, or edema  LYMPH: No lymphadenopathy noted  SKIN: No rashes or lesions    LABS:                        9.6    8.39  )-----------( 311      ( 14 Mar 2020 07:18 )             31.5     03-14    142  |  105  |  22  ----------------------------<  95  3.5   |  29  |  1.48<H>    Ca    8.7      14 Mar 2020 07:18    TPro  6.3  /  Alb  2.6<L>  /  TBili  0.6  /  DBili  x   /  AST  11<L>  /  ALT  10<L>  /  AlkPhos  149<H>  03-13       cardiac markers   Urinalysis Basic - ( 13 Mar 2020 22:05 )    Color: Yellow / Appearance: Slightly Turbid / S.015 / pH: x  Gluc: x / Ketone: Negative  / Bili: Negative / Urobili: Negative mg/dL   Blood: x / Protein: 15 mg/dL / Nitrite: Negative   Leuk Esterase: Moderate / RBC: 3-5 /HPF / WBC >50   Sq Epi: x / Non Sq Epi: Few / Bacteria: Many      CAPILLARY BLOOD GLUCOSE      Imaging Personally Reviewed:  [ ] YES  [ ] NO    Care Discussed with Consultants/Other Providers [ ] YES  [ ] NO

## 2020-03-14 NOTE — PROGRESS NOTE ADULT - ASSESSMENT
89 year old female w/PMH of HTN, HLD, presents to the ED today BIBEMS for lower back pain. Pt was diagnosed w/stress fracture last week on Tramadol, Naprosyn and Tylenol.   She was released from [facility] rehab last month, on February 21st, able to ambulate with a walker .  Pt has been found screaming and crying in pain per daughter who has been caring for her.  Her daughter is unable to care for her now, and was brought to the ER.   She denies headache, dizziness, chest pain, SOB, dysuria, hematuria or abdominal pain.,  any recent travel or sick contacts.    A/P    Intractable lower back pain.  - PT eval.  - Readjust all pain meds.  - SW eval.  - may need long term care.    HTN  - continue home meds .    chronic gastritis  - on Protonix    DVT  - sc heparin.    Follow up patient.

## 2020-03-15 LAB
CULTURE RESULTS: SIGNIFICANT CHANGE UP
SPECIMEN SOURCE: SIGNIFICANT CHANGE UP

## 2020-03-15 PROCEDURE — 73560 X-RAY EXAM OF KNEE 1 OR 2: CPT | Mod: 26,LT

## 2020-03-15 PROCEDURE — 99232 SBSQ HOSP IP/OBS MODERATE 35: CPT

## 2020-03-15 PROCEDURE — 73590 X-RAY EXAM OF LOWER LEG: CPT | Mod: 26,LT

## 2020-03-15 RX ADMIN — PANTOPRAZOLE SODIUM 40 MILLIGRAM(S): 20 TABLET, DELAYED RELEASE ORAL at 08:08

## 2020-03-15 RX ADMIN — HEPARIN SODIUM 5000 UNIT(S): 5000 INJECTION INTRAVENOUS; SUBCUTANEOUS at 05:18

## 2020-03-15 RX ADMIN — AMLODIPINE BESYLATE 5 MILLIGRAM(S): 2.5 TABLET ORAL at 05:18

## 2020-03-15 RX ADMIN — ATORVASTATIN CALCIUM 10 MILLIGRAM(S): 80 TABLET, FILM COATED ORAL at 21:42

## 2020-03-15 RX ADMIN — Medication 81 MILLIGRAM(S): at 13:57

## 2020-03-15 NOTE — PHYSICAL THERAPY INITIAL EVALUATION ADULT - GAIT DEVIATIONS NOTED, PT EVAL
decreased stride length/decreased step length/decreased velocity of limb motion/c/o pain in the left hip, difficulty  bearing full weight on LLE/decreased sreedhar

## 2020-03-15 NOTE — PHYSICAL THERAPY INITIAL EVALUATION ADULT - GENERAL OBSERVATIONS, REHAB EVAL
Pt is alert, oriented to person and time but with periods of confusion disorientation and forgetfulness

## 2020-03-15 NOTE — PHYSICAL THERAPY INITIAL EVALUATION ADULT - REFERRAL TO ANOTHER SERVICE NEEDED, PT EVAL
occupational therapy orthopedics/occupational therapy/Nurse Shalini requested to call MD for hip xray

## 2020-03-15 NOTE — PHYSICAL THERAPY INITIAL EVALUATION ADULT - PERTINENT HX OF CURRENT PROBLEM, REHAB EVAL
Pt states that prior to this admission she is independent and uses a rolling walker on and off at home, dx Intractable back pain , compression fracture on T5, as per Nurse Shalini and MAIA pt  fell on the floor yesterday,

## 2020-03-15 NOTE — PHYSICAL THERAPY INITIAL EVALUATION ADULT - FOLLOWS COMMANDS/ANSWERS QUESTIONS, REHAB EVAL
75% of the time/able to follow single-step instructions/50% of the time/unable to answer questions none

## 2020-03-15 NOTE — PHYSICAL THERAPY INITIAL EVALUATION ADULT - PRECAUTIONS/LIMITATIONS, REHAB EVAL
fall precautions/weakness in the lower extremities, poor standing and ambulation balance and tolerance due to pain in the left hip area

## 2020-03-15 NOTE — PHYSICAL THERAPY INITIAL EVALUATION ADULT - DIAGNOSIS, PT EVAL
Pt presented with deficits in strength all exteremities especially the LLE, poor standing ambulation balance due to c/o weakness in both LE  and pain left hip. Pt presented with deficits in strength all extremities especially the LLE, poor standing ambulation balance due to c/o weakness in both LE  and pain left hip.

## 2020-03-15 NOTE — PROGRESS NOTE ADULT - SUBJECTIVE AND OBJECTIVE BOX
Patient is a 89y old  Female who presents with a chief complaint of acute pain (14 Mar 2020 11:25), no SOB, no pain at present.       OVERNIGHT EVENTS: none    MEDICATIONS  (STANDING):  amLODIPine   Tablet 5 milliGRAM(s) Oral daily  aspirin enteric coated 81 milliGRAM(s) Oral daily  atorvastatin 10 milliGRAM(s) Oral at bedtime  heparin  Injectable 5000 Unit(s) SubCutaneous every 12 hours  influenza   Vaccine 0.5 milliLiter(s) IntraMuscular once  pantoprazole    Tablet 40 milliGRAM(s) Oral before breakfast    MEDICATIONS  (PRN):  acetaminophen   Tablet .. 650 milliGRAM(s) Oral every 6 hours PRN Temp greater or equal to 38C (100.4F), Mild Pain (1 - 3)  LORazepam     Tablet 0.5 milliGRAM(s) Oral every 12 hours PRN Anxiety  morphine  - Injectable 2 milliGRAM(s) IV Push every 6 hours PRN Severe Pain (7 - 10)  senna 2 Tablet(s) Oral at bedtime PRN Constipation  traMADol 50 milliGRAM(s) Oral every 8 hours PRN Moderate Pain (4 - 6)        REVIEW OF SYSTEMS:  12 systems and negative.      Vital Signs Last 24 Hrs  T(C): 36.3 (15 Mar 2020 05:11), Max: 36.4 (14 Mar 2020 17:25)  T(F): 97.4 (15 Mar 2020 05:11), Max: 97.5 (14 Mar 2020 17:25)  HR: 95 (15 Mar 2020 05:11) (79 - 99)  BP: 131/64 (15 Mar 2020 05:11) (91/58 - 131/70)  BP(mean): --  RR: 18 (15 Mar 2020 05:11) (17 - 18)  SpO2: 98% (15 Mar 2020 05:11) (97% - 98%)    PHYSICAL EXAM:  GENERAL: NAD,  well-developed  HEAD:  Atraumatic, Normocephalic  EYES: Conjunctiva and sclera clear  ENMT:   Moist mucous membranes   NECK: Supple, No JVD   NERVOUS SYSTEM:  Alert & Oriented, moves all extremities  CHEST/LUNG: CTA bilaterally; No rales, rhonchi, wheezing, or rubs  HEART: Regular rate and rhythm; No murmurs, rubs, or gallops  ABDOMEN: Soft, Nontender, Nondistended; Bowel sounds present  EXTREMITIES:  2+ Peripheral Pulses, No clubbing, cyanosis, or edema  LYMPH: No lymphadenopathy noted  SKIN: No rashes or lesions    LABS:                        9.6    8.39  )-----------( 311      ( 14 Mar 2020 07:18 )             31.5     03-14    142  |  105  |  22  ----------------------------<  95  3.5   |  29  |  1.48<H>    Ca    8.7      14 Mar 2020 07:18    TPro  6.3  /  Alb  2.6<L>  /  TBili  0.6  /  DBili  x   /  AST  11<L>  /  ALT  10<L>  /  AlkPhos  149<H>  03-13       cardiac markers   Urinalysis Basic - ( 13 Mar 2020 22:05 )    Color: Yellow / Appearance: Slightly Turbid / S.015 / pH: x  Gluc: x / Ketone: Negative  / Bili: Negative / Urobili: Negative mg/dL   Blood: x / Protein: 15 mg/dL / Nitrite: Negative   Leuk Esterase: Moderate / RBC: 3-5 /HPF / WBC >50   Sq Epi: x / Non Sq Epi: Few / Bacteria: Many      CAPILLARY BLOOD GLUCOSE        Cultures    RADIOLOGY & ADDITIONAL TESTS:    Imaging Personally Reviewed:  [ ] YES  [ ] NO    Consultant(s) Notes Reviewed:  [ ] YES  [ ] NO    Care Discussed with Consultants/Other Providers [ ] YES  [ ] NO

## 2020-03-15 NOTE — PROGRESS NOTE ADULT - ASSESSMENT
89 year old female w/PMH of HTN, HLD, presents to the ED today BIBEMS for lower back pain. Pt was diagnosed w/stress fracture last week on Tramadol, Naprosyn and Tylenol.   She was released from [facility] rehab last month, on February 21st, able to ambulate with a walker .  Pt has been found screaming and crying in pain per daughter who has been caring for her.  Her daughter is unable to care for her now, and was brought to the ER.   She denies headache, dizziness, chest pain, SOB, dysuria, hematuria or abdominal pain.,  any recent travel or sick contacts.    A/P    Intractable lower back pain: improved  - Cont pain meds as needed.    - SW eval.  - may need long term care.    S/p fall yesterday,  - No injury nted.  - on 1:1 obs.  - fall precautions    HTN  - continue home meds .    chronic gastritis  - on Protonix    DVT  - sc heparin.    D/c planning to rehab  Awaiting PT, SW eval.

## 2020-03-15 NOTE — PHYSICAL THERAPY INITIAL EVALUATION ADULT - STRENGTHENING, PT EVAL
Improve strength in the both LE especially the LLE  and be able to perform functional tasks-bed mobility, sitting, standing, transfers and ambulate in a safe manner with or without  assistive device and prevent falls.

## 2020-03-15 NOTE — PHYSICAL THERAPY INITIAL EVALUATION ADULT - LIVES WITH, PROFILE
Pt states she lives with her daughter in pvt house, has 5 steps with rails to enter the house, bedroom on 2nd floor.

## 2020-03-16 PROCEDURE — 99232 SBSQ HOSP IP/OBS MODERATE 35: CPT

## 2020-03-16 RX ADMIN — HEPARIN SODIUM 5000 UNIT(S): 5000 INJECTION INTRAVENOUS; SUBCUTANEOUS at 06:34

## 2020-03-16 RX ADMIN — PANTOPRAZOLE SODIUM 40 MILLIGRAM(S): 20 TABLET, DELAYED RELEASE ORAL at 09:19

## 2020-03-16 RX ADMIN — AMLODIPINE BESYLATE 5 MILLIGRAM(S): 2.5 TABLET ORAL at 06:36

## 2020-03-16 RX ADMIN — Medication 81 MILLIGRAM(S): at 14:12

## 2020-03-16 RX ADMIN — HEPARIN SODIUM 5000 UNIT(S): 5000 INJECTION INTRAVENOUS; SUBCUTANEOUS at 17:16

## 2020-03-16 RX ADMIN — ATORVASTATIN CALCIUM 10 MILLIGRAM(S): 80 TABLET, FILM COATED ORAL at 22:49

## 2020-03-16 RX ADMIN — TRAMADOL HYDROCHLORIDE 50 MILLIGRAM(S): 50 TABLET ORAL at 01:30

## 2020-03-16 RX ADMIN — TRAMADOL HYDROCHLORIDE 50 MILLIGRAM(S): 50 TABLET ORAL at 02:15

## 2020-03-16 NOTE — PROGRESS NOTE ADULT - SUBJECTIVE AND OBJECTIVE BOX
Patient is a 89y old  Female who presents with a chief complaint of acute pain (15 Mar 2020 10:38), no chest pain, no SOB.       OVERNIGHT EVENTS: none    MEDICATIONS  (STANDING):  amLODIPine   Tablet 5 milliGRAM(s) Oral daily  aspirin enteric coated 81 milliGRAM(s) Oral daily  atorvastatin 10 milliGRAM(s) Oral at bedtime  heparin  Injectable 5000 Unit(s) SubCutaneous every 12 hours  influenza   Vaccine 0.5 milliLiter(s) IntraMuscular once  pantoprazole    Tablet 40 milliGRAM(s) Oral before breakfast    MEDICATIONS  (PRN):  acetaminophen   Tablet .. 650 milliGRAM(s) Oral every 6 hours PRN Temp greater or equal to 38C (100.4F), Mild Pain (1 - 3)  LORazepam     Tablet 0.5 milliGRAM(s) Oral every 12 hours PRN Anxiety  morphine  - Injectable 2 milliGRAM(s) IV Push every 6 hours PRN Severe Pain (7 - 10)  senna 2 Tablet(s) Oral at bedtime PRN Constipation  traMADol 50 milliGRAM(s) Oral every 8 hours PRN Moderate Pain (4 - 6)        REVIEW OF SYSTEMS: reviewed and negative.     Vital Signs Last 24 Hrs  T(C): 36.3 (16 Mar 2020 05:16), Max: 36.4 (15 Mar 2020 16:22)  T(F): 97.4 (16 Mar 2020 05:16), Max: 97.6 (15 Mar 2020 16:22)  HR: 89 (16 Mar 2020 05:16) (86 - 90)  BP: 126/57 (16 Mar 2020 05:16) (126/57 - 133/70)  BP(mean): --  RR: 18 (16 Mar 2020 05:16) (17 - 18)  SpO2: 98% (16 Mar 2020 05:16) (98% - 98%)    PHYSICAL EXAM:  GENERAL: NAD,  well-developed  HEAD:  Atraumatic, Normocephalic  EYES: Conjunctiva and sclera clear  ENMT:   Moist mucous membranes   NECK: Supple, No JVD   NERVOUS SYSTEM:  Alert & Oriented, moves all extremities  CHEST/LUNG: CTA bilaterally; No rales, rhonchi, wheezing, or rubs  HEART: Regular rate and rhythm; No murmurs, rubs, or gallops  ABDOMEN: Soft, Nontender, Nondistended; Bowel sounds present  EXTREMITIES:  2+ Peripheral Pulses, No clubbing, cyanosis, or edema  LYMPH: No lymphadenopathy noted  SKIN: No petechiae    LABS:             cardiac markers     CAPILLARY BLOOD GLUCOSE        Cultures    RADIOLOGY & ADDITIONAL TESTS:    Imaging Personally Reviewed:  [ ] YES  [ ] NO    Consultant(s) Notes Reviewed:  [ ] YES  [ ] NO    Care Discussed with Consultants/Other Providers [ ] YES  [ ] NO

## 2020-03-16 NOTE — PROGRESS NOTE ADULT - ASSESSMENT
89 year old female w/PMH of HTN, HLD, presents to the ED today BIBEMS for lower back pain. Pt was diagnosed w/stress fracture last week on Tramadol, Naprosyn and Tylenol.   She was released from [facility] rehab last month, on February 21st, able to ambulate with a walker .  Pt has been found screaming and crying in pain per daughter who has been caring for her.  Her daughter is unable to care for her now, and was brought to the ER.   She denies headache, dizziness, chest pain, SOB, dysuria, hematuria or abdominal pain.,  any recent travel or sick contacts.    A/P    Intractable lower back pain: improved  - Cont pain meds as needed.    - SW eval.  - may need long term care.    S/p fall    - No injury nted.  - on 1:1 obs.  - fall precautions    HTN  - continue home meds .    chronic gastritis  - on Protonix    DVT  - sc heparin.    D/c planning to rehab  PT eval noted, recommended GEORGE

## 2020-03-17 ENCOUNTER — TRANSCRIPTION ENCOUNTER (OUTPATIENT)
Age: 85
End: 2020-03-17

## 2020-03-17 PROCEDURE — 99232 SBSQ HOSP IP/OBS MODERATE 35: CPT

## 2020-03-17 RX ADMIN — Medication 0.5 MILLIGRAM(S): at 21:34

## 2020-03-17 RX ADMIN — Medication 81 MILLIGRAM(S): at 12:12

## 2020-03-17 RX ADMIN — Medication 0.5 MILLIGRAM(S): at 01:06

## 2020-03-17 RX ADMIN — AMLODIPINE BESYLATE 5 MILLIGRAM(S): 2.5 TABLET ORAL at 05:51

## 2020-03-17 RX ADMIN — Medication 650 MILLIGRAM(S): at 01:16

## 2020-03-17 RX ADMIN — Medication 650 MILLIGRAM(S): at 01:47

## 2020-03-17 RX ADMIN — HEPARIN SODIUM 5000 UNIT(S): 5000 INJECTION INTRAVENOUS; SUBCUTANEOUS at 18:47

## 2020-03-17 RX ADMIN — HEPARIN SODIUM 5000 UNIT(S): 5000 INJECTION INTRAVENOUS; SUBCUTANEOUS at 05:51

## 2020-03-17 RX ADMIN — ATORVASTATIN CALCIUM 10 MILLIGRAM(S): 80 TABLET, FILM COATED ORAL at 21:34

## 2020-03-17 NOTE — DISCHARGE NOTE PROVIDER - NSDCMRMEDTOKEN_GEN_ALL_CORE_FT
acetaminophen 325 mg oral tablet: 2 tab(s) orally every 6 hours, As needed, Temp greater or equal to 38C (100.4F), Mild Pain (1 - 3)  acetaminophen 325 mg oral tablet: 2 tab(s) orally every 4 hours, As needed, Temp greater or equal to 38C (100.4F), Mild Pain (1 - 3)  amLODIPine 5 mg oral tablet: 1 tab(s) orally once a day  aspirin 81 mg oral delayed release tablet: 1 tab(s) orally once a day  atorvastatin 10 mg oral tablet: 1 tab(s) orally once a day (at bedtime)  famotidine 20 mg oral tablet: 1 tab(s) orally once a day  furosemide 40 mg oral tablet: 1 tab(s) orally once a day  pantoprazole 40 mg oral delayed release tablet: 1 tab(s) orally once a day (before a meal)  senna oral tablet: 2 tab(s) orally once a day (at bedtime), As needed, Constipation  traMADol 50 mg oral tablet: 1 tab(s) orally every 8 hours, As Needed -for severe pain MDD:150mg acetaminophen 325 mg oral tablet: 2 tab(s) orally every 6 hours, As needed, Temp greater or equal to 38C (100.4F), Mild Pain (1 - 3)  amLODIPine 5 mg oral tablet: 1 tab(s) orally once a day  aspirin 81 mg oral delayed release tablet: 1 tab(s) orally once a day  atorvastatin 10 mg oral tablet: 1 tab(s) orally once a day (at bedtime)  famotidine 20 mg oral tablet: 1 tab(s) orally once a day  nystatin 100,000 units/g topical ointment: 1 application topically every 12 hours  pantoprazole 40 mg oral delayed release tablet: 1 tab(s) orally once a day (before a meal)  senna oral tablet: 2 tab(s) orally once a day (at bedtime), As needed, Constipation  traMADol 50 mg oral tablet: 1 tab(s) orally every 8 hours, As Needed -for severe pain MDD:150mg

## 2020-03-17 NOTE — PROGRESS NOTE ADULT - SUBJECTIVE AND OBJECTIVE BOX
Patient is a 89y old  Female who presents with a chief complaint of acute pain (17 Mar 2020 11:59), less pain today.       OVERNIGHT EVENTS:none    MEDICATIONS  (STANDING):  amLODIPine   Tablet 5 milliGRAM(s) Oral daily  aspirin enteric coated 81 milliGRAM(s) Oral daily  atorvastatin 10 milliGRAM(s) Oral at bedtime  heparin  Injectable 5000 Unit(s) SubCutaneous every 12 hours  influenza   Vaccine 0.5 milliLiter(s) IntraMuscular once  pantoprazole    Tablet 40 milliGRAM(s) Oral before breakfast    MEDICATIONS  (PRN):  acetaminophen   Tablet .. 650 milliGRAM(s) Oral every 6 hours PRN Temp greater or equal to 38C (100.4F), Mild Pain (1 - 3)  LORazepam     Tablet 0.5 milliGRAM(s) Oral every 12 hours PRN Anxiety  morphine  - Injectable 2 milliGRAM(s) IV Push every 6 hours PRN Severe Pain (7 - 10)  senna 2 Tablet(s) Oral at bedtime PRN Constipation  traMADol 50 milliGRAM(s) Oral every 8 hours PRN Moderate Pain (4 - 6)        REVIEW OF SYSTEMS:  reviewed and negative.        Vital Signs Last 24 Hrs  T(C): 36.6 (17 Mar 2020 06:13), Max: 36.6 (17 Mar 2020 06:13)  T(F): 97.8 (17 Mar 2020 06:13), Max: 97.8 (17 Mar 2020 06:13)  HR: 83 (17 Mar 2020 06:13) (83 - 88)  BP: 113/67 (17 Mar 2020 06:13) (105/59 - 113/67)  BP(mean): --  RR: 18 (17 Mar 2020 06:13) (18 - 20)  SpO2: 97% (17 Mar 2020 06:13) (96% - 97%)    PHYSICAL EXAM:  GENERAL: NAD, well-groomed, well-developed  HEAD:  Atraumatic, Normocephalic  EYES: EOMI, PERRLA, conjunctiva and sclera clear  ENMT: No tonsillar erythema, exudates, or enlargement; Moist mucous membranes.  NECK: Supple, No JVD   NERVOUS SYSTEM:  Alert & Oriented,    CHEST/LUNG: CTA bilaterally; No rales, rhonchi, wheezing, or rubs  HEART: Regular rate and rhythm; No murmurs, rubs, or gallops  ABDOMEN: Soft, Nontender, Nondistended; Bowel sounds present  EXTREMITIES:  2+ Peripheral Pulses, No clubbing, cyanosis, or edema  LYMPH: No lymphadenopathy noted  SKIN: No petechiae    LABS:             cardiac markers     CAPILLARY BLOOD GLUCOSE        Cultures    RADIOLOGY & ADDITIONAL TESTS:    Imaging Personally Reviewed:  [ ] YES  [ ] NO    Consultant(s) Notes Reviewed:  [ ] YES  [ ] NO    Care Discussed with Consultants/Other Providers [ ] YES  [ ] NO

## 2020-03-17 NOTE — PROGRESS NOTE ADULT - ASSESSMENT
89 year old female w/PMH of HTN, HLD, presents to the ED today BIBEMS for lower back pain. Pt was diagnosed w/stress fracture last week on Tramadol, Naprosyn and Tylenol.   She was released from [facility] rehab last month, on February 21st, able to ambulate with a walker .  Pt has been found screaming and crying in pain per daughter who has been caring for her.  Her daughter is unable to care for her now, and was brought to the ER.   She denies headache, dizziness, chest pain, SOB, dysuria, hematuria or abdominal pain.,  any recent travel or sick contacts.    A/P    Intractable lower back pain: improved  - Cont pain meds as needed.    - SW eval.  - may need long term care.    S/p fall    - No injury nted..  - fall precautions    HTN  - continue home meds .    chronic gastritis  - on Protonix    DVT  - sc heparin.    D/c planning to rehab  PT eval noted, recommended [facility].  Patient is medically stable.

## 2020-03-17 NOTE — DISCHARGE NOTE PROVIDER - HOSPITAL COURSE
89 year old female w/PMH of HTN, HLD, presents to the ED today BIBEMS for lower back pain. Pt was diagnosed w/stress fracture last week on Tramadol, Naprosyn and Tylenol.   She was released from [facility] rehab last month, on February 21st, able to ambulate with a walker .  Pt has been found screaming and crying in pain per daughter who has been caring for her.  Her daughter is unable to care for her now, and was brought to the ER.   She was admitted for back pain, evaluated by PT.  At present, she is cleared for discharge to [facility]. Assessment and Plan:     · Assessment	    89 year old female w/PMH of HTN, HLD, [facility]. Pt was diagnosed w/stress fracture last week on Tramadol, Naprosyn and Tylenol.   She was released from [facility] rehab last month, on February 21st, able to ambulate with a walker .  Pt has been found screaming and crying in pain per daughter who has been caring for her.  Her daughter is unable to care for her now, and was brought to the ER.   She denies headache, dizziness, chest pain, SOB, dysuria, hematuria or abdominal pain.,  any recent travel or sick contacts.        A/P        Intractable lower back pain: improved    - Cont pain meds as needed.      - SW eval.    - will need long term care. await [facility] nursing home         S/p fall      - No injury noted per my colleague     - fall precautions        HTN    - continue home meds .        chronic gastritis    - on Protonix        DVT    - sc heparin.        severe protein malnutrition     nutrition consult         left ulnar fracture per nursing s/p fall on 3/14/2020 and has fracture at ulnar  consulted ortho recommendation noted no surgical intervention. will be placed in sling for comfort . pain meds as needed         TIME SPENT COMPLETING DISCHARGE AND COORDINATING CARE WITH NURSING,  AND CASE MANAGEMENT APPROX 40MINUTES

## 2020-03-17 NOTE — DISCHARGE NOTE PROVIDER - NSDCCPCAREPLAN_GEN_ALL_CORE_FT
PRINCIPAL DISCHARGE DIAGNOSIS  Diagnosis: Intractable back pain  Assessment and Plan of Treatment:       SECONDARY DISCHARGE DIAGNOSES  Diagnosis: Compression fracture of T5 vertebra, sequela  Assessment and Plan of Treatment: PRINCIPAL DISCHARGE DIAGNOSIS  Diagnosis: Intractable back pain  Assessment and Plan of Treatment:       SECONDARY DISCHARGE DIAGNOSES  Diagnosis: Benign essential HTN  Assessment and Plan of Treatment:     Diagnosis: Ulnar fracture  Assessment and Plan of Treatment:     Diagnosis: Compression fracture of T5 vertebra, sequela  Assessment and Plan of Treatment:

## 2020-03-18 PROCEDURE — 99232 SBSQ HOSP IP/OBS MODERATE 35: CPT

## 2020-03-18 RX ORDER — SODIUM CHLORIDE 9 MG/ML
1000 INJECTION INTRAMUSCULAR; INTRAVENOUS; SUBCUTANEOUS
Refills: 0 | Status: COMPLETED | OUTPATIENT
Start: 2020-03-18 | End: 2020-03-18

## 2020-03-18 RX ADMIN — TRAMADOL HYDROCHLORIDE 50 MILLIGRAM(S): 50 TABLET ORAL at 08:47

## 2020-03-18 RX ADMIN — MORPHINE SULFATE 2 MILLIGRAM(S): 50 CAPSULE, EXTENDED RELEASE ORAL at 21:34

## 2020-03-18 RX ADMIN — Medication 650 MILLIGRAM(S): at 04:10

## 2020-03-18 RX ADMIN — SODIUM CHLORIDE 50 MILLILITER(S): 9 INJECTION INTRAMUSCULAR; INTRAVENOUS; SUBCUTANEOUS at 18:57

## 2020-03-18 RX ADMIN — TRAMADOL HYDROCHLORIDE 50 MILLIGRAM(S): 50 TABLET ORAL at 08:17

## 2020-03-18 RX ADMIN — Medication 81 MILLIGRAM(S): at 11:43

## 2020-03-18 RX ADMIN — ATORVASTATIN CALCIUM 10 MILLIGRAM(S): 80 TABLET, FILM COATED ORAL at 21:34

## 2020-03-18 RX ADMIN — HEPARIN SODIUM 5000 UNIT(S): 5000 INJECTION INTRAVENOUS; SUBCUTANEOUS at 17:27

## 2020-03-18 RX ADMIN — HEPARIN SODIUM 5000 UNIT(S): 5000 INJECTION INTRAVENOUS; SUBCUTANEOUS at 05:48

## 2020-03-18 RX ADMIN — Medication 650 MILLIGRAM(S): at 03:40

## 2020-03-18 RX ADMIN — MORPHINE SULFATE 2 MILLIGRAM(S): 50 CAPSULE, EXTENDED RELEASE ORAL at 21:49

## 2020-03-18 RX ADMIN — PANTOPRAZOLE SODIUM 40 MILLIGRAM(S): 20 TABLET, DELAYED RELEASE ORAL at 08:14

## 2020-03-18 RX ADMIN — AMLODIPINE BESYLATE 5 MILLIGRAM(S): 2.5 TABLET ORAL at 05:49

## 2020-03-18 NOTE — PROGRESS NOTE ADULT - ASSESSMENT
89 year old female w/PMH of HTN, HLD, presents to the ED today BIBEMS for lower back pain. Pt was diagnosed w/stress fracture last week on Tramadol, Naprosyn and Tylenol.   She was released from [facility] rehab last month, on February 21st, able to ambulate with a walker .  Pt has been found screaming and crying in pain per daughter who has been caring for her.  Her daughter is unable to care for her now, and was brought to the ER.   She denies headache, dizziness, chest pain, SOB, dysuria, hematuria or abdominal pain.,  any recent travel or sick contacts.    A/P    Intractable lower back pain: improved  - Cont pain meds as needed.    - SW eval.  - will need long term care. await [facility]     S/p fall    - No injury noted per my colleague   - fall precautions    HTN  - continue home meds .    chronic gastritis  - on Protonix    DVT  - sc heparin.    D/c planning to rehab  PT eval noted, recommended [facility].  Patient is medically stable. 89 year old female w/PMH of HTN, HLD, presents to the ED today BIBEMS for lower back pain. Pt was diagnosed w/stress fracture last week on Tramadol, Naprosyn and Tylenol.   She was released from [facility] rehab last month, on February 21st, able to ambulate with a walker .  Pt has been found screaming and crying in pain per daughter who has been caring for her.  Her daughter is unable to care for her now, and was brought to the ER.   She denies headache, dizziness, chest pain, SOB, dysuria, hematuria or abdominal pain.,  any recent travel or sick contacts.    A/P    Intractable lower back pain: improved  - Cont pain meds as needed.    - SW eval.  - will need long term care. await [facility]     S/p fall    - No injury noted per my colleague   - fall precautions    HTN  - continue home meds .    chronic gastritis  - on Protonix    DVT  - sc heparin.    left arm ecchymosis : may be due to fall per nursing which occurred prior to me inheriting patient, supportive acre , check cbc in am   D/c planning to rehab  PT ha noted, recommended [facility]OR[facility].  Patient is medically stable.

## 2020-03-18 NOTE — PROGRESS NOTE ADULT - SUBJECTIVE AND OBJECTIVE BOX
Patient is a 89y old  Female who presents with a chief complaint of acute pain (17 Mar 2020 11:59), less pain today.       OVERNIGHT EVENTS:none    MEDICATIONS  (STANDING):  amLODIPine   Tablet 5 milliGRAM(s) Oral daily  aspirin enteric coated 81 milliGRAM(s) Oral daily  atorvastatin 10 milliGRAM(s) Oral at bedtime  heparin  Injectable 5000 Unit(s) SubCutaneous every 12 hours  influenza   Vaccine 0.5 milliLiter(s) IntraMuscular once  pantoprazole    Tablet 40 milliGRAM(s) Oral before breakfast    MEDICATIONS  (PRN):  acetaminophen   Tablet .. 650 milliGRAM(s) Oral every 6 hours PRN Temp greater or equal to 38C (100.4F), Mild Pain (1 - 3)  LORazepam     Tablet 0.5 milliGRAM(s) Oral every 12 hours PRN Anxiety  morphine  - Injectable 2 milliGRAM(s) IV Push every 6 hours PRN Severe Pain (7 - 10)  senna 2 Tablet(s) Oral at bedtime PRN Constipation  traMADol 50 milliGRAM(s) Oral every 8 hours PRN Moderate Pain (4 - 6)      Vital Signs Last 24 Hrs  T(C): 37.1 (18 Mar 2020 11:00), Max: 37.1 (18 Mar 2020 11:00)  T(F): 98.7 (18 Mar 2020 11:00), Max: 98.7 (18 Mar 2020 11:00)  HR: 83 (18 Mar 2020 11:00) (83 - 100)  BP: 104/66 (18 Mar 2020 11:00) (104/66 - 127/70)  BP(mean): --  RR: 18 (18 Mar 2020 11:00) (18 - 20)  SpO2: 98% (18 Mar 2020 11:00) (95% - 98%)    PHYSICAL EXAM:  GENERAL: NAD, well-groomed, well-developed  HEAD:  Atraumatic, Normocephalic  EYES: EOMI, PERRLA, conjunctiva and sclera clear  ENMT: No tonsillar erythema, exudates, or enlargement; Moist mucous membranes.  NECK: Supple, No JVD   NERVOUS SYSTEM:  Alert & Oriented,    CHEST/LUNG: CTA bilaterally; No rales, rhonchi, wheezing, or rubs  HEART: Regular rate and rhythm; No murmurs, rubs, or gallops  ABDOMEN: Soft, Nontender, Nondistended; Bowel sounds present  EXTREMITIES:  2+ Peripheral Pulses, No clubbing, cyanosis, or edema  SKIN: No petechiae    LABS:             cardiac markers     CAPILLARY BLOOD GLUCOSE        Cultures    RADIOLOGY & ADDITIONAL TESTS:    Imaging Personally Reviewed:  [ ] YES  [ ] NO    Consultant(s) Notes Reviewed:  [ ] YES  [ ] NO    Care Discussed with Consultants/Other Providers [ ] YES  [ ] NO Patient is a 89y old  Female who presents with a chief complaint of acute pain (17 Mar 2020 11:59), less pain today.       OVERNIGHT EVENTS:none    MEDICATIONS  (STANDING):  amLODIPine   Tablet 5 milliGRAM(s) Oral daily  aspirin enteric coated 81 milliGRAM(s) Oral daily  atorvastatin 10 milliGRAM(s) Oral at bedtime  heparin  Injectable 5000 Unit(s) SubCutaneous every 12 hours  influenza   Vaccine 0.5 milliLiter(s) IntraMuscular once  pantoprazole    Tablet 40 milliGRAM(s) Oral before breakfast    MEDICATIONS  (PRN):  acetaminophen   Tablet .. 650 milliGRAM(s) Oral every 6 hours PRN Temp greater or equal to 38C (100.4F), Mild Pain (1 - 3)  LORazepam     Tablet 0.5 milliGRAM(s) Oral every 12 hours PRN Anxiety  morphine  - Injectable 2 milliGRAM(s) IV Push every 6 hours PRN Severe Pain (7 - 10)  senna 2 Tablet(s) Oral at bedtime PRN Constipation  traMADol 50 milliGRAM(s) Oral every 8 hours PRN Moderate Pain (4 - 6)      Vital Signs Last 24 Hrs  T(C): 37.1 (18 Mar 2020 11:00), Max: 37.1 (18 Mar 2020 11:00)  T(F): 98.7 (18 Mar 2020 11:00), Max: 98.7 (18 Mar 2020 11:00)  HR: 83 (18 Mar 2020 11:00) (83 - 100)  BP: 104/66 (18 Mar 2020 11:00) (104/66 - 127/70)  BP(mean): --  RR: 18 (18 Mar 2020 11:00) (18 - 20)  SpO2: 98% (18 Mar 2020 11:00) (95% - 98%)    PHYSICAL EXAM:  GENERAL: NAD, well-groomed, well-developed  HEAD:  Atraumatic, Normocephalic  EYES: EOMI, PERRLA, conjunctiva and sclera clear  ENMT: No tonsillar erythema, exudates, or enlargement; Moist mucous membranes.  NECK: Supple, No JVD   NERVOUS SYSTEM:  Alert & Oriented,    CHEST/LUNG: CTA bilaterally; No rales, rhonchi, wheezing, or rubs  HEART: Regular rate and rhythm; No murmurs, rubs, or gallops  ABDOMEN: Soft, Nontender, Nondistended; Bowel sounds present  EXTREMITIES:  2+ Peripheral Pulses, left arm ecchymosis at left more prominent at left elbow .  SKIN: No petechiae    LABS:             cardiac markers     CAPILLARY BLOOD GLUCOSE        Cultures    RADIOLOGY & ADDITIONAL TESTS:    Imaging Personally Reviewed:  [ ] YES  [ ] NO    Consultant(s) Notes Reviewed:  [ ] YES  [ ] NO    Care Discussed with Consultants/Other Providers [ ] YES  [ ] NO

## 2020-03-19 LAB
ANION GAP SERPL CALC-SCNC: 6 MMOL/L — SIGNIFICANT CHANGE UP (ref 5–17)
BUN SERPL-MCNC: 16 MG/DL — SIGNIFICANT CHANGE UP (ref 7–23)
CALCIUM SERPL-MCNC: 8.3 MG/DL — LOW (ref 8.5–10.1)
CHLORIDE SERPL-SCNC: 109 MMOL/L — HIGH (ref 96–108)
CO2 SERPL-SCNC: 28 MMOL/L — SIGNIFICANT CHANGE UP (ref 22–31)
CREAT SERPL-MCNC: 0.99 MG/DL — SIGNIFICANT CHANGE UP (ref 0.5–1.3)
GLUCOSE SERPL-MCNC: 76 MG/DL — SIGNIFICANT CHANGE UP (ref 70–99)
HCT VFR BLD CALC: 33.3 % — LOW (ref 34.5–45)
HGB BLD-MCNC: 9.8 G/DL — LOW (ref 11.5–15.5)
MAGNESIUM SERPL-MCNC: 2.1 MG/DL — SIGNIFICANT CHANGE UP (ref 1.6–2.6)
MCHC RBC-ENTMCNC: 26.7 PG — LOW (ref 27–34)
MCHC RBC-ENTMCNC: 29.4 GM/DL — LOW (ref 32–36)
MCV RBC AUTO: 90.7 FL — SIGNIFICANT CHANGE UP (ref 80–100)
NRBC # BLD: 0 /100 WBCS — SIGNIFICANT CHANGE UP (ref 0–0)
PHOSPHATE SERPL-MCNC: 2.9 MG/DL — SIGNIFICANT CHANGE UP (ref 2.5–4.5)
PLATELET # BLD AUTO: 363 K/UL — SIGNIFICANT CHANGE UP (ref 150–400)
POTASSIUM SERPL-MCNC: 4.2 MMOL/L — SIGNIFICANT CHANGE UP (ref 3.5–5.3)
POTASSIUM SERPL-SCNC: 4.2 MMOL/L — SIGNIFICANT CHANGE UP (ref 3.5–5.3)
RBC # BLD: 3.67 M/UL — LOW (ref 3.8–5.2)
RBC # FLD: 16 % — HIGH (ref 10.3–14.5)
SODIUM SERPL-SCNC: 143 MMOL/L — SIGNIFICANT CHANGE UP (ref 135–145)
WBC # BLD: 5.11 K/UL — SIGNIFICANT CHANGE UP (ref 3.8–10.5)
WBC # FLD AUTO: 5.11 K/UL — SIGNIFICANT CHANGE UP (ref 3.8–10.5)

## 2020-03-19 PROCEDURE — 73080 X-RAY EXAM OF ELBOW: CPT | Mod: 26,LT

## 2020-03-19 PROCEDURE — 99232 SBSQ HOSP IP/OBS MODERATE 35: CPT

## 2020-03-19 RX ADMIN — PANTOPRAZOLE SODIUM 40 MILLIGRAM(S): 20 TABLET, DELAYED RELEASE ORAL at 07:43

## 2020-03-19 RX ADMIN — HEPARIN SODIUM 5000 UNIT(S): 5000 INJECTION INTRAVENOUS; SUBCUTANEOUS at 06:18

## 2020-03-19 RX ADMIN — Medication 650 MILLIGRAM(S): at 15:57

## 2020-03-19 RX ADMIN — HEPARIN SODIUM 5000 UNIT(S): 5000 INJECTION INTRAVENOUS; SUBCUTANEOUS at 17:06

## 2020-03-19 RX ADMIN — ATORVASTATIN CALCIUM 10 MILLIGRAM(S): 80 TABLET, FILM COATED ORAL at 21:27

## 2020-03-19 RX ADMIN — Medication 81 MILLIGRAM(S): at 11:18

## 2020-03-19 RX ADMIN — Medication 650 MILLIGRAM(S): at 17:04

## 2020-03-19 NOTE — DIETITIAN INITIAL EVALUATION ADULT. - PERTINENT MEDS FT
acetaminophen   Tablet .. PRN  amLODIPine   Tablet  aspirin enteric coated  atorvastatin  heparin  Injectable  influenza   Vaccine  LORazepam     Tablet PRN  morphine  - Injectable PRN  pantoprazole    Tablet  senna PRN  traMADol PRN

## 2020-03-19 NOTE — DIETITIAN INITIAL EVALUATION ADULT. - PHYSICAL APPEARANCE
BMI=22.5; no edema/underweight/other (specify)/debilitated Nutrition focused physical exam conducted: Subcutaneous fat loss: [moderate ] Orbital fat pads region, [ moderate]Buccal fat region, [severe ]Triceps region,  [WNL ]Ribs region.  Muscle wasting: [severe ]Temples region, [mild ]Clavicle region, [moderate ]Shoulder region, [unable ]Scapula region, [ moderate]Interosseous region,  [moderate ]thigh region, [moderate ]Calf region

## 2020-03-19 NOTE — DIETITIAN INITIAL EVALUATION ADULT. - PERTINENT LABORATORY DATA
03-19 Na 143 mmol/L Glu 76 mg/dL K+ 4.2 mmol/L Cr 0.99 mg/dL BUN 16 mg/dL Phos 2.9 mg/dL Alb 2.6(3/13)   PAB n/a   Hgb 9.8 g/dL<L> Hct 33.3 %<L> HgA1C n/a    Glucose, Serum: 76 mg/dL   24Hr FS:

## 2020-03-19 NOTE — PROGRESS NOTE ADULT - SUBJECTIVE AND OBJECTIVE BOX
Patient is a 89y old  Female who presents with a chief complaint of acute pain (17 Mar 2020 11:59), less pain today.       OVERNIGHT EVENTS:none      Vital Signs Last 24 Hrs  T(C): 37.1 (18 Mar 2020 11:00), Max: 37.1 (18 Mar 2020 11:00)  T(F): 98.7 (18 Mar 2020 11:00), Max: 98.7 (18 Mar 2020 11:00)  HR: 83 (18 Mar 2020 11:00) (83 - 100)  BP: 104/66 (18 Mar 2020 11:00) (104/66 - 127/70)  BP(mean): --  RR: 18 (18 Mar 2020 11:00) (18 - 20)  SpO2: 98% (18 Mar 2020 11:00) (95% - 98%)    PHYSICAL EXAM:  GENERAL: NAD, well-groomed, well-developed, elderly   HEAD:  Atraumatic, Normocephalic  EYES: EOMI, PERRLA, conjunctiva and sclera clear  ENMT: No tonsillar erythema, exudates, or enlargement; Moist mucous membranes.  NECK: Supple, No JVD   NERVOUS SYSTEM:  Alert & Oriented,    CHEST/LUNG: CTA bilaterally; No rales, rhonchi, wheezing, or rubs  HEART: Regular rate and rhythm; No murmurs, rubs, or gallops  ABDOMEN: Soft, Nontender, Nondistended; Bowel sounds present  EXTREMITIES:  2+ Peripheral Pulses, left arm ecchymosis at left more prominent at left elbow .  SKIN: No petechiae    LABS:             cardiac markers     CAPILLARY BLOOD GLUCOSE        Cultures    RADIOLOGY & ADDITIONAL TESTS:    Imaging Personally Reviewed:  [ ] YES  [ ] NO    Consultant(s) Notes Reviewed:  [ ] YES  [ ] NO    Care Discussed with Consultants/Other Providers [ ] YES  [ ] NO

## 2020-03-19 NOTE — DIETITIAN INITIAL EVALUATION ADULT. - ETIOLOGY
Severe malnutrition in the context of acute illness Inadequate energy & protein intake related to compression fracture of T5 vertebrae

## 2020-03-19 NOTE — DIETITIAN INITIAL EVALUATION ADULT. - SIGNS/SYMPTOMS
Inadequate energy & protein intake related to compression fracture of T5 vertebrae Moderate-severe fat loss & muscle wasting & 8.7kg (14%) wt loss x 2 1/2 months

## 2020-03-19 NOTE — CHART NOTE - NSCHARTNOTEFT_GEN_A_CORE
Upon Nutritional Assessment by the Registered Dietitian your patient was determined to meet criteria / has evidence of the following diagnosis/diagnoses:          [ ]  Mild Protein Calorie Malnutrition        [ ]  Moderate Protein Calorie Malnutrition        [x ] Severe Protein Calorie Malnutrition        [ ] Unspecified Protein Calorie Malnutrition        [ ] Underweight / BMI <19        [ ] Morbid Obesity / BMI > 40      Findings as based on:  •  Comprehensive nutrition assessment and consultation  •  Calorie counts (nutrient intake analysis)  •  Food acceptance and intake status from observations by staff  •  Follow up  •  Patient education  •  Intervention secondary to interdisciplinary rounds  •   concerns      Treatment:    The following diet has been recommended:  Low Na/Ensure Enlive 3 x day(1050kcal & 60gm protein)    PROVIDER Section:     By signing this assessment you are acknowledging and agree with the diagnosis/diagnoses assigned by the Registered Dietitian    Comments:

## 2020-03-19 NOTE — DIETITIAN INITIAL EVALUATION ADULT. - OTHER INFO
Pt lives with daughter who is unable to care for pt. Pt with recent hospitalization 1/2020 & poor po intake & recent d/c from [facility] last month admitted with intractable lower back pain with compression fracture of T5 vertebrae & now c/o Lt arm pain. Pt pending d/c to nursing home.    Unable to obtain recent diet hx due to pt confused. Pt presents with poor po intake during hospitalization 0-50% meals. Pt with upper dentures & not wearing lower dentures due to sore gums. Pt presents with sign wt loss 8.7kg(14%) x 2 1/2 months. Last BM x 1(3/18).

## 2020-03-19 NOTE — PROGRESS NOTE ADULT - ASSESSMENT
89 year old female w/PMH of HTN, HLD, [facility]. Pt was diagnosed w/stress fracture last week on Tramadol, Naprosyn and Tylenol.   She was released from [facility] rehab last month, on February 21st, able to ambulate with a walker .  Pt has been found screaming and crying in pain per daughter who has been caring for her.  Her daughter is unable to care for her now, and was brought to the ER.   She denies headache, dizziness, chest pain, SOB, dysuria, hematuria or abdominal pain.,  any recent travel or sick contacts.    A/P    Intractable lower back pain: improved  - Cont pain meds as needed.    - SW eval.  - will need long term care. await [facility]     S/p fall    - No injury noted per my colleague   - fall precautions    HTN  - continue home meds .    chronic gastritis  - on Protonix    DVT  - sc heparin.    severe protein malnutrition   nutrition consult   left arm ecchymosis : may be due to fall per nursing which occurred prior to me inheriting patient, supportive acre , checked cbc and stable with no change from prior   D/c planning to rehab  PT ha noted, recommended [facility]. and await rehab [facility]   Patient is medically stable. 89 year old female w/PMH of HTN, HLD, presents to the ED today [facility] for lower back pain. Pt was diagnosed w/stress fracture last week on Tramadol, Naprosyn and Tylenol.   She was released from [facility] rehab last month, on February 21st, able to ambulate with a walker .  Pt has been found screaming and crying in pain per daughter who has been caring for her.  Her daughter is unable to care for her now, and was brought to the ER.   She denies headache, dizziness, chest pain, SOB, dysuria, hematuria or abdominal pain.,  any recent travel or sick contacts.    A/P    Intractable lower back pain: improved  - Cont pain meds as needed.    - SW eval.  - will need long term care. await [facility]     S/p fall    - No injury noted per my colleague   - fall precautions    HTN  - continue home meds .    chronic gastritis  - on Protonix    DVT  - sc heparin.    severe protein malnutrition   nutrition consult   left arm ecchymosis : may be due to fall per nursing which occurred prior to me inheriting patient, supportive acre , checked cbc and stable with no change from prior . vicky get xray   D/c planning to rehab  PT ha noted, recommended [facility]. and await rehab placemnet   Patient is medically stable.

## 2020-03-20 PROCEDURE — 99232 SBSQ HOSP IP/OBS MODERATE 35: CPT

## 2020-03-20 PROCEDURE — 24670 CLTX ULNAR FX PROX W/O MNPJ: CPT | Mod: LT

## 2020-03-20 PROCEDURE — 99221 1ST HOSP IP/OBS SF/LOW 40: CPT | Mod: 24,GC,57

## 2020-03-20 RX ADMIN — ATORVASTATIN CALCIUM 10 MILLIGRAM(S): 80 TABLET, FILM COATED ORAL at 21:26

## 2020-03-20 RX ADMIN — TRAMADOL HYDROCHLORIDE 50 MILLIGRAM(S): 50 TABLET ORAL at 21:22

## 2020-03-20 RX ADMIN — HEPARIN SODIUM 5000 UNIT(S): 5000 INJECTION INTRAVENOUS; SUBCUTANEOUS at 17:05

## 2020-03-20 RX ADMIN — PANTOPRAZOLE SODIUM 40 MILLIGRAM(S): 20 TABLET, DELAYED RELEASE ORAL at 07:54

## 2020-03-20 RX ADMIN — TRAMADOL HYDROCHLORIDE 50 MILLIGRAM(S): 50 TABLET ORAL at 22:30

## 2020-03-20 RX ADMIN — Medication 81 MILLIGRAM(S): at 11:10

## 2020-03-20 RX ADMIN — Medication 650 MILLIGRAM(S): at 08:01

## 2020-03-20 RX ADMIN — HEPARIN SODIUM 5000 UNIT(S): 5000 INJECTION INTRAVENOUS; SUBCUTANEOUS at 06:36

## 2020-03-20 RX ADMIN — Medication 650 MILLIGRAM(S): at 09:00

## 2020-03-20 RX ADMIN — AMLODIPINE BESYLATE 5 MILLIGRAM(S): 2.5 TABLET ORAL at 06:36

## 2020-03-20 NOTE — CONSULT NOTE ADULT - SUBJECTIVE AND OBJECTIVE BOX
89 yo Female RHD s/p Left hemiarthroplasty by Dr. Encarnacion in January, admitted on the floor with intractable lower back pain. She is AOx1 and history was largely obtained from chart review. She had a fall on hospital day 1 and noted to have increasing left elbow ecchymosis. On exam she reports pain to the left elbow, no pain elsewhere.     PMH:  Other chronic gastritis  Hypercholesterolemia  Benign hypertension  High cholesterol  HTN (hypertension)    PSH:  No significant past surgical history    AH:  Meds: See med rec    T(C): 36.3 (03-20-20 @ 06:45)  HR: 89 (03-20-20 @ 06:45)  BP: 141/74 (03-20-20 @ 06:45)  RR: 18 (03-20-20 @ 06:45)  SpO2: 96% (03-20-20 @ 06:45)  Wt(kg): --    PE L UE:  Skin intact, diffuse ecchymosis about the left elbow, + mild soft tissue swelling, Decreased ROM of the elbow limited secondary to pain. ROM  at the elbow with pain at extermes of ROM. No TTP about the wrist/hand/shoulder. + Rad Pulse 2+/4. SILT C5-T1, +AIN/PIN/Ulnar/Radial/Musc/Median, soft compartments;    SECONDARY EXAM: Benign, Skin intact, SILT throughout, motor grossly intact throughout, no other orthopedic injuries at this time, compartments soft and compressible    SPINE: Skin intact, no bony tenderness or step-offs appreciated throughout cervical/thoracic/lumbar/sacral spine    R UE: Skin intact, no erythema, ecchymosis, edema, gross deformity, NTTP over the bony prominences of the shoulder/elbow/wrist/hand, painless passive/active ROM of the shoulder/elbow/wrist/hand, C5-T1 SILT, motor grossly intact throughout axillary/musculocutaenous/radial/median/ulnar nerves, + radial pulse    R LE: Skin intact, no erythema, ecchymosis, edema, gross deformity, NTTP over the bony prominences of the hip/knee/ankle/foot, painless passive/active ROM of the hip/knee/ankle/foot, L2-S1 SILT, motor grossly intact throughout hip flexors/quads/hams/TA/EHL/FHL/GSC, + DP/PT pulses, no pain with log roll, no pain on axial loading, compartments soft and compressible, calves nontender      Imaging:  XRay L Elbow  3 views of l elbow demonstrates intraarticular olecranon fx with minimal displacement. No other fx/dislocations noted.   CT L Sp reviewed from this admission, showing that the hemiarthroplasty is reduced.    A/P: 89yFemale s/p Mech Fall w/ L olecranon fracture  - Pain control  - Strict Ice/Elevation  - NWB LUE in sling for comfort only  - elbow skin/sound checks while at rehab  - Encourage ROM of the wrist and elbow as tolerated  - No acute orthopaedic surgical intervention at this time.  - Outpatient follow up with attending on call, Dr. Encarnacion in 1-2 weeks following discharge. Please call office for an appointment.  - Ortho stable for discharge

## 2020-03-20 NOTE — OCCUPATIONAL THERAPY INITIAL EVALUATION ADULT - ADDITIONAL COMMENTS
Pt is a poor historian, PLOF and home set up confirmed with daughter Dianne. Pt's daughter reports the following, Pt lives with daughter in a private house with 3 ROSANNA with bilateral hand rails (wide apart). Pt's bedroom and bathroom are on the first floor, no steps inside to negotiate. Bathroom contains a walk in shower with standard toilet. Pt was recently discharged from [facility] and as of February Pt required assist with ADL's and supervision for functional transfers/ ambulation with use of rolling walker. Daughter reports she is unable to care for Pt and is looking to have Pt discharged to a nursing home.  DME/AE: lightweight wc, rolling walker, 3:1 commode

## 2020-03-20 NOTE — PROGRESS NOTE ADULT - ASSESSMENT
89 year old female w/PMH of HTN, HLD, [facility]. Pt was diagnosed w/stress fracture last week on Tramadol, Naprosyn and Tylenol.   She was released from [facility] rehab last month, on February 21st, able to ambulate with a walker .  Pt has been found screaming and crying in pain per daughter who has been caring for her.  Her daughter is unable to care for her now, and was brought to the ER.   She denies headache, dizziness, chest pain, SOB, dysuria, hematuria or abdominal pain.,  any recent travel or sick contacts.    A/P    Intractable lower back pain: improved  - Cont pain meds as needed.    - SW eval.  - will need long term care. await [facility] nursing home     S/p fall    - No injury noted per my colleague   - fall precautions    HTN  - continue home meds .    chronic gastritis  - on Protonix    DVT  - sc heparin.    severe protein malnutrition   nutrition consult     left ulnar fracture per nursing s/p fall on 3/14/2020 and has fracture at ulnar  consulted ortho recommendation noted no surgical intervention. will be placed in sling for comfort . pain meds as needed     D/c planning to rehab d/w SW to arrange    Patient is medically stable.

## 2020-03-20 NOTE — OCCUPATIONAL THERAPY INITIAL EVALUATION ADULT - BALANCE TRAINING, PT EVAL
Pt will improve standing balance to Fair- to facilitate safe functional transfers by the end of 2 weeks.

## 2020-03-20 NOTE — OCCUPATIONAL THERAPY INITIAL EVALUATION ADULT - PERTINENT HX OF CURRENT PROBLEM, REHAB EVAL
Pt seen in ED on 3/13 with complaint of mid/low back pain( stress fracture diagnosed a week prior to admission to hospital) Pt fell in hospital during admission resulting in L proximal ulna and olecranon fracture + [address]

## 2020-03-20 NOTE — CONSULT NOTE ADULT - ATTENDING COMMENTS
discussed w team.  sling for comfort.  rom as tolerated.  f/u xray 1 month discussed w team.  sling for comfort.  rom as tolerated.  f/u xray 1 month    s/p L hip hemiarthroplasty for FN fx 1/6/20. wbat. PT, gait training. activities as tolerated.  will plan for repeat xray in 1 month at same time as elbow xray, if possible. patient seen and examined. discussed w team.  agree with resident assessment and plan. sling for comfort.  rom as tolerated.  f/u xray 1 month    s/p L hip hemiarthroplasty for FN fx 1/6/20. wbat. PT, gait training. activities as tolerated.  will plan for repeat xray in 1 month at same time as elbow xray, if possible.

## 2020-03-20 NOTE — OCCUPATIONAL THERAPY INITIAL EVALUATION ADULT - GENERAL OBSERVATIONS, REHAB EVAL
Chart reviewed and events to date noted. Spoke with Dr. Rodrigues #[id number] Pt is NWB LUE s/p proximal ulna and olecranon facture. Donned sling to LUE prior to OT assessment.  Pt c/o L elbow pain. Pt presents with functional deficits in ADL's, bed mobility and functional transfers related to NWB LUE, decreased strength, endurance, pain, standing tolerance, and static/ dynamic standing balance. Chart reviewed and events to date noted. Spoke with Dr. Rodrigues #[id number] Pt is NWB LUE s/p proximal ulna and olecranon facture. Donned sling to LUE prior to OT assessment. Pt is A&OX1.  Pt c/o L elbow pain. Pt presents with functional deficits in ADL's, bed mobility and functional transfers related to NWB LUE, decreased strength, endurance, pain, standing tolerance, and static/ dynamic standing balance.

## 2020-03-20 NOTE — OCCUPATIONAL THERAPY INITIAL EVALUATION ADULT - RANGE OF MOTION EXAMINATION, UPPER EXTREMITY
Right UE Active ROM was WFL (within functional limits) LUE: unable to assess elbow ROM s/p Olecranon fracture, wrist and digit AROM WFL/Right UE Active ROM was WFL (within functional limits)

## 2020-03-21 PROCEDURE — 99232 SBSQ HOSP IP/OBS MODERATE 35: CPT

## 2020-03-21 RX ORDER — KETOROLAC TROMETHAMINE 30 MG/ML
15 SYRINGE (ML) INJECTION ONCE
Refills: 0 | Status: DISCONTINUED | OUTPATIENT
Start: 2020-03-21 | End: 2020-03-21

## 2020-03-21 RX ORDER — TRAMADOL HYDROCHLORIDE 50 MG/1
50 TABLET ORAL EVERY 8 HOURS
Refills: 0 | Status: DISCONTINUED | OUTPATIENT
Start: 2020-03-21 | End: 2020-03-23

## 2020-03-21 RX ADMIN — HEPARIN SODIUM 5000 UNIT(S): 5000 INJECTION INTRAVENOUS; SUBCUTANEOUS at 18:47

## 2020-03-21 RX ADMIN — Medication 15 MILLIGRAM(S): at 18:48

## 2020-03-21 RX ADMIN — AMLODIPINE BESYLATE 5 MILLIGRAM(S): 2.5 TABLET ORAL at 05:14

## 2020-03-21 RX ADMIN — Medication 81 MILLIGRAM(S): at 11:17

## 2020-03-21 RX ADMIN — Medication 15 MILLIGRAM(S): at 19:03

## 2020-03-21 RX ADMIN — HEPARIN SODIUM 5000 UNIT(S): 5000 INJECTION INTRAVENOUS; SUBCUTANEOUS at 05:15

## 2020-03-21 RX ADMIN — Medication 0.5 MILLIGRAM(S): at 01:31

## 2020-03-21 RX ADMIN — ATORVASTATIN CALCIUM 10 MILLIGRAM(S): 80 TABLET, FILM COATED ORAL at 21:18

## 2020-03-21 RX ADMIN — Medication 650 MILLIGRAM(S): at 11:24

## 2020-03-21 RX ADMIN — Medication 650 MILLIGRAM(S): at 12:24

## 2020-03-21 RX ADMIN — PANTOPRAZOLE SODIUM 40 MILLIGRAM(S): 20 TABLET, DELAYED RELEASE ORAL at 08:44

## 2020-03-21 NOTE — PROGRESS NOTE ADULT - SUBJECTIVE AND OBJECTIVE BOX
Patient is a 89y old  Female who presents with a chief complaint of acute pain (17 Mar 2020 11:59), less pain today.       OVERNIGHT EVENTS:none     MEDICATIONS  (STANDING):  amLODIPine   Tablet 5 milliGRAM(s) Oral daily  aspirin enteric coated 81 milliGRAM(s) Oral daily  atorvastatin 10 milliGRAM(s) Oral at bedtime  heparin  Injectable 5000 Unit(s) SubCutaneous every 12 hours  influenza   Vaccine 0.5 milliLiter(s) IntraMuscular once  pantoprazole    Tablet 40 milliGRAM(s) Oral before breakfast    MEDICATIONS  (PRN):  acetaminophen   Tablet .. 650 milliGRAM(s) Oral every 6 hours PRN Temp greater or equal to 38C (100.4F), Mild Pain (1 - 3)  LORazepam     Tablet 0.5 milliGRAM(s) Oral every 12 hours PRN Anxiety  morphine  - Injectable 2 milliGRAM(s) IV Push every 6 hours PRN Severe Pain (7 - 10)  senna 2 Tablet(s) Oral at bedtime PRN Constipation      Vital Signs Last 24 Hrs  T(C): 36.2 (21 Mar 2020 11:46), Max: 36.9 (20 Mar 2020 14:57)  T(F): 97.1 (21 Mar 2020 11:46), Max: 98.5 (20 Mar 2020 14:57)  HR: 78 (21 Mar 2020 11:46) (78 - 102)  BP: 113/59 (21 Mar 2020 11:46) (101/65 - 135/75)  BP(mean): --  RR: 18 (21 Mar 2020 11:46) (17 - 18)  SpO2: 97% (21 Mar 2020 11:46) (95% - 99%)      PHYSICAL EXAM:  GENERAL: NAD, well-groomed, well-developed, elderly   HEAD:  Atraumatic, Normocephalic  EYES: EOMI, PERRLA, conjunctiva and sclera clear  ENMT: No tonsillar erythema, exudates, or enlargement; Moist mucous membranes.  NECK: Supple, No JVD   NERVOUS SYSTEM:  Alert & Oriented,    CHEST/LUNG: CTA bilaterally; No rales, rhonchi, wheezing, or rubs  HEART: Regular rate and rhythm; No murmurs, rubs, or gallops  ABDOMEN: Soft, Nontender, Nondistended; Bowel sounds present  EXTREMITIES:  2+ Peripheral Pulses, left arm ecchymosis at left more prominent at left elbow .  SKIN: No petechiae    LABS:                              cardiac markers     CAPILLARY BLOOD GLUCOSE        Cultures    RADIOLOGY & ADDITIONAL TESTS:  < from: Xray Elbow AP + Lateral + Oblique, Left (03.19.20 @ 16:36) >  IMPRESSION: Proximal ulnar fracture.        < end of copied text >      Imaging Personally Reviewed:  [ ] YES  [ ] NO    Consultant(s) Notes Reviewed:  [ ] YES  [ ] NO    Care Discussed with Consultants/Other Providers [ ] YES  [ ] NO

## 2020-03-21 NOTE — PROGRESS NOTE ADULT - ASSESSMENT
89 year old female w/PMH of HTN, HLD, [facility]. Pt was diagnosed w/stress fracture last week on Tramadol, Naprosyn and Tylenol.   She was released from [facility] rehab last month, on February 21st, able to ambulate with a walker .  Pt has been found screaming and crying in pain per daughter who has been caring for her.  Her daughter is unable to care for her now, and was brought to the ER.   She denies headache, dizziness, chest pain, SOB, dysuria, hematuria or abdominal pain.,  any recent travel or sick contacts.    A/P    Intractable lower back pain: improved  - Cont pain meds as needed.    - SW eval.  - will need long term care. await [facility] nursing home     S/p fall    - No injury noted per my colleague   - fall precautions    HTN  - continue home meds .    chronic gastritis  - on Protonix    DVT  - sc heparin.    severe protein malnutrition   nutrition consult     left ulnar fracture per nursing s/p fall on 3/14/2020 and has fracture at ulnar  consulted ortho recommendation noted no surgical intervention. will be placed in sling for comfort . pain meds as needed     D/c planning to rehab d/w SW to arrange    Patient is medically stable.

## 2020-03-22 PROCEDURE — 99232 SBSQ HOSP IP/OBS MODERATE 35: CPT

## 2020-03-22 RX ORDER — NYSTATIN CREAM 100000 [USP'U]/G
1 CREAM TOPICAL EVERY 12 HOURS
Refills: 0 | Status: DISCONTINUED | OUTPATIENT
Start: 2020-03-22 | End: 2020-03-23

## 2020-03-22 RX ADMIN — HEPARIN SODIUM 5000 UNIT(S): 5000 INJECTION INTRAVENOUS; SUBCUTANEOUS at 17:43

## 2020-03-22 RX ADMIN — AMLODIPINE BESYLATE 5 MILLIGRAM(S): 2.5 TABLET ORAL at 05:33

## 2020-03-22 RX ADMIN — NYSTATIN CREAM 1 APPLICATION(S): 100000 CREAM TOPICAL at 17:43

## 2020-03-22 RX ADMIN — PANTOPRAZOLE SODIUM 40 MILLIGRAM(S): 20 TABLET, DELAYED RELEASE ORAL at 11:40

## 2020-03-22 RX ADMIN — TRAMADOL HYDROCHLORIDE 50 MILLIGRAM(S): 50 TABLET ORAL at 13:20

## 2020-03-22 RX ADMIN — Medication 81 MILLIGRAM(S): at 13:07

## 2020-03-22 RX ADMIN — HEPARIN SODIUM 5000 UNIT(S): 5000 INJECTION INTRAVENOUS; SUBCUTANEOUS at 05:33

## 2020-03-22 RX ADMIN — TRAMADOL HYDROCHLORIDE 50 MILLIGRAM(S): 50 TABLET ORAL at 14:20

## 2020-03-22 RX ADMIN — ATORVASTATIN CALCIUM 10 MILLIGRAM(S): 80 TABLET, FILM COATED ORAL at 21:33

## 2020-03-22 NOTE — PROGRESS NOTE ADULT - ASSESSMENT
89 year old female w/PMH of HTN, HLD, [facility]. Pt was diagnosed w/stress fracture last week on Tramadol, Naprosyn and Tylenol.   She was released from [facility] rehab last month, on February 21st, able to ambulate with a walker .  Pt has been found screaming and crying in pain per daughter who has been caring for her.  Her daughter is unable to care for her now, and was brought to the ER.   She denies headache, dizziness, chest pain, SOB, dysuria, hematuria or abdominal pain.,  any recent travel or sick contacts.    A/P    Intractable lower back pain: improved  - Cont pain meds as needed.    - SW eval.  - will need long term care. await [facility] nursing home     S/p fall    - No injury noted per my colleague   - fall precautions    HTN  - continue home meds .    chronic gastritis  - on Protonix    DVT  - sc heparin.    severe protein malnutrition   nutrition consult     left ulnar fracture per nursing s/p fall on 3/14/2020 and has fracture at ulnar  consulted ortho recommendation noted no surgical intervention. will be placed in sling for comfort . pain meds as needed     D/c planning to rehab d/w SW to arrange    Patient is medically stable.

## 2020-03-22 NOTE — PROGRESS NOTE ADULT - SUBJECTIVE AND OBJECTIVE BOX
Patient is a 89y old  Female who presents with a chief complaint of acute pain (17 Mar 2020 11:59), less pain today.       OVERNIGHT EVENTS:none     MEDICATIONS  (STANDING):  amLODIPine   Tablet 5 milliGRAM(s) Oral daily  aspirin enteric coated 81 milliGRAM(s) Oral daily  atorvastatin 10 milliGRAM(s) Oral at bedtime  heparin  Injectable 5000 Unit(s) SubCutaneous every 12 hours  influenza   Vaccine 0.5 milliLiter(s) IntraMuscular once  nystatin Ointment 1 Application(s) Topical every 12 hours  pantoprazole    Tablet 40 milliGRAM(s) Oral before breakfast    MEDICATIONS  (PRN):  acetaminophen   Tablet .. 650 milliGRAM(s) Oral every 6 hours PRN Temp greater or equal to 38C (100.4F), Mild Pain (1 - 3)  senna 2 Tablet(s) Oral at bedtime PRN Constipation  traMADol 50 milliGRAM(s) Oral every 8 hours PRN Moderate Pain (4 - 6)    Vital Signs Last 24 Hrs  T(C): 36.6 (22 Mar 2020 10:04), Max: 36.8 (22 Mar 2020 05:25)  T(F): 97.8 (22 Mar 2020 10:04), Max: 98.3 (22 Mar 2020 05:25)  HR: 96 (22 Mar 2020 10:04) (77 - 96)  BP: 104/54 (22 Mar 2020 10:04) (104/54 - 127/67)  BP(mean): --  RR: 18 (22 Mar 2020 10:04) (18 - 18)  SpO2: 98% (22 Mar 2020 10:04) (98% - 99%)    PHYSICAL EXAM:  GENERAL: NAD, well-groomed, well-developed, elderly   HEAD:  Atraumatic, Normocephalic  EYES: EOMI, PERRLA, conjunctiva and sclera clear  ENMT: No tonsillar erythema, exudates, or enlargement; Moist mucous membranes.  NECK: Supple, No JVD   NERVOUS SYSTEM:  Alert & Oriented,    CHEST/LUNG: CTA bilaterally; No rales, rhonchi, wheezing, or rubs  HEART: Regular rate and rhythm; No murmurs, rubs, or gallops  ABDOMEN: Soft, Nontender, Nondistended; Bowel sounds present  EXTREMITIES:  2+ Peripheral Pulses, left arm ecchymosis at left more prominent at left elbow .  SKIN: No petechiae    LABS:                              cardiac markers     CAPILLARY BLOOD GLUCOSE        Cultures    RADIOLOGY & ADDITIONAL TESTS:  < from: Xray Elbow AP + Lateral + Oblique, Left (03.19.20 @ 16:36) >  IMPRESSION: Proximal ulnar fracture.        < end of copied text >      Imaging Personally Reviewed:  [ ] YES  [ ] NO    Consultant(s) Notes Reviewed:  [ ] YES  [ ] NO    Care Discussed with Consultants/Other Providers [ ] YES  [ ] NO

## 2020-03-23 ENCOUNTER — TRANSCRIPTION ENCOUNTER (OUTPATIENT)
Age: 85
End: 2020-03-23

## 2020-03-23 VITALS
TEMPERATURE: 97 F | SYSTOLIC BLOOD PRESSURE: 128 MMHG | OXYGEN SATURATION: 94 % | RESPIRATION RATE: 20 BRPM | DIASTOLIC BLOOD PRESSURE: 62 MMHG | HEART RATE: 89 BPM

## 2020-03-23 PROCEDURE — 99239 HOSP IP/OBS DSCHRG MGMT >30: CPT

## 2020-03-23 RX ORDER — AMLODIPINE BESYLATE 2.5 MG/1
1 TABLET ORAL
Qty: 0 | Refills: 0 | DISCHARGE
Start: 2020-03-23

## 2020-03-23 RX ORDER — NYSTATIN CREAM 100000 [USP'U]/G
1 CREAM TOPICAL
Qty: 0 | Refills: 0 | DISCHARGE
Start: 2020-03-23

## 2020-03-23 RX ADMIN — NYSTATIN CREAM 1 APPLICATION(S): 100000 CREAM TOPICAL at 05:20

## 2020-03-23 RX ADMIN — Medication 81 MILLIGRAM(S): at 11:33

## 2020-03-23 RX ADMIN — PANTOPRAZOLE SODIUM 40 MILLIGRAM(S): 20 TABLET, DELAYED RELEASE ORAL at 07:57

## 2020-03-23 RX ADMIN — AMLODIPINE BESYLATE 5 MILLIGRAM(S): 2.5 TABLET ORAL at 05:19

## 2020-03-23 RX ADMIN — HEPARIN SODIUM 5000 UNIT(S): 5000 INJECTION INTRAVENOUS; SUBCUTANEOUS at 05:19

## 2020-03-23 NOTE — DISCHARGE NOTE NURSING/CASE MANAGEMENT/SOCIAL WORK - PATIENT PORTAL LINK FT
You can access the FollowMyHealth Patient Portal offered by [facility] by registering at the following website: [email or web address]. By joining [employer]’s FollowMyHealth portal, you will also be able to view your health information using other applications (apps) compatible with our system.

## 2020-03-23 NOTE — CHART NOTE - NSCHARTNOTEFT_GEN_A_CORE
Assessment: Pt seen for follow-up & continues Acute severe malnutrition.  Pt with s/p fall with fx of ulnar. Last BM x 1(3/22).  Pending d/c to rehab today.     Factors impacting intake: [x ] none [ ] nausea  [ ] vomiting [ ] diarrhea [ ] constipation  [ ]chewing problems [ ] swallowing issues  [ ] other:     Diet Prescription: Diet, Regular:   Low Sodium  Supplement Feeding Modality:  Oral  Ensure Enlive Cans or Servings Per Day:  1       Frequency:  Three Times a day (03-19-20 @ 08:29)    Intake: Po intake 50-75% meals & supplements feeds self pc tray prep.     Current Weight: Wt=54.1kg(3/19/2020),  Wt=53.6kg(3/13/2020)  % Weight Change  0.5kg wt gain x 1 week    Physical appearance: +1 edema Lt arm    Pertinent Medications: MEDICATIONS  (STANDING):  amLODIPine   Tablet 5 milliGRAM(s) Oral daily  aspirin enteric coated 81 milliGRAM(s) Oral daily  atorvastatin 10 milliGRAM(s) Oral at bedtime  heparin  Injectable 5000 Unit(s) SubCutaneous every 12 hours  influenza   Vaccine 0.5 milliLiter(s) IntraMuscular once  nystatin Ointment 1 Application(s) Topical every 12 hours  pantoprazole    Tablet 40 milliGRAM(s) Oral before breakfast    MEDICATIONS  (PRN):  acetaminophen   Tablet .. 650 milliGRAM(s) Oral every 6 hours PRN Temp greater or equal to 38C (100.4F), Mild Pain (1 - 3)  senna 2 Tablet(s) Oral at bedtime PRN Constipation  traMADol 50 milliGRAM(s) Oral every 8 hours PRN Moderate Pain (4 - 6)    Pertinent Labs: 03-19 Na 143 mmol/L Glu 76 mg/dL K+ 4.2 mmol/L Cr 0.99 mg/dL BUN 16 mg/dL Phos 2.9 mg/dL Alb 2.6(3/13)   PAB n/a   Hgb 9.8 g/dL<L> Hct 33.3 %<L> HgA1C n/a     24Hr FS:  Skin: intact    Estimated Needs:   [x ] no change since previous assessment (3/866424)  [ ] recalculated:     Previous Nutrition Diagnosis: Malnutrition Severe malnutrition in the context of acute illness.     Etiology Inadequate energy & protein intake related to compression fracture of T5 vertebrae.    Signs/Symptoms Moderate-severe fat loss & muscle wasting & 8.7kg (14%) wt loss x 2 1/2 months.    Goal/Expected Outcome Pt to consume >75% meals/supplements; Goal not met  Nutrition Diagnosis is [x ] ongoing  [ ] resolved  [ ] improved  [ ] not applicable       New Nutrition Diagnosis: [x ] not applicable       Interventions:   Recommend  [x ] Continue: Regular/Ensure Enlive 3 x day(1050kcal & 60gm protein)  [ ] Change Diet To:  [ ] Nutrition Supplement:  [ ] Nutrition Support:  [ ] Other:     Monitoring and Evaluation:   [x ] PO intake [ x ] Tolerance to diet prescription [ x ] weights [ x ] labs[ x ] follow up per protocol  [ ] other:

## 2020-03-26 DIAGNOSIS — E78.5 HYPERLIPIDEMIA, UNSPECIFIED: ICD-10-CM

## 2020-03-26 DIAGNOSIS — I10 ESSENTIAL (PRIMARY) HYPERTENSION: ICD-10-CM
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
## 2020-03-26 DIAGNOSIS — S52.022A DISPLACED FRACTURE OF OLECRANON PROCESS WITHOUT INTRAARTICULAR EXTENSION OF LEFT ULNA, INITIAL ENCOUNTER FOR CLOSED FRACTURE: ICD-10-CM

## 2020-03-26 DIAGNOSIS — M48.54XS COLLAPSED VERTEBRA, NOT ELSEWHERE CLASSIFIED, THORACIC REGION, SEQUELA OF FRACTURE: ICD-10-CM
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
## 2020-03-26 DIAGNOSIS — M54.5 LOW BACK PAIN: ICD-10-CM

## 2020-03-26 DIAGNOSIS — K29.50 UNSPECIFIED CHRONIC GASTRITIS WITHOUT BLEEDING: ICD-10-CM

## 2020-03-26 DIAGNOSIS — E43 UNSPECIFIED SEVERE PROTEIN-CALORIE MALNUTRITION: ICD-10-CM

## 2020-04-22 ENCOUNTER — INPATIENT (INPATIENT)
Facility: HOSPITAL | Age: 85
LOS: 15 days | Discharge: NURSING, REHAB OR LONG-TERM CARE | End: 2020-05-08
Attending: STUDENT IN AN ORGANIZED HEALTH CARE EDUCATION/TRAINING PROGRAM | Admitting: STUDENT IN AN ORGANIZED HEALTH CARE EDUCATION/TRAINING PROGRAM
Payer: MEDICARE

## 2020-04-22 VITALS
DIASTOLIC BLOOD PRESSURE: 62 MMHG | SYSTOLIC BLOOD PRESSURE: 115 MMHG | HEART RATE: 87 BPM | RESPIRATION RATE: 18 BRPM | TEMPERATURE: 98 F | OXYGEN SATURATION: 95 %

## 2020-04-22 DIAGNOSIS — Z01.818 ENCOUNTER FOR OTHER PREPROCEDURAL EXAMINATION: ICD-10-CM

## 2020-04-22 DIAGNOSIS — K29.50 UNSPECIFIED CHRONIC GASTRITIS WITHOUT BLEEDING: ICD-10-CM

## 2020-04-22 DIAGNOSIS — I10 ESSENTIAL (PRIMARY) HYPERTENSION: ICD-10-CM

## 2020-04-22 DIAGNOSIS — S73.005A UNSPECIFIED DISLOCATION OF LEFT HIP, INITIAL ENCOUNTER: ICD-10-CM

## 2020-04-22 DIAGNOSIS — N39.0 URINARY TRACT INFECTION, SITE NOT SPECIFIED: ICD-10-CM

## 2020-04-22 DIAGNOSIS — Z29.9 ENCOUNTER FOR PROPHYLACTIC MEASURES, UNSPECIFIED: ICD-10-CM

## 2020-04-22 LAB
ALBUMIN SERPL ELPH-MCNC: 2.8 G/DL — LOW (ref 3.3–5)
ALP SERPL-CCNC: 185 U/L — HIGH (ref 40–120)
ALT FLD-CCNC: 10 U/L — SIGNIFICANT CHANGE UP (ref 4–33)
ANION GAP SERPL CALC-SCNC: 12 MMO/L — SIGNIFICANT CHANGE UP (ref 7–14)
APPEARANCE UR: CLEAR — SIGNIFICANT CHANGE UP
APTT BLD: 42.3 SEC — HIGH (ref 27.5–36.3)
AST SERPL-CCNC: 18 U/L — SIGNIFICANT CHANGE UP (ref 4–32)
BACTERIA # UR AUTO: SIGNIFICANT CHANGE UP
BASOPHILS # BLD AUTO: 0.02 K/UL — SIGNIFICANT CHANGE UP (ref 0–0.2)
BASOPHILS NFR BLD AUTO: 0.1 % — SIGNIFICANT CHANGE UP (ref 0–2)
BILIRUB SERPL-MCNC: 0.3 MG/DL — SIGNIFICANT CHANGE UP (ref 0.2–1.2)
BILIRUB UR-MCNC: NEGATIVE — SIGNIFICANT CHANGE UP
BLD GP AB SCN SERPL QL: POSITIVE — SIGNIFICANT CHANGE UP
BLOOD UR QL VISUAL: NEGATIVE — SIGNIFICANT CHANGE UP
BUN SERPL-MCNC: 30 MG/DL — HIGH (ref 7–23)
CALCIUM SERPL-MCNC: 9.4 MG/DL — SIGNIFICANT CHANGE UP (ref 8.4–10.5)
CHLORIDE SERPL-SCNC: 106 MMOL/L — SIGNIFICANT CHANGE UP (ref 98–107)
CO2 SERPL-SCNC: 24 MMOL/L — SIGNIFICANT CHANGE UP (ref 22–31)
COLOR SPEC: SIGNIFICANT CHANGE UP
CREAT SERPL-MCNC: 0.92 MG/DL — SIGNIFICANT CHANGE UP (ref 0.5–1.3)
EOSINOPHIL # BLD AUTO: 0.08 K/UL — SIGNIFICANT CHANGE UP (ref 0–0.5)
EOSINOPHIL NFR BLD AUTO: 0.4 % — SIGNIFICANT CHANGE UP (ref 0–6)
EPI CELLS # UR: SIGNIFICANT CHANGE UP
ERYTHROCYTE [SEDIMENTATION RATE] IN BLOOD: 100 MM/HR — HIGH (ref 4–25)
GLUCOSE SERPL-MCNC: 73 MG/DL — SIGNIFICANT CHANGE UP (ref 70–99)
GLUCOSE UR-MCNC: NEGATIVE — SIGNIFICANT CHANGE UP
HCT VFR BLD CALC: 32.9 % — LOW (ref 34.5–45)
HGB BLD-MCNC: 10.4 G/DL — LOW (ref 11.5–15.5)
IMM GRANULOCYTES NFR BLD AUTO: 0.7 % — SIGNIFICANT CHANGE UP (ref 0–1.5)
INR BLD: 1.11 — SIGNIFICANT CHANGE UP (ref 0.88–1.17)
KETONES UR-MCNC: NEGATIVE — SIGNIFICANT CHANGE UP
LEUKOCYTE ESTERASE UR-ACNC: HIGH
LYMPHOCYTES # BLD AUTO: 1.45 K/UL — SIGNIFICANT CHANGE UP (ref 1–3.3)
LYMPHOCYTES # BLD AUTO: 8 % — LOW (ref 13–44)
MCHC RBC-ENTMCNC: 26.6 PG — LOW (ref 27–34)
MCHC RBC-ENTMCNC: 31.6 % — LOW (ref 32–36)
MCV RBC AUTO: 84.1 FL — SIGNIFICANT CHANGE UP (ref 80–100)
MONOCYTES # BLD AUTO: 0.75 K/UL — SIGNIFICANT CHANGE UP (ref 0–0.9)
MONOCYTES NFR BLD AUTO: 4.1 % — SIGNIFICANT CHANGE UP (ref 2–14)
MUCOUS THREADS # UR AUTO: SIGNIFICANT CHANGE UP
NEUTROPHILS # BLD AUTO: 15.81 K/UL — HIGH (ref 1.8–7.4)
NEUTROPHILS NFR BLD AUTO: 86.7 % — HIGH (ref 43–77)
NITRITE UR-MCNC: NEGATIVE — SIGNIFICANT CHANGE UP
NRBC # FLD: 0 K/UL — SIGNIFICANT CHANGE UP (ref 0–0)
PH UR: 5.5 — SIGNIFICANT CHANGE UP (ref 5–8)
PLATELET # BLD AUTO: 555 K/UL — HIGH (ref 150–400)
PMV BLD: 9.9 FL — SIGNIFICANT CHANGE UP (ref 7–13)
POTASSIUM SERPL-MCNC: 4.4 MMOL/L — SIGNIFICANT CHANGE UP (ref 3.5–5.3)
POTASSIUM SERPL-SCNC: 4.4 MMOL/L — SIGNIFICANT CHANGE UP (ref 3.5–5.3)
PROT SERPL-MCNC: 7.2 G/DL — SIGNIFICANT CHANGE UP (ref 6–8.3)
PROT UR-MCNC: 10 — SIGNIFICANT CHANGE UP
PROTHROM AB SERPL-ACNC: 12.8 SEC — SIGNIFICANT CHANGE UP (ref 9.8–13.1)
RBC # BLD: 3.91 M/UL — SIGNIFICANT CHANGE UP (ref 3.8–5.2)
RBC # FLD: 17.6 % — HIGH (ref 10.3–14.5)
RBC CASTS # UR COMP ASSIST: SIGNIFICANT CHANGE UP (ref 0–?)
RH IG SCN BLD-IMP: NEGATIVE — SIGNIFICANT CHANGE UP
SODIUM SERPL-SCNC: 142 MMOL/L — SIGNIFICANT CHANGE UP (ref 135–145)
SP GR SPEC: 1.02 — SIGNIFICANT CHANGE UP (ref 1–1.04)
UROBILINOGEN FLD QL: NORMAL — SIGNIFICANT CHANGE UP
WBC # BLD: 18.23 K/UL — HIGH (ref 3.8–10.5)
WBC # FLD AUTO: 18.23 K/UL — HIGH (ref 3.8–10.5)
WBC UR QL: HIGH (ref 0–?)

## 2020-04-22 PROCEDURE — 72192 CT PELVIS W/O DYE: CPT | Mod: 26

## 2020-04-22 PROCEDURE — 99223 1ST HOSP IP/OBS HIGH 75: CPT | Mod: GC

## 2020-04-22 PROCEDURE — 73700 CT LOWER EXTREMITY W/O DYE: CPT | Mod: 26,LT

## 2020-04-22 PROCEDURE — 73552 X-RAY EXAM OF FEMUR 2/>: CPT | Mod: 26,LT

## 2020-04-22 PROCEDURE — 86077 PHYS BLOOD BANK SERV XMATCH: CPT

## 2020-04-22 PROCEDURE — 71045 X-RAY EXAM CHEST 1 VIEW: CPT | Mod: 26

## 2020-04-22 PROCEDURE — 73502 X-RAY EXAM HIP UNI 2-3 VIEWS: CPT | Mod: 26,LT

## 2020-04-22 RX ORDER — AMLODIPINE BESYLATE 2.5 MG/1
5 TABLET ORAL DAILY
Refills: 0 | Status: DISCONTINUED | OUTPATIENT
Start: 2020-04-22 | End: 2020-05-08

## 2020-04-22 RX ORDER — LIDOCAINE 4 G/100G
1 CREAM TOPICAL ONCE
Refills: 0 | Status: COMPLETED | OUTPATIENT
Start: 2020-04-22 | End: 2020-04-22

## 2020-04-22 RX ORDER — PANTOPRAZOLE SODIUM 20 MG/1
40 TABLET, DELAYED RELEASE ORAL
Refills: 0 | Status: DISCONTINUED | OUTPATIENT
Start: 2020-04-22 | End: 2020-05-08

## 2020-04-22 RX ORDER — FENTANYL CITRATE 50 UG/ML
12.5 INJECTION INTRAVENOUS ONCE
Refills: 0 | Status: DISCONTINUED | OUTPATIENT
Start: 2020-04-22 | End: 2020-04-22

## 2020-04-22 RX ORDER — FUROSEMIDE 40 MG
20 TABLET ORAL DAILY
Refills: 0 | Status: DISCONTINUED | OUTPATIENT
Start: 2020-04-22 | End: 2020-04-22

## 2020-04-22 RX ORDER — OXYCODONE AND ACETAMINOPHEN 5; 325 MG/1; MG/1
1 TABLET ORAL EVERY 8 HOURS
Refills: 0 | Status: DISCONTINUED | OUTPATIENT
Start: 2020-04-22 | End: 2020-04-29

## 2020-04-22 RX ORDER — MORPHINE SULFATE 50 MG/1
4 CAPSULE, EXTENDED RELEASE ORAL ONCE
Refills: 0 | Status: DISCONTINUED | OUTPATIENT
Start: 2020-04-22 | End: 2020-04-22

## 2020-04-22 RX ORDER — FENTANYL CITRATE 50 UG/ML
25 INJECTION INTRAVENOUS ONCE
Refills: 0 | Status: DISCONTINUED | OUTPATIENT
Start: 2020-04-22 | End: 2020-04-22

## 2020-04-22 RX ORDER — ATORVASTATIN CALCIUM 80 MG/1
10 TABLET, FILM COATED ORAL AT BEDTIME
Refills: 0 | Status: DISCONTINUED | OUTPATIENT
Start: 2020-04-22 | End: 2020-05-08

## 2020-04-22 RX ORDER — KETAMINE HYDROCHLORIDE 100 MG/ML
70 INJECTION INTRAMUSCULAR; INTRAVENOUS ONCE
Refills: 0 | Status: DISCONTINUED | OUTPATIENT
Start: 2020-04-22 | End: 2020-04-22

## 2020-04-22 RX ORDER — CEFTRIAXONE 500 MG/1
1000 INJECTION, POWDER, FOR SOLUTION INTRAMUSCULAR; INTRAVENOUS EVERY 24 HOURS
Refills: 0 | Status: DISCONTINUED | OUTPATIENT
Start: 2020-04-23 | End: 2020-05-02

## 2020-04-22 RX ORDER — HEPARIN SODIUM 5000 [USP'U]/ML
5000 INJECTION INTRAVENOUS; SUBCUTANEOUS EVERY 8 HOURS
Refills: 0 | Status: DISCONTINUED | OUTPATIENT
Start: 2020-04-22 | End: 2020-04-22

## 2020-04-22 RX ORDER — ACETAMINOPHEN 500 MG
650 TABLET ORAL EVERY 8 HOURS
Refills: 0 | Status: DISCONTINUED | OUTPATIENT
Start: 2020-04-22 | End: 2020-05-01

## 2020-04-22 RX ORDER — KETAMINE HYDROCHLORIDE 100 MG/ML
100 INJECTION INTRAMUSCULAR; INTRAVENOUS ONCE
Refills: 0 | Status: DISCONTINUED | OUTPATIENT
Start: 2020-04-22 | End: 2020-04-22

## 2020-04-22 RX ORDER — SENNA PLUS 8.6 MG/1
2 TABLET ORAL AT BEDTIME
Refills: 0 | Status: DISCONTINUED | OUTPATIENT
Start: 2020-04-22 | End: 2020-05-08

## 2020-04-22 RX ORDER — CEFTRIAXONE 500 MG/1
1000 INJECTION, POWDER, FOR SOLUTION INTRAMUSCULAR; INTRAVENOUS ONCE
Refills: 0 | Status: COMPLETED | OUTPATIENT
Start: 2020-04-22 | End: 2020-04-22

## 2020-04-22 RX ORDER — HYDROMORPHONE HYDROCHLORIDE 2 MG/ML
1 INJECTION INTRAMUSCULAR; INTRAVENOUS; SUBCUTANEOUS ONCE
Refills: 0 | Status: DISCONTINUED | OUTPATIENT
Start: 2020-04-22 | End: 2020-04-22

## 2020-04-22 RX ADMIN — FENTANYL CITRATE 12.5 MICROGRAM(S): 50 INJECTION INTRAVENOUS at 19:30

## 2020-04-22 RX ADMIN — FENTANYL CITRATE 25 MICROGRAM(S): 50 INJECTION INTRAVENOUS at 17:47

## 2020-04-22 RX ADMIN — KETAMINE HYDROCHLORIDE 70 MILLIGRAM(S): 100 INJECTION INTRAMUSCULAR; INTRAVENOUS at 18:46

## 2020-04-22 RX ADMIN — MORPHINE SULFATE 4 MILLIGRAM(S): 50 CAPSULE, EXTENDED RELEASE ORAL at 21:00

## 2020-04-22 RX ADMIN — CEFTRIAXONE 1000 MILLIGRAM(S): 500 INJECTION, POWDER, FOR SOLUTION INTRAMUSCULAR; INTRAVENOUS at 23:32

## 2020-04-22 RX ADMIN — CEFTRIAXONE 100 MILLIGRAM(S): 500 INJECTION, POWDER, FOR SOLUTION INTRAMUSCULAR; INTRAVENOUS at 21:34

## 2020-04-22 RX ADMIN — LIDOCAINE 1 PATCH: 4 CREAM TOPICAL at 20:59

## 2020-04-22 NOTE — H&P ADULT - HISTORY OF PRESENT ILLNESS
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 Patient had a mechanical fall and underwent a left hip hemiarthroplasty in 1/2020 at [facility]. She tolerated surgery and discharged to [facility] rehab. She presented to [facility] on 3/13/2020 for painful vertebral compression fractures, once her pain was treated she was discharged to [facility] rehab. She was ambulating with PT initially while at [facility] when about a month prior according to daughter she was told by [facility] staff that her mother fell. She has since non been ambulatory. Today it was noticed that her leg appeared shortened and internally rotated and it was painful to move her in bed. She presented to [facility] for eval. Otherwise, patient had no cardiac hx. Tolerated procedure well in Jan. Recovered partially ambulatory at rehab lately prior to this event.

## 2020-04-22 NOTE — H&P ADULT - PROBLEM SELECTOR PLAN 5
- Patient has no functional status change since previous Jan. 2020 operation. No cardiac sx.   - Pending EKG.  - Pending infectious w/u including COVID 19 r/o and UTI treatment. Will wait PCR results and COVID 19 PCR before medically clear patient for OR.  - No further cardiac w/u needed at this time unless positive EKG findings or new onset symptoms. Patient RCRI score 1 to undertake an intermediate risk surgery.

## 2020-04-22 NOTE — H&P ADULT - NSHPPHYSICALEXAM_GEN_ALL_CORE
T(C): 36.4 (04-22-20 @ 16:18), Max: 36.4 (04-22-20 @ 16:18)  HR: 93 (04-22-20 @ 19:22) (87 - 96)  BP: 128/59 (04-22-20 @ 19:22) (101/59 - 149/70)  RR: 20 (04-22-20 @ 19:22) (18 - 22)  SpO2: 97% (04-22-20 @ 19:22) (95% - 100%)  Wt(kg): --  GENERAL: NAD  HEAD:  Atraumatic, Normocephalic  EYES: EOMI, PERRLA, conjunctiva and sclera clear  NECK: Supple, No JVD  CHEST/LUNG: Clear to auscultation bilaterally; No wheeze  HEART: Regular rate and rhythm; No murmurs, rubs, or gallops  ABDOMEN: Soft, Nontender, Nondistended; Bowel sounds present  EXTREMITIES:  left leg shortened and internally rotated.  PSYCH: AAOx1  NEUROLOGY: non-focal  SKIN: No rashes or lesions

## 2020-04-22 NOTE — ED PROVIDER NOTE - OBJECTIVE STATEMENT
89 y/o F PMHx CAD, HTN, HLD, GERD, DDD, scoliosis, chronic back pain 2/2 compression fractures, recent L hip fx in March 2020 sp partial hip replacement and rehab, brought in from [place] for L hip dislocation. Per daughter, received a call from [place] [facility]rapist stating the leg appeared un 89 y/o F PMHx CAD, HTN, HLD, GERD, DDD, scoliosis, chronic back pain 2/2 compression fractures, recent L hip fx in January 2020 sp partial hip replacement and rehab, brought in from [place] for L hip dislocation. Per daughter, received a call from [place] [facility] stating the leg appeared deformed. No known trauma or falls. Pt denies any complaints, but able to localize pain to left hip. AOx 2

## 2020-04-22 NOTE — H&P ADULT - PROBLEM SELECTOR PLAN 1
- Given concern for infection, will consult IR for aspiration in the am (per ortho recs)  - F/u blood cultures.  - Plan for OR on Friday per ortho.  - Pain control prn.

## 2020-04-22 NOTE — ED PROVIDER NOTE - CLINICAL SUMMARY MEDICAL DECISION MAKING FREE TEXT BOX
89 y/o F PMHx CAD, HTN, HLD, GERD, DDD, scoliosis, chronic back pain 2/2 compression fractures, recent L hip fx in January 2020 sp partial hip replacement and rehab, brought in from [place] for L hip dislocation. Per ortho, this type of hip replacement does not typically dislocate unless infected. Concern for infection given WBC 18. Will do cxr, ua, blood cultures, covid swab/rvp (no cough, but coming from [place]). After discussion w/ ortho, agreed to hold abx so that the inpatient team can get an appropriate culture from the joint via IR, as pt is hemodynamically stable. Reduce joint, admit.

## 2020-04-22 NOTE — H&P ADULT - ASSESSMENT
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 Concern or joint infection given elevated inflammatory markers.

## 2020-04-22 NOTE — ED PROCEDURE NOTE - PRE-OP ASSESSMENT
A pre-op assessment/re-assessment was completed immediately prior to the beginning of the procedure.

## 2020-04-22 NOTE — ED PROCEDURE NOTE - CPROC ED INFORMED CONSENT1
Benefits, risks, and possible complications of procedure explained to patient/caregiver who verbalized understanding and gave verbal consent./Dianne Petersen (daughter)

## 2020-04-22 NOTE — ED PROVIDER NOTE - PROGRESS NOTE DETAILS
DONALD Bragg: Spoke w/ pts daughter Dianne Pillai, gave consent for moderate sedation and reduction of L hip. Per ortho, this type of hip replacement does not typically dislocate unless infected. Concern for infection given WBC 18. Will do cxr, ua, blood cultures, covid swab/rvp (no cough, but coming from [place]). After discussion w/ ortho, agreed to hold abx so that the inpatient team can get an appropriate culture from the joint via IR, as pt is hemodynamically stable. DONALD Bragg: Ortho resident spoke w/ pts daughter, possible transfer to S for intraoperative management, pending final conversation w/ pt and ortho attending. Daughter mentioned pt has a pessary that is changed every 3 months and needs to be changed now. Dr. Monsalve spoke w/ gyn, state they cannot remove the pessary given the hip issue i.e. unable to position patient appropriately for removal. State if pt has surgery, obgyn can then swap the pessary. Spoke to ortho resident, plan to take patient to OR on 4/24 but requesting to admit pt to medicine given concern for septic joint. - Angel Malone, DO PGY-1

## 2020-04-22 NOTE — ED PROVIDER NOTE - PHYSICAL EXAMINATION
Vital signs reviewed.   CONSTITUTIONAL: Well-appearing; well-nourished; in moderate distress 2/2 pain. Non-toxic appearing.   HEAD: Normocephalic, atraumatic.  EYES: PERRL, EOM intact, conjunctiva and sclera WNL.  ENT: normal nose; no rhinorrhea; normal pharynx with no tonsillar hypertrophy, dry mucous membranes  NECK/LYMPH: Supple; non-tender; no cervical lymphadenopathy.  CARD: Normal S1, S2; RRR, no LE edema  RESP: Normal chest excursion with respiration; breath sounds clear and equal bilaterally; no wheezes, rhonchi, or rales.  ABD/GI: soft, non-distended; non-tender  EXT/MS: scoliotic spine w/ mild tenderness to midline lumbar region, moves all 4 extremities spontaneously w/ limited movement in the LLE, Left leg is shortened and internally rotated  SKIN: Normal for age and race; warm; dry; good turgor  NEURO: Awake, alert, oriented x 2, no gross deficits, CN II-XII grossly intact  PSYCH: Normal mood; appropriate affect.

## 2020-04-22 NOTE — ED PROVIDER NOTE - ATTENDING CONTRIBUTION TO CARE
Pt sent in from [facility] for ?hip fracture, found to have a periprosthetic hip dislocation, unclear of chronicity. Ortho consulted for reduction, I provided procedural sedation (see note) but reduction was unsuccessful, will need to be done in OR. WBC 18, will pursue infectious w/u as well.

## 2020-04-22 NOTE — ED ADULT NURSE REASSESSMENT NOTE - NS ED NURSE REASSESS COMMENT FT1
Manda RN: patient fully recovered from conscious sedation. pt is back to baseline, A&O x 2 to self and place/ pt is fully awake. respirations even and unlabored, RR 20 sating 100% RA. NSR on monitor. pt complaining of pain, pt medicated as per MD orders.   14 fr benson placed. report endorsed to primary RN.

## 2020-04-22 NOTE — H&P ADULT - NSHPLABSRESULTS_GEN_ALL_CORE
( @ 17:30)                      10.4  18.23 )-----------( 555                 32.9    Neutrophils = 15.81 (86.7%)  Lymphocytes = 1.45 (8.0%)  Eosinophils = 0.08 (0.4%)  Basophils = 0.02 (0.1%)  Monocytes = 0.75 (4.1%)  Bands = --%        142  |  106  |  30<H>  ----------------------------<  73  4.4   |  24  |  0.92    Ca    9.4      2020 17:30    TPro  7.2  /  Alb  2.8<L>  /  TBili  0.3  /  DBili  x   /  AST  18  /  ALT  10  /  AlkPhos  185<H>      ( 2020 17:30 )   PT: 12.8 SEC;   INR: 1.11 ;       PTT:42.3 SEC    Urinalysis Basic - ( 2020 19:08 )    Color: LT. YELLOW / Appearance: CLEAR / S.018 / pH: 5.5  Gluc: NEGATIVE / Ketone: NEGATIVE  / Bili: NEGATIVE / Urobili: NORMAL   Blood: NEGATIVE / Protein: 10 / Nitrite: NEGATIVE   Leuk Esterase: LARGE / RBC: 0-2 / WBC 11-25   Sq Epi: x / Non Sq Epi: FEW / Bacteria: FEW    Imaging results reviewed personally.

## 2020-04-22 NOTE — CONSULT NOTE ADULT - SUBJECTIVE AND OBJECTIVE BOX
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 Patient had a mechanical fall and underwent a left hip hemiarthroplasty in 1/2020 with Dr. Sammy Encarnacion at [facility]. She tolerated surgery and discharged to [facility] rehab. She presented to [facility] on 3/13/2020 for painful vertebral compression fractures, once her pain was treated she was discharged to [place] [facility] rehab. She was ambulating with PT initially while at [place] [facility] when about a month prior according to daughter she was told by [place] [facility] staff that her mother fell. She has since non been ambulatory. Today it was noticed that her leg appeared shortened and internally rotated and it was painful to move her in bed. She presented to [facility] for eval.    No pertinent family history in first degree relatives  Other chronic gastritis  Hypercholesterolemia  Benign hypertension  High cholesterol  HTN (hypertension)  No significant past surgical history  DISLOCATED HIP  RAD CDS  29    PAST MEDICAL & SURGICAL HISTORY:  Other chronic gastritis  Hypercholesterolemia  Benign hypertension  High cholesterol  HTN (hypertension)  No significant past surgical history    MEDICATIONS  (STANDING):  cefTRIAXone   IVPB 1000 milliGRAM(s) IV Intermittent once    Allergies    No Known Allergies    Intolerances                            10.4   18.23 )-----------( 555      ( 22 Apr 2020 17:30 )             32.9     22 Apr 2020 17:30    142    |  106    |  30     ----------------------------<  73     4.4     |  24     |  0.92     Ca    9.4        22 Apr 2020 17:30    TPro  7.2    /  Alb  2.8    /  TBili  0.3    /  DBili  x      /  AST  18     /  ALT  10     /  AlkPhos  185    22 Apr 2020 17:30    PT/INR - ( 22 Apr 2020 17:30 )   PT: 12.8 SEC;   INR: 1.11          PTT - ( 22 Apr 2020 17:30 )  PTT:42.3 SEC  Vital Signs Last 24 Hrs  T(C): 36.4 (04-22-20 @ 16:18), Max: 36.4 (04-22-20 @ 16:18)  T(F): 97.6 (04-22-20 @ 16:18), Max: 97.6 (04-22-20 @ 16:18)  HR: 93 (04-22-20 @ 19:22) (87 - 96)  BP: 128/59 (04-22-20 @ 19:22) (101/59 - 149/70)  BP(mean): --  RR: 20 (04-22-20 @ 19:22) (18 - 22)  SpO2: 97% (04-22-20 @ 19:22) (95% - 100%)    Imaging: XR demonstates left hip prosthesis  Posterior dislocation, no obvious fracture.    Physical Exam  General: NAD, A&O x1   Neurologic: No gross deficits, moving all 4s.  Head: NCAT without abrasions, lacerations, or ecchymosis to head, face, or scalp  Eyes: PERRL  Neck/C-Spine: FAROM. No bony TTP.    HIPS and PELVIS: Unable to SLR **Hip.     LLE: Healed scar over hip.  No open skin. Internally Rotated and shortened. No deformities or other signs of trauma at  knee, lower leg, ankle or foot. Full baseline painless ROM at ankle and toes with. Unable to actively SLR. SILT toes 1-5. 2+ DP/PT pulses with brisk cap refill distally. Compartments soft and compressible.     < from: Xray Pelvis AP only (04.22.20 @ 17:41) >    IMPRESSION:  There is a posterior dislocation of the cemented left hemiarthroplasty. No acute periprosthetic fracture, or fracture of the visualized pelvis and distal femur are visualized.         < end of copied text >      Procedure: Closed Reduction under conscious sedation in ED was attempted in ED. Reduction was unsuccessful as hip appear appeared to be contracted in this dislocated position.    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 Dislocation is concerning for infected L hip hemiarthroplasty given ESR of 100 and elevated WBC.     -Admit to Ortho  -pain control  -Bedrest   -DVT ppx: Venodyne  -Pre op labs and tests  -Morning labs   -Obtain med clearance  -IR as aspiration of L hip to rule out prosthetic joint infection  -Plan for OR Friday 4/24/20 with Dr. 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 Patient had a mechanical fall and underwent a left hip hemiarthroplasty in 1/2020 with Dr. Sammy Encarnacion at [facility]. She tolerated surgery and discharged to [facility] rehab. She presented to [facility] on 3/13/2020 for painful vertebral compression fractures, once her pain was treated she was discharged to [place] rehab. She was ambulating with PT initially while at [place] when about a month prior according to daughter she was told by [facility] staff that her mother fell. She has since non been ambulatory. Today it was noticed that her leg appeared shortened and internally rotated and it was painful to move her in bed. She presented to [facility] for eval.    No pertinent family history in first degree relatives  Other chronic gastritis  Hypercholesterolemia  Benign hypertension  High cholesterol  HTN (hypertension)  No significant past surgical history  DISLOCATED HIP  RAD CDS  29    PAST MEDICAL & SURGICAL HISTORY:  Other chronic gastritis  Hypercholesterolemia  Benign hypertension  High cholesterol  HTN (hypertension)  No significant past surgical history    MEDICATIONS  (STANDING):  cefTRIAXone   IVPB 1000 milliGRAM(s) IV Intermittent once    Allergies    No Known Allergies    Intolerances                            10.4   18.23 )-----------( 555      ( 22 Apr 2020 17:30 )             32.9     22 Apr 2020 17:30    142    |  106    |  30     ----------------------------<  73     4.4     |  24     |  0.92     Ca    9.4        22 Apr 2020 17:30    TPro  7.2    /  Alb  2.8    /  TBili  0.3    /  DBili  x      /  AST  18     /  ALT  10     /  AlkPhos  185    22 Apr 2020 17:30    PT/INR - ( 22 Apr 2020 17:30 )   PT: 12.8 SEC;   INR: 1.11          PTT - ( 22 Apr 2020 17:30 )  PTT:42.3 SEC  Vital Signs Last 24 Hrs  T(C): 36.4 (04-22-20 @ 16:18), Max: 36.4 (04-22-20 @ 16:18)  T(F): 97.6 (04-22-20 @ 16:18), Max: 97.6 (04-22-20 @ 16:18)  HR: 93 (04-22-20 @ 19:22) (87 - 96)  BP: 128/59 (04-22-20 @ 19:22) (101/59 - 149/70)  BP(mean): --  RR: 20 (04-22-20 @ 19:22) (18 - 22)  SpO2: 97% (04-22-20 @ 19:22) (95% - 100%)    Imaging: XR demonstates left hip prosthesis  Posterior dislocation, no obvious fracture.    Physical Exam  General: NAD, A&O x1   Neurologic: No gross deficits, moving all 4s.  Head: NCAT without abrasions, lacerations, or ecchymosis to head, face, or scalp  Eyes: PERRL  Neck/C-Spine: FAROM. No bony TTP.    HIPS and PELVIS: Unable to SLR **Hip.     LLE: Healed scar over hip.  No open skin. Internally Rotated and shortened. No deformities or other signs of trauma at  knee, lower leg, ankle or foot. Full baseline painless ROM at ankle and toes with. Unable to actively SLR. SILT toes 1-5. 2+ DP/PT pulses with brisk cap refill distally. Compartments soft and compressible.     < from: Xray Pelvis AP only (04.22.20 @ 17:41) >    IMPRESSION:  There is a posterior dislocation of the cemented left hemiarthroplasty. No acute periprosthetic fracture, or fracture of the visualized pelvis and distal femur are visualized.         < end of copied text >      Procedure: Closed Reduction under conscious sedation in ED was attempted in ED. Reduction was unsuccessful as hip appear appeared to be contracted in this dislocated position.    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 Dislocation is concerning for infected L hip hemiarthroplasty given ESR of 100 and elevated WBC.     -pain control  -Bedrest   -DVT ppx: Venodyne  -Pre op labs and tests  -Morning labs   -Obtain med clearance  -IR as aspiration of L hip to rule out prosthetic joint infection  -Plan for OR Friday 4/24/20 with Dr. Beto Mijares for possible revision camilo, possible total, possible antibiotic spacer, and possible resection arthroplasty

## 2020-04-23 DIAGNOSIS — B34.2 CORONAVIRUS INFECTION, UNSPECIFIED: ICD-10-CM

## 2020-04-23 LAB
-  COAGULASE NEGATIVE STAPHYLOCOCCUS: SIGNIFICANT CHANGE UP
ALBUMIN SERPL ELPH-MCNC: 1.7 G/DL — LOW (ref 3.3–5)
ALP SERPL-CCNC: 170 U/L — HIGH (ref 40–120)
ALT FLD-CCNC: 7 U/L — SIGNIFICANT CHANGE UP (ref 4–33)
ANION GAP SERPL CALC-SCNC: 16 MMO/L — HIGH (ref 7–14)
AST SERPL-CCNC: 13 U/L — SIGNIFICANT CHANGE UP (ref 4–32)
BASOPHILS # BLD AUTO: 0.02 K/UL — SIGNIFICANT CHANGE UP (ref 0–0.2)
BASOPHILS NFR BLD AUTO: 0.1 % — SIGNIFICANT CHANGE UP (ref 0–2)
BILIRUB SERPL-MCNC: 0.4 MG/DL — SIGNIFICANT CHANGE UP (ref 0.2–1.2)
BUN SERPL-MCNC: 31 MG/DL — HIGH (ref 7–23)
CALCIUM SERPL-MCNC: 9.1 MG/DL — SIGNIFICANT CHANGE UP (ref 8.4–10.5)
CHLORIDE SERPL-SCNC: 110 MMOL/L — HIGH (ref 98–107)
CO2 SERPL-SCNC: 12 MMOL/L — LOW (ref 22–31)
CREAT SERPL-MCNC: 0.92 MG/DL — SIGNIFICANT CHANGE UP (ref 0.5–1.3)
CRP SERPL-MCNC: 287.1 MG/L — HIGH
EOSINOPHIL # BLD AUTO: 0.03 K/UL — SIGNIFICANT CHANGE UP (ref 0–0.5)
EOSINOPHIL NFR BLD AUTO: 0.2 % — SIGNIFICANT CHANGE UP (ref 0–6)
GLUCOSE SERPL-MCNC: 60 MG/DL — LOW (ref 70–99)
GRAM STN FLD: SIGNIFICANT CHANGE UP
HCT VFR BLD CALC: 32.2 % — LOW (ref 34.5–45)
HGB BLD-MCNC: 9.7 G/DL — LOW (ref 11.5–15.5)
IMM GRANULOCYTES NFR BLD AUTO: 0.5 % — SIGNIFICANT CHANGE UP (ref 0–1.5)
LYMPHOCYTES # BLD AUTO: 0.96 K/UL — LOW (ref 1–3.3)
LYMPHOCYTES # BLD AUTO: 6.8 % — LOW (ref 13–44)
MAGNESIUM SERPL-MCNC: 2.1 MG/DL — SIGNIFICANT CHANGE UP (ref 1.6–2.6)
MCHC RBC-ENTMCNC: 25.9 PG — LOW (ref 27–34)
MCHC RBC-ENTMCNC: 30.1 % — LOW (ref 32–36)
MCV RBC AUTO: 86.1 FL — SIGNIFICANT CHANGE UP (ref 80–100)
METHOD TYPE: SIGNIFICANT CHANGE UP
MONOCYTES # BLD AUTO: 0.56 K/UL — SIGNIFICANT CHANGE UP (ref 0–0.9)
MONOCYTES NFR BLD AUTO: 4 % — SIGNIFICANT CHANGE UP (ref 2–14)
NEUTROPHILS # BLD AUTO: 12.42 K/UL — HIGH (ref 1.8–7.4)
NEUTROPHILS NFR BLD AUTO: 88.4 % — HIGH (ref 43–77)
NRBC # FLD: 0 K/UL — SIGNIFICANT CHANGE UP (ref 0–0)
PHOSPHATE SERPL-MCNC: 5.1 MG/DL — HIGH (ref 2.5–4.5)
PLATELET # BLD AUTO: 561 K/UL — HIGH (ref 150–400)
PMV BLD: 9.8 FL — SIGNIFICANT CHANGE UP (ref 7–13)
POTASSIUM SERPL-MCNC: 5.8 MMOL/L — HIGH (ref 3.5–5.3)
POTASSIUM SERPL-SCNC: 5.8 MMOL/L — HIGH (ref 3.5–5.3)
PROT SERPL-MCNC: 6.3 G/DL — SIGNIFICANT CHANGE UP (ref 6–8.3)
RBC # BLD: 3.74 M/UL — LOW (ref 3.8–5.2)
RBC # FLD: 17.7 % — HIGH (ref 10.3–14.5)
SARS-COV-2 RNA SPEC QL NAA+PROBE: DETECTED
SODIUM SERPL-SCNC: 138 MMOL/L — SIGNIFICANT CHANGE UP (ref 135–145)
SPECIMEN SOURCE: SIGNIFICANT CHANGE UP
WBC # BLD: 14.06 K/UL — HIGH (ref 3.8–10.5)
WBC # FLD AUTO: 14.06 K/UL — HIGH (ref 3.8–10.5)

## 2020-04-23 PROCEDURE — 93010 ELECTROCARDIOGRAM REPORT: CPT | Mod: 76

## 2020-04-23 RX ORDER — SODIUM CHLORIDE 9 MG/ML
500 INJECTION, SOLUTION INTRAVENOUS ONCE
Refills: 0 | Status: COMPLETED | OUTPATIENT
Start: 2020-04-23 | End: 2020-04-23

## 2020-04-23 RX ADMIN — LIDOCAINE 1 PATCH: 4 CREAM TOPICAL at 05:06

## 2020-04-23 RX ADMIN — PANTOPRAZOLE SODIUM 40 MILLIGRAM(S): 20 TABLET, DELAYED RELEASE ORAL at 05:07

## 2020-04-23 RX ADMIN — AMLODIPINE BESYLATE 5 MILLIGRAM(S): 2.5 TABLET ORAL at 05:06

## 2020-04-23 RX ADMIN — SODIUM CHLORIDE 500 MILLILITER(S): 9 INJECTION, SOLUTION INTRAVENOUS at 08:20

## 2020-04-23 RX ADMIN — CEFTRIAXONE 100 MILLIGRAM(S): 500 INJECTION, POWDER, FOR SOLUTION INTRAMUSCULAR; INTRAVENOUS at 21:57

## 2020-04-23 RX ADMIN — OXYCODONE AND ACETAMINOPHEN 1 TABLET(S): 5; 325 TABLET ORAL at 21:52

## 2020-04-23 RX ADMIN — ATORVASTATIN CALCIUM 10 MILLIGRAM(S): 80 TABLET, FILM COATED ORAL at 21:52

## 2020-04-23 RX ADMIN — LIDOCAINE 1 PATCH: 4 CREAM TOPICAL at 05:07

## 2020-04-23 NOTE — PROGRESS NOTE ADULT - PROBLEM SELECTOR PLAN 5
- Patient has no functional status change since previous Jan. 2020 operation. No cardiac sx.   - Pending EKG.  - Pending infectious w/u including COVID 19 r/o and UTI treatment. Will wait PCR results and COVID 19 PCR before medically clear patient for OR.  - No further cardiac w/u needed at this time unless positive EKG findings or new onset symptoms. Patient RCRI score 1 to undertake an intermediate risk surgery. BP within normal range   Continue home amlodipine Continue oral PPI

## 2020-04-23 NOTE — PROGRESS NOTE ADULT - PROBLEM SELECTOR PLAN 1
- Given concern for infection, will consult IR for aspiration in the am (per ortho recs)  - F/u blood cultures.  - Plan for OR on Friday per ortho.  - Pain control prn. Unclear chronicity of etiology but prior history of L. hemiarthroplasty in 01/2020  Plan for possible revision camilo arthroplasty vs. total arthroplasty, possible antibiotic spacer, and possible resection arthroplasty by Orthopaedic surgery tomorrow 04/24/20  Per Ortho request, IR consulted for possibly aspiration of L hip to rule out septic joint however no discrete fluid collection seen on CT left femur/pelvis  Remains afebrile this admission, repeat labs reordered STAT for today   Will follow up blood cultures x2, urine culture   Pain control with acetaminophen (mild/mod pain) or oxycodone (severe pain) PRN Unclear chronicity of etiology but prior history of L. hemiarthroplasty in 01/2020  Plan for possible revision camilo arthroplasty vs. total arthroplasty, possible antibiotic spacer, and possible resection arthroplasty by Orthopaedic surgery tomorrow 04/24/20  Per Ortho request, IR consulted for possibly aspiration of L hip to rule out septic joint given markedly elevated inflammatory markers however no discrete fluid collection seen on CT left femur/pelvis  Remains afebrile this admission, repeat CBC and CMP reordered STAT for today   Will follow up blood cultures x2, urine culture   Pain control with acetaminophen (mild/mod pain) or oxycodone (severe pain) PRN Unclear chronicity of etiology but prior history of L. hemiarthroplasty in 01/2020  Plan for possible revision camilo arthroplasty vs. total arthroplasty, possible antibiotic spacer, and possible resection arthroplasty by Orthopaedic surgery tomorrow 04/24/20 however patient tested positive for COVID19  IR consulted for possibly aspiration of L hip to rule out septic joint given markedly elevated inflammatory markers however no discrete fluid collection seen on CT left femur/pelvis and COVID19 may be likely etiology   Repeat CBC and CMP reordered STAT for today   Will follow up blood cultures x2, urine culture   Pain control with acetaminophen (mild/mod pain) or oxycodone (severe pain) PRN  Ortho informed of COVID19 PCR results and will notify Medicine team whether surgery is still planned

## 2020-04-23 NOTE — PROGRESS NOTE ADULT - ASSESSMENT
89 year old woman with HTN, HLD, GERD, scoliosis, chronic back pain due to compression fractures, and recent L hemiarthroplasty in 01/2020 presents from [facility] for L prosthetic hip dislocation of unclear etiology and chronicity, with concern for joint infection given elevated inflammatory markers and pain localized to left hip. 89 year old woman with HTN, HLD, GERD, scoliosis, chronic back pain due to compression fractures, and recent L hemiarthroplasty in 01/2020 presents from [facility] for L prosthetic hip dislocation of unclear etiology and chronicity, found to have elevated inflammatory markers and mild COVID19 infection.

## 2020-04-23 NOTE — PROGRESS NOTE ADULT - PROBLEM SELECTOR PLAN 2
- C/w PPI. Preoperative medical optimization pending completion of EKG and COVID PCR result -will follow up     Patient has no functional status change since prior operation in January 2020. No cardiac symptoms reported by patient or recent. No further cardiac evaluation needed at this time unless for positive EKG findings or new onset symptoms. Patient RCRI score remains 1 to undergo this intermediate-risk surgery. Preoperative medical optimization pending completion of EKG and COVID PCR result - will follow up     Patient has no functional status change since prior operation in January 2020. No cardiac symptoms or prior cardiac evaluation per daugther. No further cardiac evaluation needed at this time unless for positive EKG findings or new onset symptoms. Patient RCRI score remains 1 to undergo this intermediate-risk surgery Preoperative medical optimization pending completion of EKG and COVID PCR result - will follow up     Patient has no functional status change since prior operation in January 2020. No  cardiac symptoms present or past, or history of cardiac evaluation per daughter. No further cardiac evaluation needed at this time unless for positive EKG findings or new onset symptoms. Patient RCRI score remains 1 to undergo this intermediate-risk surgery COVID19 PCR positive 04/23/20  Mild infection with normal O2 sat n room air, no respiratory symptoms, serum ESR and CRP elevation  Not a candidate for Plaquenil given absence of hypoxia   Check D-dimer, ferritin, ESR, CRP and procalcitonin tomorrow, then trend CRP/ESR/ferritin every 48-72 hours  Supportive care

## 2020-04-23 NOTE — CHART NOTE - NSCHARTNOTEFT_GEN_A_CORE
PRE-INTERVENTIONAL RADIOLOGY PROCEDURE NOTE      Patient Age: 89 YO     Patient Gender: Female     Procedure: Left Hip aspiration     Diagnosis/Indication: Effusion     Interventional Radiology Attending Physician: Dr. Reed     Ordering Attending Physician: Dr. Rodriguez     Pertinent Medical History: Hip dislocation     Pertinent labs:                      10.4   18.23 )-----------( 555      ( 22 Apr 2020 17:30 )             32.9       04-22    142  |  106  |  30<H>  ----------------------------<  73  4.4   |  24  |  0.92    Ca    9.4      22 Apr 2020 17:30    TPro  7.2  /  Alb  2.8<L>  /  TBili  0.3  /  DBili  x   /  AST  18  /  ALT  10  /  AlkPhos  185<H>  04-22      PT/INR - ( 22 Apr 2020 17:30 )   PT: 12.8 SEC;   INR: 1.11          PTT - ( 22 Apr 2020 17:30 )  PTT:42.3 SEC        Patient and Family Aware ? Yes

## 2020-04-23 NOTE — PROGRESS NOTE ADULT - PROBLEM SELECTOR PLAN 3
- Monitor BP.  - Resume home amlodipine. UA weakly positive with few WBCs  Continue ceftriaxone 1 g daily pending urine culture UA weakly positive with few WBCs  Informed by patient that patient has a pessary -last exchanged in Oct/Nov 2019  Continue ceftriaxone 1 g daily pending urine culture  Will consider GYN consult for inpatient UA weakly positive with few WBCs  Informed by patient that patient has a pessary - last exchanged in Oct/Nov 2019  Continue ceftriaxone 1 g daily pending urine culture  Will consider GYN consult for pessary exchange prior to discharge Preoperative medical optimization pending completion of EKG and whether surgery is still reasonable given patient is COVID positive.     Patient has no functional status change since prior operation in January 2020. No  cardiac symptoms present or past, or history of cardiac evaluation per daughter Dianne Kennedy ([phone]). No further cardiac evaluation needed at this time unless for positive EKG findings or new onset symptoms. Patient RCRI score remains 1 to undergo this intermediate-risk surgery

## 2020-04-23 NOTE — CHART NOTE - NSCHARTNOTEFT_GEN_A_CORE
Patient found to be COVID positive. After having discussion with daughter, it was decided to avoid operative treatment for now given increased risk of postoperative morbidity with COVID. Plan for placing patient in bucks traction for now and observing respiratory status.    Ortho  [id number] Patient found to be COVID positive. After having discussion with daughter, it was decided to avoid operative treatment for now given increased risk of postoperative morbidity with COVID. Plan for placing patient in bucks traction for now and observing respiratory status.    Ortho  [id number]    Orthopaedic Attending Addendum  I have reviewed and agree with note as written above  I myself spoke to the daughter Dianne at [phone]  She understands that while asymptomatic, her mother is at higher risk for problems, including respiratory failure and death with the surgery. She also understands that sylvia mother may need open reduction or conversion to a total, both of which might be more difficult in the future. She is unsure how much she has walked recently, as she has been in rehab and Dianne has been unable to visit her daughter. We will reassess earlynext week.    Beto Mijares MD  Musculoskeletal Oncology  [phone]

## 2020-04-23 NOTE — PROGRESS NOTE ADULT - PROBLEM SELECTOR PLAN 7
DVT ppx with SCDs   DASH diet  Fall precautions, best rest  Dispo pending surgery, likely back to [facility]

## 2020-04-23 NOTE — PROGRESS NOTE ADULT - PROBLEM SELECTOR PLAN 6
- DVT ppx with SCDs per ortho recs.  - DASH diet.  - Fall precautions.  - Bedrest. DVT ppx with SCDs   DASH diet.  Fall precautions, best rest  Dispo pending surgery, likely back to [facility] DVT ppx with SCDs   DASH diet  Fall precautions, best rest  Dispo pending surgery, likely back to [facility] BP within normal range   Continue home amlodipine

## 2020-04-23 NOTE — PROGRESS NOTE ADULT - SUBJECTIVE AND OBJECTIVE BOX
Patient is a 89y old  Female who presents with a chief complaint of LE pain (2020 04:49)      SUBJECTIVE / OVERNIGHT EVENTS: No acute events overnight.            MEDICATIONS  (STANDING):  amLODIPine   Tablet 5 milliGRAM(s) Oral daily  atorvastatin 10 milliGRAM(s) Oral at bedtime  cefTRIAXone   IVPB 1000 milliGRAM(s) IV Intermittent every 24 hours  pantoprazole    Tablet 40 milliGRAM(s) Oral before breakfast    MEDICATIONS  (PRN):  acetaminophen   Tablet .. 650 milliGRAM(s) Oral every 8 hours PRN Mild Pain (1 - 3), Moderate Pain (4 - 6)  oxycodone    5 mG/acetaminophen 325 mG 1 Tablet(s) Oral every 8 hours PRN Severe Pain (7 - 10)  senna 2 Tablet(s) Oral at bedtime PRN Constipation      T(C): 36.3 (20 @ 11:54), Max: 36.6 (20 @ 00:27)  HR: 98 (20 @ 11:54) (87 - 99)  BP: 119/67 (20 @ 11:54) (94/48 - 149/70)  RR: 17 (20 @ 11:54) (17 - 22)  SpO2: 96% (20 @ 11:54) (95% - 100%)  CAPILLARY BLOOD GLUCOSE        I&O's Summary    2020 07:01  -  2020 12:01  --------------------------------------------------------  IN: 0 mL / OUT: 300 mL / NET: -300 mL        PHYSICAL EXAM:  GENERAL: not in distress, on room air  EYES: open, sclera clear b/l  CHEST/LUNG: normal respiratory effort, not tachypneic, speaking in full sentences without difficulty  HEART: Not tachycardic, no lower extremity edema b/l  ABDOMEN: Soft, Nontender, Nondistended  EXTREMITIES: Warm and well perfused  NEUROLOGY: awake, alert, responds to Qs appropriately, no gross deficits  PSYCH: calm, cooperative  SKIN: No visible rashes or lesions    LABS:                        10.4   18.23 )-----------( 555      ( 2020 17:30 )             32.9         142  |  106  |  30<H>  ----------------------------<  73  4.4   |  24  |  0.92    Ca    9.4      2020 17:30    TPro  7.2  /  Alb  2.8<L>  /  TBili  0.3  /  DBili  x   /  AST  18  /  ALT  10  /  AlkPhos  185<H>      PT/INR - ( 2020 17:30 )   PT: 12.8 SEC;   INR: 1.11     PTT - ( 2020 17:30 )  PTT:42.3 SEC      Urinalysis Basic - ( 2020 19:08 )    Color: LT. YELLOW / Appearance: CLEAR / S.018 / pH: 5.5  Gluc: NEGATIVE / Ketone: NEGATIVE  / Bili: NEGATIVE / Urobili: NORMAL   Blood: NEGATIVE / Protein: 10 / Nitrite: NEGATIVE   Leuk Esterase: LARGE / RBC: 0-2 / WBC 11-25   Sq Epi: x / Non Sq Epi: FEW / Bacteria: FEW        RADIOLOGY & ADDITIONAL TESTS: Patient is a 89y old  Female who presents with a chief complaint of LE pain (2020 04:49)      SUBJECTIVE / OVERNIGHT EVENTS: No acute events overnight.      MEDICATIONS  (STANDING):  amLODIPine   Tablet 5 milliGRAM(s) Oral daily  atorvastatin 10 milliGRAM(s) Oral at bedtime  cefTRIAXone   IVPB 1000 milliGRAM(s) IV Intermittent every 24 hours  pantoprazole    Tablet 40 milliGRAM(s) Oral before breakfast    MEDICATIONS  (PRN):  acetaminophen   Tablet .. 650 milliGRAM(s) Oral every 8 hours PRN Mild Pain (1 - 3), Moderate Pain (4 - 6)  oxycodone    5 mG/acetaminophen 325 mG 1 Tablet(s) Oral every 8 hours PRN Severe Pain (7 - 10)  senna 2 Tablet(s) Oral at bedtime PRN Constipation      T(C): 36.3 (20 @ 11:54), Max: 36.6 (20 @ 00:27)  HR: 98 (20 @ 11:54) (87 - 99)  BP: 119/67 (20 @ 11:54) (94/48 - 149/70)  RR: 17 (20 @ 11:54) (17 - 22)  SpO2: 96% (20 @ 11:54) (95% - 100%)  CAPILLARY BLOOD GLUCOSE        I&O's Summary    2020 07:01  -  2020 12:01  --------------------------------------------------------  IN: 0 mL / OUT: 300 mL / NET: -300 mL        PHYSICAL EXAM:    GENERAL: Elderly, appears in mild, distress, on room air  EYES: Ppen, sclera clear b/l  CHEST/LUNG: normal respiratory effort, not tachypneic, speaking in full sentences without difficulty  HEART: Not tachycardic, no lower extremity edema b/l  ABDOMEN: Soft, Nontender, Nondistended  EXTREMITIES: ROM limited at left hip without palpable effusion or warmth, tender over proximal LLE  NEUROLOGY: Awake, alert, responds to most questions appropriately, AOx1 (person)  PSYCH: Calm, cooperativ, redirectable   SKIN: No visible rashes or lesions      LABS:                        10.4   18.23 )-----------( 555      ( 2020 17:30 )             32.9         142  |  106  |  30<H>  ----------------------------<  73  4.4   |  24  |  0.92    Ca    9.4      2020 17:30    TPro  7.2  /  Alb  2.8<L>  /  TBili  0.3  /  DBili  x   /  AST  18  /  ALT  10  /  AlkPhos  185<H>      PT/INR - ( 2020 17:30 )   PT: 12.8 SEC;   INR: 1.11     PTT - ( 2020 17:30 )  PTT:42.3 SEC      Urinalysis Basic - ( 2020 19:08 )    Color: LT. YELLOW / Appearance: CLEAR / S.018 / pH: 5.5  Gluc: NEGATIVE / Ketone: NEGATIVE  / Bili: NEGATIVE / Urobili: NORMAL   Blood: NEGATIVE / Protein: 10 / Nitrite: NEGATIVE   Leuk Esterase: LARGE / RBC: 0-2 / WBC 11-25   Sq Epi: x / Non Sq Epi: FEW / Bacteria: FEW        RADIOLOGY & ADDITIONAL TESTS: Patient is a 89y old  Female who presents with a chief complaint of LE pain (2020 04:49)      SUBJECTIVE / OVERNIGHT EVENTS: No acute events overnight.    Patient localizes pain to left hip, otherwise has no complaints. Requests that I speak with daughter Dianne prior to planned surgery.       MEDICATIONS  (STANDING):  amLODIPine   Tablet 5 milliGRAM(s) Oral daily  atorvastatin 10 milliGRAM(s) Oral at bedtime  cefTRIAXone   IVPB 1000 milliGRAM(s) IV Intermittent every 24 hours  pantoprazole    Tablet 40 milliGRAM(s) Oral before breakfast    MEDICATIONS  (PRN):  acetaminophen   Tablet .. 650 milliGRAM(s) Oral every 8 hours PRN Mild Pain (1 - 3), Moderate Pain (4 - 6)  oxycodone    5 mG/acetaminophen 325 mG 1 Tablet(s) Oral every 8 hours PRN Severe Pain (7 - 10)  senna 2 Tablet(s) Oral at bedtime PRN Constipation      T(C): 36.3 (20 @ 11:54), Max: 36.6 (20 @ 00:27)  HR: 98 (20 @ 11:54) (87 - 99)  BP: 119/67 (20 @ 11:54) (94/48 - 149/70)  RR: 17 (20 @ 11:54) (17 - 22)  SpO2: 96% (20 @ 11:54) (95% - 100%)  CAPILLARY BLOOD GLUCOSE        I&O's Summary    2020 07:01  -  2020 12:01  --------------------------------------------------------  IN: 0 mL / OUT: 300 mL / NET: -300 mL        PHYSICAL EXAM:    GENERAL: Elderly, appears in mild, distress, on room air  EYES: Open, sclera clear b/l  CHEST/LUNG: normal respiratory effort, not tachypneic, speaking in full sentences without difficulty  HEART: Not tachycardic, no lower extremity edema b/l  ABDOMEN: Soft, nontender, nondistended  EXTREMITIES: ROM limited at left hip without palpable effusion or warmth, tender over proximal LLE  NEUROLOGY: Awake, alert, responds to most questions appropriately, AOx1 (person)  PSYCH: Calm, cooperative, redirectable   SKIN: No visible rashes or lesions      LABS:                        10.4   18.23 )-----------( 555      ( 2020 17:30 )             32.9     -    142  |  106  |  30<H>  ----------------------------<  73  4.4   |  24  |  0.92    Ca    9.4      2020 17:30    TPro  7.2  /  Alb  2.8<L>  /  TBili  0.3  /  DBili  x   /  AST  18  /  ALT  10  /  AlkPhos  185<H>  22    PT/INR - ( 2020 17:30 )   PT: 12.8 SEC;   INR: 1.11     PTT - ( 2020 17:30 )  PTT:42.3 SEC      Urinalysis Basic - ( 2020 19:08 )    Color: LT. YELLOW / Appearance: CLEAR / S.018 / pH: 5.5  Gluc: NEGATIVE / Ketone: NEGATIVE  / Bili: NEGATIVE / Urobili: NORMAL   Blood: NEGATIVE / Protein: 10 / Nitrite: NEGATIVE   Leuk Esterase: LARGE / RBC: 0-2 / WBC 11-25   Sq Epi: x / Non Sq Epi: FEW / Bacteria: FEW        RADIOLOGY & ADDITIONAL TESTS: Patient is a 89y old  Female who presents with a chief complaint of LE pain (2020 04:49)      SUBJECTIVE / OVERNIGHT EVENTS: No acute events overnight.    Patient localizes pain to left hip, otherwise has no complaints. Requests that I speak with daughter Dianne prior to planned surgery.     COVID PCR is positive.       MEDICATIONS  (STANDING):  amLODIPine   Tablet 5 milliGRAM(s) Oral daily  atorvastatin 10 milliGRAM(s) Oral at bedtime  cefTRIAXone   IVPB 1000 milliGRAM(s) IV Intermittent every 24 hours  pantoprazole    Tablet 40 milliGRAM(s) Oral before breakfast    MEDICATIONS  (PRN):  acetaminophen   Tablet .. 650 milliGRAM(s) Oral every 8 hours PRN Mild Pain (1 - 3), Moderate Pain (4 - 6)  oxycodone    5 mG/acetaminophen 325 mG 1 Tablet(s) Oral every 8 hours PRN Severe Pain (7 - 10)  senna 2 Tablet(s) Oral at bedtime PRN Constipation      T(C): 36.3 (20 @ 11:54), Max: 36.6 (20 @ 00:27)  HR: 98 (20 @ 11:54) (87 - 99)  BP: 119/67 (20 @ 11:54) (94/48 - 149/70)  RR: 17 (20 @ 11:54) (17 - 22)  SpO2: 96% (20 @ 11:54) (95% - 100%)  CAPILLARY BLOOD GLUCOSE        I&O's Summary    2020 07:01  -  2020 12:01  --------------------------------------------------------  IN: 0 mL / OUT: 300 mL / NET: -300 mL        PHYSICAL EXAM:    GENERAL: Elderly, appears in mild, distress, on room air  EYES: Open, sclera clear b/l  CHEST/LUNG: normal respiratory effort, not tachypneic, speaking in full sentences without difficulty  HEART: Not tachycardic, no lower extremity edema b/l  ABDOMEN: Soft, nontender, nondistended  EXTREMITIES: ROM limited at left hip without palpable effusion or warmth, tender over proximal LLE  NEUROLOGY: Awake, alert, responds to most questions appropriately, AOx1 (person)  PSYCH: Calm, cooperative, redirectable   SKIN: No visible rashes or lesions      LABS:                        10.4   18.23 )-----------( 555      ( 2020 17:30 )             32.9         142  |  106  |  30<H>  ----------------------------<  73  4.4   |  24  |  0.92    Ca    9.4      2020 17:30    TPro  7.2  /  Alb  2.8<L>  /  TBili  0.3  /  DBili  x   /  AST  18  /  ALT  10  /  AlkPhos  185<H>  22    PT/INR - ( 2020 17:30 )   PT: 12.8 SEC;   INR: 1.11     PTT - ( 2020 17:30 )  PTT:42.3 SEC      Urinalysis Basic - ( 2020 19:08 )    Color: LT. YELLOW / Appearance: CLEAR / S.018 / pH: 5.5  Gluc: NEGATIVE / Ketone: NEGATIVE  / Bili: NEGATIVE / Urobili: NORMAL   Blood: NEGATIVE / Protein: 10 / Nitrite: NEGATIVE   Leuk Esterase: LARGE / RBC: 0-2 / WBC 11-25   Sq Epi: x / Non Sq Epi: FEW / Bacteria: FEW        RADIOLOGY & ADDITIONAL TESTS:

## 2020-04-23 NOTE — PROGRESS NOTE ADULT - SUBJECTIVE AND OBJECTIVE BOX
Patient seen and examined.  No acute events overnight.    Vital Signs Last 24 Hrs  T(C): 36.4 (23 Apr 2020 01:00), Max: 36.6 (23 Apr 2020 00:27)  T(F): 97.6 (23 Apr 2020 01:00), Max: 97.9 (23 Apr 2020 00:27)  HR: 97 (23 Apr 2020 01:00) (87 - 97)  BP: 114/56 (23 Apr 2020 01:00) (94/48 - 149/70)  BP(mean): 54 (23 Apr 2020 00:27) (54 - 54)  RR: 18 (23 Apr 2020 01:00) (18 - 22)  SpO2: 97% (23 Apr 2020 01:00) (95% - 100%)                          10.4   18.23 )-----------( 555      ( 22 Apr 2020 17:30 )             32.9   04-22    142  |  106  |  30<H>  ----------------------------<  73  4.4   |  24  |  0.92    Ca    9.4      22 Apr 2020 17:30    TPro  7.2  /  Alb  2.8<L>  /  TBili  0.3  /  DBili  x   /  AST  18  /  ALT  10  /  AlkPhos  185<H>  04-22      Gen: NAD  HIPS and PELVIS: Unable to SLR **Hip.     LLE: Healed scar over hip.  No open skin. Internally Rotated and shortened. No deformities or other signs of trauma at  knee, lower leg, ankle or foot. Full baseline painless ROM at ankle and toes with. Unable to actively SLR. SILT toes 1-5. 2+ DP/PT pulses with brisk cap refill distally. Compartments soft and compressible.     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 Dislocation is concerning for infected L hip hemiarthroplasty given ESR of 100 and elevated WBC.     -pain control  -Bedrest   -DVT ppx: Venodyne  -Pre op labs and tests  -Morning labs   -Obtain med clearance  -IR as aspiration of L hip to rule out prosthetic joint infection  -Plan for OR Friday 4/24/20 with Dr. Beto Mijares for possible revision camilo, possible total, possible antibiotic spacer, and possible resection arthroplasty

## 2020-04-23 NOTE — PROGRESS NOTE ADULT - PROBLEM SELECTOR PLAN 4
- UA positive. F/u cultures.  - Cover with ceftriaxone. Continue oral PPI UA weakly positive with few WBCs  Informed by patient that patient has a pessary - last exchanged in Oct/Nov 2019  Continue ceftriaxone 1 g daily pending urine culture  Will consider GYN consult for pessary exchange prior to discharge

## 2020-04-23 NOTE — PROVIDER CONTACT NOTE (CRITICAL VALUE NOTIFICATION) - SITUATION
Received lab report that patient blood culture from 4/22 is positive for cocci in cluster in aerobic bottle.

## 2020-04-24 LAB
-  AMIKACIN: SIGNIFICANT CHANGE UP
-  AMPICILLIN/SULBACTAM: SIGNIFICANT CHANGE UP
-  AMPICILLIN: SIGNIFICANT CHANGE UP
-  AZTREONAM: SIGNIFICANT CHANGE UP
-  CEFAZOLIN: SIGNIFICANT CHANGE UP
-  CEFEPIME: SIGNIFICANT CHANGE UP
-  CEFOXITIN: SIGNIFICANT CHANGE UP
-  CEFTRIAXONE: SIGNIFICANT CHANGE UP
-  CIPROFLOXACIN: SIGNIFICANT CHANGE UP
-  GENTAMICIN: SIGNIFICANT CHANGE UP
-  IMIPENEM: SIGNIFICANT CHANGE UP
-  LEVOFLOXACIN: SIGNIFICANT CHANGE UP
-  MEROPENEM: SIGNIFICANT CHANGE UP
-  NITROFURANTOIN: SIGNIFICANT CHANGE UP
-  PIPERACILLIN/TAZOBACTAM: SIGNIFICANT CHANGE UP
-  TIGECYCLINE: SIGNIFICANT CHANGE UP
-  TOBRAMYCIN: SIGNIFICANT CHANGE UP
-  TRIMETHOPRIM/SULFAMETHOXAZOLE: SIGNIFICANT CHANGE UP
ANION GAP SERPL CALC-SCNC: 12 MMO/L — SIGNIFICANT CHANGE UP (ref 7–14)
BASOPHILS # BLD AUTO: 0.02 K/UL — SIGNIFICANT CHANGE UP (ref 0–0.2)
BASOPHILS # BLD AUTO: 0.03 K/UL — SIGNIFICANT CHANGE UP (ref 0–0.2)
BASOPHILS NFR BLD AUTO: 0.2 % — SIGNIFICANT CHANGE UP (ref 0–2)
BASOPHILS NFR BLD AUTO: 0.3 % — SIGNIFICANT CHANGE UP (ref 0–2)
BLD GP AB SCN SERPL QL: NEGATIVE — SIGNIFICANT CHANGE UP
BUN SERPL-MCNC: 35 MG/DL — HIGH (ref 7–23)
CALCIUM SERPL-MCNC: 9 MG/DL — SIGNIFICANT CHANGE UP (ref 8.4–10.5)
CHLORIDE SERPL-SCNC: 110 MMOL/L — HIGH (ref 98–107)
CO2 SERPL-SCNC: 22 MMOL/L — SIGNIFICANT CHANGE UP (ref 22–31)
CREAT SERPL-MCNC: 1.04 MG/DL — SIGNIFICANT CHANGE UP (ref 0.5–1.3)
CULTURE RESULTS: SIGNIFICANT CHANGE UP
D DIMER BLD IA.RAPID-MCNC: 1430 NG/ML — SIGNIFICANT CHANGE UP
EOSINOPHIL # BLD AUTO: 0.14 K/UL — SIGNIFICANT CHANGE UP (ref 0–0.5)
EOSINOPHIL # BLD AUTO: 0.17 K/UL — SIGNIFICANT CHANGE UP (ref 0–0.5)
EOSINOPHIL NFR BLD AUTO: 1.4 % — SIGNIFICANT CHANGE UP (ref 0–6)
EOSINOPHIL NFR BLD AUTO: 1.6 % — SIGNIFICANT CHANGE UP (ref 0–6)
FERRITIN SERPL-MCNC: 206.8 NG/ML — HIGH (ref 15–150)
GLUCOSE SERPL-MCNC: 138 MG/DL — HIGH (ref 70–99)
GRAM STN FLD: SIGNIFICANT CHANGE UP
HCT VFR BLD CALC: 29.4 % — LOW (ref 34.5–45)
HCT VFR BLD CALC: 31.5 % — LOW (ref 34.5–45)
HGB BLD-MCNC: 8.9 G/DL — LOW (ref 11.5–15.5)
HGB BLD-MCNC: 9.6 G/DL — LOW (ref 11.5–15.5)
IMM GRANULOCYTES NFR BLD AUTO: 0.6 % — SIGNIFICANT CHANGE UP (ref 0–1.5)
IMM GRANULOCYTES NFR BLD AUTO: 0.8 % — SIGNIFICANT CHANGE UP (ref 0–1.5)
LYMPHOCYTES # BLD AUTO: 1.9 K/UL — SIGNIFICANT CHANGE UP (ref 1–3.3)
LYMPHOCYTES # BLD AUTO: 1.94 K/UL — SIGNIFICANT CHANGE UP (ref 1–3.3)
LYMPHOCYTES # BLD AUTO: 17.4 % — SIGNIFICANT CHANGE UP (ref 13–44)
LYMPHOCYTES # BLD AUTO: 19.5 % — SIGNIFICANT CHANGE UP (ref 13–44)
MAGNESIUM SERPL-MCNC: 2.1 MG/DL — SIGNIFICANT CHANGE UP (ref 1.6–2.6)
MCHC RBC-ENTMCNC: 26.1 PG — LOW (ref 27–34)
MCHC RBC-ENTMCNC: 26.3 PG — LOW (ref 27–34)
MCHC RBC-ENTMCNC: 30.3 % — LOW (ref 32–36)
MCHC RBC-ENTMCNC: 30.5 % — LOW (ref 32–36)
MCV RBC AUTO: 85.6 FL — SIGNIFICANT CHANGE UP (ref 80–100)
MCV RBC AUTO: 87 FL — SIGNIFICANT CHANGE UP (ref 80–100)
METHOD TYPE: SIGNIFICANT CHANGE UP
MONOCYTES # BLD AUTO: 0.72 K/UL — SIGNIFICANT CHANGE UP (ref 0–0.9)
MONOCYTES # BLD AUTO: 0.75 K/UL — SIGNIFICANT CHANGE UP (ref 0–0.9)
MONOCYTES NFR BLD AUTO: 6.9 % — SIGNIFICANT CHANGE UP (ref 2–14)
MONOCYTES NFR BLD AUTO: 7.3 % — SIGNIFICANT CHANGE UP (ref 2–14)
NEUTROPHILS # BLD AUTO: 7.05 K/UL — SIGNIFICANT CHANGE UP (ref 1.8–7.4)
NEUTROPHILS # BLD AUTO: 7.98 K/UL — HIGH (ref 1.8–7.4)
NEUTROPHILS NFR BLD AUTO: 71 % — SIGNIFICANT CHANGE UP (ref 43–77)
NEUTROPHILS NFR BLD AUTO: 73 % — SIGNIFICANT CHANGE UP (ref 43–77)
NRBC # FLD: 0 K/UL — SIGNIFICANT CHANGE UP (ref 0–0)
NRBC # FLD: 0 K/UL — SIGNIFICANT CHANGE UP (ref 0–0)
ORGANISM # SPEC MICROSCOPIC CNT: SIGNIFICANT CHANGE UP
ORGANISM # SPEC MICROSCOPIC CNT: SIGNIFICANT CHANGE UP
PHOSPHATE SERPL-MCNC: 3.2 MG/DL — SIGNIFICANT CHANGE UP (ref 2.5–4.5)
PLATELET # BLD AUTO: 531 K/UL — HIGH (ref 150–400)
PLATELET # BLD AUTO: 582 K/UL — HIGH (ref 150–400)
PMV BLD: 9.6 FL — SIGNIFICANT CHANGE UP (ref 7–13)
PMV BLD: 9.6 FL — SIGNIFICANT CHANGE UP (ref 7–13)
POTASSIUM SERPL-MCNC: 4.5 MMOL/L — SIGNIFICANT CHANGE UP (ref 3.5–5.3)
POTASSIUM SERPL-SCNC: 4.5 MMOL/L — SIGNIFICANT CHANGE UP (ref 3.5–5.3)
PROCALCITONIN SERPL-MCNC: 0.13 NG/ML — HIGH (ref 0.02–0.1)
RBC # BLD: 3.38 M/UL — LOW (ref 3.8–5.2)
RBC # BLD: 3.68 M/UL — LOW (ref 3.8–5.2)
RBC # FLD: 17.5 % — HIGH (ref 10.3–14.5)
RBC # FLD: 17.6 % — HIGH (ref 10.3–14.5)
RH IG SCN BLD-IMP: NEGATIVE — SIGNIFICANT CHANGE UP
SODIUM SERPL-SCNC: 144 MMOL/L — SIGNIFICANT CHANGE UP (ref 135–145)
SPECIMEN SOURCE: SIGNIFICANT CHANGE UP
SPECIMEN SOURCE: SIGNIFICANT CHANGE UP
VANCOMYCIN TROUGH SERPL-MCNC: 0.5 UG/ML — LOW (ref 10–20)
WBC # BLD: 10.92 K/UL — HIGH (ref 3.8–10.5)
WBC # BLD: 9.93 K/UL — SIGNIFICANT CHANGE UP (ref 3.8–10.5)
WBC # FLD AUTO: 10.92 K/UL — HIGH (ref 3.8–10.5)
WBC # FLD AUTO: 9.93 K/UL — SIGNIFICANT CHANGE UP (ref 3.8–10.5)

## 2020-04-24 RX ORDER — LANOLIN ALCOHOL/MO/W.PET/CERES
3 CREAM (GRAM) TOPICAL AT BEDTIME
Refills: 0 | Status: DISCONTINUED | OUTPATIENT
Start: 2020-04-24 | End: 2020-05-08

## 2020-04-24 RX ORDER — VANCOMYCIN HCL 1 G
1000 VIAL (EA) INTRAVENOUS EVERY 24 HOURS
Refills: 0 | Status: DISCONTINUED | OUTPATIENT
Start: 2020-04-24 | End: 2020-04-27

## 2020-04-24 RX ORDER — ENOXAPARIN SODIUM 100 MG/ML
40 INJECTION SUBCUTANEOUS DAILY
Refills: 0 | Status: DISCONTINUED | OUTPATIENT
Start: 2020-04-24 | End: 2020-04-24

## 2020-04-24 RX ORDER — ENOXAPARIN SODIUM 100 MG/ML
40 INJECTION SUBCUTANEOUS DAILY
Refills: 0 | Status: DISCONTINUED | OUTPATIENT
Start: 2020-04-24 | End: 2020-04-29

## 2020-04-24 RX ADMIN — AMLODIPINE BESYLATE 5 MILLIGRAM(S): 2.5 TABLET ORAL at 06:07

## 2020-04-24 RX ADMIN — OXYCODONE AND ACETAMINOPHEN 1 TABLET(S): 5; 325 TABLET ORAL at 06:05

## 2020-04-24 RX ADMIN — ATORVASTATIN CALCIUM 10 MILLIGRAM(S): 80 TABLET, FILM COATED ORAL at 23:02

## 2020-04-24 RX ADMIN — PANTOPRAZOLE SODIUM 40 MILLIGRAM(S): 20 TABLET, DELAYED RELEASE ORAL at 06:07

## 2020-04-24 RX ADMIN — ENOXAPARIN SODIUM 40 MILLIGRAM(S): 100 INJECTION SUBCUTANEOUS at 14:25

## 2020-04-24 RX ADMIN — Medication 250 MILLIGRAM(S): at 18:20

## 2020-04-24 RX ADMIN — Medication 650 MILLIGRAM(S): at 23:02

## 2020-04-24 RX ADMIN — CEFTRIAXONE 100 MILLIGRAM(S): 500 INJECTION, POWDER, FOR SOLUTION INTRAMUSCULAR; INTRAVENOUS at 23:02

## 2020-04-24 RX ADMIN — OXYCODONE AND ACETAMINOPHEN 1 TABLET(S): 5; 325 TABLET ORAL at 16:34

## 2020-04-24 RX ADMIN — Medication 3 MILLIGRAM(S): at 23:02

## 2020-04-24 NOTE — CONSULT NOTE ADULT - SUBJECTIVE AND OBJECTIVE BOX
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 Patient had a mechanical fall and underwent a left hip hemiarthroplasty in 2020 at [facility]. [facility]. She presented to [facility] on 3/13/2020 for painful vertebral compression fractures, once her pain was treated she was discharged to [facility] rehab. She was ambulating with PT initially while at [facility] when about a month prior according to daughter she was told by [facility] staff that her mother fell. She has since not been ambulatory. Today it was noticed that her leg appeared shortened and internally rotated and it was painful to move her in bed. She presented to [facility] for eval. Otherwise, patient had no cardiac hx. Tolerated procedure well in . Recovered partially ambulatory at rehab lately prior to this event. (2020 23:38)    ER vitals:  T 97.6, P 87, /62, RR 18, 95% RA.  WBC 18.2 --> 9.9.   . .   D-dimer 1430, ferritin 206.  PCT 0.13.  UA Large LE, (+) wbc's.   Ucx >100K GNR and >100K CoNS.   Bcx (+) CoNS  bottles.   Covid pcr (+) .      Multiple imaging performed including CT pelvis and femur, showing  dislocated femoral component of the left hip hemiarthroplasty. Mild cortical irregularity and lucencies at the left greater trochanter, which is difficult to assess due to artifact. This may be in part postsurgical although nondisplaced fracture cannot be excluded.  Also with foci of air in the left mid lateral thigh soft tissue, without a discrete fluid collection. Differential diagnosis includes injection, penetrating trauma and infection.  Question sigmoid colon diverticulitis.     Pt seen by Ortho service, pt initially planned IR aspiration of L hip to rule out prosthetic joint infection, given CT findings and bacteremia with CoNS.  Also planned for OR for possible revision vs resection arthroplasty, now cancelled due to Covid (+) status.     ID consulted for further recommendations.            REVIEW OF SYSTEMS:    CONSTITUTIONAL: No fever, weight loss, or fatigue  EYES: No eye pain, visual disturbances, or discharge  ENMT:  No sore throat  NECK: No pain or stiffness  RESPIRATORY: No cough, wheezing, chills or hemoptysis; No shortness of breath  CARDIOVASCULAR: No chest pain, palpitations, dizziness, or leg swelling  GASTROINTESTINAL: No abdominal or epigastric pain. No nausea, vomiting, or hematemesis; No diarrhea or constipation. No melena or hematochezia.  GENITOURINARY: No dysuria, frequency, hematuria, or incontinence  NEUROLOGICAL: No headaches, memory loss, loss of strength, numbness, or tremors  SKIN: No itching, burning, rashes, or lesions   LYMPH NODES: No enlarged glands  MUSCULOSKELETAL: No joint pain or swelling; No muscle, back, or extremity pain      PAST MEDICAL & SURGICAL HISTORY:  Other chronic gastritis  Hypercholesterolemia  Benign hypertension  High cholesterol  HTN (hypertension)  No significant past surgical history      Allergies    No Known Allergies    Intolerances        FAMILY HISTORY:  No pertinent family history in first degree relatives      SOCIAL HISTORY:  No smoking, ivdu, etoh      MEDICATIONS  (STANDING):  amLODIPine   Tablet 5 milliGRAM(s) Oral daily  atorvastatin 10 milliGRAM(s) Oral at bedtime  cefTRIAXone   IVPB 1000 milliGRAM(s) IV Intermittent every 24 hours  enoxaparin Injectable 40 milliGRAM(s) SubCutaneous daily  melatonin 3 milliGRAM(s) Oral at bedtime  pantoprazole    Tablet 40 milliGRAM(s) Oral before breakfast    MEDICATIONS  (PRN):  acetaminophen   Tablet .. 650 milliGRAM(s) Oral every 8 hours PRN Mild Pain (1 - 3), Moderate Pain (4 - 6)  oxycodone    5 mG/acetaminophen 325 mG 1 Tablet(s) Oral every 8 hours PRN Severe Pain (7 - 10)  senna 2 Tablet(s) Oral at bedtime PRN Constipation      Vital Signs Last 24 Hrs  T(C): 36.6 (2020 11:04), Max: 36.8 (2020 22:11)  T(F): 97.9 (2020 11:04), Max: 98.3 (2020 22:11)  HR: 98 (2020 11:04) (98 - 110)  BP: 116/62 (2020 11:04) (115/46 - 118/70)  BP(mean): --  RR: 18 (2020 11:04) (18 - 18)  SpO2: 100% (2020 11:04) (97% - 100%)    PHYSICAL EXAM:    GENERAL: NAD, well-groomed  HEAD:  Atraumatic, Normocephalic  EYES: EOMI, PERRLA, conjunctiva and sclera clear  ENMT: No tonsillar erythema, exudates, or enlargement; Moist mucous membranes  NECK: Supple, No JVD  CHEST/LUNG: Clear to percussion bilaterally; No rales, rhonchi, wheezing, or rubs  HEART: Regular rate and rhythm; No murmurs, rubs, or gallops  ABDOMEN: Soft, Nontender, Nondistended; Bowel sounds present  EXTREMITIES:  2+ Peripheral Pulses, No clubbing, cyanosis, or edema  LYMPH: No lymphadenopathy noted  SKIN: No rashes or lesions    LABS:  CBC Full  -  ( 2020 12:00 )  WBC Count : 9.93 K/uL  RBC Count : 3.68 M/uL  Hemoglobin : 9.6 g/dL  Hematocrit : 31.5 %  Platelet Count - Automated : 582 K/uL  Mean Cell Volume : 85.6 fL  Mean Cell Hemoglobin : 26.1 pg  Mean Cell Hemoglobin Concentration : 30.5 %  Auto Neutrophil # : 7.05 K/uL  Auto Lymphocyte # : 1.94 K/uL  Auto Monocyte # : 0.72 K/uL  Auto Eosinophil # : 0.14 K/uL  Auto Basophil # : 0.02 K/uL  Auto Neutrophil % : 71.0 %  Auto Lymphocyte % : 19.5 %  Auto Monocyte % : 7.3 %  Auto Eosinophil % : 1.4 %  Auto Basophil % : 0.2 %          144  |  110<H>  |  35<H>  ----------------------------<  138<H>  4.5   |  22  |  1.04    Ca    9.0      2020 07:08  Phos  3.2     -24  Mg     2.1     -24    TPro  6.3  /  Alb  1.7<L>  /  TBili  0.4  /  DBili  x   /  AST  13  /  ALT  7   /  AlkPhos  170<H>  -      LIVER FUNCTIONS - ( 2020 14:10 )  Alb: 1.7 g/dL / Pro: 6.3 g/dL / ALK PHOS: 170 u/L / ALT: 7 u/L / AST: 13 u/L / GGT: x                               MICROBIOLOGY:        Urinalysis Basic - ( 2020 19:08 )    Color: LT. YELLOW / Appearance: CLEAR / S.018 / pH: 5.5  Gluc: NEGATIVE / Ketone: NEGATIVE  / Bili: NEGATIVE / Urobili: NORMAL   Blood: NEGATIVE / Protein: 10 / Nitrite: NEGATIVE   Leuk Esterase: LARGE / RBC: 0-2 / WBC 11-25   Sq Epi: x / Non Sq Epi: FEW / Bacteria: FEW    Culture - Blood (20 @ 00:56)    -  Coagulase negative Staphylococcus: Detec    Gram Stain:   Growth in aerobic bottle: Gram Positive Cocci in Clusters  Growth in anaerobic bottle: Gram Positive Cocci in Clusters    Specimen Source: .Blood Blood-Venous    Organism: Blood Culture PCR    Culture Results:   Growth in aerobic bottle: Gram Positive Cocci in Clusters  Growth in anaerobic bottle: Gram Positive Cocci in Clusters  "Due to technical problems, Proteus sp. will Not be reported as part of  the BCID panel until further notice"  ***Blood Panel PCR results on this specimen are available  approximately 3 hours after the Gram stain result.***  Gram stain, PCR, and/or culture results may not always  correspond due to difference in methodologies.  ************************************************************  This PCR assay was performed using [employer].  The following targets are tested for: Enterococcus,  vancomycin resistant enterococci, Listeria monocytogenes,  coagulase negative staphylococci, S. aureus,  methicillin resistant S. aureus, Streptococcus agalactiae  (Group B), S. pneumoniae, S. pyogenes (Group A),  Acinetobacter baumannii, Enterobacter cloacae, E. coli,  Klebsiella oxytoca, K. pneumoniae, Proteus sp.,  Serratia marcescens, Haemophilus influenzae,  Neisseria meningitidis, Pseudomonas aeruginosa, Candida  albicans, C. glabrata, C krusei, C parapsilosis,  C. tropicalis and the KPC resistance gene.    Organism Identification: Blood Culture PCR    Method Type: PCR    Culture - Blood (20 @ 00:56)    Gram Stain:   Growth in aerobic bottle: Gram Positive Cocci in Clusters  Growth in anaerobic bottle: Gram Positive Cocci in Clusters    Specimen Source: .Blood Blood-Peripheral    Culture Results:   Growth in aerobic bottle: Gram Positive Cocci in Clusters  Growth in anaerobic bottle: Gram Positive Cocci in Clusters    Culture - Urine (20 @ 00:56)    Specimen Source: .Urine Clean Catch (Midstream)    Culture Results:   >100,000 CFU/ml Gram Negative Rods  >100,000 CFU/ml Coag Negative Staphylococcus "Susceptibilities not  performed"    COVID-19 PCR . (20 @ 18:23)    COVID-19 PCR: Detected: This test has been validated by [employer] to be accurate;  though it has not been FDA cleared/approved by the usual pathway.  As with all laboratory tests, results should be correlated with clinical  findings.  https://www.fda.gov/media/780246/download  https://www.fda.gov/media/591715/download                RADIOLOGY:    < from: CT Pelvis Bony Only No Cont (20 @ 21:54) >  IMPRESSION:    Supralaterally and posteriorly dislocated femoral component of the left hip hemiarthroplasty. Mild cortical irregularity and lucencies at the left greater trochanter, which is difficult to assess due to artifact. This may be in part postsurgical although nondisplaced fracture cannot be excluded. If there is continued clinical suspicion, additional imaging (nuclear scan and/or MRI with metal artifact reduction protocol) may be obtained for further evaluation.    Foci of air in the left mid lateral thigh soft tissue, without a discrete fluid collection. Differential diagnosis includes injection, penetrating trauma and infection. Recommend clinical correlation for further evaluation.    Question sigmoid colon diverticulitis. Recommend clinical correlation and follow-up colonoscopy to exclude potential underlying neoplasm.    Additional findings as described.    < end of copied text >        < from: CT Femur No Cont, Left (20 @ 21:54) >  IMPRESSION:    Supralaterally and posteriorly dislocated femoral component of the left hip hemiarthroplasty. Mild cortical irregularity and lucencies at the left greater trochanter, which is difficult to assess due to artifact. This may be in part postsurgical although nondisplaced fracture cannot be excluded. If there is continued clinical suspicion, additional imaging (nuclear scan and/or MRI with metal artifact reduction protocol) may be obtained for further evaluation.    Foci of air in the left mid lateral thigh soft tissue, without a discrete fluid collection. Differential diagnosis includes injection, penetrating trauma and infection. Recommend clinical correlation for further evaluation.    Question sigmoid colon diverticulitis. Recommend clinical correlation and follow-up colonoscopy to exclude potential underlying neoplasm.    Additional findings as described.    < end of copied text >        < from: Xray Chest 1 View- PORTABLE-Routine (20 @ 18:07) >  Findings:     Study is limited as the patient's chin obscures the right lung apex. Otherwise, the visualized lungs are clear. The heart size within normal limits.    Impression:    Grossly clear lungs. Limited evaluation of the right lung apex.    < end of copied text >        < from: Xray Pelvis AP only (20 @ 17:41) >  FINDINGS:     There is a posterior dislocation of the left hemiarthroplasty. No acute periprosthetic fracture, or fracture of the visualized pelvis or distal femur are visualized. No cortical erosion.     Degenerative changes the lumbar spine.    Gas and stool are seen extending to the colon. There is atherosclerotic disease.    IMPRESSION:  There is a posterior dislocation of the cemented left hemiarthroplasty. No acute periprosthetic fracture, or fracture of the visualized pelvis and distal femur are visualized.     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 Patient had a mechanical fall and underwent a left hip hemiarthroplasty in 2020 at [facility]. She tolerated surgery and discharged to [facility] rehab. She presented to [facility] on 3/13/2020 for painful vertebral compression fractures, once her pain was treated she was discharged to [facility] rehab. She was ambulating with PT initially while at [facility] when about a month prior according to daughter she was told by [facility] staff that her mother fell. She has since not been ambulatory. Today it was noticed that her leg appeared shortened and internally rotated and it was painful to move her in bed. She presented to [facility] for eval. Otherwise, patient had no cardiac hx. Tolerated procedure well in . Recovered partially ambulatory at rehab lately prior to this event. (2020 23:38)    ER vitals:  T 97.6, P 87, /62, RR 18, 95% RA.  WBC 18.2 --> 9.9.   . .   D-dimer 1430, ferritin 206.  PCT 0.13.  UA Large LE, (+) wbc's.   Ucx >100K GNR and >100K CoNS.   Bcx (+) CoNS  bottles.   Covid pcr (+) .      Multiple imaging performed including CT pelvis and femur, showing  dislocated femoral component of the left hip hemiarthroplasty. Mild cortical irregularity and lucencies at the left greater trochanter, which is difficult to assess due to artifact. This may be in part postsurgical although nondisplaced fracture cannot be excluded.  Also with foci of air in the left mid lateral thigh soft tissue, without a discrete fluid collection. Differential diagnosis includes injection, penetrating trauma and infection.  Question sigmoid colon diverticulitis.     Pt seen by Ortho service, pt initially planned IR aspiration of L hip to rule out prosthetic joint infection, given CT findings and bacteremia with CoNS.  Also planned for OR for possible revision vs resection arthroplasty, now cancelled due to Covid (+) status.     ID consulted for further recommendations.            REVIEW OF SYSTEMS:    CONSTITUTIONAL: No fever, weight loss, or fatigue  EYES: No eye pain, visual disturbances, or discharge  ENMT:  No sore throat  NECK: No pain or stiffness  RESPIRATORY: No cough, wheezing, chills or hemoptysis; No shortness of breath  CARDIOVASCULAR: No chest pain, palpitations, dizziness, or leg swelling  GASTROINTESTINAL: No abdominal or epigastric pain. No nausea, vomiting, or hematemesis; No diarrhea or constipation. No melena or hematochezia.  GENITOURINARY: No dysuria, frequency, hematuria, or incontinence  NEUROLOGICAL: No headaches, memory loss, loss of strength, numbness, or tremors  SKIN: No itching, burning, rashes, or lesions   LYMPH NODES: No enlarged glands  MUSCULOSKELETAL: No joint pain or swelling; No muscle, back, or extremity pain      PAST MEDICAL & SURGICAL HISTORY:  Other chronic gastritis  Hypercholesterolemia  Benign hypertension  High cholesterol  HTN (hypertension)  No significant past surgical history      Allergies    No Known Allergies    Intolerances        FAMILY HISTORY:  No pertinent family history in first degree relatives      SOCIAL HISTORY:  No smoking, ivdu, etoh      MEDICATIONS  (STANDING):  amLODIPine   Tablet 5 milliGRAM(s) Oral daily  atorvastatin 10 milliGRAM(s) Oral at bedtime  cefTRIAXone   IVPB 1000 milliGRAM(s) IV Intermittent every 24 hours  enoxaparin Injectable 40 milliGRAM(s) SubCutaneous daily  melatonin 3 milliGRAM(s) Oral at bedtime  pantoprazole    Tablet 40 milliGRAM(s) Oral before breakfast    MEDICATIONS  (PRN):  acetaminophen   Tablet .. 650 milliGRAM(s) Oral every 8 hours PRN Mild Pain (1 - 3), Moderate Pain (4 - 6)  oxycodone    5 mG/acetaminophen 325 mG 1 Tablet(s) Oral every 8 hours PRN Severe Pain (7 - 10)  senna 2 Tablet(s) Oral at bedtime PRN Constipation      Vital Signs Last 24 Hrs  T(C): 36.6 (2020 11:04), Max: 36.8 (2020 22:11)  T(F): 97.9 (2020 11:04), Max: 98.3 (2020 22:11)  HR: 98 (2020 11:04) (98 - 110)  BP: 116/62 (2020 11:04) (115/46 - 118/70)  BP(mean): --  RR: 18 (2020 11:04) (18 - 18)  SpO2: 100% (2020 11:04) (97% - 100%)    PHYSICAL EXAM:    GENERAL: NAD, well-groomed  HEAD:  Atraumatic, Normocephalic  EYES: EOMI, PERRLA, conjunctiva and sclera clear  ENMT: No tonsillar erythema, exudates, or enlargement; Moist mucous membranes  NECK: Supple, No JVD  CHEST/LUNG: Clear to percussion bilaterally; No rales, rhonchi, wheezing, or rubs  HEART: Regular rate and rhythm; No murmurs, rubs, or gallops  ABDOMEN: Soft, Nontender, Nondistended; Bowel sounds present  EXTREMITIES: b/l LE contractures.  LLE bucks traction device in place  LYMPH: No lymphadenopathy noted  SKIN: No rashes or lesions  :  benson in place with cloudy yellow urine    LABS:  CBC Full  -  ( 2020 12:00 )  WBC Count : 9.93 K/uL  RBC Count : 3.68 M/uL  Hemoglobin : 9.6 g/dL  Hematocrit : 31.5 %  Platelet Count - Automated : 582 K/uL  Mean Cell Volume : 85.6 fL  Mean Cell Hemoglobin : 26.1 pg  Mean Cell Hemoglobin Concentration : 30.5 %  Auto Neutrophil # : 7.05 K/uL  Auto Lymphocyte # : 1.94 K/uL  Auto Monocyte # : 0.72 K/uL  Auto Eosinophil # : 0.14 K/uL  Auto Basophil # : 0.02 K/uL  Auto Neutrophil % : 71.0 %  Auto Lymphocyte % : 19.5 %  Auto Monocyte % : 7.3 %  Auto Eosinophil % : 1.4 %  Auto Basophil % : 0.2 %      -24    144  |  110<H>  |  35<H>  ----------------------------<  138<H>  4.5   |  22  |  1.04    Ca    9.0      2020 07:08  Phos  3.2     04-24  Mg     2.1     04-24    TPro  6.3  /  Alb  1.7<L>  /  TBili  0.4  /  DBili  x   /  AST  13  /  ALT  7   /  AlkPhos  170<H>  -      LIVER FUNCTIONS - ( 2020 14:10 )  Alb: 1.7 g/dL / Pro: 6.3 g/dL / ALK PHOS: 170 u/L / ALT: 7 u/L / AST: 13 u/L / GGT: x                               MICROBIOLOGY:        Urinalysis Basic - ( 2020 19:08 )    Color: LT. YELLOW / Appearance: CLEAR / S.018 / pH: 5.5  Gluc: NEGATIVE / Ketone: NEGATIVE  / Bili: NEGATIVE / Urobili: NORMAL   Blood: NEGATIVE / Protein: 10 / Nitrite: NEGATIVE   Leuk Esterase: LARGE / RBC: 0-2 / WBC 11-25   Sq Epi: x / Non Sq Epi: FEW / Bacteria: FEW    Culture - Blood (20 @ 00:56)    -  Coagulase negative Staphylococcus: Detec    Gram Stain:   Growth in aerobic bottle: Gram Positive Cocci in Clusters  Growth in anaerobic bottle: Gram Positive Cocci in Clusters    Specimen Source: .Blood Blood-Venous    Organism: Blood Culture PCR    Culture Results:   Growth in aerobic bottle: Gram Positive Cocci in Clusters  Growth in anaerobic bottle: Gram Positive Cocci in Clusters  "Due to technical problems, Proteus sp. will Not be reported as part of  the BCID panel until further notice"  ***Blood Panel PCR results on this specimen are available  approximately 3 hours after the Gram stain result.***  Gram stain, PCR, and/or culture results may not always  correspond due to difference in methodologies.  ************************************************************  This PCR assay was performed using [employer].  The following targets are tested for: Enterococcus,  vancomycin resistant enterococci, Listeria monocytogenes,  coagulase negative staphylococci, S. aureus,  methicillin resistant S. aureus, Streptococcus agalactiae  (Group B), S. pneumoniae, S. pyogenes (Group A),  Acinetobacter baumannii, Enterobacter cloacae, E. coli,  Klebsiella oxytoca, K. pneumoniae, Proteus sp.,  Serratia marcescens, Haemophilus influenzae,  Neisseria meningitidis, Pseudomonas aeruginosa, Candida  albicans, C. glabrata, C krusei, C parapsilosis,  C. tropicalis and the KPC resistance gene.    Organism Identification: Blood Culture PCR    Method Type: PCR    Culture - Blood (20 @ 00:56)    Gram Stain:   Growth in aerobic bottle: Gram Positive Cocci in Clusters  Growth in anaerobic bottle: Gram Positive Cocci in Clusters    Specimen Source: .Blood Blood-Peripheral    Culture Results:   Growth in aerobic bottle: Gram Positive Cocci in Clusters  Growth in anaerobic bottle: Gram Positive Cocci in Clusters    Culture - Urine (20 @ 00:56)    Specimen Source: .Urine Clean Catch (Midstream)    Culture Results:   >100,000 CFU/ml Gram Negative Rods  >100,000 CFU/ml Coag Negative Staphylococcus "Susceptibilities not  performed"    COVID-19 PCR . (20 @ 18:23)    COVID-19 PCR: Detected: This test has been validated by [employer] to be accurate;  though it has not been FDA cleared/approved by the usual pathway.  As with all laboratory tests, results should be correlated with clinical  findings.  https://www.fda.gov/media/660419/download  https://www.fda.gov/media/654127/download                RADIOLOGY:    < from: CT Pelvis Bony Only No Cont (20 @ 21:54) >  IMPRESSION:    Supralaterally and posteriorly dislocated femoral component of the left hip hemiarthroplasty. Mild cortical irregularity and lucencies at the left greater trochanter, which is difficult to assess due to artifact. This may be in part postsurgical although nondisplaced fracture cannot be excluded. If there is continued clinical suspicion, additional imaging (nuclear scan and/or MRI with metal artifact reduction protocol) may be obtained for further evaluation.    Foci of air in the left mid lateral thigh soft tissue, without a discrete fluid collection. Differential diagnosis includes injection, penetrating trauma and infection. Recommend clinical correlation for further evaluation.    Question sigmoid colon diverticulitis. Recommend clinical correlation and follow-up colonoscopy to exclude potential underlying neoplasm.    Additional findings as described.    < end of copied text >        < from: CT Femur No Cont, Left (20 @ 21:54) >  IMPRESSION:    Supralaterally and posteriorly dislocated femoral component of the left hip hemiarthroplasty. Mild cortical irregularity and lucencies at the left greater trochanter, which is difficult to assess due to artifact. This may be in part postsurgical although nondisplaced fracture cannot be excluded. If there is continued clinical suspicion, additional imaging (nuclear scan and/or MRI with metal artifact reduction protocol) may be obtained for further evaluation.    Foci of air in the left mid lateral thigh soft tissue, without a discrete fluid collection. Differential diagnosis includes injection, penetrating trauma and infection. Recommend clinical correlation for further evaluation.    Question sigmoid colon diverticulitis. Recommend clinical correlation and follow-up colonoscopy to exclude potential underlying neoplasm.    Additional findings as described.    < end of copied text >        < from: Xray Chest 1 View- PORTABLE-Routine (20 @ 18:07) >  Findings:     Study is limited as the patient's chin obscures the right lung apex. Otherwise, the visualized lungs are clear. The heart size within normal limits.    Impression:    Grossly clear lungs. Limited evaluation of the right lung apex.    < end of copied text >        < from: Xray Pelvis AP only (20 @ 17:41) >  FINDINGS:     There is a posterior dislocation of the left hemiarthroplasty. No acute periprosthetic fracture, or fracture of the visualized pelvis or distal femur are visualized. No cortical erosion.     Degenerative changes the lumbar spine.    Gas and stool are seen extending to the colon. There is atherosclerotic disease.    IMPRESSION:  There is a posterior dislocation of the cemented left hemiarthroplasty. No acute periprosthetic fracture, or fracture of the visualized pelvis and distal femur are visualized.     < end of copied text >

## 2020-04-24 NOTE — PROGRESS NOTE ADULT - SUBJECTIVE AND OBJECTIVE BOX
Subjective/Interval Events:  Patient continues to saturate well on room air.  Orthopedics is deferring surgery now due to COVID +.  In addition patient with blood culture form 4/32 results with gram positive cocci in clusters x 2.    Hospital Medications:  acetaminophen   Tablet .. 650 milliGRAM(s) Oral every 8 hours PRN  amLODIPine   Tablet 5 milliGRAM(s) Oral daily  atorvastatin 10 milliGRAM(s) Oral at bedtime  cefTRIAXone   IVPB 1000 milliGRAM(s) IV Intermittent every 24 hours  melatonin 3 milliGRAM(s) Oral at bedtime  oxycodone    5 mG/acetaminophen 325 mG 1 Tablet(s) Oral every 8 hours PRN  pantoprazole    Tablet 40 milliGRAM(s) Oral before breakfast  senna 2 Tablet(s) Oral at bedtime PRN      Physical Exam:  T(C): 36.5 (04-24-20 @ 05:46), Max: 36.8 (04-23-20 @ 22:11)  HR: 100 (04-24-20 @ 05:46) (98 - 110)  BP: 115/46 (04-24-20 @ 05:46) (115/46 - 119/67)  RR: 18 (04-24-20 @ 05:46) (17 - 18)  SpO2: 100% (04-24-20 @ 05:46) (96% - 100%)    Comfortable appearing  On room air  No respiratory distress    Labs: reviewed                        8.9    10.92 )-----------( 531      ( 24 Apr 2020 07:08 )             29.4     04-24    144  |  110<H>  |  35<H>  ----------------------------<  138<H>  4.5   |  22  |  1.04    Ca    9.0      24 Apr 2020 07:08  Phos  3.2     04-24  Mg     2.1     04-24    TPro  6.3  /  Alb  1.7<L>  /  TBili  0.4  /  DBili  x   /  AST  13  /  ALT  7   /  AlkPhos  170<H>  04-23    LIVER FUNCTIONS - ( 23 Apr 2020 14:10 )  Alb: 1.7 g/dL / Pro: 6.3 g/dL / ALK PHOS: 170 u/L / ALT: 7 u/L / AST: 13 u/L / GGT: x             Culture - Blood (collected 23 Apr 2020 00:56)  Source: .Blood Blood-Venous  Gram Stain (23 Apr 2020 19:12):    Growth in aerobic bottle: Gram Positive Cocci in Clusters  Preliminary Report (23 Apr 2020 19:13):    Growth in aerobic bottle: Gram Positive Cocci in Clusters    "Due to technical problems, Proteus sp. will Not be reported as part of    the BCID panel until further notice"    ***Blood Panel PCR results on this specimen are available    approximately 3 hours after the Gram stain result.***    Gram stain, PCR, and/or culture results may not always    correspond due to difference in methodologies.    ************************************************************    This PCR assay was performed using [facility].    The following targets are tested for: Enterococcus,    vancomycin resistant enterococci, Listeria monocytogenes,    coagulase negative staphylococci, S. aureus,    methicillin resistant S. aureus, Streptococcus agalactiae    (Group B), S. pneumoniae, S.pyogenes (Group A),    Acinetobacter baumannii, Enterobacter cloacae, E. coli,    Klebsiella oxytoca, K. pneumoniae, Proteus sp.,    Serratia marcescens, Haemophilus influenzae,    Neisseria meningitidis, Pseudomonas aeruginosa, Candida    albicans, C. glabrata, C krusei, C parapsilosis,    C. tropicalis and the KPC resistance gene.  Organism: Blood Culture PCR (23 Apr 2020 21:51)  Organism: Blood Culture PCR (23 Apr 2020 21:51)    Culture - Blood (collected 23 Apr 2020 00:56)  Source: .Blood Blood-Peripheral  Gram Stain (24 Apr 2020 06:24):    Growth in aerobic bottle: Gram Positive Cocci in Clusters    Growth in anaerobic bottle: Gram Positive Cocci in Clusters  Preliminary Report (24 Apr 2020 06:24):    Growth in aerobic bottle: Gram Positive Cocci in Clusters    Growth in anaerobic bottle: Gram Positive Cocci in Clusters    Culture - Urine (collected 23 Apr 2020 00:56)  Source: .Urine Clean Catch (Midstream)  Preliminary Report (23 Apr 2020 22:40):    >100,000 CFU/ml Gram Negative Rods    >100,000 CFU/ml Coag Negative Staphylococcus "Susceptibilities not    performed"        Diagnostic Studies: reviewed Subjective/Interval Events:  Patient continues to saturate well on room air.  Orthopedics is deferring surgery now due to COVID +.  In addition patient with blood culture form 4/32 results with gram positive cocci in clusters x 2.    Hospital Medications:  acetaminophen   Tablet .. 650 milliGRAM(s) Oral every 8 hours PRN  amLODIPine   Tablet 5 milliGRAM(s) Oral daily  atorvastatin 10 milliGRAM(s) Oral at bedtime  cefTRIAXone   IVPB 1000 milliGRAM(s) IV Intermittent every 24 hours  melatonin 3 milliGRAM(s) Oral at bedtime  oxycodone    5 mG/acetaminophen 325 mG 1 Tablet(s) Oral every 8 hours PRN  pantoprazole    Tablet 40 milliGRAM(s) Oral before breakfast  senna 2 Tablet(s) Oral at bedtime PRN      Physical Exam:  T(C): 36.5 (04-24-20 @ 05:46), Max: 36.8 (04-23-20 @ 22:11)  HR: 100 (04-24-20 @ 05:46) (98 - 110)  BP: 115/46 (04-24-20 @ 05:46) (115/46 - 119/67)  RR: 18 (04-24-20 @ 05:46) (17 - 18)  SpO2: 100% (04-24-20 @ 05:46) (96% - 100%)    confused, shouting out  On room air  No respiratory distress    Labs: reviewed                        8.9    10.92 )-----------( 531      ( 24 Apr 2020 07:08 )             29.4     04-24    144  |  110<H>  |  35<H>  ----------------------------<  138<H>  4.5   |  22  |  1.04    Ca    9.0      24 Apr 2020 07:08  Phos  3.2     04-24  Mg     2.1     04-24    TPro  6.3  /  Alb  1.7<L>  /  TBili  0.4  /  DBili  x   /  AST  13  /  ALT  7   /  AlkPhos  170<H>  04-23    LIVER FUNCTIONS - ( 23 Apr 2020 14:10 )  Alb: 1.7 g/dL / Pro: 6.3 g/dL / ALK PHOS: 170 u/L / ALT: 7 u/L / AST: 13 u/L / GGT: x             Culture - Blood (collected 23 Apr 2020 00:56)  Source: .Blood Blood-Venous  Gram Stain (23 Apr 2020 19:12):    Growth in aerobic bottle: Gram Positive Cocci in Clusters  Preliminary Report (23 Apr 2020 19:13):    Growth in aerobic bottle: Gram Positive Cocci in Clusters    "Due to technical problems, Proteus sp. will Not be reported as part of    the BCID panel until further notice"    ***Blood Panel PCR results on this specimen are available    approximately 3 hours after the Gram stain result.***    Gram stain, PCR, and/or culture results may not always    correspond due to difference in methodologies.    ************************************************************    This PCR assay was performed using [employer].    The following targets are tested for: Enterococcus,    vancomycin resistant enterococci, Listeria monocytogenes,    coagulase negative staphylococci, S. aureus,    methicillin resistant S. aureus, Streptococcus agalactiae    (Group B), S. pneumoniae, S.pyogenes (Group A),    Acinetobacter baumannii, Enterobacter cloacae, E. coli,    Klebsiella oxytoca, K. pneumoniae, Proteus sp.,    Serratia marcescens, Haemophilus influenzae,    Neisseria meningitidis, Pseudomonas aeruginosa, Candida    albicans, C. glabrata, C krusei, C parapsilosis,    C. tropicalis and the KPC resistance gene.  Organism: Blood Culture PCR (23 Apr 2020 21:51)  Organism: Blood Culture PCR (23 Apr 2020 21:51)    Culture - Blood (collected 23 Apr 2020 00:56)  Source: .Blood Blood-Peripheral  Gram Stain (24 Apr 2020 06:24):    Growth in aerobic bottle: Gram Positive Cocci in Clusters    Growth in anaerobic bottle: Gram Positive Cocci in Clusters  Preliminary Report (24 Apr 2020 06:24):    Growth in aerobic bottle: Gram Positive Cocci in Clusters    Growth in anaerobic bottle: Gram Positive Cocci in Clusters    Culture - Urine (collected 23 Apr 2020 00:56)  Source: .Urine Clean Catch (Midstream)  Preliminary Report (23 Apr 2020 22:40):    >100,000 CFU/ml Gram Negative Rods    >100,000 CFU/ml Coag Negative Staphylococcus "Susceptibilities not    performed"        Diagnostic Studies: reviewed

## 2020-04-24 NOTE — CONSULT NOTE ADULT - ASSESSMENT
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 Patient had a mechanical fall and underwent a left hip hemiarthroplasty in 1/2020 at [facility]. She tolerated surgery and discharged to [facility] rehab. She presented to [facility] on 3/13/2020 for painful vertebral compression fractures, once her pain was treated she was discharged to [facility] rehab.    ER vitals:  T 97.6, P 87, /62, RR 18, 95% RA.  WBC 18.2 --> 9.9.   . .   D-dimer 1430, ferritin 206.  PCT 0.13.  UA Large LE, (+) wbc's.   Ucx >100K GNR and >100K CoNS.   Bcx (+) CoNS 4/4 bottles.   Covid pcr (+) 4/22.      Multiple imaging performed including CT pelvis and femur, showing  dislocated femoral component of the left hip hemiarthroplasty. Mild cortical irregularity and lucencies at the left greater trochanter, which is difficult to assess due to artifact. This may be in part postsurgical although nondisplaced fracture cannot be excluded.  Also with foci of air in the left mid lateral thigh soft tissue, without a discrete fluid collection. Differential diagnosis includes injection, penetrating trauma and infection.  Question sigmoid colon diverticulitis.     Pt seen by Ortho service, pt initially planned IR aspiration of L hip to rule out prosthetic joint infection, given CT findings and bacteremia with CoNS.  Also planned for OR for possible revision vs resection arthroplasty, now cancelled due to Covid (+) status.     ID consulted for further recommendations.     CoNS bacteremia:    - Pt with 4/4 blood cultures positive, also Ucx (+) CoNS (secondary to descending infection from bacetermia?).  Given elevated inflammatory markers, and new dislocated left hip hemiarthroplasty with lucency, concern for prosthetic joint infection.    - Add vancomycin 1gm IV q24.  check trough prior to 3rd dose.  f/u sensitivities.  Check repeat blood cultures to ensure clearance.     - Pt currently not a surgical candidate.  Recommend 6 weeks of IV abx from date of blood culture clearance and pt will likely need chronic suppressive abx treatment.      - Recommend echocardiogram.     - Monitor WBC, temp curve, trend inflammatory markers.       Covid (+):    - Cont supportive care.  Trend ferritin, D-dimer, CRP every 48-72 hours.  Monitor pulse ox.  Currently oxygenation 100% on room air.     - Cxr shows grossly clear lungs.      - check baseline EKG and Qtc.  Doubt benefit of plaquenil given adequate oxygenation.  All therapeutics are investigational.      UTI:    - Growing both GNR and CoNS.  Cont rocephin and vanco, f/u culture results.      - Pt with h/o pessary.  consider GYN f/u, will defer to medicine.      Will follow,    Sue Armstrong  [phone]

## 2020-04-24 NOTE — PROGRESS NOTE ADULT - SUBJECTIVE AND OBJECTIVE BOX
Orthopaedic Surgery Progress Note    Subjective:   Patient seen and examined  No acute events overnight  LLE placed in bucks traction    Objective:  T(C): 36.5 (20 @ 05:46), Max: 36.8 (20 @ 22:11)  HR: 100 (20 @ 05:46) (98 - 110)  BP: 115/46 (20 @ 05:46) (115/46 - 119/67)  RR: 18 (20 @ 05:46) (17 - 18)  SpO2: 100% (20 @ 05:46) (96% - 100%)  Wt(kg): --     @ 07:01  -   @ 06:25  --------------------------------------------------------  IN: 0 mL / OUT: 300 mL / NET: -300 mL        PE    NAD  LLE in bucks traction, skin intact   LLE internally rotated and shortened  motor intact GS/TA/EHL  SILT S/S/SP/DP  WWP                          9.7    14.06 )-----------( 561      ( 2020 14:10 )             32.2         138  |  110<H>  |  31<H>  ----------------------------<  60<L>  5.8<H>   |  12<L>  |  0.92    Ca    9.1      2020 14:10  Phos  5.1       Mg     2.1         TPro  6.3  /  Alb  1.7<L>  /  TBili  0.4  /  DBili  x   /  AST  13  /  ALT  7   /  AlkPhos  170<H>      PT/INR - ( 2020 17:30 )   PT: 12.8 SEC;   INR: 1.11          PTT - ( 2020 17:30 )  PTT:42.3 SEC  Urinalysis Basic - ( 2020 19:08 )    Color: LT. YELLOW / Appearance: CLEAR / S.018 / pH: 5.5  Gluc: NEGATIVE / Ketone: NEGATIVE  / Bili: NEGATIVE / Urobili: NORMAL   Blood: NEGATIVE / Protein: 10 / Nitrite: NEGATIVE   Leuk Esterase: LARGE / RBC: 0-2 / WBC 11-25   Sq Epi: x / Non Sq Epi: FEW / Bacteria: FEW        89y Female w/ L chronically dislocated hip hemiarthroplasty  - Deferring surgery for now given COVID +  - Pain control  - LLE in 10 lbs bucks traction  - NWB LLE   - Ortho to follow  - PT/OT/OOB  - DVT ppx

## 2020-04-24 NOTE — PROGRESS NOTE ADULT - ASSESSMENT
Impression:  89 year old woman with HTN, HLD, GERD, scoliosis, chronic back pain due to compression fractures, and recent L hemiarthroplasty in 01/2020 [facility] of unclear etiology and chronicity, found to have elevated inflammatory markers and mild COVID19 infection.     Plan:  #Hip Disolcation -Unclear chronicity of etiology but prior history of L. hemiarthroplasty in 01/2020.  Plan originally  for possible revision camilo arthroplasty vs. total arthroplasty, possible antibiotic spacer, and possible resection arthroplasty by Orthopaedic surgery today 04/24/20 however patient tested positive for COVID19 so will be postponed to unknown date. IR consulted for possibly aspiration of L hip to rule out septic joint given markedly elevated inflammatory markers however no discrete fluid collection seen on CT left femur/pelvis and COVID19 may be likely etiology    - Repeat CBC and CMP daily   - Will follow up blood cultures x2, urine culture    - pain control with acetaminophen (mild/mod pain) or oxycodone (severe pain) PRN    #Bactermia - positive for gram positive cocci in pars and clusters, awaiting sensitivities   - will consult ID for management of bactermia   - daily blood cultures   - currently on Cefriaxone, this will need to be changed     #COVID-19 - COVID19 PCR positive 04/23/20 - Mild infection with normal O2 sat n room air, no respiratory symptoms, serum ESR and CRP elevation. Not a candidate for Plaquenil given absence of hypoxia    - trend CRP/ESR/ferritin every 48-72 hours   - Supportive care.  #UTI - UA weakly positive with few WBCs Informed by patient that patient has a pessary - last exchanged in Oct/Nov 2019   - Continue ceftriaxone 1 g daily pending urine culture   - Will consider GYN consult for pessary exchange prior to discharge.    #HTN   - continue home amlodipine    #PPX   - DASH diet   - fall precautions   - DVT ppx and SCDs   - Dispo: likely [facility] Impression:  89 year old woman with HTN, HLD, GERD, scoliosis, chronic back pain due to compression fractures, and recent L hemiarthroplasty in 01/2020 [facility] of unclear etiology and chronicity, found to have elevated inflammatory markers and mild COVID19 infection.     Plan:  #Hip Disolcation -Unclear chronicity of etiology but prior history of L. hemiarthroplasty in 01/2020.  Plan originally  for possible revision camilo arthroplasty vs. total arthroplasty, possible antibiotic spacer, and possible resection arthroplasty by Orthopaedic surgery today 04/24/20 however patient tested positive for COVID19 so will be postponed to unknown date. IR consulted for possibly aspiration of L hip to rule out septic joint given markedly elevated inflammatory markers however no discrete fluid collection seen on CT left femur/pelvis and COVID19 may be likely etiology    - Repeat CBC and CMP daily   - Will follow up blood cultures x2, urine culture    - pain control with acetaminophen (mild/mod pain) or oxycodone (severe pain) PRN    #Bactermia - positive for gram positive cocci in pars and clusters, awaiting sensitivities   - will consult ID for management of bactermia   - daily blood cultures   - currently on Cefriaxone, this will likely need to be changed will await ID recommendations     #COVID-19 - COVID19 PCR positive 04/23/20 - Mild infection with normal O2 sat n room air, no respiratory symptoms, serum ESR and CRP elevation. Not a candidate for Plaquenil given absence of hypoxia    - trend CRP/ESR/ferritin every 48-72 hours   - Supportive care.  #UTI - UA weakly positive with few WBCs Informed by patient that patient has a pessary - last exchanged in Oct/Nov 2019   - Continue ceftriaxone 1 g daily pending urine culture   - Will consider GYN consult for pessary exchange prior to discharge.    #HTN   - continue home amlodipine    #PPX   - DASH diet   - fall precautions   - DVT ppx and SCDs   - Dispo: likely [facility] Impression:  89 year old woman with HTN, HLD, GERD, scoliosis, chronic back pain due to compression fractures, and recent L [facility]miarthroplasty in 01/2020 [facility] of unclear etiology and chronicity, found to have elevated inflammatory markers and mild COVID19 infection.     Plan:  #Hip Disolcation -Unclear chronicity of etiology but prior history of L. hemiarthroplasty in 01/2020.  Plan originally  for possible revision camilo arthroplasty vs. total arthroplasty, possible antibiotic spacer, and possible resection arthroplasty by Orthopaedic surgery today 04/24/20 however patient tested positive for COVID19 so will be postponed to unknown date. IR consulted for possibly aspiration of L hip to rule out septic joint given markedly elevated inflammatory markers however no discrete fluid collection seen on CT left femur/pelvis and COVID19 may be likely etiology    - Repeat CBC and CMP daily   - Will follow up blood cultures x2, urine culture    - pain control with acetaminophen (mild/mod pain) or oxycodone (severe pain) PRN    #Bactermia - positive for gram positive cocci in pars and clusters, awaiting sensitivities   - will consult ID for management of bactermia   - daily blood cultures   - currently on Cefriaxone, this will likely need to be changed will await ID recommendations     #COVID-19 - COVID19 PCR positive 04/23/20 - Mild infection with normal O2 sat n room air, no respiratory symptoms, serum ESR and CRP elevation. Not a candidate for Plaquenil given absence of hypoxia    - trend CRP/ESR/ferritin every 48-72 hours   - Supportive care.  #UTI - UA weakly positive with few WBCs Informed by patient that patient has a pessary - last exchanged in Oct/Nov 2019   - Continue ceftriaxone 1 g daily pending urine culture   - Will consider GYN consult for pessary exchange prior to discharge.    #HTN   - continue home amlodipine    #PPX   - DASH diet   - fall precautions   - DVT with 40 mg daily Lovenox   - Dispo: likely [facility] Impression:  89 year old woman with HTN, HLD, GERD, scoliosis, chronic back pain due to compression fractures, and recent L hemiarthroplasty in 01/2020 [facility] of unclear etiology and chronicity, found to have elevated inflammatory markers and mild COVID19 infection.     Plan:  #Hip Disolcation -Unclear chronicity of etiology but prior history of L. hemiarthroplasty in 01/2020 and hip was imaging after shows implant in place  Plan originally  for possible revision camilo arthroplasty vs. total arthroplasty, possible antibiotic spacer, and possible resection arthroplasty by Orthopaedic surgery today 04/24/20 however patient tested positive for COVID19 so will be postponed to unknown date.  Doubt significant chronicity given recent onset of pain and continued pain over this joint on exam today. IR consulted for possibly aspiration of L hip to rule out septic joint given markedly elevated inflammatory markers however no discrete fluid collection seen on CT left femur/pelvis and COVID19 may be likely etiology    - Repeat CBC and CMP daily   - Will follow up blood cultures x2, urine culture    - pain control with acetaminophen (mild/mod pain) or oxycodone (severe pain) PRN   - personally would favor additional attempt at hip relocation if possible given extremly poor prognisis if patient keeps hip joint the way it is    #Bactermia - positive for gram positive cocci in pars and clusters, awaiting sensitivities   - will consult ID for management of bactermia   - daily blood cultures   - currently on Cefriaxone, this will likely need to be changed will await ID recommendations     #COVID-19 - COVID19 PCR positive 04/23/20 - Mild infection with normal O2 sat n room air, no respiratory symptoms, serum ESR and CRP elevation. Not a candidate for Plaquenil given absence of hypoxia    - trend CRP/ESR/ferritin every 48-72 hours   - Supportive care.  #UTI - UA weakly positive with few WBCs Informed by patient that patient has a pessary - last exchanged in Oct/Nov 2019   - Continue ceftriaxone 1 g daily pending urine culture   - Will consider GYN consult for pessary exchange prior to discharge.    #HTN   - continue home amlodipine    #PPX   - DASH diet   - fall precautions   - DVT with 40 mg daily Lovenox   - Dispo: likely [facility]

## 2020-04-24 NOTE — CHART NOTE - NSCHARTNOTEFT_GEN_A_CORE
Internal Medicine update note:   - spoke extensively with orthopedics team about significant concerns about patients current management given significant Hip pain,  with patient crying out in pain this morning, tender over dislocated joint   - Patient currently appears asymptomatic from COVID (no cough, fever, shortness of breath or oxygen requirements) however no plans for further management of dislocated hip joint due to COVID positive state, which was recently partially replaced in January of 2020   - addressed possibility of second try on relocation of hip joint with higher sedation, however ortho team thought not viable option with concern for further damage such as femur fracture   - addressed concern of ID that bacteremia is from joint, however orthopedics does not agree with this opinion given positive UA   - addressed concern for patients inability to ever walk again without relocation   - Dr. Mijares however feels strongly about non-operative management per orthopedics resident and has discussed this with family

## 2020-04-25 LAB
-  AMPICILLIN/SULBACTAM: SIGNIFICANT CHANGE UP
-  CEFAZOLIN: SIGNIFICANT CHANGE UP
-  CLINDAMYCIN: SIGNIFICANT CHANGE UP
-  ERYTHROMYCIN: SIGNIFICANT CHANGE UP
-  GENTAMICIN: SIGNIFICANT CHANGE UP
-  OXACILLIN: SIGNIFICANT CHANGE UP
-  PENICILLIN: SIGNIFICANT CHANGE UP
-  RIFAMPIN: SIGNIFICANT CHANGE UP
-  TETRACYCLINE: SIGNIFICANT CHANGE UP
-  TRIMETHOPRIM/SULFAMETHOXAZOLE: SIGNIFICANT CHANGE UP
-  VANCOMYCIN: SIGNIFICANT CHANGE UP
ALBUMIN SERPL ELPH-MCNC: 2.5 G/DL — LOW (ref 3.3–5)
ALP SERPL-CCNC: 149 U/L — HIGH (ref 40–120)
ALT FLD-CCNC: 10 U/L — SIGNIFICANT CHANGE UP (ref 4–33)
ANION GAP SERPL CALC-SCNC: 13 MMO/L — SIGNIFICANT CHANGE UP (ref 7–14)
AST SERPL-CCNC: 12 U/L — SIGNIFICANT CHANGE UP (ref 4–32)
BASOPHILS # BLD AUTO: 0.03 K/UL — SIGNIFICANT CHANGE UP (ref 0–0.2)
BASOPHILS NFR BLD AUTO: 0.3 % — SIGNIFICANT CHANGE UP (ref 0–2)
BILIRUB SERPL-MCNC: 0.2 MG/DL — SIGNIFICANT CHANGE UP (ref 0.2–1.2)
BUN SERPL-MCNC: 34 MG/DL — HIGH (ref 7–23)
CALCIUM SERPL-MCNC: 8.9 MG/DL — SIGNIFICANT CHANGE UP (ref 8.4–10.5)
CHLORIDE SERPL-SCNC: 108 MMOL/L — HIGH (ref 98–107)
CO2 SERPL-SCNC: 23 MMOL/L — SIGNIFICANT CHANGE UP (ref 22–31)
CREAT SERPL-MCNC: 1.14 MG/DL — SIGNIFICANT CHANGE UP (ref 0.5–1.3)
CULTURE RESULTS: SIGNIFICANT CHANGE UP
CULTURE RESULTS: SIGNIFICANT CHANGE UP
EOSINOPHIL # BLD AUTO: 0.2 K/UL — SIGNIFICANT CHANGE UP (ref 0–0.5)
EOSINOPHIL NFR BLD AUTO: 1.9 % — SIGNIFICANT CHANGE UP (ref 0–6)
GLUCOSE SERPL-MCNC: 83 MG/DL — SIGNIFICANT CHANGE UP (ref 70–99)
HCT VFR BLD CALC: 27.1 % — LOW (ref 34.5–45)
HGB BLD-MCNC: 8.4 G/DL — LOW (ref 11.5–15.5)
IMM GRANULOCYTES NFR BLD AUTO: 0.8 % — SIGNIFICANT CHANGE UP (ref 0–1.5)
LYMPHOCYTES # BLD AUTO: 2.56 K/UL — SIGNIFICANT CHANGE UP (ref 1–3.3)
LYMPHOCYTES # BLD AUTO: 24.6 % — SIGNIFICANT CHANGE UP (ref 13–44)
MAGNESIUM SERPL-MCNC: 2.1 MG/DL — SIGNIFICANT CHANGE UP (ref 1.6–2.6)
MCHC RBC-ENTMCNC: 26.8 PG — LOW (ref 27–34)
MCHC RBC-ENTMCNC: 31 % — LOW (ref 32–36)
MCV RBC AUTO: 86.3 FL — SIGNIFICANT CHANGE UP (ref 80–100)
METHOD TYPE: SIGNIFICANT CHANGE UP
MONOCYTES # BLD AUTO: 1.1 K/UL — HIGH (ref 0–0.9)
MONOCYTES NFR BLD AUTO: 10.6 % — SIGNIFICANT CHANGE UP (ref 2–14)
NEUTROPHILS # BLD AUTO: 6.44 K/UL — SIGNIFICANT CHANGE UP (ref 1.8–7.4)
NEUTROPHILS NFR BLD AUTO: 61.8 % — SIGNIFICANT CHANGE UP (ref 43–77)
NRBC # FLD: 0 K/UL — SIGNIFICANT CHANGE UP (ref 0–0)
ORGANISM # SPEC MICROSCOPIC CNT: SIGNIFICANT CHANGE UP
PLATELET # BLD AUTO: 580 K/UL — HIGH (ref 150–400)
PMV BLD: 10 FL — SIGNIFICANT CHANGE UP (ref 7–13)
POTASSIUM SERPL-MCNC: 4.6 MMOL/L — SIGNIFICANT CHANGE UP (ref 3.5–5.3)
POTASSIUM SERPL-SCNC: 4.6 MMOL/L — SIGNIFICANT CHANGE UP (ref 3.5–5.3)
PROT SERPL-MCNC: 6.2 G/DL — SIGNIFICANT CHANGE UP (ref 6–8.3)
RBC # BLD: 3.14 M/UL — LOW (ref 3.8–5.2)
RBC # FLD: 17.8 % — HIGH (ref 10.3–14.5)
SODIUM SERPL-SCNC: 144 MMOL/L — SIGNIFICANT CHANGE UP (ref 135–145)
SPECIMEN SOURCE: SIGNIFICANT CHANGE UP
SPECIMEN SOURCE: SIGNIFICANT CHANGE UP
WBC # BLD: 10.41 K/UL — SIGNIFICANT CHANGE UP (ref 3.8–10.5)
WBC # FLD AUTO: 10.41 K/UL — SIGNIFICANT CHANGE UP (ref 3.8–10.5)

## 2020-04-25 PROCEDURE — 99232 SBSQ HOSP IP/OBS MODERATE 35: CPT

## 2020-04-25 RX ADMIN — AMLODIPINE BESYLATE 5 MILLIGRAM(S): 2.5 TABLET ORAL at 05:34

## 2020-04-25 RX ADMIN — PANTOPRAZOLE SODIUM 40 MILLIGRAM(S): 20 TABLET, DELAYED RELEASE ORAL at 05:34

## 2020-04-25 RX ADMIN — Medication 3 MILLIGRAM(S): at 21:28

## 2020-04-25 RX ADMIN — OXYCODONE AND ACETAMINOPHEN 1 TABLET(S): 5; 325 TABLET ORAL at 21:28

## 2020-04-25 RX ADMIN — Medication 250 MILLIGRAM(S): at 17:31

## 2020-04-25 RX ADMIN — CEFTRIAXONE 100 MILLIGRAM(S): 500 INJECTION, POWDER, FOR SOLUTION INTRAMUSCULAR; INTRAVENOUS at 21:27

## 2020-04-25 RX ADMIN — ATORVASTATIN CALCIUM 10 MILLIGRAM(S): 80 TABLET, FILM COATED ORAL at 21:28

## 2020-04-25 RX ADMIN — ENOXAPARIN SODIUM 40 MILLIGRAM(S): 100 INJECTION SUBCUTANEOUS at 12:34

## 2020-04-25 NOTE — PROGRESS NOTE ADULT - SUBJECTIVE AND OBJECTIVE BOX
Infectious Diseases progress note:    Subjective: Afebrile.  Repeat Bcx from 4/23 positive.      ROS:  CONSTITUTIONAL:  No fever, chills, rigors  CARDIOVASCULAR:  No chest pain or palpitations  RESPIRATORY:   No SOB, cough, dyspnea on exertion.  No wheezing  GASTROINTESTINAL:  No abd pain, N/V, diarrhea/constipation  EXTREMITIES:  No swelling or joint pain  GENITOURINARY:  No burning on urination, increased frequency or urgency.  No flank pain  NEUROLOGIC:  No HA, visual disturbances  SKIN: No rashes    Allergies    No Known Allergies    Intolerances        ANTIBIOTICS/RELEVANT:  antimicrobials  cefTRIAXone   IVPB 1000 milliGRAM(s) IV Intermittent every 24 hours  vancomycin  IVPB 1000 milliGRAM(s) IV Intermittent every 24 hours    immunologic:    OTHER:  acetaminophen   Tablet .. 650 milliGRAM(s) Oral every 8 hours PRN  amLODIPine   Tablet 5 milliGRAM(s) Oral daily  atorvastatin 10 milliGRAM(s) Oral at bedtime  enoxaparin Injectable 40 milliGRAM(s) SubCutaneous daily  melatonin 3 milliGRAM(s) Oral at bedtime  oxycodone    5 mG/acetaminophen 325 mG 1 Tablet(s) Oral every 8 hours PRN  pantoprazole    Tablet 40 milliGRAM(s) Oral before breakfast  senna 2 Tablet(s) Oral at bedtime PRN      Objective:  Vital Signs Last 24 Hrs  T(C): 36.9 (25 Apr 2020 12:22), Max: 37 (24 Apr 2020 20:21)  T(F): 98.5 (25 Apr 2020 12:22), Max: 98.6 (24 Apr 2020 20:21)  HR: 88 (25 Apr 2020 12:22) (88 - 986)  BP: 120/60 (25 Apr 2020 12:22) (114/61 - 120/60)  BP(mean): --  RR: 18 (25 Apr 2020 12:22) (18 - 18)  SpO2: 95% (25 Apr 2020 12:22) (95% - 95%)    PHYSICAL EXAM:  Constitutional:NAD  Eyes:BINTA, EOMI  Ear/Nose/Throat: no thrush, mucositis.  Moist mucous membranes	  Neck:no JVD, no lymphadenopathy, supple  Respiratory: CTA sandra  Cardiovascular: S1S2 RRR, no murmurs  Gastrointestinal:soft, nontender,  nondistended (+) BS  Extremities:  LLE bucks traction device in place  Skin:  no rashes, open wounds or ulcerations  : benson        LABS:                        8.4    10.41 )-----------( 580      ( 25 Apr 2020 06:45 )             27.1     04-25    144  |  108<H>  |  34<H>  ----------------------------<  83  4.6   |  23  |  1.14    Ca    8.9      25 Apr 2020 06:45  Phos  3.2     04-24  Mg     2.1     04-25    TPro  6.2  /  Alb  2.5<L>  /  TBili  0.2  /  DBili  x   /  AST  12  /  ALT  10  /  AlkPhos  149<H>  04-25          Procalcitonin, Serum: 0.13 (04-24 @ 07:08)        Vancomycin Level, Trough: 0.5 ug/mL (04-24 @ 16:30)              MICROBIOLOGY:    Culture - Blood (04.23.20 @ 00:56)    Gram Stain:   Growth in aerobic bottle: Gram Positive Cocci in Clusters  Growth in anaerobic bottle: Gram Positive Cocci in Clusters    -  Cefazolin: R <=4    -  Ampicillin/Sulbactam: R <=8/4    -  Clindamycin: R >4    -  Erythromycin: R >4    -  Gentamicin: R >8 Should not be used as monotherapy    -  RIF- Rifampin: S <=1 Should not be used as monotherapy    -  Tetra/Doxy: R >8    -  Oxacillin: R >2    -  Penicillin: R >8    -  Trimethoprim/Sulfamethoxazole: R >2/38    -  Vancomycin: S 2    Specimen Source: .Blood Blood-Peripheral    Organism: Gram Positive Cocci in Clusters    Culture Results:   Growth in aerobic and anaerobic bottles: Staphylococcus epidermidis    Organism Identification: Gram Positive Cocci in Clusters    Method Type: BROOKLYN    Culture - Blood (04.22.20 @ 18:35)    Gram Stain:   Growth in aerobic bottle: Gram Positive Cocci in Clusters  Growth in anaerobic bottle: Gram Positive Cocci in Clusters    -  Coagulase negative Staphylococcus: Detec    Specimen Source: .Blood Blood-Venous    Organism: Blood Culture PCR    Culture Results:   Growth in aerobic and anaerobic bottles: Staphylococcus epidermidis  "Susceptibilities not performed"  Growth in aerobic bottle: Staphylococcus hominis  Coag Negative Staphylococcus  Single set isolate, possible contaminant. Contact  Microbiology if susceptibility testing clinically  indicated.  "Due to technical problems, Proteus sp. will Not be reported as part of  the BCID panel until further notice"  ***Blood Panel PCR results on this specimen are available  approximately 3 hours after the Gram stain result.***  Gram stain, PCR, and/or culture results may not always  correspond due to difference in methodologies.  ************************************************************  This PCR assay was performed using [employer].  The following targets are tested for: Enterococcus,  vancomycin resistant enterococci, Listeria monocytogenes,  coagulase negative staphylococci, S. aureus,  methicillin resistant S. aureus, Streptococcus agalactiae  (Group B), S. pneumoniae, S. pyogenes (Group A),  Acinetobacter baumannii, Enterobacter cloacae, E. coli,  Klebsiella oxytoca, K. pneumoniae, Proteus sp.,  Serratia marcescens, Haemophilus influenzae,  Neisseria meningitidis, Pseudomonas aeruginosa, Candida  albicans, C. glabrata, C krusei, C parapsilosis,  C. tropicalis and the KPC resistance gene.    Organism Identification: Blood Culture PCR    Method Type: PCR    COVID-19 PCR . (04.22.20 @ 18:23)    COVID-19 PCR: Detected: This test has been validated by [email or web address] to be accurate;  though it has not been FDA cleared/approved by the usual pathway.  As with all laboratory tests, results should be correlated with clinical  findings.  https://www.fda.gov/media/546594/download  https://www.fda.gov/media/016980/download    Culture - Urine (04.22.20 @ 18:21)    -  Amikacin: S <=16    -  Ampicillin: R >16 These ampicillin results predict results for amoxicillin    -  Ampicillin/Sulbactam: R >16/8 Enterobacter, Citrobacter, and Serratia may develop resistance during prolonged therapy (3-4 days)    -  Gentamicin: S <=4    -  Aztreonam: S <=4    -  Cefazolin: S <=8 ([id number]) For uncomplicated UTI with K. pneumoniae, E. coli, or P. mirablis: BROOKLYN <=16 is sensitive and BROOKLYN >=32 is resistant. This also predicts results for oral agents cefaclor, cefdinir, cefpodoxime, cefprozil, cefuroxime axetil, cephalexin and locarbef for uncomplicated UTI. Note that some isolates may be susceptible to these agents while testing resistant to cefazolin.    -  Cefepime: S <=4    -  Cefoxitin: S <=8    -  Ceftriaxone: S <=1 Enterobacter, Citrobacter, and Serratia may develop resistance during prolonged therapy    -  Ciprofloxacin: S <=1    -  Piperacillin/Tazobactam: S <=16    -  Tigecycline: S <=2    -  Tobramycin: S <=4    -  Trimethoprim/Sulfamethoxazole: R >2/38    -  Imipenem: S <=1    -  Levofloxacin: S <=2    -  Meropenem: S <=1    -  Nitrofurantoin: S <=32 Should not be used to treat pyelonephritis    Specimen Source: .Urine Clean Catch (Midstream)    Culture Results:   >100,000 CFU/ml Escherichia coli  >100,000 CFU/ml Coag Negative Staphylococcus "Susceptibilities not  performed"    Organism Identification: Escherichia coli    Organism: Escherichia coli    Method Type: BROOK[place]N          RADIOLOGY & ADDITIONAL STUDIES:    < from: CT Pelvis Bony Only No Cont (04.22.20 @ 21:54) >  IMPRESSION:    Supralaterally and posteriorly dislocated femoral component of the left hip hemiarthroplasty. Mild cortical irregularity and lucencies at the left greater trochanter, which is difficult to assess due to artifact. This may be in part postsurgical although nondisplaced fracture cannot be excluded. If there is continued clinical suspicion, additional imaging (nuclear scan and/or MRI with metal artifact reduction protocol) may be obtained for further evaluation.    Foci of air in the left mid lateral thigh soft tissue, without a discrete fluid collection. Differential diagnosis includes injection, penetrating trauma and infection. Recommend clinical correlation for further evaluation.    Question sigmoid colon diverticulitis. Recommend clinical correlation and follow-up colonoscopy to exclude potential underlying neoplasm.    Additional findings as described.    < end of copied text >        < from: CT Femur No Cont, Left (04.22.20 @ 21:54) >  IMPRESSION:    Supralaterally and posteriorly dislocated femoral component of the left hip hemiarthroplasty. Mild cortical irregularity and lucencies at the left greater trochanter, which is difficult to assess due to artifact. This may be in part postsurgical although nondisplaced fracture cannot be excluded. If there is continued clinical suspicion, additional imaging (nuclear scan and/or MRI with metal artifact reduction protocol) may be obtained for further evaluation.    Foci of air in the left mid lateral thigh soft tissue, without a discrete fluid collection. Differential diagnosis includes injection, penetrating trauma and infection. Recommend clinical correlation for further evaluation.    Question sigmoid colon diverticulitis. Recommend clinical correlation and follow-up colonoscopy to exclude potential underlying neoplasm.    Additional findings as described.    < end of copied text >

## 2020-04-25 NOTE — PROGRESS NOTE ADULT - ASSESSMENT
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 Patient had a mechanical fall and underwent a left hip hemiarthroplasty in 1/2020 at [facility]. She tolerated surgery and discharged to [facility] rehab. She presented to [facility] on 3/13/2020 for painful vertebral compression fractures, once her pain was treated she was discharged to [place] rehab.    ER vitals:  T 97.6, P 87, /62, RR 18, 95% RA.  WBC 18.2 --> 9.9.   . .   D-dimer 1430, ferritin 206.  PCT 0.13.  UA Large LE, (+) wbc's.   Ucx >100K GNR and >100K CoNS.   Bcx (+) CoNS 4/4 bottles.   Covid pcr (+) 4/22.      Multiple imaging performed including CT pelvis and femur, showing  dislocated femoral component of the left hip hemiarthroplasty. Mild cortical irregularity and lucencies at the left greater trochanter, which is difficult to assess due to artifact. This may be in part postsurgical although nondisplaced fracture cannot be excluded.  Also with foci of air in the left mid lateral thigh soft tissue, without a discrete fluid collection. Differential diagnosis includes injection, penetrating trauma and infection.  Question sigmoid colon diverticulitis.     Pt seen by Ortho service, pt initially planned IR aspiration of L hip to rule out prosthetic joint infection, given CT findings and bacteremia with CoNS.  Also planned for OR for possible revision vs resection arthroplasty, now cancelled due to Covid (+) status.     ID consulted for further recommendations.     CoNS bacteremia:    - Pt with 4/4 blood cultures positive, also Ucx (+) CoNS (secondary to descending infection from bacetermia?).  Given elevated inflammatory markers, and new dislocated left hip hemiarthroplasty with lucency, concern for prosthetic joint infection.    - Add vancomycin 1gm IV q24.  check trough prior to 3rd dose.  f/u sensitivities.  Check repeat blood cultures to ensure clearance.  Sent on 4/24 (testing)    - Pt currently not a surgical candidate as per Ortho.  Recommend 6 weeks of IV abx from date of blood culture clearance and pt will likely need chronic suppressive abx treatment.      - Recommend echocardiogram.     - Monitor WBC, temp curve, trend inflammatory markers.       Covid (+):    - Cont supportive care.  Trend ferritin, D-dimer, CRP every 48-72 hours.  Monitor pulse ox.  Currently oxygenating adequately on room air.     - Cxr shows grossly clear lungs.      - check baseline EKG and Qtc.  Doubt benefit of plaquenil given adequate oxygenation.  All therapeutics are investigational.      UTI:    - Growing both E.coli and CoNS.  Cont rocephin (sensitive) x 7 days and vanco x 6 weeks, f/u culture results.      - Pt with h/o pessary.  consider GYN f/u, will defer to medicine.      Will follow,    Sue Armstrong  [phone]

## 2020-04-25 NOTE — PHYSICAL THERAPY INITIAL EVALUATION ADULT - PLANNED THERAPY INTERVENTIONS, PT EVAL
balance training/bed mobility training/gait training/strengthening/transfer training/Patient left supine in bed in NAD, call bell in reach, all lines intact. +bed alarm/ROM

## 2020-04-25 NOTE — PHYSICAL THERAPY INITIAL EVALUATION ADULT - PERTINENT HX OF CURRENT PROBLEM, REHAB EVAL
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  Patient had a mechanical fall and underwent a left hip hemiarthroplasty in 1/2020 at [facility]. She tolerated surgery and discharged to [facility] rehab. Presented to [facility] 3/13/2020 for painful vertebral compression fractures,. She was discharged to [facility] and was ambulating at [facility]. Pt had a fall at [facility], as per daughter.

## 2020-04-25 NOTE — PROGRESS NOTE ADULT - ASSESSMENT
Impression:  89 year old woman with HTN, HLD, GERD, scoliosis, chronic back pain due to compression fractures, and recent L hemiarthroplasty in 01/2020 [facility] hip dislocation of unclear etiology and chronicity, found to have elevated inflammatory markers and mild COVID19 infection.     Plan:  #Hip Disolcation -Unclear chronicity of etiology but prior history of L. hemiarthroplasty in 01/2020 and hip was imaging after shows implant in place  Plan originally  for possible revision camilo arthroplasty vs. total arthroplasty, possible antibiotic spacer, and possible resection arthroplasty by Orthopaedic surgery today 04/24/20 however patient tested positive for COVID19 so will be postponed to unknown date.  Doubt significant chronicity given recent onset of pain and continued pain over this joint on exam today. IR consulted for possibly aspiration of L hip to rule out septic joint given markedly elevated inflammatory markers however no discrete fluid collection seen on CT left femur/pelvis and COVID19 may be likely etiology    - Repeat CBC and CMP daily   - pain control with acetaminophen (mild/mod pain) or oxycodone (severe pain) PRN   - personally would favor additional attempt at hip relocation if possible given extremly poor prognisis if patient keeps hip joint the way it is  -Continue bucks traction until ortho states otherwise.     #Bactermia - positive for Staph epidermidis on 4/22 and 4/23.    - will consult ID for management of bacteremia   - daily blood cultures   - currently on Cefriaxone, this will likely need to be changed will await ID recommendations  - consider Echocardiogram      #COVID-19 - COVID19 PCR positive 04/23/20 - Mild infection with normal O2 sat n room air, no respiratory symptoms, serum ESR and CRP elevation. Not a candidate for Plaquenil given absence of hypoxia    - trend CRP/ESR/ferritin every 48-72 hours   - Supportive care.    #UTI - UA positive with few WBCs Informed by patient that patient has a pessary - last exchanged in Oct/Nov 2019   - Continue ceftriaxone 1 g daily and Vancomycin.    - Cultures growing E coli and Coag Neg Staph.    - Vanco trough in am    - Will consider GYN consult for pessary exchange prior to discharge.    #HTN   - continue home amlodipine    #PPX   - DASH diet   - fall precautions   - DVT with 40 mg daily Lovenox   - Dispo: likely [facility] Impression:  89 year old woman with HTN, HLD, GERD, scoliosis, chronic back pain due to compression fractures, and recent L hemiarthroplasty in 01/2020 [facility] of unclear etiology and chronicity, found to have elevated inflammatory markers and mild COVID19 infection.     Plan:  #Hip Disolcation -Unclear chronicity of etiology but prior history of L. hemiarthroplasty in 01/2020 and hip was imaging after shows implant in place  Plan originally  for possible revision camilo arthroplasty vs. total arthroplasty, possible antibiotic spacer, and possible resection arthroplasty by Orthopaedic surgery today 04/24/20 however patient tested positive for COVID19 so will be postponed to unknown date.  Doubt significant chronicity given recent onset of pain and continued pain over this joint on exam today. IR consulted for possibly aspiration of L hip to rule out septic joint given markedly elevated inflammatory markers however no discrete fluid collection seen on CT left femur/pelvis and COVID19 may be likely etiology    - Repeat CBC and CMP daily   - pain control with acetaminophen (mild/mod pain) or oxycodone (severe pain) PRN   - personally would favor additional attempt at hip relocation if possible given extremly poor prognisis if patient keeps hip joint the way it is  -Continue bucks traction until ortho states otherwise.     #Bactermia - positive for Staph epidermidis on 4/22 and 4/23.    - will consult ID for management of bacteremia   - daily blood cultures   - currently on Cefriaxone, this will likely need to be changed will await ID recommendations  - consider Echocardiogram      #COVID-19 - COVID19 PCR positive 04/23/20 - Mild infection with normal O2 sat n room air, no respiratory symptoms, serum ESR and CRP elevation. Not a candidate for Plaquenil given absence of hypoxia    - trend CRP/ESR/ferritin every 48-72 hours   - Supportive care.    #UTI - UA positive with few WBCs Informed by patient that patient has a pessary - last exchanged in Oct/Nov 2019   - Continue ceftriaxone 1 g daily and Vancomycin.    - Cultures growing E coli (sensitive to Ceftriaxone) and Coag Neg Staph.    - Vanco trough in am    - Will consider GYN consult for pessary exchange prior to discharge.    #HTN   - continue home amlodipine    #PPX   - DASH diet   - fall precautions   - DVT with 40 mg daily Lovenox   - Dispo: likely [facility]

## 2020-04-25 NOTE — PROGRESS NOTE ADULT - SUBJECTIVE AND OBJECTIVE BOX
Medicine Progress Note    Patient is a 89y old  Female who presents with a chief complaint of LE pain (24 Apr 2020 13:52)      SUBJECTIVE / OVERNIGHT EVENTS: Pt is moaning in her room.  She denies any pain.  She was thirsty so gave her some water and then gave her some orange juice.  Again denied any pain.     MEDICATIONS  (STANDING):  amLODIPine   Tablet 5 milliGRAM(s) Oral daily  atorvastatin 10 milliGRAM(s) Oral at bedtime  cefTRIAXone   IVPB 1000 milliGRAM(s) IV Intermittent every 24 hours  enoxaparin Injectable 40 milliGRAM(s) SubCutaneous daily  melatonin 3 milliGRAM(s) Oral at bedtime  pantoprazole    Tablet 40 milliGRAM(s) Oral before breakfast  vancomycin  IVPB 1000 milliGRAM(s) IV Intermittent every 24 hours    MEDICATIONS  (PRN):  acetaminophen   Tablet .. 650 milliGRAM(s) Oral every 8 hours PRN Mild Pain (1 - 3), Moderate Pain (4 - 6)  oxycodone    5 mG/acetaminophen 325 mG 1 Tablet(s) Oral every 8 hours PRN Severe Pain (7 - 10)  senna 2 Tablet(s) Oral at bedtime PRN Constipation    CAPILLARY BLOOD GLUCOSE        I&O's Summary      PHYSICAL EXAM:  Vital Signs Last 24 Hrs  T(C): 36.9 (25 Apr 2020 12:22), Max: 37 (24 Apr 2020 20:21)  T(F): 98.5 (25 Apr 2020 12:22), Max: 98.6 (24 Apr 2020 20:21)  HR: 88 (25 Apr 2020 12:22) (88 - 986)  BP: 120/60 (25 Apr 2020 12:22) (114/61 - 120/60)  BP(mean): --  RR: 18 (25 Apr 2020 12:22) (18 - 18)  SpO2: 95% (25 Apr 2020 12:22) (95% - 95%)    Gen: moaning, awake, answered questions.   CV: RRR  LUNGS: clear   Abd: soft NT ND  Ext: left leg in bucks traction at an angle   Neuro: able to feel me touch her left foot.  Good pulses R foot.     LABS:                        8.4    10.41 )-----------( 580      ( 25 Apr 2020 06:45 )             27.1     04-25    144  |  108<H>  |  34<H>  ----------------------------<  83  4.6   |  23  |  1.14    Ca    8.9      25 Apr 2020 06:45  Phos  3.2     04-24  Mg     2.1     04-25    TPro  6.2  /  Alb  2.5<L>  /  TBili  0.2  /  DBili  x   /  AST  12  /  ALT  10  /  AlkPhos  149<H>  04-25      Culture - Blood (collected 23 Apr 2020 00:56)  Source: .Blood Blood-Peripheral  Gram Stain (24 Apr 2020 06:24):    Growth in aerobic bottle: Gram Positive Cocci in Clusters    Growth in anaerobic bottle: Gram Positive Cocci in Clusters  Preliminary Report (25 Apr 2020 14:09):    Growth in aerobic and anaerobic bottles: Staphylococcus epidermidis    Culture - Blood (collected 22 Apr 2020 18:35)  Source: .Blood Blood-Venous  Gram Stain (24 Apr 2020 13:44):    Growth in aerobic bottle: Gram Positive Cocci in Clusters    Growth in anaerobic bottle: Gram Positive Cocci in Clusters  Final Report (25 Apr 2020 13:27):    Growth in aerobic and anaerobic bottles: Staphylococcus epidermidis    "Susceptibilities not performed"    Growth in aerobic bottle: Staphylococcus hominis    Coag Negative Staphylococcus    Single set isolate, possible contaminant. Contact    Microbiology if susceptibility testing clinically    indicated.    "Due to technical problems, Proteus sp. will Not be reported as part of    the BCID panel until further notice"    ***Blood Panel PCR results on this specimen are available    approximately 3 hours after the Gram stain result.***    Gram stain, PCR, and/or culture results may not always    correspond due to difference in methodologies.    ************************************************************    This PCR assay was performed using [employer].    The following targets are tested for: Enterococcus,    vancomycin resistant enterococci, Listeria monocytogenes,    coagulase negative staphylococci, S. aureus,    methicillin resistant S. aureus, Streptococcus agalactiae    (Group B), S. pneumoniae, S. pyogenes (Group A),    Acinetobacter baumannii, Enterobacter cloacae, E. coli,    Klebsiella oxytoca, K. pneumoniae, Proteus sp.,    Serratia marcescens, Haemophilus influenzae,    Neisseria meningitidis, Pseudomonas aeruginosa, Candida    albicans, C. glabrata, C krusei, C parapsilosis,    C. tropicalis and the KPC resistance gene.  Organism: Blood Culture PCR (25 Apr 2020 13:27)  Organism: Blood Culture PCR (25 Apr 2020 13:27)    Culture - Urine (collected 22 Apr 2020 18:21)  Source: .Urine Clean Catch (Midstream)  Final Report (24 Apr 2020 16:46):    >100,000 CFU/ml Escherichia coli    >100,000 CFU/ml Coag Negative Staphylococcus "Susceptibilities not    performed"  Organism: Escherichia coli (24 Apr 2020 16:46)  Organism: Escherichia coli (24 Apr 2020 16:46)      COVID-19 PCR: Detected (22 Apr 2020 18:23)      RADIOLOGY & ADDITIONAL TESTS:  Imaging from Last 24 Hours:    Electrocardiogram/QTc Interval:    COORDINATION OF CARE:  Care Discussed with Consultants/Other Providers: Medicine Progress Note    Patient is a 89y old  Female who presents with a chief complaint of LE pain (24 Apr 2020 13:52)      SUBJECTIVE / OVERNIGHT EVENTS: Pt is moaning in her room.  She denies any pain.  She was thirsty so gave her some water and then gave her some orange juice.  Again denied any pain.   Called phone number listed in chart for daughter with a busy signal. Unable to give updates. 16:10.     MEDICATIONS  (STANDING):  amLODIPine   Tablet 5 milliGRAM(s) Oral daily  atorvastatin 10 milliGRAM(s) Oral at bedtime  cefTRIAXone   IVPB 1000 milliGRAM(s) IV Intermittent every 24 hours  enoxaparin Injectable 40 milliGRAM(s) SubCutaneous daily  melatonin 3 milliGRAM(s) Oral at bedtime  pantoprazole    Tablet 40 milliGRAM(s) Oral before breakfast  vancomycin  IVPB 1000 milliGRAM(s) IV Intermittent every 24 hours    MEDICATIONS  (PRN):  acetaminophen   Tablet .. 650 milliGRAM(s) Oral every 8 hours PRN Mild Pain (1 - 3), Moderate Pain (4 - 6)  oxycodone    5 mG/acetaminophen 325 mG 1 Tablet(s) Oral every 8 hours PRN Severe Pain (7 - 10)  senna 2 Tablet(s) Oral at bedtime PRN Constipation    CAPILLARY BLOOD GLUCOSE        I&O's Summary      PHYSICAL EXAM:  Vital Signs Last 24 Hrs  T(C): 36.9 (25 Apr 2020 12:22), Max: 37 (24 Apr 2020 20:21)  T(F): 98.5 (25 Apr 2020 12:22), Max: 98.6 (24 Apr 2020 20:21)  HR: 88 (25 Apr 2020 12:22) (88 - 986)  BP: 120/60 (25 Apr 2020 12:22) (114/61 - 120/60)  BP(mean): --  RR: 18 (25 Apr 2020 12:22) (18 - 18)  SpO2: 95% (25 Apr 2020 12:22) (95% - 95%)    Gen: moaning, awake, answered questions.   CV: RRR  LUNGS: clear   Abd: soft NT ND  Ext: left leg in bucks traction at an angle   Neuro: able to feel me touch her left foot.  Good pulses R foot.     LABS:                        8.4    10.41 )-----------( 580      ( 25 Apr 2020 06:45 )             27.1     04-25    144  |  108<H>  |  34<H>  ----------------------------<  83  4.6   |  23  |  1.14    Ca    8.9      25 Apr 2020 06:45  Phos  3.2     04-24  Mg     2.1     04-25    TPro  6.2  /  Alb  2.5<L>  /  TBili  0.2  /  DBili  x   /  AST  12  /  ALT  10  /  AlkPhos  149<H>  04-25      Culture - Blood (collected 23 Apr 2020 00:56)  Source: .Blood Blood-Peripheral  Gram Stain (24 Apr 2020 06:24):    Growth in aerobic bottle: Gram Positive Cocci in Clusters    Growth in anaerobic bottle: Gram Positive Cocci in Clusters  Preliminary Report (25 Apr 2020 14:09):    Growth in aerobic and anaerobic bottles: Staphylococcus epidermidis    Culture - Blood (collected 22 Apr 2020 18:35)  Source: .Blood Blood-Venous  Gram Stain (24 Apr 2020 13:44):    Growth in aerobic bottle: Gram Positive Cocci in Clusters    Growth in anaerobic bottle: Gram Positive Cocci in Clusters  Final Report (25 Apr 2020 13:27):    Growth in aerobic and anaerobic bottles: Staphylococcus epidermidis    "Susceptibilities not performed"    Growth in aerobic bottle: Staphylococcus hominis    Coag Negative Staphylococcus    Single set isolate, possible contaminant. Contact    Microbiology if susceptibility testing clinically    indicated.    "Due to technical problems, Proteus sp. will Not be reported as part of    the BCID panel until further notice"    ***Blood Panel PCR results on this specimen are available    approximately 3 hours after the Gram stain result.***    Gram stain, PCR, and/or culture results may not always    correspond due to difference in methodologies.    ************************************************************    This PCR assay was performed using [employer].    The following targets are tested for: Enterococcus,    vancomycin resistant enterococci, Listeria monocytogenes,    coagulase negative staphylococci, S. aureus,    methicillin resistant S. aureus, Streptococcus agalactiae    (Group B), S. pneumoniae, S. pyogenes (Group A),    Acinetobacter baumannii, Enterobacter cloacae, E. coli,    Klebsiella oxytoca, K. pneumoniae, Proteus sp.,    Serratia marcescens, Haemophilus influenzae,    Neisseria meningitidis, Pseudomonas aeruginosa, Candida    albicans, C. glabrata, C krusei, C parapsilosis,    C. tropicalis and the KPC resistance gene.  Organism: Blood Culture PCR (25 Apr 2020 13:27)  Organism: Blood Culture PCR (25 Apr 2020 13:27)    Culture - Urine (collected 22 Apr 2020 18:21)  Source: .Urine Clean Catch (Midstream)  Final Report (24 Apr 2020 16:46):    >100,000 CFU/ml Escherichia coli    >100,000 CFU/ml Coag Negative Staphylococcus "Susceptibilities not    performed"  Organism: Escherichia coli (24 Apr 2020 16:46)  Organism: Escherichia coli (24 Apr 2020 16:46)      COVID-19 PCR: Detected (22 Apr 2020 18:23)      RADIOLOGY & ADDITIONAL TESTS:  Imaging from Last 24 Hours:    Electrocardiogram/QTc Interval:    COORDINATION OF CARE:  Care Discussed with Consultants/Other Providers:

## 2020-04-26 DIAGNOSIS — K21.9 GASTRO-ESOPHAGEAL REFLUX DISEASE WITHOUT ESOPHAGITIS: ICD-10-CM

## 2020-04-26 DIAGNOSIS — E78.00 PURE HYPERCHOLESTEROLEMIA, UNSPECIFIED: ICD-10-CM

## 2020-04-26 DIAGNOSIS — R78.81 BACTEREMIA: ICD-10-CM

## 2020-04-26 DIAGNOSIS — I10 ESSENTIAL (PRIMARY) HYPERTENSION: ICD-10-CM

## 2020-04-26 LAB
ALBUMIN SERPL ELPH-MCNC: 2.5 G/DL — LOW (ref 3.3–5)
ALP SERPL-CCNC: 148 U/L — HIGH (ref 40–120)
ALT FLD-CCNC: 11 U/L — SIGNIFICANT CHANGE UP (ref 4–33)
ANION GAP SERPL CALC-SCNC: 14 MMO/L — SIGNIFICANT CHANGE UP (ref 7–14)
AST SERPL-CCNC: 14 U/L — SIGNIFICANT CHANGE UP (ref 4–32)
BASOPHILS # BLD AUTO: 0.02 K/UL — SIGNIFICANT CHANGE UP (ref 0–0.2)
BASOPHILS NFR BLD AUTO: 0.2 % — SIGNIFICANT CHANGE UP (ref 0–2)
BILIRUB SERPL-MCNC: 0.3 MG/DL — SIGNIFICANT CHANGE UP (ref 0.2–1.2)
BUN SERPL-MCNC: 26 MG/DL — HIGH (ref 7–23)
CALCIUM SERPL-MCNC: 9.2 MG/DL — SIGNIFICANT CHANGE UP (ref 8.4–10.5)
CHLORIDE SERPL-SCNC: 108 MMOL/L — HIGH (ref 98–107)
CO2 SERPL-SCNC: 22 MMOL/L — SIGNIFICANT CHANGE UP (ref 22–31)
CREAT SERPL-MCNC: 0.99 MG/DL — SIGNIFICANT CHANGE UP (ref 0.5–1.3)
CRP SERPL-MCNC: 154.1 MG/L — HIGH
D DIMER BLD IA.RAPID-MCNC: 1406 NG/ML — SIGNIFICANT CHANGE UP
EOSINOPHIL # BLD AUTO: 0.1 K/UL — SIGNIFICANT CHANGE UP (ref 0–0.5)
EOSINOPHIL NFR BLD AUTO: 1.1 % — SIGNIFICANT CHANGE UP (ref 0–6)
FERRITIN SERPL-MCNC: 176.1 NG/ML — HIGH (ref 15–150)
GLUCOSE SERPL-MCNC: 81 MG/DL — SIGNIFICANT CHANGE UP (ref 70–99)
HCT VFR BLD CALC: 25.8 % — LOW (ref 34.5–45)
HGB BLD-MCNC: 8 G/DL — LOW (ref 11.5–15.5)
IMM GRANULOCYTES NFR BLD AUTO: 0.8 % — SIGNIFICANT CHANGE UP (ref 0–1.5)
LYMPHOCYTES # BLD AUTO: 0.95 K/UL — LOW (ref 1–3.3)
LYMPHOCYTES # BLD AUTO: 10.6 % — LOW (ref 13–44)
MAGNESIUM SERPL-MCNC: 2.1 MG/DL — SIGNIFICANT CHANGE UP (ref 1.6–2.6)
MCHC RBC-ENTMCNC: 26.7 PG — LOW (ref 27–34)
MCHC RBC-ENTMCNC: 31 % — LOW (ref 32–36)
MCV RBC AUTO: 86 FL — SIGNIFICANT CHANGE UP (ref 80–100)
MONOCYTES # BLD AUTO: 0.69 K/UL — SIGNIFICANT CHANGE UP (ref 0–0.9)
MONOCYTES NFR BLD AUTO: 7.7 % — SIGNIFICANT CHANGE UP (ref 2–14)
NEUTROPHILS # BLD AUTO: 7.11 K/UL — SIGNIFICANT CHANGE UP (ref 1.8–7.4)
NEUTROPHILS NFR BLD AUTO: 79.6 % — HIGH (ref 43–77)
NRBC # FLD: 0 K/UL — SIGNIFICANT CHANGE UP (ref 0–0)
PLATELET # BLD AUTO: 460 K/UL — HIGH (ref 150–400)
PMV BLD: 10.2 FL — SIGNIFICANT CHANGE UP (ref 7–13)
POTASSIUM SERPL-MCNC: 4.4 MMOL/L — SIGNIFICANT CHANGE UP (ref 3.5–5.3)
POTASSIUM SERPL-SCNC: 4.4 MMOL/L — SIGNIFICANT CHANGE UP (ref 3.5–5.3)
PROCALCITONIN SERPL-MCNC: 0.12 NG/ML — HIGH (ref 0.02–0.1)
PROT SERPL-MCNC: 6.4 G/DL — SIGNIFICANT CHANGE UP (ref 6–8.3)
RBC # BLD: 3 M/UL — LOW (ref 3.8–5.2)
RBC # FLD: 17.7 % — HIGH (ref 10.3–14.5)
SODIUM SERPL-SCNC: 144 MMOL/L — SIGNIFICANT CHANGE UP (ref 135–145)
VANCOMYCIN FLD-MCNC: 17.7 UG/ML — SIGNIFICANT CHANGE UP
WBC # BLD: 8.94 K/UL — SIGNIFICANT CHANGE UP (ref 3.8–10.5)
WBC # FLD AUTO: 8.94 K/UL — SIGNIFICANT CHANGE UP (ref 3.8–10.5)

## 2020-04-26 PROCEDURE — 93010 ELECTROCARDIOGRAM REPORT: CPT

## 2020-04-26 PROCEDURE — 99232 SBSQ HOSP IP/OBS MODERATE 35: CPT

## 2020-04-26 RX ADMIN — OXYCODONE AND ACETAMINOPHEN 1 TABLET(S): 5; 325 TABLET ORAL at 22:51

## 2020-04-26 RX ADMIN — PANTOPRAZOLE SODIUM 40 MILLIGRAM(S): 20 TABLET, DELAYED RELEASE ORAL at 05:41

## 2020-04-26 RX ADMIN — ATORVASTATIN CALCIUM 10 MILLIGRAM(S): 80 TABLET, FILM COATED ORAL at 22:51

## 2020-04-26 RX ADMIN — ENOXAPARIN SODIUM 40 MILLIGRAM(S): 100 INJECTION SUBCUTANEOUS at 13:32

## 2020-04-26 RX ADMIN — CEFTRIAXONE 100 MILLIGRAM(S): 500 INJECTION, POWDER, FOR SOLUTION INTRAMUSCULAR; INTRAVENOUS at 22:51

## 2020-04-26 RX ADMIN — AMLODIPINE BESYLATE 5 MILLIGRAM(S): 2.5 TABLET ORAL at 05:40

## 2020-04-26 RX ADMIN — OXYCODONE AND ACETAMINOPHEN 1 TABLET(S): 5; 325 TABLET ORAL at 08:59

## 2020-04-26 RX ADMIN — Medication 250 MILLIGRAM(S): at 21:06

## 2020-04-26 NOTE — PROGRESS NOTE ADULT - PROBLEM SELECTOR PLAN 1
Unclear chronicity of etiology but prior history of L. hemiarthroplasty in 01/2020 and hip was imaging after shows implant in place  Plan originally  for possible revision camilo arthroplasty vs. total arthroplasty, possible antibiotic spacer, and possible resection arthroplasty by Orthopaedic surgery today 04/24/20 however patient tested positive for COVID19 so will be postponed to unknown date due to familial concerns relating to potential deterioration during/after surgery due to covid19.  Doubt significant chronicity given recent onset of pain and continued pain over this joint on exam today. IR consulted for possibly aspiration of L hip to rule out septic joint given markedly elevated inflammatory markers however no discrete fluid collection seen on CT left femur/pelvis and COVID19 may be likely etiology    - Repeat CBC and CMP daily   - pain control with acetaminophen (mild/mod pain) or oxycodone (severe pain) PRN  -Continue bucks traction until ortho states otherwise.  - appreciate ortho recs Unclear chronicity of etiology but prior history of L. hemiarthroplasty in 01/2020 and hip was imaging after shows implant in place  Plan originally  for possible revision camilo arthroplasty vs. total arthroplasty, possible antibiotic spacer, and possible resection arthroplasty by Orthopaedic surgery 04/24/20 however patient tested positive for COVID19 so will be postponed to unknown date due to familial concerns relating to potential deterioration during/after surgery due to covid19.  Doubt significant chronicity given recent onset of pain and continued pain over this joint on exam today. IR consulted for possibly aspiration of L hip to rule out septic joint given markedly elevated inflammatory markers however no discrete fluid collection seen on CT left femur/pelvis and COVID19 may be likely etiology    - Repeat CBC and CMP daily   - pain control with acetaminophen (mild/mod pain) or oxycodone (severe pain) PRN  -Continue bucks traction until ortho states otherwise.  - appreciate ortho recs

## 2020-04-26 NOTE — PROGRESS NOTE ADULT - SUBJECTIVE AND OBJECTIVE BOX
OVERNIGHT/24 HOUR EVENTS: STARLA    SUBJECTIVE / INTERVAL HPI: Patient seen and examined at bedside. Patient resting comfortably with LLE in pugh's traction. patient was thirsty and we gave her some water.    ROS:  CONSTITUTIONAL: No fevers or headache  RESPIRATORY: No cough; No shortness of breath  CARDIOVASCULAR: No chest pain or palpitations  GASTROINTESTINAL: No abdominal pain. No nausea, vomiting, diarrhea, or constipation.  GENITOURINARY: No dysuria, frequency or hematuria  All other review of systems is negative unless indicated above.    MEDICATIONS:  acetaminophen   Tablet .. 650 milliGRAM(s) Oral every 8 hours PRN Mild Pain (1 - 3), Moderate Pain (4 - 6)  amLODIPine   Tablet 5 milliGRAM(s) Oral daily  atorvastatin 10 milliGRAM(s) Oral at bedtime  cefTRIAXone   IVPB 1000 milliGRAM(s) IV Intermittent every 24 hours  enoxaparin Injectable 40 milliGRAM(s) SubCutaneous daily  melatonin 3 milliGRAM(s) Oral at bedtime  oxycodone    5 mG/acetaminophen 325 mG 1 Tablet(s) Oral every 8 hours PRN Severe Pain (7 - 10)  pantoprazole    Tablet 40 milliGRAM(s) Oral before breakfast  senna 2 Tablet(s) Oral at bedtime PRN Constipation  vancomycin  IVPB 1000 milliGRAM(s) IV Intermittent every 24 hours      VITAL SIGNS:  Vital Signs Last 24 Hrs  T(C): 36.8 (25 Apr 2020 20:31), Max: 36.8 (25 Apr 2020 20:31)  T(F): 98.2 (25 Apr 2020 20:31), Max: 98.2 (25 Apr 2020 20:31)  HR: 95 (25 Apr 2020 20:31) (95 - 95)  BP: 108/49 (25 Apr 2020 20:31) (108/49 - 108/49)  BP(mean): --  RR: 18 (25 Apr 2020 20:31) (18 - 18)  SpO2: 94% (25 Apr 2020 20:31) (94% - 94%)    I&Os:    04-25-20 @ 07:01  -  04-26-20 @ 07:00  --------------------------------------------------------  IN: 0 mL / OUT: 300 mL / NET: -300 mL        PHYSICAL EXAM:    General: WDWN  HEENT: NCAT; PERRL, anicteric sclera; MMM  Cardiovascular: RRR,  Respiratory: CTABL; no W/R/R  Gastrointestinal: soft, nontender, nondistended.   Skin: no ulcerations or visible rashes appreciated  Extremities: WWP; LLE in pugh's traction.   Neurological: AAOx2; CN II-XII grossly intact; no focal deficits          LABS:                        8.0    8.94  )-----------( 460      ( 26 Apr 2020 06:09 )             25.8     04-26    144  |  108<H>  |  26<H>  ----------------------------<  81  4.4   |  22  |  0.99    Ca    9.2      26 Apr 2020 06:09  Mg     2.1     04-26    TPro  6.4  /  Alb  2.5<L>  /  TBili  0.3  /  DBili  x   /  AST  14  /  ALT  11  /  AlkPhos  148<H>  04-26        CAPILLARY BLOOD GLUCOSE          D-Dimer Assay, Quantitative: 1406 ng/mL (04-26-20 @ 06:09)  Ferritin, Serum: 176.1 ng/mL (04-26-20 @ 06:09)  Procalcitonin, Serum: 0.12 ng/mL (04-26-20 @ 06:09)    D-Dimer Assay, Quantitative: 1430 ng/mL (04-24-20 @ 07:08)  Ferritin, Serum: 206.8 ng/mL (04-24-20 @ 07:08)  Procalcitonin, Serum: 0.13 ng/mL (04-24-20 @ 07:08)      Culture - Blood (collected 04-24-20 @ 16:20)  Source: .Blood Blood-Venous  Preliminary Report (04-25-20 @ 17:01):    No growth to date.    Culture - Blood (collected 04-24-20 @ 16:20)  Source: .Blood Blood-Peripheral  Preliminary Report (04-25-20 @ 17:01):    No growth to date.    Culture - Blood (collected 04-23-20 @ 00:56)  Source: .Blood Blood-Peripheral  Gram Stain (04-24-20 @ 06:24):    Growth in aerobic bottle: Gram Positive Cocci in Clusters    Growth in anaerobic bottle: Gram Positive Cocci in Clusters  Final Report (04-25-20 @ 16:04):    Growth in aerobic and anaerobic bottles: Staphylococcus epidermidis  Organism: Gram Positive Cocci in Clusters (04-25-20 @ 16:04)  Organism: Gram Positive Cocci in Clusters (04-25-20 @ 16:04)      -  Ampicillin/Sulbactam: R <=8/4      -  Cefazolin: R <=4      -  Clindamycin: R >4      -  Erythromycin: R >4      -  Gentamicin: R >8 Should not be used as monotherapy      -  Oxacillin: R >2      -  Penicillin: R >8      -  RIF- Rifampin: S <=1 Should not be used as monotherapy      -  Tetra/Doxy: R >8      -  Trimethoprim/Sulfamethoxazole: R >2/38      -  Vancomycin: S 2      Method Type: [place]    Culture - Blood (collected 04-22-20 @ 18:35)  Source: .Blood Blood-Venous  Gram Stain (04-24-20 @ 13:44):    Growth in aerobic bottle: Gram Positive Cocci in Clusters    Growth in anaerobic bottle: Gram Positive Cocci in Clusters  Final Report (04-25-20 @ 13:27):    Growth in aerobic and anaerobic bottles: Staphylococcus epidermidis    "Susceptibilities not performed"    Growth in aerobic bottle: Staphylococcus hominis    Coag Negative Staphylococcus    Single set isolate, possible contaminant. Contact    Microbiology if susceptibility testing clinically    indicated.    "Due to technical problems, Proteus sp. will Not be reported as part of    the BCID panel until further notice"    ***Blood Panel PCR results on this specimen are available    approximately 3 hours after the Gram stain result.***    Gram stain, PCR, and/or culture results may not always    correspond due to difference in methodologies.    ************************************************************    This PCR assay was performed using [employer].    The following targets are tested for: Enterococcus,    vancomycin resistant enterococci, Listeria monocytogenes,    coagulase negative staphylococci, S. aureus,    methicillin resistant S. aureus, Streptococcus agalactiae    (Group B), S. pneumoniae, S. pyogenes (Group A),    Acinetobacter baumannii, Enterobacter cloacae, E. coli,    Klebsiella oxytoca, K. pneumoniae, Proteus sp.,    Serratia marcescens, Haemophilus influenzae,    Neisseria meningitidis, Pseudomonas aeruginosa, Candida    albicans, C. glabrata, C krusei, C parapsilosis,    C. tropicalis and the KPC resistance gene.  Organism: Blood Culture PCR (04-25-20 @ 13:27)  Organism: Blood Culture PCR (04-25-20 @ 13:27)      -  Coagulase negative Staphylococcus: Detec      Method Type: PCR    Culture - Urine (collected 04-22-20 @ 18:21)  Source: .Urine Clean Catch (Midstream)  Final Report (04-24-20 @ 16:46):    >100,000 CFU/ml Escherichia coli    >100,000 CFU/ml Coag Negative Staphylococcus "Susceptibilities not    performed"  Organism: Escherichia coli (04-24-20 @ 16:46)  Organism: Escherichia coli (04-24-20 @ 16:46)      -  Amikacin: S <=16      -  Ampicillin: R >16 These ampicillin results predict results for amoxicillin      -  Ampicillin/Sulbactam: R >16/8 Enterobacter, Citrobacter, and Serratia may develop resistance during prolonged therapy (3-4 days)      -  Aztreonam: S <=4      -  Cefazolin: S <=8 ([id number]) For uncomplicated UTI with K. pneumoniae, E. coli, or P. mirablis: [place] <=16 is sensitive and [place] >=32 is resistant. This also predicts results for oral agents cefaclor, cefdinir, cefpodoxime, cefprozil, cefuroxime axetil, cephalexin and locarbef for uncomplicated UTI. Note that some isolates may be susceptible to these agents while testing resistant to cefazolin.      -  Cefepime: S <=4      -  Cefoxitin: S <=8      -  Ceftriaxone: S <=1 Enterobacter, Citrobacter, and Serratia may develop resistance during prolonged therapy      -  Ciprofloxacin: S <=1      -  Gentamicin: S <=4      -  Imipenem: S <=1      -  Levofloxacin: S <=2      -  Meropenem: S <=1      -  Nitrofurantoin: S <=32 Should not be used to treat pyelonephritis      -  Piperacillin/Tazobactam: S <=16      -  Tigecycline: S <=2      -  Tobramycin: S <=4      -  Trimethoprim/Sulfamethoxazole: R >2/38      Method Type: [place]        Culture - Blood (collected 24 Apr 2020 16:20)  Source: .Blood Blood-Venous  Preliminary Report (25 Apr 2020 17:01):    No growth to date.    Culture - Blood (collected 24 Apr 2020 16:20)  Source: .Blood Blood-Peripheral  Preliminary Report (25 Apr 2020 17:01):    No growth to date.          RADIOLOGY & ADDITIONAL TESTS: Reviewed. No

## 2020-04-26 NOTE — PROGRESS NOTE ADULT - SUBJECTIVE AND OBJECTIVE BOX
Infectious Diseases progress note:    Subjective: No leukocytosis or fever.  Repeat bcx ngtd.     ROS:  CONSTITUTIONAL:  No fever, chills, rigors  CARDIOVASCULAR:  No chest pain or palpitations  RESPIRATORY:   No SOB, cough, dyspnea on exertion.  No wheezing  GASTROINTESTINAL:  No abd pain, N/V, diarrhea/constipation  EXTREMITIES:  No swelling or joint pain  GENITOURINARY:  No burning on urination, increased frequency or urgency.  No flank pain  NEUROLOGIC:  No HA, visual disturbances  SKIN: No rashes    Allergies    No Known Allergies    Intolerances        ANTIBIOTICS/RELEVANT:  antimicrobials  cefTRIAXone   IVPB 1000 milliGRAM(s) IV Intermittent every 24 hours  vancomycin  IVPB 1000 milliGRAM(s) IV Intermittent every 24 hours    immunologic:    OTHER:  acetaminophen   Tablet .. 650 milliGRAM(s) Oral every 8 hours PRN  amLODIPine   Tablet 5 milliGRAM(s) Oral daily  atorvastatin 10 milliGRAM(s) Oral at bedtime  enoxaparin Injectable 40 milliGRAM(s) SubCutaneous daily  melatonin 3 milliGRAM(s) Oral at bedtime  oxycodone    5 mG/acetaminophen 325 mG 1 Tablet(s) Oral every 8 hours PRN  pantoprazole    Tablet 40 milliGRAM(s) Oral before breakfast  senna 2 Tablet(s) Oral at bedtime PRN      Objective:  Vital Signs Last 24 Hrs  T(C): 36.8 (25 Apr 2020 20:31), Max: 36.8 (25 Apr 2020 20:31)  T(F): 98.2 (25 Apr 2020 20:31), Max: 98.2 (25 Apr 2020 20:31)  HR: 95 (25 Apr 2020 20:31) (95 - 95)  BP: 108/49 (25 Apr 2020 20:31) (108/49 - 108/49)  BP(mean): --  RR: 18 (25 Apr 2020 20:31) (18 - 18)  SpO2: 94% (25 Apr 2020 20:31) (94% - 94%)    PHYSICAL EXAM:  Constitutional:NAD  Eyes:BINTA, EOMI  Ear/Nose/Throat: no thrush, mucositis.  Moist mucous membranes	  Neck:no JVD, no lymphadenopathy, supple  Respiratory: CTA sandra  Cardiovascular: S1S2 RRR, no murmurs  Gastrointestinal:soft, nontender,  nondistended (+) BS  Extremities:no e/e/c, LLE bucks traction device in place  Skin:  no rashes, open wounds or ulcerations  :  benson        LABS:                        8.0    8.94  )-----------( 460      ( 26 Apr 2020 06:09 )             25.8     04-26    144  |  108<H>  |  26<H>  ----------------------------<  81  4.4   |  22  |  0.99    Ca    9.2      26 Apr 2020 06:09  Mg     2.1     04-26    TPro  6.4  /  Alb  2.5<L>  /  TBili  0.3  /  DBili  x   /  AST  14  /  ALT  11  /  AlkPhos  148<H>  04-26          Procalcitonin, Serum: 0.12 (04-26 @ 06:09)  Procalcitonin, Serum: 0.13 (04-24 @ 07:08)    Vancomycin Level, Random:  ug/mL (04-26 @ 04:42)      Vancomycin Level, Trough: 0.5 ug/mL (04-24 @ 16:30)              MICROBIOLOGY:    Culture - Blood (04.24.20 @ 16:20)    Specimen Source: .Blood Blood-Venous    Culture Results:   No growth to date.    Culture - Blood (04.24.20 @ 16:20)    Specimen Source: .Blood Blood-Peripheral    Culture Results:   No growth to date.    Culture - Blood (04.23.20 @ 00:56)    -  Trimethoprim/Sulfamethoxazole: R >2/38    -  Vancomycin: S 2    -  RIF- Rifampin: S <=1 Should not be used as monotherapy    -  Tetra/Doxy: R >8    -  Gentamicin: R >8 Should not be used as monotherapy    -  Oxacillin: R >2    -  Penicillin: R >8    -  Clindamycin: R >4    -  Erythromycin: R >4    -  Cefazolin: R <=4    Gram Stain:   Growth in aerobic bottle: Gram Positive Cocci in Clusters  Growth in anaerobic bottle: Gram Positive Cocci in Clusters    -  Ampicillin/Sulbactam: R <=8/4    Specimen Source: .Blood Blood-Peripheral    Organism: Gram Positive Cocci in Clusters    Culture Results:   Growth in aerobic and anaerobic bottles: Staphylococcus epidermidis    Organism Identification: Gram Positive Cocci in Clusters    Method Type: [place]    Culture - Blood (04.22.20 @ 18:35)    Gram Stain:   Growth in aerobic bottle: Gram Positive Cocci in Clusters  Growth in anaerobic bottle: Gram Positive Cocci in Clusters    -  Coagulase negative Staphylococcus: Detec    Specimen Source: .Blood Blood-Venous    Organism: Blood Culture PCR    Culture Results:   Growth in aerobic and anaerobic bottles: Staphylococcus epidermidis  "Susceptibilities not performed"  Growth in aerobic bottle: Staphylococcus hominis  Coag Negative Staphylococcus  Single set isolate, possible contaminant. Contact  Microbiology if susceptibility testing clinically  indicated.  "Due to technical problems, Proteus sp. will Not be reported as part of  the BCID panel until further notice"  ***Blood Panel PCR results on this specimen are available  approximately 3 hours after the Gram stain result.***  Gram stain, PCR, and/or culture results may not always  correspond due to difference in methodologies.  ************************************************************  This PCR assay was performed using [employer].  The following targets are tested for: Enterococcus,  vancomycin resistant enterococci, Listeria monocytogenes,  coagulase negative staphylococci, S. aureus,  methicillin resistant S. aureus, Streptococcus agalactiae  (Group B), S. pneumoniae, S. pyogenes (Group A),  Acinetobacter baumannii, Enterobacter cloacae, E. coli,  Klebsiella oxytoca, K. pneumoniae, Proteus sp.,  Serratia marcescens, Haemophilus influenzae,  Neisseria meningitidis, Pseudomonas aeruginosa, Candida  albicans, C. glabrata, C krusei, C parapsilosis,  C. tropicalis and the KPC resistance gene.    Organism Identification: Blood Culture PCR    Method Type: PCR    Culture - Urine (04.22.20 @ 18:21)    -  Amikacin: S <=16    -  Ampicillin: R >16 These ampicillin results predict results for amoxicillin    -  Ampicillin/Sulbactam: R >16/8 Enterobacter, Citrobacter, and Serratia may develop resistance during prolonged therapy (3-4 days)    -  Aztreonam: S <=4    -  Cefazolin: S <=8 (MIC_CL_COM_ENTERIC_CEFAZU) For uncomplicated UTI with K. pneumoniae, E. coli, or P. mirablis: BROOKLYN <=16 is sensitive and BROOKLYN >=32 is resistant. This also predicts results for oral agents cefaclor, cefdinir, cefpodoxime, cefprozil, cefuroxime axetil, cephalexin and locarbef for uncomplicated UTI. Note that some isolates may be susceptible to these agents while testing resistant to cefazolin.    -  Cefepime: S <=4    -  Cefoxitin: S <=8    -  Ceftriaxone: S <=1 Enterobacter, Citrobacter, and Serratia may develop resistance during prolonged therapy    -  Ciprofloxacin: S <=1    -  Imipenem: S <=1    -  Levofloxacin: S <=2    -  Meropenem: S <=1    -  Nitrofurantoin: S <=32 Should not be used to treat pyelonephritis    -  Gentamicin: S <=4    -  Piperacillin/Tazobactam: S <=16    -  Tigecycline: S <=2    -  Tobramycin: S <=4    -  Trimethoprim/Sulfamethoxazole: R >2/38    Specimen Source: .Urine Clean Catch (Midstream)    Culture Results:   >100,000 CFU/ml Escherichia coli  >100,000 CFU/ml Coag Negative Staphylococcus "Susceptibilities not  performed"    Organism Identification: Escherichia coli    Organism: Escherichia coli    Method Type: [place]          RADIOLOGY & ADDITIONAL STUDIES:    < from: CT Pelvis Bony Only No Cont (04.22.20 @ 21:54) >  IMPRESSION:    Supralaterally and posteriorly dislocated femoral component of the left hip hemiarthroplasty. Mild cortical irregularity and lucencies at the left greater trochanter, which is difficult to assess due to artifact. This may be in part postsurgical although nondisplaced fracture cannot be excluded. If there is continued clinical suspicion, additional imaging (nuclear scan and/or MRI with metal artifact reduction protocol) may be obtained for further evaluation.    Foci of air in the left mid lateral thigh soft tissue, without a discrete fluid collection. Differential diagnosis includes injection, penetrating trauma and infection. Recommend clinical correlation for further evaluation.    Question sigmoid colon diverticulitis. Recommend clinical correlation and follow-up colonoscopy to exclude potential underlying neoplasm.    Additional findings as described.    < end of copied text >

## 2020-04-26 NOTE — PROGRESS NOTE ADULT - PROBLEM SELECTOR PLAN 2
positive for Staph epidermidis on 4/22 and 4/23.   - blood cultures NGTD on 4/24   - f/u repeat cultures   - appreciate ID recs   - daily blood cultures   - continue vancomycin 1g IV q24 until 6 weeks from blood cultures are clear  - continue rocephin for UTI x 7 days  - adding rifampin per ID due to concern for prosthetic joint infection  - will try to obtain Echocardiogram tomorrow positive for Staph epidermidis on 4/22 and 4/23, sensitive to Vancomycin  - blood cultures NGTD on 4/24   - f/u repeat cultures   - appreciate ID recs--will add Rifampin 4/26.    - daily blood cultures   - continue vancomycin 1g IV q24 until 6 weeks from blood cultures are clear  - continue rocephin for UTI x 7 days  - adding rifampin per ID due to concern for prosthetic joint infection  - will try to obtain Echocardiogram tomorrow

## 2020-04-26 NOTE — PROGRESS NOTE ADULT - ASSESSMENT
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     Plan:  #Hip Disolcation -  #Bactermia -   #COVID-19 -     #UTI -  #HTN      #PPX

## 2020-04-26 NOTE — PROGRESS NOTE ADULT - PROBLEM SELECTOR PLAN 3
COVID19 PCR positive 04/23/20 - Mild infection with normal O2 sat n room air, no respiratory symptoms, serum ESR and CRP elevation. Not a candidate for Plaquenil given absence of hypoxia   - inflammatory markers downtrending   - trend CRP/ESR/ferritin every 48-72 hours   - Supportive care.  - last known ecg (3/13/20) with QTc of 481  - f/u repeat ecg in case need for hydroxychlorquine

## 2020-04-26 NOTE — PROGRESS NOTE ADULT - PROBLEM SELECTOR PLAN 8
- DASH diet   - fall precautions   - home senna   - home melatonin   - DVT with 40 mg daily Lovenox    Dispo: likely [facility]

## 2020-04-26 NOTE — PROGRESS NOTE ADULT - ASSESSMENT
Orthopaedic Surgery Progress Note    Subjective:   Patient seen and examined  No acute events overnight  Patient is in bucks traction LLE    Objective:  T(C): 36.8 (04-25-20 @ 20:31), Max: 36.9 (04-25-20 @ 12:22)  HR: 95 (04-25-20 @ 20:31) (88 - 95)  BP: 108/49 (04-25-20 @ 20:31) (108/49 - 120/60)  RR: 18 (04-25-20 @ 20:31) (18 - 18)  SpO2: 94% (04-25-20 @ 20:31) (94% - 95%)  Wt(kg): --    04-25 @ 07:01  -  04-26 @ 05:39  --------------------------------------------------------  IN: 0 mL / OUT: 300 mL / NET: -300 mL        PE    NAD  LLE:   In 10 lbs of bucks traction  Traction boot was removed and skin examined. Skin is intact without any ulcerations  L hip is shortened and internally rotated  motor intact GS/TA/EHL  SILT S/S/SP/DP  WWP                          8.4    10.41 )-----------( 580      ( 25 Apr 2020 06:45 )             27.1     04-25    144  |  108<H>  |  34<H>  ----------------------------<  83  4.6   |  23  |  1.14    Ca    8.9      25 Apr 2020 06:45  Phos  3.2     04-24  Mg     2.1     04-25    TPro  6.2  /  Alb  2.5<L>  /  TBili  0.2  /  DBili  x   /  AST  12  /  ALT  10  /  AlkPhos  149<H>  04-25        89y Female with L dislocated hemiarthroplasty  - per previous discussion with family, they would like to wait on surgical management and observe given patients COVID+ status and its potential complications with surgery  - patient will ultimately need a closed reduction vs open reduction L hip when deemed stable and family is in agreement  - Pain control  - NWB LLE in 10 lbs bucks traction  - Posterior Anterior dislocation precautions  - DVT ppx  - FU ID recs given bacteremia and UTI  - ortho will continue to follow,

## 2020-04-27 LAB
ALBUMIN SERPL ELPH-MCNC: 2.3 G/DL — LOW (ref 3.3–5)
ALP SERPL-CCNC: 136 U/L — HIGH (ref 40–120)
ALT FLD-CCNC: 13 U/L — SIGNIFICANT CHANGE UP (ref 4–33)
ANION GAP SERPL CALC-SCNC: 13 MMO/L — SIGNIFICANT CHANGE UP (ref 7–14)
AST SERPL-CCNC: 15 U/L — SIGNIFICANT CHANGE UP (ref 4–32)
BASOPHILS # BLD AUTO: 0.03 K/UL — SIGNIFICANT CHANGE UP (ref 0–0.2)
BASOPHILS NFR BLD AUTO: 0.3 % — SIGNIFICANT CHANGE UP (ref 0–2)
BILIRUB SERPL-MCNC: 0.4 MG/DL — SIGNIFICANT CHANGE UP (ref 0.2–1.2)
BUN SERPL-MCNC: 22 MG/DL — SIGNIFICANT CHANGE UP (ref 7–23)
CALCIUM SERPL-MCNC: 9.1 MG/DL — SIGNIFICANT CHANGE UP (ref 8.4–10.5)
CHLORIDE SERPL-SCNC: 108 MMOL/L — HIGH (ref 98–107)
CO2 SERPL-SCNC: 22 MMOL/L — SIGNIFICANT CHANGE UP (ref 22–31)
CREAT SERPL-MCNC: 0.87 MG/DL — SIGNIFICANT CHANGE UP (ref 0.5–1.3)
EOSINOPHIL # BLD AUTO: 0.09 K/UL — SIGNIFICANT CHANGE UP (ref 0–0.5)
EOSINOPHIL NFR BLD AUTO: 1 % — SIGNIFICANT CHANGE UP (ref 0–6)
GLUCOSE SERPL-MCNC: 75 MG/DL — SIGNIFICANT CHANGE UP (ref 70–99)
HCT VFR BLD CALC: 27.7 % — LOW (ref 34.5–45)
HGB BLD-MCNC: 8.3 G/DL — LOW (ref 11.5–15.5)
IMM GRANULOCYTES NFR BLD AUTO: 0.7 % — SIGNIFICANT CHANGE UP (ref 0–1.5)
LYMPHOCYTES # BLD AUTO: 1.36 K/UL — SIGNIFICANT CHANGE UP (ref 1–3.3)
LYMPHOCYTES # BLD AUTO: 15.7 % — SIGNIFICANT CHANGE UP (ref 13–44)
MAGNESIUM SERPL-MCNC: 2 MG/DL — SIGNIFICANT CHANGE UP (ref 1.6–2.6)
MCHC RBC-ENTMCNC: 26.1 PG — LOW (ref 27–34)
MCHC RBC-ENTMCNC: 30 % — LOW (ref 32–36)
MCV RBC AUTO: 87.1 FL — SIGNIFICANT CHANGE UP (ref 80–100)
MONOCYTES # BLD AUTO: 1.04 K/UL — HIGH (ref 0–0.9)
MONOCYTES NFR BLD AUTO: 12 % — SIGNIFICANT CHANGE UP (ref 2–14)
NEUTROPHILS # BLD AUTO: 6.11 K/UL — SIGNIFICANT CHANGE UP (ref 1.8–7.4)
NEUTROPHILS NFR BLD AUTO: 70.3 % — SIGNIFICANT CHANGE UP (ref 43–77)
NRBC # FLD: 0 K/UL — SIGNIFICANT CHANGE UP (ref 0–0)
PLATELET # BLD AUTO: 529 K/UL — HIGH (ref 150–400)
PMV BLD: 9.6 FL — SIGNIFICANT CHANGE UP (ref 7–13)
POTASSIUM SERPL-MCNC: 4.6 MMOL/L — SIGNIFICANT CHANGE UP (ref 3.5–5.3)
POTASSIUM SERPL-SCNC: 4.6 MMOL/L — SIGNIFICANT CHANGE UP (ref 3.5–5.3)
PROT SERPL-MCNC: 6.1 G/DL — SIGNIFICANT CHANGE UP (ref 6–8.3)
RBC # BLD: 3.18 M/UL — LOW (ref 3.8–5.2)
RBC # FLD: 17.6 % — HIGH (ref 10.3–14.5)
SODIUM SERPL-SCNC: 143 MMOL/L — SIGNIFICANT CHANGE UP (ref 135–145)
VANCOMYCIN FLD-MCNC: 22.1 UG/ML — SIGNIFICANT CHANGE UP
WBC # BLD: 8.69 K/UL — SIGNIFICANT CHANGE UP (ref 3.8–10.5)
WBC # FLD AUTO: 8.69 K/UL — SIGNIFICANT CHANGE UP (ref 3.8–10.5)

## 2020-04-27 PROCEDURE — 93306 TTE W/DOPPLER COMPLETE: CPT | Mod: 26

## 2020-04-27 PROCEDURE — 93010 ELECTROCARDIOGRAM REPORT: CPT

## 2020-04-27 PROCEDURE — 99232 SBSQ HOSP IP/OBS MODERATE 35: CPT

## 2020-04-27 RX ORDER — VANCOMYCIN HCL 1 G
750 VIAL (EA) INTRAVENOUS EVERY 24 HOURS
Refills: 0 | Status: DISCONTINUED | OUTPATIENT
Start: 2020-04-28 | End: 2020-05-07

## 2020-04-27 RX ORDER — VANCOMYCIN HCL 1 G
VIAL (EA) INTRAVENOUS
Refills: 0 | Status: DISCONTINUED | OUTPATIENT
Start: 2020-04-27 | End: 2020-05-07

## 2020-04-27 RX ORDER — VANCOMYCIN HCL 1 G
750 VIAL (EA) INTRAVENOUS ONCE
Refills: 0 | Status: COMPLETED | OUTPATIENT
Start: 2020-04-27 | End: 2020-04-27

## 2020-04-27 RX ADMIN — CEFTRIAXONE 100 MILLIGRAM(S): 500 INJECTION, POWDER, FOR SOLUTION INTRAMUSCULAR; INTRAVENOUS at 21:53

## 2020-04-27 RX ADMIN — ATORVASTATIN CALCIUM 10 MILLIGRAM(S): 80 TABLET, FILM COATED ORAL at 21:53

## 2020-04-27 RX ADMIN — OXYCODONE AND ACETAMINOPHEN 1 TABLET(S): 5; 325 TABLET ORAL at 21:53

## 2020-04-27 RX ADMIN — Medication 3 MILLIGRAM(S): at 21:53

## 2020-04-27 RX ADMIN — ENOXAPARIN SODIUM 40 MILLIGRAM(S): 100 INJECTION SUBCUTANEOUS at 11:14

## 2020-04-27 RX ADMIN — AMLODIPINE BESYLATE 5 MILLIGRAM(S): 2.5 TABLET ORAL at 06:04

## 2020-04-27 RX ADMIN — Medication 250 MILLIGRAM(S): at 14:44

## 2020-04-27 RX ADMIN — OXYCODONE AND ACETAMINOPHEN 1 TABLET(S): 5; 325 TABLET ORAL at 07:00

## 2020-04-27 RX ADMIN — PANTOPRAZOLE SODIUM 40 MILLIGRAM(S): 20 TABLET, DELAYED RELEASE ORAL at 06:04

## 2020-04-27 NOTE — PROGRESS NOTE ADULT - ASSESSMENT
89y Female with L dislocated hemiarthroplasty  - per previous discussion with family, they would like to wait on surgical management and observe given patients COVID+ status and its potential complications with surgery  - patient will ultimately need a closed reduction vs open reduction L hip when deemed stable and family is in agreement  - Pain control  - NWB LLE in 10 lbs bucks traction  - DVT ppx  - FU ID recs given bacteremia and UTI, low suspicion infected joint given lack of fluid collection  - ortho will continue to follow,     Ortho [id number]

## 2020-04-27 NOTE — PROGRESS NOTE ADULT - SUBJECTIVE AND OBJECTIVE BOX
OVERNIGHT/24 HOUR EVENTS: STARLA    SUBJECTIVE / INTERVAL HPI: Patient seen and examined at bedside. Patient resting comfortably in bed in no acute distress. Pain is being well controlled with current regimen    ROS:  CONSTITUTIONAL: No fevers or headache  RESPIRATORY: No cough; No shortness of breath  CARDIOVASCULAR: No chest pain or palpitations  GASTROINTESTINAL: No abdominal pain. No nausea, vomiting, diarrhea, or constipation.  All other review of systems is negative unless indicated above.    MEDICATIONS:  acetaminophen   Tablet .. 650 milliGRAM(s) Oral every 8 hours PRN Mild Pain (1 - 3), Moderate Pain (4 - 6)  amLODIPine   Tablet 5 milliGRAM(s) Oral daily  atorvastatin 10 milliGRAM(s) Oral at bedtime  cefTRIAXone   IVPB 1000 milliGRAM(s) IV Intermittent every 24 hours  enoxaparin Injectable 40 milliGRAM(s) SubCutaneous daily  melatonin 3 milliGRAM(s) Oral at bedtime  oxycodone    5 mG/acetaminophen 325 mG 1 Tablet(s) Oral every 8 hours PRN Severe Pain (7 - 10)  pantoprazole    Tablet 40 milliGRAM(s) Oral before breakfast  rifAMPin 300 milliGRAM(s) Oral two times a day  senna 2 Tablet(s) Oral at bedtime PRN Constipation  vancomycin  IVPB      vancomycin  IVPB 750 milliGRAM(s) IV Intermittent once      VITAL SIGNS:  Vital Signs Last 24 Hrs  T(C): 36.4 (27 Apr 2020 05:21), Max: 36.9 (26 Apr 2020 16:18)  T(F): 97.6 (27 Apr 2020 05:21), Max: 98.4 (26 Apr 2020 16:18)  HR: 90 (27 Apr 2020 05:21) (90 - 97)  BP: 128/52 (27 Apr 2020 05:21) (106/59 - 128/52)  RR: 17 (27 Apr 2020 05:21) (17 - 18)  SpO2: 100% (27 Apr 2020 05:21) (95% - 100%)    I&Os:    04-26-20 @ 07:01  -  04-27-20 @ 07:00  --------------------------------------------------------  IN: 0 mL / OUT: 400 mL / NET: -400 mL      PHYSICAL EXAM:    General: elderly woman in no acute distress  HEENT: NCAT; PERRL, anicteric sclera; mucus membranes dry  Cardiovascular: S1, S2 normal; RRR, possible new murmur  Respiratory: CTABL; no W/R/R  Gastrointestinal: soft, nontender, nondistended. bowel sounds present.  Skin: no ulcerations or visible rashes appreciated  Extremities: WWP; no edema, clubbing or cyanosis. No splinter hemorrhages osler nodes, or janeway lesions  Vascular: 2+ radial, DP/PT pulses B/L  Neurological: AAOx1-2; CN II-XII grossly intact; no focal deficits      LABS:                        8.3    8.69  )-----------( 529      ( 27 Apr 2020 06:15 )             27.7     04-27    143  |  108<H>  |  22  ----------------------------<  75  4.6   |  22  |  0.87    Ca    9.1      27 Apr 2020 06:15  Mg     2.0     04-27    TPro  6.1  /  Alb  2.3<L>  /  TBili  0.4  /  DBili  x   /  AST  15  /  ALT  13  /  AlkPhos  136<H>  04-27        D-Dimer Assay, Quantitative: 1406 ng/mL (04-26-20 @ 06:09)  Ferritin, Serum: 176.1 ng/mL (04-26-20 @ 06:09)  Procalcitonin, Serum: 0.12 ng/mL (04-26-20 @ 06:09)    D-Dimer Assay, Quantitative: 1430 ng/mL (04-24-20 @ 07:08)  Ferritin, Serum: 206.8 ng/mL (04-24-20 @ 07:08)  Procalcitonin, Serum: 0.13 ng/mL (04-24-20 @ 07:08)      Culture - Blood (collected 04-24-20 @ 16:20)  Source: .Blood Blood-Venous  Preliminary Report (04-25-20 @ 17:01):    No growth to date.    Culture - Blood (collected 04-24-20 @ 16:20)  Source: .Blood Blood-Peripheral  Preliminary Report (04-25-20 @ 17:01):    No growth to date.    Culture - Blood (collected 04-23-20 @ 00:56)  Source: .Blood Blood-Peripheral  Gram Stain (04-24-20 @ 06:24):    Growth in aerobic bottle: Gram Positive Cocci in Clusters    Growth in anaerobic bottle: Gram Positive Cocci in Clusters  Final Report (04-25-20 @ 16:04):    Growth in aerobic and anaerobic bottles: Staphylococcus epidermidis  Organism: Gram Positive Cocci in Clusters (04-25-20 @ 16:04)  Organism: Gram Positive Cocci in Clusters (04-25-20 @ 16:04)      -  Ampicillin/Sulbactam: R <=8/4      -  Cefazolin: R <=4      -  Clindamycin: R >4      -  Erythromycin: R >4      -  Gentamicin: R >8 Should not be used as monotherapy      -  Oxacillin: R >2      -  Penicillin: R >8      -  RIF- Rifampin: S <=1 Should not be used as monotherapy      -  Tetra/Doxy: R >8      -  Trimethoprim/Sulfamethoxazole: R >2/38      -  Vancomycin: S 2      Method Type: [place]    Culture - Blood (collected 04-22-20 @ 18:35)  Source: .Blood Blood-Venous  Gram Stain (04-24-20 @ 13:44):    Growth in aerobic bottle: Gram Positive Cocci in Clusters    Growth in anaerobic bottle: Gram Positive Cocci in Clusters  Final Report (04-25-20 @ 13:27):    Growth in aerobic and anaerobic bottles: Staphylococcus epidermidis    "Susceptibilities not performed"    Growth in aerobic bottle: Staphylococcus hominis    Coag Negative Staphylococcus    Single set isolate, possible contaminant. Contact    Microbiology if susceptibility testing clinically    indicated.    "Due to technical problems, Proteus sp. will Not be reported as part of    the BCID panel until further notice"    ***Blood Panel PCR results on this specimen are available    approximately 3 hours after the Gram stain result.***    Gram stain, PCR, and/or culture results may not always    correspond due to difference in methodologies.    ************************************************************    This PCR assay was performed using [employer].    The following targets are tested for: Enterococcus,    vancomycin resistant enterococci, Listeria monocytogenes,    coagulase negative staphylococci, S. aureus,    methicillin resistant S. aureus, Streptococcus agalactiae    (Group B), S. pneumoniae, S. pyogenes (Group A),    Acinetobacter baumannii, Enterobacter cloacae, E. coli,    Klebsiella oxytoca, K. pneumoniae, Proteus sp.,    Serratia marcescens, Haemophilus influenzae,    Neisseria meningitidis, Pseudomonas aeruginosa, Candida    albicans, C. glabrata, C krusei, C parapsilosis,    C. tropicalis and the KPC resistance gene.  Organism: Blood Culture PCR (04-25-20 @ 13:27)  Organism: Blood Culture PCR (04-25-20 @ 13:27)      -  Coagulase negative Staphylococcus: Detec      Method Type: PCR    Culture - Urine (collected 04-22-20 @ 18:21)  Source: .Urine Clean Catch (Midstream)  Final Report (04-24-20 @ 16:46):    >100,000 CFU/ml Escherichia coli    >100,000 CFU/ml Coag Negative Staphylococcus "Susceptibilities not    performed"  Organism: Escherichia coli (04-24-20 @ 16:46)  Organism: Escherichia coli (04-24-20 @ 16:46)      -  Amikacin: S <=16      -  Ampicillin: R >16 These ampicillin results predict results for amoxicillin      -  Ampicillin/Sulbactam: R >16/8 Enterobacter, Citrobacter, and Serratia may develop resistance during prolonged therapy (3-4 days)      -  Aztreonam: S <=4      -  Cefazolin: S <=8 (MIC_CL_COM_ENTERIC_CEFAZU) For uncomplicated UTI with K. pneumoniae, E. coli, or P. mirablis: BR[place]N <=16 is sensitive and BROOK[place]N >=32 is resistant. This also predicts results for oral agents cefaclor, cefdinir, cefpodoxime, cefprozil, cefuroxime axetil, cephalexin and locarbef for uncomplicated UTI. Note that some isolates may be susceptible to these agents while testing resistant to cefazolin.      -  Cefepime: S <=4      -  Cefoxitin: S <=8      -  Ceftriaxone: S <=1 Enterobacter, Citrobacter, and Serratia may develop resistance during prolonged therapy      -  Ciprofloxacin: S <=1      -  Gentamicin: S <=4      -  Imipenem: S <=1      -  Levofloxacin: S <=2      -  Meropenem: S <=1      -  Nitrofurantoin: S <=32 Should not be used to treat pyelonephritis      -  Piperacillin/Tazobactam: S <=16      -  Tigecycline: S <=2      -  Tobramycin: S <=4      -  Trimethoprim/Sulfamethoxazole: R >2/38      Method Type: [place]        Culture - Blood (collected 24 Apr 2020 16:20)  Source: .Blood Blood-Venous  Preliminary Report (25 Apr 2020 17:01):    No growth to date.    Culture - Blood (collected 24 Apr 2020 16:20)  Source: .Blood Blood-Peripheral  Preliminary Report (25 Apr 2020 17:01):    No growth to date.          RADIOLOGY & ADDITIONAL TESTS: Reviewed.

## 2020-04-27 NOTE — PROGRESS NOTE ADULT - ASSESSMENT
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 Patient had a mechanical fall and underwent a left hip hemiarthroplasty in 1/2020 at [facility]. She tolerated surgery and discharged to [facility] rehab. She presented to [facility] on 3/13/2020 for painful vertebral compression fractures, once her pain was treated she was discharged to [place] rehab.    ER vitals:  T 97.6, P 87, /62, RR 18, 95% RA.  WBC 18.2 --> 9.9.   . .   D-dimer 1430, ferritin 206.  PCT 0.13.  UA Large LE, (+) wbc's.   Ucx >100K GNR and >100K CoNS.   Bcx (+) CoNS 4/4 bottles.   Covid pcr (+) 4/22.      Multiple imaging performed including CT pelvis and femur, showing  dislocated femoral component of the left hip hemiarthroplasty. Mild cortical irregularity and lucencies at the left greater trochanter, which is difficult to assess due to artifact. This may be in part postsurgical although nondisplaced fracture cannot be excluded.  Also with foci of air in the left mid lateral thigh soft tissue, without a discrete fluid collection. Differential diagnosis includes injection, penetrating trauma and infection.  Question sigmoid colon diverticulitis.     Pt seen by Ortho service, pt initially planned IR aspiration of L hip to rule out prosthetic joint infection, given CT findings and bacteremia with CoNS.  Also planned for OR for possible revision vs resection arthroplasty, now cancelled due to Covid (+) status.     ID consulted for further recommendations.     CoNS bacteremia:    - Pt with 4/4 blood cultures positive, also Ucx (+) CoNS (secondary to descending infection from bacetermia?).  Given elevated inflammatory markers, and new dislocated left hip hemiarthroplasty with lucency, concern for prosthetic joint infection.    - Add vancomycin 1gm IV q24.  check trough prior to 3rd dose.  f/u sensitivities.  Repeat blood cultures 4/24 NGTD.  Will add rifampin for adjunctive treatment in the setting of prosthesis. Monitor weekly LFTs.     - Recommend 6 weeks of IV abx from date of blood culture clearance.  Sensitivities reviewed, no oral options for chronic suppressive treatment due to resistance.  Pt not currently a surgical candidate as per Orthopedics.  Recommend reevaluation for surgery once pt is stable as IV abx alone unlikely clear infection.  Recommend L hip joint aspiration to evaluate for PJI.  Possible hip joint effusion seen on CT pelvis.      - Recommend echocardiogram.     - Monitor WBC, temp curve, trend inflammatory markers.       Covid (+):    - Cont supportive care.  Trend ferritin, D-dimer, CRP every 48-72 hours.  Monitor pulse ox.  Currently oxygenating adequately on room air.     - Cxr shows grossly clear lungs.      - check baseline EKG and Qtc.  Doubt benefit of plaquenil given adequate oxygenation.  All therapeutics are investigational.      UTI:    - Growing both E.coli and CoNS.  Cont rocephin (sensitive) x 7 days (through 4/27) and IV vanco x 6 weeks, f/u culture results.       - Pt with h/o pessary.  consider GYN f/u, will defer to medicine.      Will follow,    Sue Armstrong  [phone]

## 2020-04-27 NOTE — PROGRESS NOTE ADULT - PROBLEM SELECTOR PLAN 4
UA positive with few WBCs Informed by patient that patient has a pessary - last exchanged in Oct/Nov 2019   - Continue ceftriaxone 1 g daily x 7 days and Vancomycin.    - Cultures growing E coli (sensitive to Ceftriaxone) and Coag Neg Staph.    - Will consider GYN consult for pessary exchange prior to discharge. UA positive with few WBCs Informed by patient that patient has a pessary - last exchanged in Oct/Nov 2019   - Continue ceftriaxone 1 g daily x 7 days and Vancomycin.    - Cultures growing E coli (sensitive to Ceftriaxone) and Coag Neg Staph.    - Will consider GYN consult for pessary exchange prior to discharge.  - Pt normally sees Dr. Jose BILLINGS in the [facility].

## 2020-04-27 NOTE — PROGRESS NOTE ADULT - PROBLEM SELECTOR PLAN 2
positive for Staph epidermidis on 4/22 and 4/23, sensitive to Vancomycin  - blood cultures NGTD on 4/24   - f/u repeat cultures   - appreciate ID recs   - daily blood cultures   - continue vancomycin until 6 weeks from blood cultures are clear  - vanc trough of 21 this AM; dose reduced from 1g q24 to 750mg q24.   - f/u next trough (4/30)  - continue rocephin for UTI x 7 days  - adding rifampin per ID due to concern for prosthetic joint infection  - will try to obtain Echocardiogram tomorrow

## 2020-04-27 NOTE — PROGRESS NOTE ADULT - SUBJECTIVE AND OBJECTIVE BOX
Ortho Progress Note    S: Patient seen and examined. No acute events overnight. Pain well controlled with current regimen. Denies lightheadedness/dizziness, CP/SOB. Tolerating diet.       O:  Physical Exam:  Gen: Laying in bed, NAD, alert   Resp: Unlabored breathing  Ext: EHL/FHL/TA/Sol intact          + SILT DP/SP/CASTREJON/Sa/Tib          +DP, extremity WWP          In bucks traction     Vital Signs Last 24 Hrs  T(C): 36.4 (27 Apr 2020 05:21), Max: 36.9 (26 Apr 2020 16:18)  T(F): 97.6 (27 Apr 2020 05:21), Max: 98.4 (26 Apr 2020 16:18)  HR: 90 (27 Apr 2020 05:21) (90 - 97)  BP: 128/52 (27 Apr 2020 05:21) (106/59 - 128/52)  BP(mean): --  RR: 17 (27 Apr 2020 05:21) (17 - 18)  SpO2: 100% (27 Apr 2020 05:21) (95% - 100%)                          8.0    8.94  )-----------( 460      ( 26 Apr 2020 06:09 )             25.8                         8.4    10.41 )-----------( 580      ( 25 Apr 2020 06:45 )             27.1       04-26    144  |  108<H>  |  26<H>  ----------------------------<  81  4.4   |  22  |  0.99

## 2020-04-27 NOTE — PROGRESS NOTE ADULT - ASSESSMENT
Impression:  89 year old woman with HTN, HLD, GERD, scoliosis, chronic back pain due to compression fractures, and recent L hemiarthroplasty in 01/2020 [facility] of unclear etiology and chronicity, found to have elevated inflammatory markers and mild COVID19 infection. Was found to be bacteremic with staph epidermidis with concern for prosthetic joint infection and also found to have UTI with CoNS and E coli. Impression:  89 year old woman with Dementia, HTN, HLD, GERD, scoliosis, chronic back pain due to compression fractures, and recent L [facility]miarthroplasty in 01/[facility] of unclear etiology and chronicity, found to have elevated inflammatory markers and mild COVID19 infection. Was found to be bacteremic with staph epidermidis with concern for prosthetic joint infection and also found to have UTI with CoNS and E coli.    Updates given to daughter Dianne Kennedy at 14:15. She can be reached at [phone].

## 2020-04-28 LAB
ANION GAP SERPL CALC-SCNC: 11 MMO/L — SIGNIFICANT CHANGE UP (ref 7–14)
BASOPHILS # BLD AUTO: 0.03 K/UL — SIGNIFICANT CHANGE UP (ref 0–0.2)
BASOPHILS NFR BLD AUTO: 0.3 % — SIGNIFICANT CHANGE UP (ref 0–2)
BUN SERPL-MCNC: 21 MG/DL — SIGNIFICANT CHANGE UP (ref 7–23)
CALCIUM SERPL-MCNC: 8.9 MG/DL — SIGNIFICANT CHANGE UP (ref 8.4–10.5)
CHLORIDE SERPL-SCNC: 106 MMOL/L — SIGNIFICANT CHANGE UP (ref 98–107)
CO2 SERPL-SCNC: 24 MMOL/L — SIGNIFICANT CHANGE UP (ref 22–31)
CREAT SERPL-MCNC: 0.85 MG/DL — SIGNIFICANT CHANGE UP (ref 0.5–1.3)
CRP SERPL-MCNC: 140.6 MG/L — HIGH
D DIMER BLD IA.RAPID-MCNC: 1186 NG/ML — SIGNIFICANT CHANGE UP
EOSINOPHIL # BLD AUTO: 0.08 K/UL — SIGNIFICANT CHANGE UP (ref 0–0.5)
EOSINOPHIL NFR BLD AUTO: 0.8 % — SIGNIFICANT CHANGE UP (ref 0–6)
FERRITIN SERPL-MCNC: 152.4 NG/ML — HIGH (ref 15–150)
GLUCOSE SERPL-MCNC: 81 MG/DL — SIGNIFICANT CHANGE UP (ref 70–99)
HCT VFR BLD CALC: 27.2 % — LOW (ref 34.5–45)
HGB BLD-MCNC: 8.3 G/DL — LOW (ref 11.5–15.5)
IMM GRANULOCYTES NFR BLD AUTO: 0.7 % — SIGNIFICANT CHANGE UP (ref 0–1.5)
LYMPHOCYTES # BLD AUTO: 1.43 K/UL — SIGNIFICANT CHANGE UP (ref 1–3.3)
LYMPHOCYTES # BLD AUTO: 13.9 % — SIGNIFICANT CHANGE UP (ref 13–44)
MAGNESIUM SERPL-MCNC: 2 MG/DL — SIGNIFICANT CHANGE UP (ref 1.6–2.6)
MCHC RBC-ENTMCNC: 26.1 PG — LOW (ref 27–34)
MCHC RBC-ENTMCNC: 30.5 % — LOW (ref 32–36)
MCV RBC AUTO: 85.5 FL — SIGNIFICANT CHANGE UP (ref 80–100)
MONOCYTES # BLD AUTO: 1.02 K/UL — HIGH (ref 0–0.9)
MONOCYTES NFR BLD AUTO: 9.9 % — SIGNIFICANT CHANGE UP (ref 2–14)
NEUTROPHILS # BLD AUTO: 7.63 K/UL — HIGH (ref 1.8–7.4)
NEUTROPHILS NFR BLD AUTO: 74.4 % — SIGNIFICANT CHANGE UP (ref 43–77)
NRBC # FLD: 0 K/UL — SIGNIFICANT CHANGE UP (ref 0–0)
PHOSPHATE SERPL-MCNC: 2.8 MG/DL — SIGNIFICANT CHANGE UP (ref 2.5–4.5)
PLATELET # BLD AUTO: 573 K/UL — HIGH (ref 150–400)
PMV BLD: 9.4 FL — SIGNIFICANT CHANGE UP (ref 7–13)
POTASSIUM SERPL-MCNC: 3.9 MMOL/L — SIGNIFICANT CHANGE UP (ref 3.5–5.3)
POTASSIUM SERPL-SCNC: 3.9 MMOL/L — SIGNIFICANT CHANGE UP (ref 3.5–5.3)
PROCALCITONIN SERPL-MCNC: 0.12 NG/ML — HIGH (ref 0.02–0.1)
RBC # BLD: 3.18 M/UL — LOW (ref 3.8–5.2)
RBC # FLD: 17.7 % — HIGH (ref 10.3–14.5)
SODIUM SERPL-SCNC: 141 MMOL/L — SIGNIFICANT CHANGE UP (ref 135–145)
WBC # BLD: 10.26 K/UL — SIGNIFICANT CHANGE UP (ref 3.8–10.5)
WBC # FLD AUTO: 10.26 K/UL — SIGNIFICANT CHANGE UP (ref 3.8–10.5)

## 2020-04-28 PROCEDURE — 99232 SBSQ HOSP IP/OBS MODERATE 35: CPT

## 2020-04-28 RX ADMIN — Medication 250 MILLIGRAM(S): at 11:53

## 2020-04-28 RX ADMIN — OXYCODONE AND ACETAMINOPHEN 1 TABLET(S): 5; 325 TABLET ORAL at 05:56

## 2020-04-28 RX ADMIN — OXYCODONE AND ACETAMINOPHEN 1 TABLET(S): 5; 325 TABLET ORAL at 16:05

## 2020-04-28 RX ADMIN — CEFTRIAXONE 100 MILLIGRAM(S): 500 INJECTION, POWDER, FOR SOLUTION INTRAMUSCULAR; INTRAVENOUS at 21:32

## 2020-04-28 RX ADMIN — AMLODIPINE BESYLATE 5 MILLIGRAM(S): 2.5 TABLET ORAL at 05:28

## 2020-04-28 RX ADMIN — Medication 3 MILLIGRAM(S): at 21:31

## 2020-04-28 RX ADMIN — PANTOPRAZOLE SODIUM 40 MILLIGRAM(S): 20 TABLET, DELAYED RELEASE ORAL at 05:28

## 2020-04-28 RX ADMIN — ATORVASTATIN CALCIUM 10 MILLIGRAM(S): 80 TABLET, FILM COATED ORAL at 21:31

## 2020-04-28 RX ADMIN — ENOXAPARIN SODIUM 40 MILLIGRAM(S): 100 INJECTION SUBCUTANEOUS at 11:53

## 2020-04-28 NOTE — DIETITIAN INITIAL EVALUATION ADULT. - DIET TYPE
Diet, Regular:   Low Sodium  Supplement Feeding Modality:  Oral  Ensure Enlive Cans or Servings Per Day:  1       Frequency:  Three Times a day (04-22-20 @ 23:15)

## 2020-04-28 NOTE — PROGRESS NOTE ADULT - PROBLEM SELECTOR PLAN 1
Unclear chronicity of etiology but prior history of L. hemiarthroplasty in 01/2020 and hip was imaging after shows implant in place  Plan originally  for possible revision camilo arthroplasty vs. total arthroplasty, possible antibiotic spacer, and possible resection arthroplasty by Orthopaedic surgery 04/24/20 however patient tested positive for COVID19 so will be postponed to unknown date due to familial concerns relating to potential deterioration during/after surgery due to covid19.  Doubt significant chronicity given recent onset of pain and continued pain over this joint on exam today. IR consulted for possibly aspiration of L hip to rule out septic joint given markedly elevated inflammatory markers however no discrete fluid collection seen on CT left femur/pelvis and COVID19 may be likely etiology    - Repeat CBC and CMP daily   - pain control with acetaminophen (mild/mod pain) or oxycodone (severe pain) PRN  -Continue bucks traction until ortho states otherwise.  - appreciate ortho recs, will likely need reduction before discharge--ortho resident stated he would talk to family regarding options prior to discharge.

## 2020-04-28 NOTE — PROGRESS NOTE ADULT - SUBJECTIVE AND OBJECTIVE BOX
OVERNIGHT/24 HOUR EVENTS: STARLA    SUBJECTIVE / INTERVAL HPI: Patient seen and examined at bedside. Patient tired but answering questions although confused. A&Ox1    ROS:  CONSTITUTIONAL: No fevers or headache  RESPIRATORY: No cough; No shortness of breath  CARDIOVASCULAR: No chest pain or palpitations  GASTROINTESTINAL: No abdominal pain. No nausea, vomiting, diarrhea, or constipation.  GENITOURINARY: No dysuria, frequency or hematuria  All other review of systems is negative unless indicated above.    MEDICATIONS:  acetaminophen   Tablet .. 650 milliGRAM(s) Oral every 8 hours PRN Mild Pain (1 - 3), Moderate Pain (4 - 6)  amLODIPine   Tablet 5 milliGRAM(s) Oral daily  atorvastatin 10 milliGRAM(s) Oral at bedtime  cefTRIAXone   IVPB 1000 milliGRAM(s) IV Intermittent every 24 hours  enoxaparin Injectable 40 milliGRAM(s) SubCutaneous daily  melatonin 3 milliGRAM(s) Oral at bedtime  oxycodone    5 mG/acetaminophen 325 mG 1 Tablet(s) Oral every 8 hours PRN Severe Pain (7 - 10)  pantoprazole    Tablet 40 milliGRAM(s) Oral before breakfast  rifAMPin 300 milliGRAM(s) Oral two times a day  senna 2 Tablet(s) Oral at bedtime PRN Constipation  vancomycin  IVPB      vancomycin  IVPB 750 milliGRAM(s) IV Intermittent every 24 hours      VITAL SIGNS:  Vital Signs Last 24 Hrs  T(C): 36.7 (28 Apr 2020 05:27), Max: 36.7 (27 Apr 2020 12:48)  T(F): 98.1 (28 Apr 2020 05:27), Max: 98.1 (28 Apr 2020 05:27)  HR: 94 (28 Apr 2020 05:27) (92 - 98)  BP: 129/59 (28 Apr 2020 05:27) (124/58 - 131/61)  RR: 18 (28 Apr 2020 05:27) (17 - 18)  SpO2: 99% (28 Apr 2020 05:27) (97% - 99%)    I&Os:    04-27-20 @ 07:01  -  04-28-20 @ 07:00  --------------------------------------------------------  IN: 600 mL / OUT: 700 mL / NET: -100 mL        PHYSICAL EXAM:    General: elderly lady in NAD  HEENT: NCAT; PERRL, anicteric sclera; MMM  Cardiovascular: RRR  Respiratory: no tachypnea or increased WOB  Gastrointestinal: soft, nontender, nondistended.   Skin: no ulcerations or visible rashes appreciated  Extremities: WWP; no edema, clubbing or cyanosis  Vascular: 2+ radial, DP/PT pulses B/L  Neurological: AAOx0-1; CN II-XII grossly intact; no focal deficits        LABS:                        8.3    8.69  )-----------( 529      ( 27 Apr 2020 06:15 )             27.7     04-27    143  |  108<H>  |  22  ----------------------------<  75  4.6   |  22  |  0.87    Ca    9.1      27 Apr 2020 06:15  Mg     2.0     04-27    TPro  6.1  /  Alb  2.3<L>  /  TBili  0.4  /  DBili  x   /  AST  15  /  ALT  13  /  AlkPhos  136<H>  04-27          CAPILLARY BLOOD GLUCOSE          D-Dimer Assay, Quantitative: 1186 ng/mL (04-28-20 @ 04:35)    D-Dimer Assay, Quantitative: 1406 ng/mL (04-26-20 @ 06:09)  Ferritin, Serum: 176.1 ng/mL (04-26-20 @ 06:09)  Procalcitonin, Serum: 0.12 ng/mL (04-26-20 @ 06:09)    D-Dimer Assay, Quantitative: 1430 ng/mL (04-24-20 @ 07:08)  Ferritin, Serum: 206.8 ng/mL (04-24-20 @ 07:08)  Procalcitonin, Serum: 0.13 ng/mL (04-24-20 @ 07:08)      Culture - Blood (collected 04-24-20 @ 16:20)  Source: .Blood Blood-Venous  Preliminary Report (04-25-20 @ 17:01):    No growth to date.    Culture - Blood (collected 04-24-20 @ 16:20)  Source: .Blood Blood-Peripheral  Preliminary Report (04-25-20 @ 17:01):    No growth to date.    Culture - Blood (collected 04-23-20 @ 00:56)  Source: .Blood Blood-Peripheral  Gram Stain (04-24-20 @ 06:24):    Growth in aerobic bottle: Gram Positive Cocci in Clusters    Growth in anaerobic bottle: Gram Positive Cocci in Clusters  Final Report (04-25-20 @ 16:04):    Growth in aerobic and anaerobic bottles: Staphylococcus epidermidis  Organism: Gram Positive Cocci in Clusters (04-25-20 @ 16:04)  Organism: Gram Positive Cocci in Clusters (04-25-20 @ 16:04)      -  Ampicillin/Sulbactam: R <=8/4      -  Cefazolin: R <=4      -  Clindamycin: R >4      -  Erythromycin: R >4      -  Gentamicin: R >8 Should not be used as monotherapy      -  Oxacillin: R >2      -  Penicillin: R >8      -  RIF- Rifampin: S <=1 Should not be used as monotherapy      -  Tetra/Doxy: R >8      -  Trimethoprim/Sulfamethoxazole: R >2/38      -  Vancomycin: S 2      Method Type: BROOK[place]N    Culture - Blood (collected 04-22-20 @ 18:35)  Source: .Blood Blood-Venous  Gram Stain (04-24-20 @ 13:44):    Growth in aerobic bottle: Gram Positive Cocci in Clusters    Growth in anaerobic bottle: Gram Positive Cocci in Clusters  Final Report (04-25-20 @ 13:27):    Growth in aerobic and anaerobic bottles: Staphylococcus epidermidis    "Susceptibilities not performed"    Growth in aerobic bottle: Staphylococcus hominis    Coag Negative Staphylococcus    Single set isolate, possible contaminant. Contact    Microbiology if susceptibility testing clinically    indicated.    "Due to technical problems, Proteus sp. will Not be reported as part of    the BCID panel until further notice"    ***Blood Panel PCR results on this specimen are available    approximately 3 hours after the Gram stain result.***    Gram stain, PCR, and/or culture results may not always    correspond due to difference in methodologies.    ************************************************************    This PCR assay was performed using [employer].    The following targets are tested for: Enterococcus,    vancomycin resistant enterococci, Listeria monocytogenes,    coagulase negative staphylococci, S. aureus,    methicillin resistant S. aureus, Streptococcus agalactiae    (Group B), S. pneumoniae, S. pyogenes (Group A),    Acinetobacter baumannii, Enterobacter cloacae, E. coli,    Klebsiella oxytoca, K. pneumoniae, Proteus sp.,    Serratia marcescens, Haemophilus influenzae,    Neisseria meningitidis, Pseudomonas aeruginosa, Candida    albicans, C. glabrata, C krusei, C parapsilosis,    C. tropicalis and the KPC resistance gene.  Organism: Blood Culture PCR (04-25-20 @ 13:27)  Organism: Blood Culture PCR (04-25-20 @ 13:27)      -  Coagulase negative Staphylococcus: Detec      Method Type: PCR    Culture - Urine (collected 04-22-20 @ 18:21)  Source: .Urine Clean Catch (Midstream)  Final Report (04-24-20 @ 16:46):    >100,000 CFU/ml Escherichia coli    >100,000 CFU/ml Coag Negative Staphylococcus "Susceptibilities not    performed"  Organism: Escherichia coli (04-24-20 @ 16:46)  Organism: Escherichia coli (04-24-20 @ 16:46)      -  Amikacin: S <=16      -  Ampicillin: R >16 These ampicillin results predict results for amoxicillin      -  Ampicillin/Sulbactam: R >16/8 Enterobacter, Citrobacter, and Serratia may develop resistance during prolonged therapy (3-4 days)      -  Aztreonam: S <=4      -  Cefazolin: S <=8 (MIC_CL_COM_ENTERIC_CEFAZU) For uncomplicated UTI with K. pneumoniae, E. coli, or P. mirablis: BROOK[place]N <=16 is sensitive and BR[place]N >=32 is resistant. This also predicts results for oral agents cefaclor, cefdinir, cefpodoxime, cefprozil, cefuroxime axetil, cephalexin and locarbef for uncomplicated UTI. Note that some isolates may be susceptible to these agents while testing resistant to cefazolin.      -  Cefepime: S <=4      -  Cefoxitin: S <=8      -  Ceftriaxone: S <=1 Enterobacter, Citrobacter, and Serratia may develop resistance during prolonged therapy      -  Ciprofloxacin: S <=1      -  Gentamicin: S <=4      -  Imipenem: S <=1      -  Levofloxacin: S <=2      -  Meropenem: S <=1      -  Nitrofurantoin: S <=32 Should not be used to treat pyelonephritis      -  Piperacillin/Tazobactam: S <=16      -  Tigecycline: S <=2      -  Tobramycin: S <=4      -  Trimethoprim/Sulfamethoxazole: R >2/38      Method Type: BR[place]N            RADIOLOGY & ADDITIONAL TESTS: Reviewed.      < from: Transthoracic Echocardiogram (04.27.20 @ 12:43) >  CONCLUSIONS:  1. Mitral annular calcification, otherwise normal mitral  valve. Minimal mitral regurgitation.  2. Endocardium not well visualized; grossly normal left  ventricular systolic function.  3. The right ventricle is not well visualized; grossly  normal right ventricular systolic function.  Unable to exclude endocarditis.  Consider CHIARA for further  evaluation, if clinically indicated.    < end of copied text >

## 2020-04-28 NOTE — PROGRESS NOTE ADULT - PROBLEM SELECTOR PLAN 2
positive for Staph epidermidis on 4/22 and 4/23, sensitive to Vancomycin  - blood cultures NGTD on 4/24   - f/u repeat cultures   - appreciate ID recs   - daily blood cultures   - continue vancomycin until 6 weeks from blood cultures are clear  - f/u next trough (4/30)  - continue rocephin for UTI x 7 days  - adding rifampin per ID due to concern for prosthetic joint infection  - echo inconclusive - cannot rule out endocarditis with negative TTE  - will likely not be able to get CHIARA due to covid+ status positive for Staph epidermidis on 4/22 and 4/23, sensitive to Vancomycin  - blood cultures NGTD on 4/24   - f/u repeat cultures   - appreciate ID recs   - daily blood cultures   - continue vancomycin until 6 weeks from blood cultures are clear  - f/u next trough (4/30)  - place PICC line for IV antibiotics at [facility].   - continue rocephin for UTI x 7 days  - adding rifampin per ID due to concern for prosthetic joint infection  - echo inconclusive - cannot rule out endocarditis with negative TTE  - will likely not be able to get CHIARA due to covid+ status

## 2020-04-28 NOTE — CHART NOTE - NSCHARTNOTEFT_GEN_A_CORE
NUTRITION SERVICES     Upon Nutritional Assessment by the Registered Dietitian your patient was determined to meet criteria/ has evidence of the following diagnosis/diagnoses:  [ ] Mild Protein Calorie Malnutrition   [ ] Moderate Protein Calorie Malnutrition   [x] Severe Protein Calorie Malnutrition   [ ] Unspecified Protein Calorie Malnutrition   [ ] Underweight / BMI <19  [ ] Morbid Obesity / BMI >40    Findings as based on:  •  Comprehensive nutritional assessment and consultation    Please refer to Initial Dietitian Evaluation via documents section of [employer] for further recommendations.    Jessica Reilly RDN  pager # [phone]

## 2020-04-28 NOTE — PROGRESS NOTE ADULT - ASSESSMENT
Impression:  89 year old woman with Dementia, HTN, HLD, GERD, scoliosis, chronic back pain due to compression fractures, and recent L hemiarthroplasty in 01/2020 [facility] of unclear etiology and chronicity, found to have elevated inflammatory markers and mild COVID19 infection. Was found to be bacteremic with staph epidermidis with concern for prosthetic joint infection and also found to have UTI with CoNS and E coli.    Updates given to daughter Dianne Kennedy at 14:15. She can be reached at [phone]. Impression:  89 year old woman with Dementia, HTN, HLD, GERD, scoliosis, chronic back pain due to compression fractures, and recent L hemiarthroplasty in 01/2020 [facility] of unclear etiology and chronicity, found to have elevated inflammatory markers and mild COVID19 infection. Was found to be bacteremic with staph epidermidis with concern for prosthetic joint infection and also found to have UTI with CoNS and E coli.    Updates given to daughter Dianne Kennedy at 14:25. She can be reached at [phone]. Dianne states that she has some dementia and memory issues at baseline. Impression:  89 year old woman with Dementia, HTN, HLD, GERD, scoliosis, chronic back pain due to compression fractures, and recent L hemiarthroplasty in 01/2020 presents from [facility] for L prosthetic hip dislocation of unclear etiology and chronicity, found to have elevated inflammatory markers and mild COVID19 infection. Was found to be bacteremic with staph epidermidis with concern for prosthetic joint infection and also found to have UTI with CoNS and E coli.    Updates given to daughter Dianne Kennedy at 14:25. She can be reached at [phone]. Dianne states that she has some dementia and memory issues at baseline, so says that the patient being tired today might be baseline for her. Dianne also states that she would agree to and endorse surgery for Mrs. chandler to treat the hip. She is waiting to hear back from Dr. Mijares as she left a message with his office yesterday, 4/28.     We reviewed code status and patient is DNR/DNI.

## 2020-04-28 NOTE — PROGRESS NOTE ADULT - ASSESSMENT
89y Female with L dislocated hemiarthroplasty  - per previous discussion with daughter (Dianne) and Dr. Mijares, they would like to wait on surgical management and observe given patients COVID+ status and potential complications with surgery  - patient will ultimately need a closed reduction vs open reduction L hip. Continue discussion with family  - Pain control  - NWB LLE in 10 lbs bucks traction. monitor skin  - DVT ppx  - Low suspicion for PJI given lack of fluid collection, ID recommending abx course and IR aspiration (IR unable to aspirate due to no significant collection). continue other recs as per ID  - ortho will continue to follow,     Ortho [id number]

## 2020-04-28 NOTE — DIETITIAN INITIAL EVALUATION ADULT. - PHYSICAL APPEARANCE
other (specify) Skin intact, no edema noted. NFPE from previous admission (3/19) found patient to have moderate body fat and muscle mass loss.

## 2020-04-28 NOTE — PROGRESS NOTE ADULT - PROBLEM SELECTOR PLAN 4
UA positive with few WBCs Informed by patient that patient has a pessary - last exchanged in Oct/Nov 2019   - Continue ceftriaxone 1 g daily x 7 days and Vancomycin.    - Cultures growing E coli (sensitive to Ceftriaxone) and Coag Neg Staph.    - Will consider GYN consult for pessary exchange prior to discharge.  - Pt normally sees Dr. Jose BILLINGS in the [facility]. UA positive with few WBCs Informed by patient that patient has a pessary - last exchanged in Oct/Nov 2019   - Continue ceftriaxone 1 g daily x 7 days and Vancomycin.    - Cultures growing E coli (sensitive to Ceftriaxone) and Coag Neg Staph.    - Will consider GYN consult for pessary exchange prior to discharge--unable to do any pessary change until her hip dislocation is resolved, so waiting for ortho to decide if/when more definitive management.   - Pt normally sees Dr. Jose BILLINGS in the [facility].

## 2020-04-28 NOTE — DIETITIAN INITIAL EVALUATION ADULT. - PERTINENT MEDS FT
MEDICATIONS  (STANDING):  amLODIPine   Tablet 5 milliGRAM(s) Oral daily  atorvastatin 10 milliGRAM(s) Oral at bedtime  cefTRIAXone   IVPB 1000 milliGRAM(s) IV Intermittent every 24 hours  enoxaparin Injectable 40 milliGRAM(s) SubCutaneous daily  melatonin 3 milliGRAM(s) Oral at bedtime  pantoprazole    Tablet 40 milliGRAM(s) Oral before breakfast  rifAMPin 300 milliGRAM(s) Oral two times a day  vancomycin  IVPB      vancomycin  IVPB 750 milliGRAM(s) IV Intermittent every 24 hours    MEDICATIONS  (PRN):  acetaminophen   Tablet .. 650 milliGRAM(s) Oral every 8 hours PRN Mild Pain (1 - 3), Moderate Pain (4 - 6)  oxycodone    5 mG/acetaminophen 325 mG 1 Tablet(s) Oral every 8 hours PRN Severe Pain (7 - 10)  senna 2 Tablet(s) Oral at bedtime PRN Constipation

## 2020-04-28 NOTE — DIETITIAN INITIAL EVALUATION ADULT. - OTHER INFO
89-year-old female patient with PMH of HTN, hypercholesterolemia, and chronic gastritis. Presented to hospital with L prosthetic hip dislocation of unknown etiology and chronicity. Deferring surgery given + COVID-19.     Unable to conduct a face to face interview or nutrition-focused physical exam due to limited contact restrictions related to patient's medical condition and isolation precautions. Obtained subjective information from extensive chart review. Patient seen by RD on previous admission (3/19) and found to have 0-50% PO intake. Currently no noted abdominal pain, nausea, vomiting, diarrhea, or constipation. Patient with most recent weight of 54.2 kg (4/24) and usual body weight of 62.9 kg, indicating 13.72% weight loss < 4 months. Given poor PO intake and significant weight loss, patient diagnosed with severe malnutrition. Patient to continue receiving Ensure Enlive 240mls 3x daily (1050kcal, 60g protein) for additional calories and protein.     RD services to remain available.

## 2020-04-28 NOTE — PROGRESS NOTE ADULT - ASSESSMENT
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 Patient had a mechanical fall and underwent a left hip hemiarthroplasty in 1/2020 at [facility]. She tolerated surgery and discharged to [facility] rehab. She presented to [facility] on 3/13/2020 for painful vertebral compression fractures, once her pain was treated she was discharged to [facility].    ER vitals:  T 97.6, P 87, /62, RR 18, 95% RA.  WBC 18.2 --> 9.9.   . .   D-dimer 1430, ferritin 206.  PCT 0.13.  UA Large LE, (+) wbc's.   Ucx >100K GNR and >100K CoNS.   Bcx (+) CoNS 4/4 bottles.   Covid pcr (+) 4/22.      Multiple imaging performed including CT pelvis and femur, showing  dislocated femoral component of the left hip hemiarthroplasty. Mild cortical irregularity and lucencies at the left greater trochanter, which is difficult to assess due to artifact. This may be in part postsurgical although nondisplaced fracture cannot be excluded.  Also with foci of air in the left mid lateral thigh soft tissue, without a discrete fluid collection. Differential diagnosis includes injection, penetrating trauma and infection.  Question sigmoid colon diverticulitis.     Pt seen by Ortho service, pt initially planned IR aspiration of L hip to rule out prosthetic joint infection, given CT findings and bacteremia with CoNS.  Also planned for OR for possible revision vs resection arthroplasty, now cancelled due to Covid (+) status.     ID consulted for further recommendations.     CoNS bacteremia:    - Pt with 4/4 blood cultures positive, also Ucx (+) CoNS (secondary to descending infection from bacetermia?).  Given elevated inflammatory markers, and new dislocated left hip hemiarthroplasty with lucency, concern for prosthetic joint infection.    - Add vancomycin 1gm IV q24.  check trough prior to 3rd dose.  f/u sensitivities.  Repeat blood cultures 4/24 NGTD.  Will add rifampin for adjunctive treatment in the setting of prosthesis. Monitor weekly LFTs.     - Recommend 6 weeks of IV abx from date of blood culture clearance.  Sensitivities reviewed, no oral options for chronic suppressive treatment due to resistance.  Pt not currently a surgical candidate as per Orthopedics.  Recommend reevaluation for surgery once pt is stable as IV abx alone unlikely clear infection.  Recommend L hip joint aspiration to evaluate for PJI.  Possible hip joint effusion seen on CT pelvis, as per ortho nothing to tap at this time.       - TTE no vegetations.  Repeat blood cultures cleared promptly, doubt [facility]IARA will change managment and have low suspicion for endocarditis.     - Monitor WBC, temp curve, trend inflammatory markers.       Covid (+):    - Cont supportive care.  Trend ferritin, D-dimer, CRP every 48-72 hours.  Monitor pulse ox.  Currently oxygenating adequately on room air.     - Cxr shows grossly clear lungs.      - check baseline EKG and Qtc.  Doubt benefit of plaquenil given adequate oxygenation.  All therapeutics are investigational.      UTI:    - Growing both E.coli and CoNS.  Cont rocephin (sensitive) x 7 days (through 4/27) and IV vanco x 6 weeks, f/u culture results.       - Pt with h/o pessary.  consider GYN f/u, will defer to medicine.    *D/w medical attending at length.  Reached out to Ortho, awaiting callback.        Will follow,    Sue Armstrong  [phone]

## 2020-04-28 NOTE — PROGRESS NOTE ADULT - SUBJECTIVE AND OBJECTIVE BOX
Infectious Diseases progress note:    Subjective: No acute o/n events.  Afebrile. Pt very weak.      ROS:  CONSTITUTIONAL:  No fever, chills, rigors  CARDIOVASCULAR:  No chest pain or palpitations  RESPIRATORY:   No SOB, cough, dyspnea on exertion.  No wheezing  GASTROINTESTINAL:  No abd pain, N/V, diarrhea/constipation  EXTREMITIES:  No swelling or joint pain  GENITOURINARY:  No burning on urination, increased frequency or urgency.  No flank pain  NEUROLOGIC:  No HA, visual disturbances  SKIN: No rashes    Allergies    No Known Allergies    Intolerances        ANTIBIOTICS/RELEVANT:  antimicrobials  cefTRIAXone   IVPB 1000 milliGRAM(s) IV Intermittent every 24 hours  rifAMPin 300 milliGRAM(s) Oral two times a day  vancomycin  IVPB      vancomycin  IVPB 750 milliGRAM(s) IV Intermittent every 24 hours    immunologic:    OTHER:  acetaminophen   Tablet .. 650 milliGRAM(s) Oral every 8 hours PRN  amLODIPine   Tablet 5 milliGRAM(s) Oral daily  atorvastatin 10 milliGRAM(s) Oral at bedtime  enoxaparin Injectable 40 milliGRAM(s) SubCutaneous daily  melatonin 3 milliGRAM(s) Oral at bedtime  oxycodone    5 mG/acetaminophen 325 mG 1 Tablet(s) Oral every 8 hours PRN  pantoprazole    Tablet 40 milliGRAM(s) Oral before breakfast  senna 2 Tablet(s) Oral at bedtime PRN      Objective:  Vital Signs Last 24 Hrs  T(C): 36.7 (28 Apr 2020 05:27), Max: 36.7 (28 Apr 2020 05:27)  T(F): 98.1 (28 Apr 2020 05:27), Max: 98.1 (28 Apr 2020 05:27)  HR: 94 (28 Apr 2020 05:27) (94 - 98)  BP: 129/59 (28 Apr 2020 05:27) (129/59 - 131/61)  BP(mean): --  RR: 18 (28 Apr 2020 05:27) (17 - 18)  SpO2: 99% (28 Apr 2020 05:27) (97% - 99%)    PHYSICAL EXAM:  Constitutional:NAD  Eyes:BINTA, EOMI  Ear/Nose/Throat: no thrush, mucositis.  Moist mucous membranes	  Neck:no JVD, no lymphadenopathy, supple  Respiratory: CTA sandra  Cardiovascular: S1S2 RRR, no murmurs  Gastrointestinal:soft, nontender,  nondistended (+) BS  Extremities:no e/e/c.  LLE bucks traction device  Skin:  no rashes, open wounds or ulcerations  :  benson with orange colored urine        LABS:                        8.3    10.26 )-----------( 573      ( 28 Apr 2020 10:00 )             27.2     04-28    141  |  106  |  21  ----------------------------<  81  3.9   |  24  |  0.85    Ca    8.9      28 Apr 2020 10:00  Phos  2.8     04-28  Mg     2.0     04-28    TPro  6.1  /  Alb  2.3<L>  /  TBili  0.4  /  DBili  x   /  AST  15  /  ALT  13  /  AlkPhos  136<H>  04-27          Procalcitonin, Serum: 0.12 (04-28 @ 10:00)  Procalcitonin, Serum: 0.12 (04-26 @ 06:09)  Procalcitonin, Serum: 0.13 (04-24 @ 07:08)    Vancomycin Level, Random:  ug/mL (04-27 @ 06:15)  Vancomycin Level, Random:  ug/mL (04-26 @ 04:42)      Vancomycin Level, Trough: 0.5 ug/mL (04-24 @ 16:30)              MICROBIOLOGY:    Culture - Blood (04.24.20 @ 16:20)    Specimen Source: .Blood Blood-Venous    Culture Results:   No growth to date.    Culture - Blood (04.24.20 @ 16:20)    Specimen Source: .Blood Blood-Peripheral    Culture Results:   No growth to date.    Culture - Blood (04.23.20 @ 00:56)    -  Trimethoprim/Sulfamethoxazole: R >2/38    -  Vancomycin: S 2    -  Clindamycin: R >4    -  Erythromycin: R >4    -  Oxacillin: R >2    -  Penicillin: R >8    -  RIF- Rifampin: S <=1 Should not be used as monotherapy    -  Tetra/Doxy: R >8    Gram Stain:   Growth in aerobic bottle: Gram Positive Cocci in Clusters  Growth in anaerobic bottle: Gram Positive Cocci in Clusters    -  Ampicillin/Sulbactam: R <=8/4    -  Cefazolin: R <=4    -  Gentamicin: R >8 Should not be used as monotherapy    Specimen Source: .Blood Blood-Peripheral    Organism: Gram Positive Cocci in Clusters    Culture Results:   Growth in aerobic and anaerobic bottles: Staphylococcus epidermidis    Organism Identification: Gram Positive Cocci in Clusters    Method Type: BROOKLYN    Culture - Blood (04.22.20 @ 18:35)    Gram Stain:   Growth in aerobic bottle: Gram Positive Cocci in Clusters  Growth in anaerobic bottle: Gram Positive Cocci in Clusters    -  Coagulase negative Staphylococcus: Detec    Specimen Source: .Blood Blood-Venous    Organism: Blood Culture PCR    Culture Results:   Growth in aerobic and anaerobic bottles: Staphylococcus epidermidis  "Susceptibilities not performed"  Growth in aerobic bottle: Staphylococcus hominis  Coag Negative Staphylococcus  Single set isolate, possible contaminant. Contact  Microbiology if susceptibility testing clinically  indicated.  "Due to technical problems, Proteus sp. will Not be reported as part of  the BCID panel until further notice"  ***Blood Panel PCR results on this specimen are available  approximately 3 hours after the Gram stain result.***  Gram stain, PCR, and/or culture results may not always  correspond due to difference in methodologies.  ************************************************************  This PCR assay was performed using [employer].  The following targets are tested for: Enterococcus,  vancomycin resistant enterococci, Listeria monocytogenes,  coagulase negative staphylococci, S. aureus,  methicillin resistant S. aureus, Streptococcus agalactiae  (Group B), S. pneumoniae, S. pyogenes (Group A),  Acinetobacter baumannii, Enterobacter cloacae, E. coli,  Klebsiella oxytoca, K. pneumoniae, Proteus sp.,  Serratia marcescens, Haemophilus influenzae,  Neisseria meningitidis, Pseudomonas aeruginosa, Candida  albicans, C. glabrata, C krusei, C parapsilosis,  C. tropicalis and the KPC resistance gene.    Organism Identification: Blood Culture PCR    Method Type: PCR    Culture - Urine (04.22.20 @ 18:21)    -  Tigecycline: S <=2    -  Tobramycin: S <=4    -  Trimethoprim/Sulfamethoxazole: R >2/38    -  Piperacillin/Tazobactam: S <=16    -  Gentamicin: S <=4    -  Aztreonam: S <=4    -  Cefazolin: S <=8 ([id number]_[id number]) For uncomplicated UTI with K. pneumoniae, E. coli, or P. mirablis: BROOKLYN <=16 is sensitive and BROOK[place]N >=32 is resistant. This also predicts results for oral agents cefaclor, cefdinir, cefpodoxime, cefprozil, cefuroxime axetil, cephalexin and locarbef for uncomplicated UTI. Note that some isolates may be susceptible to these agents while testing resistant to cefazolin.    -  Amikacin: S <=16    -  Ampicillin: R >16 These ampicillin results predict results for amoxicillin    -  Ampicillin/Sulbactam: R >16/8 Enterobacter, Citrobacter, and Serratia may develop resistance during prolonged therapy (3-4 days)    -  Imipenem: S <=1    -  Levofloxacin: S <=2    -  Meropenem: S <=1    -  Nitrofurantoin: S <=32 Should not be used to treat pyelonephritis    -  Cefepime: S <=4    -  Cefoxitin: S <=8    -  Ceftriaxone: S <=1 Enterobacter, Citrobacter, and Serratia may develop resistance during prolonged therapy    -  Ciprofloxacin: S <=1    Specimen Source: .Urine Clean Catch (Midstream)    Culture Results:   >100,000 CFU/ml Escherichia coli  >100,000 CFU/ml Coag Negative Staphylococcus "Susceptibilities not  performed"    Organism Identification: Escherichia coli    Organism: Escherichia coli    Method Type: BROOK[place]N          RADIOLOGY & ADDITIONAL STUDIES:

## 2020-04-28 NOTE — PROGRESS NOTE ADULT - PROBLEM SELECTOR PLAN 3
COVID19 PCR positive 04/23/20 - Mild infection with normal O2 sat n room air, no respiratory symptoms, serum ESR and CRP elevation. Not a candidate for Plaquenil given absence of hypoxia   - inflammatory markers downtrending   - trend CRP/ESR/ferritin every 48-72 hours   - Supportive care.  - last known ecg (3/13/20) with QTc of 481 COVID19 PCR positive 04/23/20 - Mild infection with normal O2 sat n room air, no respiratory symptoms, serum ESR and CRP elevation. Not a candidate for Plaquenil given absence of hypoxia   - inflammatory markers downtrending   - Supportive care.  - last known ecg (3/13/20) with QTc of 481

## 2020-04-28 NOTE — PROGRESS NOTE ADULT - SUBJECTIVE AND OBJECTIVE BOX
Orthopedic Surgery Progress Note     S: Patient seen and examined today. No acute events overnight. Mild-moderate pain    MEDICATIONS  (STANDING):  amLODIPine   Tablet 5 milliGRAM(s) Oral daily  atorvastatin 10 milliGRAM(s) Oral at bedtime  cefTRIAXone   IVPB 1000 milliGRAM(s) IV Intermittent every 24 hours  enoxaparin Injectable 40 milliGRAM(s) SubCutaneous daily  melatonin 3 milliGRAM(s) Oral at bedtime  pantoprazole    Tablet 40 milliGRAM(s) Oral before breakfast  rifAMPin 300 milliGRAM(s) Oral two times a day  vancomycin  IVPB      vancomycin  IVPB 750 milliGRAM(s) IV Intermittent every 24 hours    MEDICATIONS  (PRN):  acetaminophen   Tablet .. 650 milliGRAM(s) Oral every 8 hours PRN Mild Pain (1 - 3), Moderate Pain (4 - 6)  oxycodone    5 mG/acetaminophen 325 mG 1 Tablet(s) Oral every 8 hours PRN Severe Pain (7 - 10)  senna 2 Tablet(s) Oral at bedtime PRN Constipation      Physical Exam:    Vital Signs Last 24 Hrs  T(C): 36.7 (28 Apr 2020 05:27), Max: 36.7 (27 Apr 2020 12:48)  T(F): 98.1 (28 Apr 2020 05:27), Max: 98.1 (28 Apr 2020 05:27)  HR: 94 (28 Apr 2020 05:27) (92 - 98)  BP: 129/59 (28 Apr 2020 05:27) (124/58 - 131/61)  BP(mean): --  RR: 18 (28 Apr 2020 05:27) (17 - 18)  SpO2: 99% (28 Apr 2020 05:27) (97% - 99%)    04-26-20 @ 07:01  -  04-27-20 @ 07:00  --------------------------------------------------------  IN: 0 mL / OUT: 400 mL / NET: -400 mL    04-27-20 @ 07:01  -  04-28-20 @ 06:02  --------------------------------------------------------  IN: 600 mL / OUT: 700 mL / NET: -100 mL          Gen: Laying in bed, NAD, alert   Resp: Unlabored breathing  LLE:  - leg internally rotated and shortened   EHL/FHL/TA/Sol intact    + SILT DP/SP/CASTREJON/Sa/Tib     +DP, extremity WWP      In bucks traction. skin intact.          LABS:                        8.3    8.69  )-----------( 529      ( 27 Apr 2020 06:15 )             27.7     04-27    143  |  108<H>  |  22  ----------------------------<  75  4.6   |  22  |  0.87    Ca    9.1      27 Apr 2020 06:15  Mg     2.0     04-27    TPro  6.1  /  Alb  2.3<L>  /  TBili  0.4  /  DBili  x   /  AST  15  /  ALT  13  /  AlkPhos  136<H>  04-27

## 2020-04-28 NOTE — DIETITIAN INITIAL EVALUATION ADULT. - ADD RECOMMEND
1) Continue with regular diet and Ensure 3x daily 2) Ferrous sulfate supplementation 3) Monitor weights, pertinent labs, BMs, skin integrity, and PO intake 4) RD services to remain available

## 2020-04-29 LAB
ANION GAP SERPL CALC-SCNC: 14 MMO/L — SIGNIFICANT CHANGE UP (ref 7–14)
BASOPHILS # BLD AUTO: 0.03 K/UL — SIGNIFICANT CHANGE UP (ref 0–0.2)
BASOPHILS NFR BLD AUTO: 0.3 % — SIGNIFICANT CHANGE UP (ref 0–2)
BUN SERPL-MCNC: 19 MG/DL — SIGNIFICANT CHANGE UP (ref 7–23)
CALCIUM SERPL-MCNC: 9.1 MG/DL — SIGNIFICANT CHANGE UP (ref 8.4–10.5)
CHLORIDE SERPL-SCNC: 104 MMOL/L — SIGNIFICANT CHANGE UP (ref 98–107)
CO2 SERPL-SCNC: 21 MMOL/L — LOW (ref 22–31)
CREAT SERPL-MCNC: 0.91 MG/DL — SIGNIFICANT CHANGE UP (ref 0.5–1.3)
CULTURE RESULTS: SIGNIFICANT CHANGE UP
CULTURE RESULTS: SIGNIFICANT CHANGE UP
EOSINOPHIL # BLD AUTO: 0.06 K/UL — SIGNIFICANT CHANGE UP (ref 0–0.5)
EOSINOPHIL NFR BLD AUTO: 0.7 % — SIGNIFICANT CHANGE UP (ref 0–6)
GLUCOSE SERPL-MCNC: 77 MG/DL — SIGNIFICANT CHANGE UP (ref 70–99)
HCT VFR BLD CALC: 29.3 % — LOW (ref 34.5–45)
HGB BLD-MCNC: 8.8 G/DL — LOW (ref 11.5–15.5)
IMM GRANULOCYTES NFR BLD AUTO: 0.8 % — SIGNIFICANT CHANGE UP (ref 0–1.5)
LYMPHOCYTES # BLD AUTO: 1.12 K/UL — SIGNIFICANT CHANGE UP (ref 1–3.3)
LYMPHOCYTES # BLD AUTO: 12.9 % — LOW (ref 13–44)
MAGNESIUM SERPL-MCNC: 2.1 MG/DL — SIGNIFICANT CHANGE UP (ref 1.6–2.6)
MCHC RBC-ENTMCNC: 26.1 PG — LOW (ref 27–34)
MCHC RBC-ENTMCNC: 30 % — LOW (ref 32–36)
MCV RBC AUTO: 86.9 FL — SIGNIFICANT CHANGE UP (ref 80–100)
MONOCYTES # BLD AUTO: 0.89 K/UL — SIGNIFICANT CHANGE UP (ref 0–0.9)
MONOCYTES NFR BLD AUTO: 10.3 % — SIGNIFICANT CHANGE UP (ref 2–14)
NEUTROPHILS # BLD AUTO: 6.51 K/UL — SIGNIFICANT CHANGE UP (ref 1.8–7.4)
NEUTROPHILS NFR BLD AUTO: 75 % — SIGNIFICANT CHANGE UP (ref 43–77)
NRBC # FLD: 0 K/UL — SIGNIFICANT CHANGE UP (ref 0–0)
PHOSPHATE SERPL-MCNC: 3.2 MG/DL — SIGNIFICANT CHANGE UP (ref 2.5–4.5)
PLATELET # BLD AUTO: 562 K/UL — HIGH (ref 150–400)
PMV BLD: 9.9 FL — SIGNIFICANT CHANGE UP (ref 7–13)
POTASSIUM SERPL-MCNC: 4.6 MMOL/L — SIGNIFICANT CHANGE UP (ref 3.5–5.3)
POTASSIUM SERPL-SCNC: 4.6 MMOL/L — SIGNIFICANT CHANGE UP (ref 3.5–5.3)
RBC # BLD: 3.37 M/UL — LOW (ref 3.8–5.2)
RBC # FLD: 18.3 % — HIGH (ref 10.3–14.5)
SODIUM SERPL-SCNC: 139 MMOL/L — SIGNIFICANT CHANGE UP (ref 135–145)
SPECIMEN SOURCE: SIGNIFICANT CHANGE UP
SPECIMEN SOURCE: SIGNIFICANT CHANGE UP
VANCOMYCIN TROUGH SERPL-MCNC: 19.6 UG/ML — SIGNIFICANT CHANGE UP (ref 10–20)
WBC # BLD: 8.68 K/UL — SIGNIFICANT CHANGE UP (ref 3.8–10.5)
WBC # FLD AUTO: 8.68 K/UL — SIGNIFICANT CHANGE UP (ref 3.8–10.5)

## 2020-04-29 PROCEDURE — 99232 SBSQ HOSP IP/OBS MODERATE 35: CPT

## 2020-04-29 RX ORDER — OXYCODONE AND ACETAMINOPHEN 5; 325 MG/1; MG/1
1 TABLET ORAL EVERY 8 HOURS
Refills: 0 | Status: DISCONTINUED | OUTPATIENT
Start: 2020-04-29 | End: 2020-05-01

## 2020-04-29 RX ADMIN — ATORVASTATIN CALCIUM 10 MILLIGRAM(S): 80 TABLET, FILM COATED ORAL at 21:58

## 2020-04-29 RX ADMIN — Medication 650 MILLIGRAM(S): at 20:10

## 2020-04-29 RX ADMIN — Medication 250 MILLIGRAM(S): at 12:18

## 2020-04-29 RX ADMIN — ENOXAPARIN SODIUM 40 MILLIGRAM(S): 100 INJECTION SUBCUTANEOUS at 12:18

## 2020-04-29 RX ADMIN — CEFTRIAXONE 100 MILLIGRAM(S): 500 INJECTION, POWDER, FOR SOLUTION INTRAMUSCULAR; INTRAVENOUS at 21:58

## 2020-04-29 RX ADMIN — Medication 3 MILLIGRAM(S): at 21:58

## 2020-04-29 RX ADMIN — PANTOPRAZOLE SODIUM 40 MILLIGRAM(S): 20 TABLET, DELAYED RELEASE ORAL at 05:11

## 2020-04-29 RX ADMIN — AMLODIPINE BESYLATE 5 MILLIGRAM(S): 2.5 TABLET ORAL at 05:12

## 2020-04-29 NOTE — CHART NOTE - NSCHARTNOTEFT_GEN_A_CORE
Orthopaedic Surgery - IR Preop Note    Procedure: L hip aspiration  Surgeon: Dr. Reed (IR)    04-29    139  |  104  |  19  ----------------------------<  77  4.6   |  21<L>  |  0.91    Ca    9.1      29 Apr 2020 07:10  Phos  3.2     04-29  Mg     2.1     04-29                      8.8    8.68  )-----------( 562      ( 29 Apr 2020 07:10 )             29.3     89y Female to undergo L hip IR aspiration, r/o PJI prior to OR on 5/1 for possible left hip reduction versus resection arthroplasty    - NPO pMN  - IVF when NPO  - EKG in chart  - FU AM labs (CBC, BMP, coags, T&S)  - Consent can be obtained from patient's daughter  - Plan for IR aspiration of L hip tomorrow    Fady Yoder MD Orthopaedic Surgery - IR Preop Note    Procedure: L hip aspiration  Surgeon: Dr. Reed (IR)    04-29    139  |  104  |  19  ----------------------------<  77  4.6   |  21<L>  |  0.91    Ca    9.1      29 Apr 2020 07:10  Phos  3.2     04-29  Mg     2.1     04-29                      8.8    8.68  )-----------( 562      ( 29 Apr 2020 07:10 )             29.3     89y Female to undergo L hip IR aspiration, r/o PJI prior to OR on 5/1 for possible left hip reduction versus resection arthroplasty    - NPO pMN  - IVF when NPO  - EKG in chart  - FU AM labs (CBC, BMP, coags, T&S)  - Hold AM dose of lovenox on 4/30  - Consent can be obtained from patient's daughter  - Plan for IR aspiration of L hip tomorrow    Fady Yoder MD

## 2020-04-29 NOTE — PROGRESS NOTE ADULT - ASSESSMENT
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 Was found to be bacteremic with staph epidermidis with concern for prosthetic joint infection and also found to have UTI with CoNS and E coli.    Updates given to daughter Dianne Kennedy at 14:25. She can be reached at [phone]. Dianne states that she has some dementia and memory issues at baseline, so says that the patient being tired today might be baseline for her. Dianne also states that she would agree to and endorse surgery for Mrs. chandler to treat the hip. She is waiting to hear back from Dr. Mijares as she left a message with his office yesterday, 4/28.     We reviewed code status and patient is DNR/DNI. 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 Was found to be bacteremic (+ blood cx on 4/22 and 4/23) with staph epidermidis with concern for prosthetic joint infection and also found to have UTI with CoNS and E coli.    Updates have been given to daughter Dianne Kennedy, she can be reached at [phone]. Dianne states that she has some dementia and memory issues at baseline and her mental status can wax and wane. Dianne also states that she would agree to and endorse surgery for Mrs. Figueredo to treat the hip.      We reviewed code status and patient is DNR/DNI.

## 2020-04-29 NOTE — PROGRESS NOTE ADULT - PROBLEM SELECTOR PLAN 1
Unclear chronicity of etiology but prior history of L. hemiarthroplasty in 01/2020 and hip was imaging after shows implant in place  Plan originally  for possible revision camilo arthroplasty vs. total arthroplasty, possible antibiotic spacer, and possible resection arthroplasty by Orthopaedic surgery 04/24/20 however patient tested positive for COVID19 so will be postponed to unknown date due to familial concerns relating to potential deterioration during/after surgery due to covid19.  Doubt significant chronicity given recent onset of pain and continued pain over this joint on exam today. IR consulted for possibly aspiration of L hip to rule out septic joint given markedly elevated inflammatory markers however no discrete fluid collection seen on CT left femur/pelvis and COVID19 may be likely etiology   - plan for OR on 5/1/20  - will need documented medical clearance prior to OR   - Repeat CBC and CMP daily   - pain control with acetaminophen (mild/mod pain) or oxycodone (severe pain) PRN  -Continue bucks traction   - appreciate ortho recs Unclear chronicity of etiology but prior history of L. hemiarthroplasty in 01/2020 and hip was imaging after shows implant in place  Plan originally  for possible revision camilo arthroplasty vs. total arthroplasty, possible antibiotic spacer, and possible resection arthroplasty by Orthopaedic surgery 04/24/20 however patient tested positive for COVID19 so surgery was delayed for medical observation. Doubt significant chronicity given recent onset of pain and continued pain over this joint on exam today. IR consulted for possibly aspiration of L hip to rule out septic joint given markedly elevated inflammatory markers however no discrete fluid collection seen on CT left femur/pelvis and COVID19 may be likely etiology   - plan for OR on 5/1/20  - will need documented medical clearance prior to OR   - Repeat CBC and CMP daily   - pain control with acetaminophen (mild/mod pain) or oxycodone (severe pain) PRN  -Continue bucks traction   - appreciate ortho recs

## 2020-04-29 NOTE — PROGRESS NOTE ADULT - PROBLEM SELECTOR PLAN 3
COVID19 PCR positive 04/23/20 - Mild infection with normal O2 sat n room air, no respiratory symptoms, serum ESR and CRP elevation. Not a candidate for Plaquenil given absence of hypoxia   - inflammatory markers downtrending   - Supportive care.  - last known ecg (3/13/20) with QTc of 481

## 2020-04-29 NOTE — PROGRESS NOTE ADULT - SUBJECTIVE AND OBJECTIVE BOX
OVERNIGHT/24 HOUR EVENTS: patient will be going to OR for hip reduction on 5/1    SUBJECTIVE: Patient seen and examined at bedside. Patient resting comfortably in slime in NAD. Reports that she feels "not bad". Denying pain in hip.    ROS:  CONSTITUTIONAL: No fevers or headache  RESPIRATORY: No cough; No shortness of breath  CARDIOVASCULAR: No chest pain or palpitations  GASTROINTESTINAL: No abdominal pain. No nausea, vomiting, diarrhea, or constipation.  GENITOURINARY: No dysuria, frequency or hematuria  All other review of systems is negative unless indicated above.    MEDICATIONS:  acetaminophen   Tablet .. 650 milliGRAM(s) Oral every 8 hours PRN Mild Pain (1 - 3), Moderate Pain (4 - 6)  amLODIPine   Tablet 5 milliGRAM(s) Oral daily  atorvastatin 10 milliGRAM(s) Oral at bedtime  cefTRIAXone   IVPB 1000 milliGRAM(s) IV Intermittent every 24 hours  enoxaparin Injectable 40 milliGRAM(s) SubCutaneous daily  melatonin 3 milliGRAM(s) Oral at bedtime  oxycodone    5 mG/acetaminophen 325 mG 1 Tablet(s) Oral every 8 hours PRN Severe Pain (7 - 10)  pantoprazole    Tablet 40 milliGRAM(s) Oral before breakfast  rifAMPin 300 milliGRAM(s) Oral two times a day  senna 2 Tablet(s) Oral at bedtime PRN Constipation  vancomycin  IVPB      vancomycin  IVPB 750 milliGRAM(s) IV Intermittent every 24 hours      VITAL SIGNS:  Vital Signs Last 24 Hrs  T(C): 36.7 (29 Apr 2020 05:10), Max: 36.7 (29 Apr 2020 05:10)  T(F): 98.1 (29 Apr 2020 05:10), Max: 98.1 (29 Apr 2020 05:10)  HR: 97 (29 Apr 2020 05:10) (97 - 114)  BP: 148/82 (29 Apr 2020 05:10) (136/72 - 148/82)  RR: 18 (29 Apr 2020 05:10) (18 - 19)  SpO2: 99% (29 Apr 2020 05:10) (97% - 99%)        PHYSICAL EXAM:    General: elderly woman in NAD  HEENT: NCAT; PERRL, anicteric sclera; MMM  Neck: supple, trachea midline  Cardiovascular: S1, S2 normal; RRR;, possible regurgitant murmur  Respiratory: CTABL; no W/R/R  Gastrointestinal: soft, nontender, nondistended. bowel sounds present.  Skin: no ulcerations or visible rashes appreciated  Extremities: WWP; no edema, clubbing or cyanosis  Vascular: 2+ radial, DP/PT pulses B/L  Neurological: AAOx1; CN II-XII grossly intact; no focal deficits      LABS:                        8.8    8.68  )-----------( 562      ( 29 Apr 2020 07:10 )             29.3     04-29    139  |  104  |  19  ----------------------------<  77  4.6   |  21<L>  |  0.91    Ca    9.1      29 Apr 2020 07:10  Phos  3.2     04-29  Mg     2.1     04-29            Ferritin, Serum: 152.4 ng/mL (04-28-20 @ 10:00)  Procalcitonin, Serum: 0.12 ng/mL (04-28-20 @ 10:00)  D-Dimer Assay, Quantitative: 1186 ng/mL (04-28-20 @ 04:35)    D-Dimer Assay, Quantitative: 1406 ng/mL (04-26-20 @ 06:09)  Ferritin, Serum: 176.1 ng/mL (04-26-20 @ 06:09)  Procalcitonin, Serum: 0.12 ng/mL (04-26-20 @ 06:09)    D-Dimer Assay, Quantitative: 1430 ng/mL (04-24-20 @ 07:08)  Ferritin, Serum: 206.8 ng/mL (04-24-20 @ 07:08)  Procalcitonin, Serum: 0.13 ng/mL (04-24-20 @ 07:08)      Culture - Blood (collected 04-24-20 @ 16:20)  Source: .Blood Blood-Venous  Preliminary Report (04-25-20 @ 17:01):    No growth to date.    Culture - Blood (collected 04-24-20 @ 16:20)  Source: .Blood Blood-Peripheral  Preliminary Report (04-25-20 @ 17:01):    No growth to date.    Culture - Blood (collected 04-23-20 @ 00:56)  Source: .Blood Blood-Peripheral  Gram Stain (04-24-20 @ 06:24):    Growth in aerobic bottle: Gram Positive Cocci in Clusters    Growth in anaerobic bottle: Gram Positive Cocci in Clusters  Final Report (04-25-20 @ 16:04):    Growth in aerobic and anaerobic bottles: Staphylococcus epidermidis  Organism: Gram Positive Cocci in Clusters (04-25-20 @ 16:04)  Organism: Gram Positive Cocci in Clusters (04-25-20 @ 16:04)      -  Ampicillin/Sulbactam: R <=8/4      -  Cefazolin: R <=4      -  Clindamycin: R >4      -  Erythromycin: R >4      -  Gentamicin: R >8 Should not be used as monotherapy      -  Oxacillin: R >2      -  Penicillin: R >8      -  RIF- Rifampin: S <=1 Should not be used as monotherapy      -  Tetra/Doxy: R >8      -  Trimethoprim/Sulfamethoxazole: R >2/38      -  Vancomycin: S 2      Method Type: [place]    Culture - Blood (collected 04-22-20 @ 18:35)  Source: .Blood Blood-Venous  Gram Stain (04-24-20 @ 13:44):    Growth in aerobic bottle: Gram Positive Cocci in Clusters    Growth in anaerobic bottle: Gram Positive Cocci in Clusters  Final Report (04-25-20 @ 13:27):    Growth in aerobic and anaerobic bottles: Staphylococcus epidermidis    "Susceptibilities not performed"    Growth in aerobic bottle: Staphylococcus hominis    Coag Negative Staphylococcus    Single set isolate, possible contaminant. Contact    Microbiology if susceptibility testing clinically    indicated.    "Due to technical problems, Proteus sp. will Not be reported as part of    the BCID panel until further notice"    ***Blood Panel PCR results on this specimen are available    approximately 3 hours after the Gram stain result.***    Gram stain, PCR, and/or culture results may not always    correspond due to difference in methodologies.    ************************************************************    This PCR assay was performed using [employer].    The following targets are tested for: Enterococcus,    vancomycin resistant enterococci, Listeria monocytogenes,    coagulase negative staphylococci, S. aureus,    methicillin resistant S. aureus, Streptococcus agalactiae    (Group B), S. pneumoniae, S. pyogenes (Group A),    Acinetobacter baumannii, Enterobacter cloacae, E. coli,    Klebsiella oxytoca, K. pneumoniae, Proteus sp.,    Serratia marcescens, Haemophilus influenzae,    Neisseria meningitidis, Pseudomonas aeruginosa, Candida    albicans, C. glabrata, C krusei, C parapsilosis,    C. tropicalis and the KPC resistance gene.  Organism: Blood Culture PCR (04-25-20 @ 13:27)  Organism: Blood Culture PCR (04-25-20 @ 13:27)      -  Coagulase negative Staphylococcus: Detec      Method Type: PCR    Culture - Urine (collected 04-22-20 @ 18:21)  Source: .Urine Clean Catch (Midstream)  Final Report (04-24-20 @ 16:46):    >100,000 CFU/ml Escherichia coli    >100,000 CFU/ml Coag Negative Staphylococcus "Susceptibilities not    performed"  Organism: Escherichia coli (04-24-20 @ 16:46)  Organism: Escherichia coli (04-24-20 @ 16:46)      -  Amikacin: S <=16      -  Ampicillin: R >16 These ampicillin results predict results for amoxicillin      -  Ampicillin/Sulbactam: R >16/8 Enterobacter, Citrobacter, and Serratia may develop resistance during prolonged therapy (3-4 days)      -  Aztreonam: S <=4      -  Cefazolin: S <=8 (MIC_CL_COM_ENTERIC_CEFAZU) For uncomplicated UTI with K. pneumoniae, E. coli, or P. mirablis: [place] <=16 is sensitive and [place] >=32 is resistant. This also predicts results for oral agents cefaclor, cefdinir, cefpodoxime, cefprozil, cefuroxime axetil, cephalexin and locarbef for uncomplicated UTI. Note that some isolates may be susceptible to these agents while testing resistant to cefazolin.      -  Cefepime: S <=4      -  Cefoxitin: S <=8      -  Ceftriaxone: S <=1 Enterobacter, Citrobacter, and Serratia may develop resistance during prolonged therapy      -  Ciprofloxacin: S <=1      -  Gentamicin: S <=4      -  Imipenem: S <=1      -  Levofloxacin: S <=2      -  Meropenem: S <=1      -  Nitrofurantoin: S <=32 Should not be used to treat pyelonephritis      -  Piperacillin/Tazobactam: S <=16      -  Tigecycline: S <=2      -  Tobramycin: S <=4      -  Trimethoprim/Sulfamethoxazole: R >2/38      Method Type: [place]            RADIOLOGY & ADDITIONAL TESTS: Reviewed.    < from: Transthoracic Echocardiogram (04.27.20 @ 12:43) >  CONCLUSIONS:  1. Mitral annular calcification, otherwise normal mitral  valve. Minimal mitral regurgitation.  2. Endocardium not well visualized; grossly normal left  ventricular systolic function.  3. The right ventricle is not well visualized; grossly  normal right ventricular systolic function.  Unable to exclude endocarditis.  Consider CHIARA for further  evaluation, if clinically indicated.    < end of copied text >

## 2020-04-29 NOTE — PROGRESS NOTE ADULT - SUBJECTIVE AND OBJECTIVE BOX
Medical assessment for surgery   Revised Cardiac Risk Index is calculated to be 0.4%, very low risk.  Pt has no know CAD, no CHF, no h/o Diabetes or stroke.  Clear from a medical standpoint for surgery on 5/1/20.      Her overall prognosis is guarded based on age and dementia, but CARLOS Gonzaleze Kalebradha understands risk of surgery and has agreed to proceed.  Patient has been medically optimized for surgery and is at her personal lowest surgical risk.

## 2020-04-29 NOTE — PROGRESS NOTE ADULT - SUBJECTIVE AND OBJECTIVE BOX
Infectious Diseases progress note:    Subjective:  Events noted.  Afebrile.  Satting well on RA.      ROS:  CONSTITUTIONAL:  No fever, chills, rigors  CARDIOVASCULAR:  No chest pain or palpitations  RESPIRATORY:   No SOB, cough, dyspnea on exertion.  No wheezing  GASTROINTESTINAL:  No abd pain, N/V, diarrhea/constipation  EXTREMITIES:  No swelling or joint pain  GENITOURINARY:  No burning on urination, increased frequency or urgency.  No flank pain  NEUROLOGIC:  No HA, visual disturbances  SKIN: No rashes    Allergies    No Known Allergies    Intolerances        ANTIBIOTICS/RELEVANT:  antimicrobials  cefTRIAXone   IVPB 1000 milliGRAM(s) IV Intermittent every 24 hours  rifAMPin 300 milliGRAM(s) Oral two times a day  vancomycin  IVPB      vancomycin  IVPB 750 milliGRAM(s) IV Intermittent every 24 hours    immunologic:    OTHER:  acetaminophen   Tablet .. 650 milliGRAM(s) Oral every 8 hours PRN  amLODIPine   Tablet 5 milliGRAM(s) Oral daily  atorvastatin 10 milliGRAM(s) Oral at bedtime  melatonin 3 milliGRAM(s) Oral at bedtime  oxycodone    5 mG/acetaminophen 325 mG 1 Tablet(s) Oral every 8 hours PRN  pantoprazole    Tablet 40 milliGRAM(s) Oral before breakfast  senna 2 Tablet(s) Oral at bedtime PRN      Objective:  Vital Signs Last 24 Hrs  T(C): 36.7 (29 Apr 2020 18:07), Max: 36.7 (29 Apr 2020 05:10)  T(F): 98 (29 Apr 2020 18:07), Max: 98.1 (29 Apr 2020 05:10)  HR: 97 (29 Apr 2020 05:10) (97 - 114)  BP: 126/61 (29 Apr 2020 18:07) (126/61 - 148/82)  BP(mean): --  RR: 17 (29 Apr 2020 18:07) (17 - 19)  SpO2: 99% (29 Apr 2020 18:07) (97% - 99%)    PHYSICAL EXAM:  Constitutional:NAD  Eyes:BINTA, EOMI  Ear/Nose/Throat: no thrush, mucositis.  Moist mucous membranes	  Neck:no JVD, no lymphadenopathy, supple  Respiratory: CTA sandra  Cardiovascular: S1S2 RRR, no murmurs  Gastrointestinal:soft, nontender,  nondistended (+) BS  Extremities:no e/e/c.  LLE bucks traction device  Skin:  no rashes, open wounds or ulcerations  : benson in place        LABS:                        8.8    8.68  )-----------( 562      ( 29 Apr 2020 07:10 )             29.3     04-29    139  |  104  |  19  ----------------------------<  77  4.6   |  21<L>  |  0.91    Ca    9.1      29 Apr 2020 07:10  Phos  3.2     04-29  Mg     2.1     04-29            Procalcitonin, Serum: 0.12 (04-28 @ 10:00)  Procalcitonin, Serum: 0.12 (04-26 @ 06:09)  Procalcitonin, Serum: 0.13 (04-24 @ 07:08)    Vancomycin Level, Random:  ug/mL (04-27 @ 06:15)  Vancomycin Level, Random:  ug/mL (04-26 @ 04:42)      Vancomycin Level, Trough: 19.6 ug/mL (04-29 @ 09:59)              MICROBIOLOGY:    Culture - Blood (04.24.20 @ 12:00)    Specimen Source: .Blood Blood-Venous    Culture Results:   No Growth Final    Culture - Blood (04.24.20 @ 12:00)    Specimen Source: .Blood Blood-Peripheral    Culture Results:   No Growth Final          RADIOLOGY & ADDITIONAL STUDIES:

## 2020-04-29 NOTE — CHART NOTE - NSCHARTNOTEFT_GEN_A_CORE
Patient being booked for OR for morning of 5/1  Notified that she will need to be re-tested for COVID-19 for OR precaution clarification  COVID-19 PCR ordered  Discussed with primary team    LOIS Yoder MD

## 2020-04-29 NOTE — PROGRESS NOTE ADULT - ASSESSMENT
89y Female with L dislocated hemiarthroplasty  - surgery planned for 5/1, reduction, possible revision, please document medical clearance  - Pain control  - NWB LLE in 10 lbs bucks traction. monitor skin  - DVT ppx  - Low suspicion for PJI given lack of fluid collection, ID recommending abx course and IR aspiration (IR unable to aspirate due to no significant collection). continue other recs as per ID  - ortho will continue to follow,     Ortho [id number]

## 2020-04-29 NOTE — PROGRESS NOTE ADULT - SUBJECTIVE AND OBJECTIVE BOX
Ortho Progress Note    S: Patient seen and examined. No acute events overnight. Pain well controlled with current regimen. Denies lightheadedness/dizziness, CP/SOB. Tolerating diet.       O:  Physical Exam:  Gen: Laying in bed, NAD, alert   Resp: Unlabored breathing  Ext: Left leg in bucks traction, skin appears intact          EHL/FHL/TA/Sol intact          + SILT DP/SP/CASTREJON/Sa/Tib          +DP, extremity WWP    Vital Signs Last 24 Hrs  T(C): 36.5 (28 Apr 2020 22:37), Max: 36.7 (28 Apr 2020 05:27)  T(F): 97.7 (28 Apr 2020 22:37), Max: 98.1 (28 Apr 2020 05:27)  HR: 114 (28 Apr 2020 22:37) (94 - 114)  BP: 136/72 (28 Apr 2020 22:37) (129/59 - 136/72)  BP(mean): --  RR: 19 (28 Apr 2020 22:37) (18 - 19)  SpO2: 97% (28 Apr 2020 22:37) (97% - 99%)                          8.3    10.26 )-----------( 573      ( 28 Apr 2020 10:00 )             27.2                         8.3    8.69  )-----------( 529      ( 27 Apr 2020 06:15 )             27.7       04-28    141  |  106  |  21  ----------------------------<  81  3.9   |  24  |  0.85

## 2020-04-29 NOTE — PROGRESS NOTE ADULT - PROBLEM SELECTOR PLAN 2
positive for Staph epidermidis on 4/22 and 4/23, sensitive to Vancomycin  - blood cultures NGTD on 4/24    - appreciate ID recs   - daily blood cultures   - continue rifampin per ID due to concern for prosthetic joint infection   - continue vancomycin until 6 weeks from blood cultures are clear  - f/u next trough (4/30)  - place PICC line for IV antibiotics at [facility].   - echo inconclusive - cannot rule out endocarditis with negative TTE  - will likely not be able to get CHIARA due to covid+ status positive for Staph epidermidis on 4/22 and 4/23, sensitive to Vancomycin  - blood cultures NGTD on 4/24    - appreciate ID recs   - daily blood cultures   - continue rifampin per ID due to concern for prosthetic joint infection   - continue vancomycin until 6 weeks from blood cultures are clear  - vanc trough 19.1 - keep current dose (750mg q24)  - place PICC line for IV antibiotics at [facility].   - echo inconclusive - cannot rule out endocarditis with negative TTE  - will likely not be able to get CHIARA due to covid+ status positive for Staph epidermidis on 4/22 and 4/23, sensitive to Vancomycin  - blood cultures NGTD on 4/24    - appreciate ID recs   - daily blood cultures   - continue rifampin per ID due to concern for prosthetic joint infection   - continue vancomycin until 6 weeks from blood cultures are clear  - vanc trough 19.1 on 4/29 - keep current dose (750mg q24)  - place PICC line for IV antibiotics at [facility]. (ID recommended we place PICC line AFTER surgery so this has yet to be done).   - echo inconclusive - cannot rule out endocarditis with negative TTE  - will likely not be able to get CHIARA due to covid+ status

## 2020-04-29 NOTE — PROVIDER CONTACT NOTE (CRITICAL VALUE NOTIFICATION) - ASSESSMENT
Blood cultures Aerobic bottle: gram negative rods gram positive cocci in pairs and chains.   Anaerobic bottle: gram positive cocci in pairs and chains.

## 2020-04-29 NOTE — PROGRESS NOTE ADULT - ASSESSMENT
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 Patient had a mechanical fall and underwent a left hip hemiarthroplasty in 1/2020 at [facility]. She tolerated surgery and discharged to [facility] rehab. She presented to [facility] on 3/13/2020 for painful vertebral compression fractures, once her pain was treated she was discharged to [place] rehab.    ER vitals:  T 97.6, P 87, /62, RR 18, 95% RA.  WBC 18.2 --> 9.9.   . .   D-dimer 1430, ferritin 206.  PCT 0.13.  UA Large LE, (+) wbc's.   Ucx >100K GNR and >100K CoNS.   Bcx (+) CoNS 4/4 bottles.   Covid pcr (+) 4/22.      Multiple imaging performed including CT pelvis and femur, showing  dislocated femoral component of the left hip hemiarthroplasty. Mild cortical irregularity and lucencies at the left greater trochanter, which is difficult to assess due to artifact. This may be in part postsurgical although nondisplaced fracture cannot be excluded.  Also with foci of air in the left mid lateral thigh soft tissue, without a discrete fluid collection. Differential diagnosis includes injection, penetrating trauma and infection.  Question sigmoid colon diverticulitis.     Pt seen by Ortho service, pt initially planned IR aspiration of L hip to rule out prosthetic joint infection, given CT findings and bacteremia with CoNS.  Also planned for OR for possible revision vs resection arthroplasty, now cancelled due to Covid (+) status.     ID consulted for further recommendations.     CoNS bacteremia:    - Pt with 4/4 blood cultures positive, also Ucx (+) CoNS (secondary to descending infection from bacetermia?).  Given elevated inflammatory markers, and new dislocated left hip hemiarthroplasty with lucency, concern for prosthetic joint infection.    - Cont vancomycin 1gm IV q24 and rifampin to treat possible PJI.  Monitor weekly LFTs.     - Recommend 6 weeks of IV abx from date of blood culture clearance.  Sensitivities reviewed, no oral options for chronic suppressive treatment due to resistance.      - Pt has now been planned for IR aspiration of hip joint to evaluate for PJI and eventual OR with Ortho for reduction vs, resection arthroplasty.   Send fluid for cell count with differential, gram stain, bacterial, AFB and fungal cultures.     - TTE no vegetations.  Repeat blood cultures cleared promptly, doubt CHIARA will change managment and have low suspicion for endocarditis.     - Monitor WBC, temp curve, trend inflammatory markers.       Covid (+):    - Cont supportive care.  Trend ferritin, D-dimer, CRP every 48-72 hours.  Monitor pulse ox.  Currently oxygenating adequately on room air.     - Cxr shows grossly clear lungs.      - check baseline EKG and Qtc.  Doubt benefit of plaquenil given adequate oxygenation.  All therapeutics are investigational.      UTI:    - Growing both E.coli and CoNS.  Cont rocephin (sensitive) x 7 days (through 4/27) and IV vanco x 6 weeks, f/u culture results.       - Pt with h/o pessary.  consider GYN f/u, will defer to medicine.    *D/w medical attending at length.  Reached out to Ortho, awaiting callback.        * Can d/c rocephin    Will follow,    Sue Armstrong  [phone]

## 2020-04-29 NOTE — PROGRESS NOTE ADULT - PROBLEM SELECTOR PLAN 4
UA positive with few WBCs Informed by patient that patient has a pessary - last exchanged in Oct/Nov 2019   - Continue ceftriaxone 1 g daily x 7 (4/23-4/30) days and Vancomycin.    - Cultures growing E coli (sensitive to Ceftriaxone) and Coag Neg Staph.    - Will consider GYN consult for pessary exchange prior to discharge--unable to do any pessary change until her hip dislocation is resolved, so waiting for ortho to decide if/when more definitive management.   - Pt normally sees Dr. Jose BILLINGS in the [facility].

## 2020-04-30 ENCOUNTER — RESULT REVIEW (OUTPATIENT)
Age: 85
End: 2020-04-30

## 2020-04-30 ENCOUNTER — TRANSCRIPTION ENCOUNTER (OUTPATIENT)
Age: 85
End: 2020-04-30

## 2020-04-30 LAB
ANION GAP SERPL CALC-SCNC: 12 MMO/L — SIGNIFICANT CHANGE UP (ref 7–14)
APTT BLD: 30.5 SEC — SIGNIFICANT CHANGE UP (ref 27.5–36.3)
BLD GP AB SCN SERPL QL: NEGATIVE — SIGNIFICANT CHANGE UP
BUN SERPL-MCNC: 19 MG/DL — SIGNIFICANT CHANGE UP (ref 7–23)
CALCIUM SERPL-MCNC: 8.9 MG/DL — SIGNIFICANT CHANGE UP (ref 8.4–10.5)
CHLORIDE SERPL-SCNC: 103 MMOL/L — SIGNIFICANT CHANGE UP (ref 98–107)
CO2 SERPL-SCNC: 22 MMOL/L — SIGNIFICANT CHANGE UP (ref 22–31)
CREAT SERPL-MCNC: 0.83 MG/DL — SIGNIFICANT CHANGE UP (ref 0.5–1.3)
CRP SERPL-MCNC: 140.3 MG/L — HIGH
D DIMER BLD IA.RAPID-MCNC: 973 NG/ML — SIGNIFICANT CHANGE UP
FERRITIN SERPL-MCNC: 150.3 NG/ML — HIGH (ref 15–150)
GLUCOSE SERPL-MCNC: 85 MG/DL — SIGNIFICANT CHANGE UP (ref 70–99)
HCT VFR BLD CALC: 26.7 % — LOW (ref 34.5–45)
HGB BLD-MCNC: 8.4 G/DL — LOW (ref 11.5–15.5)
INR BLD: 1.42 — HIGH (ref 0.88–1.17)
MCHC RBC-ENTMCNC: 27.2 PG — SIGNIFICANT CHANGE UP (ref 27–34)
MCHC RBC-ENTMCNC: 31.5 % — LOW (ref 32–36)
MCV RBC AUTO: 86.4 FL — SIGNIFICANT CHANGE UP (ref 80–100)
NRBC # FLD: 0 K/UL — SIGNIFICANT CHANGE UP (ref 0–0)
PLATELET # BLD AUTO: 613 K/UL — HIGH (ref 150–400)
PMV BLD: 9.7 FL — SIGNIFICANT CHANGE UP (ref 7–13)
POTASSIUM SERPL-MCNC: 4 MMOL/L — SIGNIFICANT CHANGE UP (ref 3.5–5.3)
POTASSIUM SERPL-SCNC: 4 MMOL/L — SIGNIFICANT CHANGE UP (ref 3.5–5.3)
PROCALCITONIN SERPL-MCNC: 0.08 NG/ML — SIGNIFICANT CHANGE UP (ref 0.02–0.1)
PROTHROM AB SERPL-ACNC: 16.4 SEC — HIGH (ref 9.8–13.1)
RBC # BLD: 3.09 M/UL — LOW (ref 3.8–5.2)
RBC # FLD: 18.4 % — HIGH (ref 10.3–14.5)
RH IG SCN BLD-IMP: NEGATIVE — SIGNIFICANT CHANGE UP
SARS-COV-2 RNA SPEC QL NAA+PROBE: SIGNIFICANT CHANGE UP
SODIUM SERPL-SCNC: 137 MMOL/L — SIGNIFICANT CHANGE UP (ref 135–145)
WBC # BLD: 8.15 K/UL — SIGNIFICANT CHANGE UP (ref 3.8–10.5)
WBC # FLD AUTO: 8.15 K/UL — SIGNIFICANT CHANGE UP (ref 3.8–10.5)

## 2020-04-30 PROCEDURE — 20611 DRAIN/INJ JOINT/BURSA W/US: CPT | Mod: LT

## 2020-04-30 PROCEDURE — 99233 SBSQ HOSP IP/OBS HIGH 50: CPT

## 2020-04-30 RX ORDER — SODIUM CHLORIDE 9 MG/ML
1000 INJECTION INTRAMUSCULAR; INTRAVENOUS; SUBCUTANEOUS
Refills: 0 | Status: DISCONTINUED | OUTPATIENT
Start: 2020-05-01 | End: 2020-05-01

## 2020-04-30 RX ADMIN — PANTOPRAZOLE SODIUM 40 MILLIGRAM(S): 20 TABLET, DELAYED RELEASE ORAL at 06:24

## 2020-04-30 RX ADMIN — Medication 250 MILLIGRAM(S): at 11:01

## 2020-04-30 RX ADMIN — OXYCODONE AND ACETAMINOPHEN 1 TABLET(S): 5; 325 TABLET ORAL at 10:58

## 2020-04-30 RX ADMIN — Medication 650 MILLIGRAM(S): at 16:12

## 2020-04-30 RX ADMIN — AMLODIPINE BESYLATE 5 MILLIGRAM(S): 2.5 TABLET ORAL at 06:24

## 2020-04-30 NOTE — PROGRESS NOTE ADULT - ASSESSMENT
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 Patient had a mechanical fall and underwent a left hip hemiarthroplasty in 1/2020 at [facility]. She tolerated surgery and discharged to [facility] rehab. She presented to [facility] on 3/13/2020 for painful vertebral compression fractures, once her pain was treated she was discharged to [facility] rehab.    ER vitals:  T 97.6, P 87, /62, RR 18, 95% RA.  WBC 18.2 --> 9.9.   . .   D-dimer 1430, ferritin 206.  PCT 0.13.  UA Large LE, (+) wbc's.   Ucx >100K GNR and >100K CoNS.   Bcx (+) CoNS 4/4 bottles.   Covid pcr (+) 4/22.      Multiple imaging performed including CT pelvis and femur, showing  dislocated femoral component of the left hip hemiarthroplasty. Mild cortical irregularity and lucencies at the left greater trochanter, which is difficult to assess due to artifact. This may be in part postsurgical although nondisplaced fracture cannot be excluded.  Also with foci of air in the left mid lateral thigh soft tissue, without a discrete fluid collection. Differential diagnosis includes injection, penetrating trauma and infection.  Question sigmoid colon diverticulitis.     Pt seen by Ortho service, pt initially planned IR aspiration of L hip to rule out prosthetic joint infection, given CT findings and bacteremia with CoNS.  Also planned for OR for possible revision vs resection arthroplasty, now cancelled due to Covid (+) status.     ID consulted for further recommendations.     CoNS bacteremia:    - Pt with 4/4 blood cultures positive, also Ucx (+) CoNS (secondary to descending infection from bacetermia?).  Given elevated inflammatory markers, and new dislocated left hip hemiarthroplasty with lucency, concern for prosthetic joint infection.    - Cont vancomycin 1gm IV q24 and rifampin to treat possible PJI.  Monitor weekly LFTs.     - Recommend 6 weeks of IV abx from date of blood culture clearance.  Sensitivities reviewed, no oral options for chronic suppressive treatment due to resistance.      - Pt has now been planned for IR aspiration of hip joint to evaluate for PJI and eventual OR with Ortho for reduction vs, resection arthroplasty.   Send fluid for cell count with differential, gram stain, bacterial, AFB and fungal cultures.  (Only sent for cultures due to minimal fluid aspirated on 4/30)    - TTE no vegetations.  Repeat blood cultures cleared promptly, doubt CHIARA will change managment and have low suspicion for endocarditis.     - Monitor WBC, temp curve, trend inflammatory markers.       Covid (+):    - Cont supportive care.  Trend ferritin, D-dimer, CRP every 48-72 hours.  Monitor pulse ox.  Currently oxygenating adequately on room air.     - Cxr shows grossly clear lungs.      -  Repeat covid testing (-), d/c precautions.       UTI:    - Growing both E.coli and CoNS.  Cont rocephin (sensitive) x 7 days (through 4/27) and IV vanco x 6 weeks, f/u culture results.       - Pt with h/o pessary.  consider GYN f/u, will defer to medicine.    * f/u L hip culture data.  Unable to send cell count due to minimal fluid aspirated.  Awaiting OR.  Repeat covid neg    Will follow,    Sue Armstrong  [phone]

## 2020-04-30 NOTE — PROGRESS NOTE ADULT - PROBLEM SELECTOR PLAN 2
positive for Staph epidermidis on 4/22 and 4/23, sensitive to Vancomycin  - blood cultures NGTD on 4/24    - appreciate ID recs   - daily blood cultures   - continue rifampin per ID due to concern for prosthetic joint infection   - continue vancomycin until 6 weeks from blood cultures are clear  - vanc trough 19.1 on 4/29 - keep current dose (750mg q24)  - place PICC line for IV antibiotics prior to dc   - echo inconclusive - cannot rule out endocarditis with negative TTE  - will likely not be able to get CHIARA due to covid+ status

## 2020-04-30 NOTE — PROGRESS NOTE ADULT - SUBJECTIVE AND OBJECTIVE BOX
Patient is a 89y old  Female who presents with a chief complaint of LE pain (30 Apr 2020 14:53)      SUBJECTIVE / OVERNIGHT EVENTS:    No events overnight. This AM, patient without n/v/d. No SOB. No cp.    MEDICATIONS  (STANDING):  amLODIPine   Tablet 5 milliGRAM(s) Oral daily  atorvastatin 10 milliGRAM(s) Oral at bedtime  cefTRIAXone   IVPB 1000 milliGRAM(s) IV Intermittent every 24 hours  melatonin 3 milliGRAM(s) Oral at bedtime  pantoprazole    Tablet 40 milliGRAM(s) Oral before breakfast  rifAMPin 300 milliGRAM(s) Oral two times a day  vancomycin  IVPB      vancomycin  IVPB 750 milliGRAM(s) IV Intermittent every 24 hours    MEDICATIONS  (PRN):  acetaminophen   Tablet .. 650 milliGRAM(s) Oral every 8 hours PRN Mild Pain (1 - 3), Moderate Pain (4 - 6)  oxycodone    5 mG/acetaminophen 325 mG 1 Tablet(s) Oral every 8 hours PRN Severe Pain (7 - 10)  senna 2 Tablet(s) Oral at bedtime PRN Constipation      PHYSICAL EXAM:  T(C): 36.3 (04-30-20 @ 12:26), Max: 36.7 (04-29-20 @ 18:07)  HR: 79 (04-30-20 @ 12:26) (79 - 93)  BP: 126/61 (04-30-20 @ 12:26) (124/60 - 137/75)  RR: 18 (04-30-20 @ 12:26) (17 - 18)  SpO2: 96% (04-30-20 @ 12:26) (96% - 99%)  I&O's Summary    29 Apr 2020 07:01  -  30 Apr 2020 07:00  --------------------------------------------------------  IN: 150 mL / OUT: 200 mL / NET: -50 mL    30 Apr 2020 07:01  -  30 Apr 2020 17:30  --------------------------------------------------------  IN: 100 mL / OUT: 300 mL / NET: -200 mL      GENERAL: NAD, well-developed  HEAD:  Atraumatic, Normocephalic, MMM  CHEST/LUNG: No use of accessory muscles, speaking in complete sentences   : Alvares in place draining clear urine  PSYCH: AAOx1  NEUROLOGY: CN II-XII grossly intact, moving all extremities  SKIN: No rashes or lesions    LABS:  CAPILLARY BLOOD GLUCOSE                              8.4    8.15  )-----------( 613      ( 30 Apr 2020 04:12 )             26.7     04-30    137  |  103  |  19  ----------------------------<  85  4.0   |  22  |  0.83    Ca    8.9      30 Apr 2020 04:12  Phos  3.2     04-29  Mg     2.1     04-29      PT/INR - ( 30 Apr 2020 04:12 )   PT: 16.4 SEC;   INR: 1.42          PTT - ( 30 Apr 2020 04:12 )  PTT:30.5 SEC            RADIOLOGY & ADDITIONAL TESTS:    Telemetry Personally Reviewed -     Imaging Personally Reviewed -     Imaging Reviewed -     Consultant(s) Notes Reviewed -       Care Discussed with Consultants/Other Providers -

## 2020-04-30 NOTE — CHART NOTE - NSCHARTNOTEFT_GEN_A_CORE
Vascular & Interventional Radiology Post-Procedure Note    Pre-Procedure Diagnosis: + BCx, chronic dislocation   Post-Procedure Diagnosis: Same as pre.  Indications for Procedure: same as per.     Attending: Dr. Salcedo  Resident: Dr. Gardner    Procedure Details/Findings: approximately 0.25cc aspirated from Left Hip  Access (if applicable): lateral left leg.     Complications: none  Estimated Blood Loss: Minimal  Specimen: sent for culture. Not enough fluid for cell count.   Contrast: none  Anesthesia: local  Patient Condition/Disposition: Stable, back to room.     Plan:   - f/u Culture.

## 2020-04-30 NOTE — PROGRESS NOTE ADULT - PROBLEM SELECTOR PLAN 4
UA positive with few WBCs Informed by patient that patient has a pessary - last exchanged in Oct/Nov 2019   - Continue ceftriaxone 1 g daily x 7 (4/23-4/30) days and Vancomycin.    - Cultures growing E coli (sensitive to Ceftriaxone) and Coag Neg Staph.    - Will consider GYN consult for pessary exchange prior to discharge--unable to do any pessary change until her hip dislocation is resolved, so waiting for ortho to decide if/when more definitive management.   - Pt normally sees Dr. Jose BILLINGS in the [facility].

## 2020-04-30 NOTE — PROGRESS NOTE ADULT - ASSESSMENT
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 Was found to be bacteremic (+ blood cx on 4/22 and 4/23) with staph epidermidis with concern for prosthetic joint infection and also found to have UTI with CoNS and E coli.    Updates can be given to daughter Dianne Kennedy, she can be reached at [phone]. Dianne states that she has some dementia and memory issues at baseline and her mental status can wax and wane. Dianne also states that she would agree to and endorse surgery for Mrs. Figueredo to treat the hip.      We reviewed code status and patient is DNR/DNI.

## 2020-04-30 NOTE — PROGRESS NOTE ADULT - PROBLEM SELECTOR PLAN 1
Unclear chronicity of etiology but prior history of L. hemiarthroplasty in 01/2020 and hip was imaging after shows implant in place  Plan originally for possible revision camilo arthroplasty vs. total arthroplasty, possible antibiotic spacer, and possible resection arthroplasty by Orthopaedic surgery 04/24/20 however patient tested positive for COVID19 so surgery was delayed for medical observation. Doubt significant chronicity given recent onset of pain and continued pain over this joint on exam today. IR consulted for possibly aspiration of L hip to rule out septic joint given markedly elevated inflammatory markers however no discrete fluid collection seen on CT left femur/pelvis and COVID19 may be likely etiology   - plan for OR on 5/1/20   - pain control with acetaminophen (mild/mod pain) or oxycodone (severe pain) PRN  -Continue bucks traction   - appreciate ortho recs

## 2020-04-30 NOTE — PROGRESS NOTE ADULT - SUBJECTIVE AND OBJECTIVE BOX
Orthopaedic Surgery Progress Note    Subjective:   Patient seen and examined  No acute events overnight  Afebrile  NPO for IR today for L hip joint aspiration    Objective:  T(C): 36.6 (04-29-20 @ 21:57), Max: 36.7 (04-29-20 @ 05:10)  HR: 93 (04-29-20 @ 21:57) (93 - 97)  BP: 124/60 (04-29-20 @ 21:57) (124/60 - 148/82)  RR: 18 (04-29-20 @ 21:57) (17 - 18)  SpO2: 96% (04-29-20 @ 21:57) (96% - 99%)    04-29 @ 07:01  -  04-30 @ 03:32  --------------------------------------------------------  IN: 150 mL / OUT: 200 mL / NET: -50 mL    PE  NAD  LLE:   leg shortened, IR  Amanda's traction boot in place  wiggles toes  exam limited by patient participation  WWP distally with good capillary refill                        8.8    8.68  )-----------( 562      ( 29 Apr 2020 07:10 )             29.3     04-29    139  |  104  |  19  ----------------------------<  77  4.6   |  21<L>  |  0.91    Ca    9.1      29 Apr 2020 07:10  Phos  3.2     04-29  Mg     2.1     04-29    89y Female s/p with now chronically located L hip hemiarthroplasty with concern for PJI given Coag negative staph in blood cultures and elevated inflammatory markers (however, CoNS in urine as well and patient COVID-19+)    - Pain control  - NWB LLE, 10 lbs Amanda's traction  - NPO/IVF for IR aspiration of L hip joint today (should be sent for gram stain, cell count, cultures [aerobic/anaerobic, AFB, fungal])  - Hold DVT ppx for IR aspiration  - OR 5/1 for closed versus open reduction of left hip, possible revision versus resection arthroplasty  - Continue abx per ID  - FU COVID re-testing for OR  - DNR with limited rescind for OR     Fady Yoder MD

## 2020-05-01 ENCOUNTER — RESULT REVIEW (OUTPATIENT)
Age: 85
End: 2020-05-01

## 2020-05-01 LAB
ANION GAP SERPL CALC-SCNC: 10 MMO/L — SIGNIFICANT CHANGE UP (ref 7–14)
ANION GAP SERPL CALC-SCNC: 14 MMO/L — SIGNIFICANT CHANGE UP (ref 7–14)
APTT BLD: 44.5 SEC — HIGH (ref 27.5–36.3)
BUN SERPL-MCNC: 15 MG/DL — SIGNIFICANT CHANGE UP (ref 7–23)
BUN SERPL-MCNC: 17 MG/DL — SIGNIFICANT CHANGE UP (ref 7–23)
CALCIUM SERPL-MCNC: 8.1 MG/DL — LOW (ref 8.4–10.5)
CALCIUM SERPL-MCNC: 8.9 MG/DL — SIGNIFICANT CHANGE UP (ref 8.4–10.5)
CHLORIDE SERPL-SCNC: 104 MMOL/L — SIGNIFICANT CHANGE UP (ref 98–107)
CHLORIDE SERPL-SCNC: 107 MMOL/L — SIGNIFICANT CHANGE UP (ref 98–107)
CO2 SERPL-SCNC: 16 MMOL/L — LOW (ref 22–31)
CO2 SERPL-SCNC: 23 MMOL/L — SIGNIFICANT CHANGE UP (ref 22–31)
CREAT SERPL-MCNC: 0.75 MG/DL — SIGNIFICANT CHANGE UP (ref 0.5–1.3)
CREAT SERPL-MCNC: 0.75 MG/DL — SIGNIFICANT CHANGE UP (ref 0.5–1.3)
GLUCOSE SERPL-MCNC: 160 MG/DL — HIGH (ref 70–99)
GLUCOSE SERPL-MCNC: 93 MG/DL — SIGNIFICANT CHANGE UP (ref 70–99)
HCT VFR BLD CALC: 32 % — LOW (ref 34.5–45)
HCT VFR BLD CALC: 34.5 % — SIGNIFICANT CHANGE UP (ref 34.5–45)
HGB BLD-MCNC: 10.5 G/DL — LOW (ref 11.5–15.5)
HGB BLD-MCNC: 10.7 G/DL — LOW (ref 11.5–15.5)
INR BLD: 1.39 — HIGH (ref 0.88–1.17)
MCHC RBC-ENTMCNC: 27.8 PG — SIGNIFICANT CHANGE UP (ref 27–34)
MCHC RBC-ENTMCNC: 28 PG — SIGNIFICANT CHANGE UP (ref 27–34)
MCHC RBC-ENTMCNC: 31 % — LOW (ref 32–36)
MCHC RBC-ENTMCNC: 32.8 % — SIGNIFICANT CHANGE UP (ref 32–36)
MCV RBC AUTO: 85.3 FL — SIGNIFICANT CHANGE UP (ref 80–100)
MCV RBC AUTO: 89.6 FL — SIGNIFICANT CHANGE UP (ref 80–100)
NRBC # FLD: 0 K/UL — SIGNIFICANT CHANGE UP (ref 0–0)
NRBC # FLD: 0 K/UL — SIGNIFICANT CHANGE UP (ref 0–0)
PLATELET # BLD AUTO: 485 K/UL — HIGH (ref 150–400)
PLATELET # BLD AUTO: 562 K/UL — HIGH (ref 150–400)
PMV BLD: 9.5 FL — SIGNIFICANT CHANGE UP (ref 7–13)
PMV BLD: 9.8 FL — SIGNIFICANT CHANGE UP (ref 7–13)
POTASSIUM SERPL-MCNC: 3.9 MMOL/L — SIGNIFICANT CHANGE UP (ref 3.5–5.3)
POTASSIUM SERPL-MCNC: 4.5 MMOL/L — SIGNIFICANT CHANGE UP (ref 3.5–5.3)
POTASSIUM SERPL-SCNC: 3.9 MMOL/L — SIGNIFICANT CHANGE UP (ref 3.5–5.3)
POTASSIUM SERPL-SCNC: 4.5 MMOL/L — SIGNIFICANT CHANGE UP (ref 3.5–5.3)
PROTHROM AB SERPL-ACNC: 16.2 SEC — HIGH (ref 9.8–13.1)
RBC # BLD: 3.75 M/UL — LOW (ref 3.8–5.2)
RBC # BLD: 3.85 M/UL — SIGNIFICANT CHANGE UP (ref 3.8–5.2)
RBC # FLD: 17.1 % — HIGH (ref 10.3–14.5)
RBC # FLD: 18 % — HIGH (ref 10.3–14.5)
SODIUM SERPL-SCNC: 137 MMOL/L — SIGNIFICANT CHANGE UP (ref 135–145)
SODIUM SERPL-SCNC: 137 MMOL/L — SIGNIFICANT CHANGE UP (ref 135–145)
WBC # BLD: 13.12 K/UL — HIGH (ref 3.8–10.5)
WBC # BLD: 9.37 K/UL — SIGNIFICANT CHANGE UP (ref 3.8–10.5)
WBC # FLD AUTO: 13.12 K/UL — HIGH (ref 3.8–10.5)
WBC # FLD AUTO: 9.37 K/UL — SIGNIFICANT CHANGE UP (ref 3.8–10.5)

## 2020-05-01 PROCEDURE — 27041 BIOPSY OF SOFT TISSUES: CPT | Mod: 59,LT

## 2020-05-01 PROCEDURE — 88331 PATH CONSLTJ SURG 1 BLK 1SPC: CPT | Mod: 26

## 2020-05-01 PROCEDURE — 88305 TISSUE EXAM BY PATHOLOGIST: CPT | Mod: 26

## 2020-05-01 PROCEDURE — 72170 X-RAY EXAM OF PELVIS: CPT | Mod: 26

## 2020-05-01 PROCEDURE — 99233 SBSQ HOSP IP/OBS HIGH 50: CPT

## 2020-05-01 PROCEDURE — 27138 REVISE HIP JOINT REPLACEMENT: CPT | Mod: 52,LT

## 2020-05-01 RX ORDER — ACETAMINOPHEN 500 MG
850 TABLET ORAL EVERY 6 HOURS
Refills: 0 | Status: COMPLETED | OUTPATIENT
Start: 2020-05-01 | End: 2020-05-01

## 2020-05-01 RX ORDER — OXYCODONE HYDROCHLORIDE 5 MG/1
2.5 TABLET ORAL EVERY 4 HOURS
Refills: 0 | Status: DISCONTINUED | OUTPATIENT
Start: 2020-05-01 | End: 2020-05-06

## 2020-05-01 RX ORDER — MORPHINE SULFATE 50 MG/1
2 CAPSULE, EXTENDED RELEASE ORAL EVERY 4 HOURS
Refills: 0 | Status: DISCONTINUED | OUTPATIENT
Start: 2020-05-01 | End: 2020-05-01

## 2020-05-01 RX ORDER — SODIUM CHLORIDE 9 MG/ML
1000 INJECTION, SOLUTION INTRAVENOUS
Refills: 0 | Status: DISCONTINUED | OUTPATIENT
Start: 2020-05-01 | End: 2020-05-02

## 2020-05-01 RX ORDER — ACETAMINOPHEN 500 MG
650 TABLET ORAL EVERY 6 HOURS
Refills: 0 | Status: DISCONTINUED | OUTPATIENT
Start: 2020-05-01 | End: 2020-05-01

## 2020-05-01 RX ORDER — OXYCODONE HYDROCHLORIDE 5 MG/1
5 TABLET ORAL EVERY 4 HOURS
Refills: 0 | Status: DISCONTINUED | OUTPATIENT
Start: 2020-05-01 | End: 2020-05-06

## 2020-05-01 RX ORDER — TRAMADOL HYDROCHLORIDE 50 MG/1
25 TABLET ORAL EVERY 8 HOURS
Refills: 0 | Status: DISCONTINUED | OUTPATIENT
Start: 2020-05-01 | End: 2020-05-07

## 2020-05-01 RX ORDER — ENOXAPARIN SODIUM 100 MG/ML
40 INJECTION SUBCUTANEOUS DAILY
Refills: 0 | Status: DISCONTINUED | OUTPATIENT
Start: 2020-05-01 | End: 2020-05-03

## 2020-05-01 RX ADMIN — PANTOPRAZOLE SODIUM 40 MILLIGRAM(S): 20 TABLET, DELAYED RELEASE ORAL at 04:31

## 2020-05-01 RX ADMIN — SODIUM CHLORIDE 100 MILLILITER(S): 9 INJECTION, SOLUTION INTRAVENOUS at 12:15

## 2020-05-01 RX ADMIN — CEFTRIAXONE 100 MILLIGRAM(S): 500 INJECTION, POWDER, FOR SOLUTION INTRAMUSCULAR; INTRAVENOUS at 00:42

## 2020-05-01 RX ADMIN — CEFTRIAXONE 100 MILLIGRAM(S): 500 INJECTION, POWDER, FOR SOLUTION INTRAMUSCULAR; INTRAVENOUS at 22:03

## 2020-05-01 RX ADMIN — Medication 3 MILLIGRAM(S): at 22:02

## 2020-05-01 RX ADMIN — ATORVASTATIN CALCIUM 10 MILLIGRAM(S): 80 TABLET, FILM COATED ORAL at 00:42

## 2020-05-01 RX ADMIN — AMLODIPINE BESYLATE 5 MILLIGRAM(S): 2.5 TABLET ORAL at 04:31

## 2020-05-01 RX ADMIN — Medication 250 MILLIGRAM(S): at 11:49

## 2020-05-01 RX ADMIN — SODIUM CHLORIDE 100 MILLILITER(S): 9 INJECTION INTRAMUSCULAR; INTRAVENOUS; SUBCUTANEOUS at 01:00

## 2020-05-01 RX ADMIN — Medication 400 MILLIGRAM(S): at 12:50

## 2020-05-01 RX ADMIN — ATORVASTATIN CALCIUM 10 MILLIGRAM(S): 80 TABLET, FILM COATED ORAL at 22:03

## 2020-05-01 RX ADMIN — Medication 3 MILLIGRAM(S): at 00:42

## 2020-05-01 NOTE — PROGRESS NOTE ADULT - SUBJECTIVE AND OBJECTIVE BOX
Orthopaedic Surgery Progress Note    Subjective:   Patient seen and examined  Resting comfortably  Denies numbness/tingling    Objective:  T(C): 36.3 (05-01-20 @ 16:58), Max: 36.5 (04-30-20 @ 21:00)  HR: 78 (05-01-20 @ 12:12) (78 - 90)  BP: 127/56 (05-01-20 @ 12:12) (127/56 - 145/77)  RR: 18 (05-01-20 @ 12:12) (18 - 18)  SpO2: 97% (05-01-20 @ 12:12) (97% - 97%)    04-30 @ 07:01  -  05-01 @ 07:00  --------------------------------------------------------  IN: 1435 mL / OUT: 900 mL / NET: 535 mL    PE  NAD  LLE:   dressing C/D/I  abduction pillow in place  patient able to fire TA/EHL/GS  SILT S/S/SP/DP  WWP distally, good cap refill                        10.7   13.12 )-----------( 485      ( 01 May 2020 14:09 )             34.5     05-01    137  |  107  |  15  ----------------------------<  160<H>  4.5   |  16<L>  |  0.75    Ca    8.1<L>      01 May 2020 14:09    PT/INR - ( 01 May 2020 04:09 )   PT: 16.2 SEC;   INR: 1.39       PTT - ( 01 May 2020 04:09 )  PTT:44.5 SEC    89y Female POD0 s/p open reduction and head exchange for L hip hemiarthroplasty    - Pain control  - WBAT  - Posterior dislocation precautions  - PT/OT/OOB  - DVT ppx  - Abx per ID, however we doubt there is an infection of the arthroplasty  - FU OR cultures  - FU IR cultures  - FU OR pathology  - Wound care for LLE pressure ulcerations  - DNR  - Dispo planning    Fady Yoder MD

## 2020-05-01 NOTE — PROGRESS NOTE ADULT - SUBJECTIVE AND OBJECTIVE BOX
Patient is a 89y old  Female who presents with a chief complaint of LE pain (01 May 2020 06:17)      SUBJECTIVE / OVERNIGHT EVENTS:    Patient is s/p L hip hemiarthroplasty today. This AM, patient without n/v/d/cp/sob.     MEDICATIONS  (STANDING):  amLODIPine   Tablet 5 milliGRAM(s) Oral daily  atorvastatin 10 milliGRAM(s) Oral at bedtime  cefTRIAXone   IVPB 1000 milliGRAM(s) IV Intermittent every 24 hours  enoxaparin Injectable 40 milliGRAM(s) SubCutaneous daily  lactated ringers. 1000 milliLiter(s) (100 mL/Hr) IV Continuous <Continuous>  melatonin 3 milliGRAM(s) Oral at bedtime  pantoprazole    Tablet 40 milliGRAM(s) Oral before breakfast  rifAMPin 300 milliGRAM(s) Oral two times a day  vancomycin  IVPB      vancomycin  IVPB 750 milliGRAM(s) IV Intermittent every 24 hours    MEDICATIONS  (PRN):  morphine  - Injectable 2 milliGRAM(s) IV Push every 4 hours PRN Severe Pain (7 - 10)  oxyCODONE    IR 5 milliGRAM(s) Oral every 4 hours PRN Severe Pain (7 - 10)  oxyCODONE    IR 2.5 milliGRAM(s) Oral every 4 hours PRN Moderate Pain (4 - 6)  senna 2 Tablet(s) Oral at bedtime PRN Constipation  traMADol 25 milliGRAM(s) Oral every 8 hours PRN Mild Pain (1 - 3)      PHYSICAL EXAM:  T(C): 33.8 (05-01-20 @ 13:40), Max: 36.5 (04-30-20 @ 21:00)  HR: 78 (05-01-20 @ 12:12) (78 - 90)  BP: 127/56 (05-01-20 @ 12:12) (127/56 - 145/77)  RR: 18 (05-01-20 @ 12:12) (18 - 18)  SpO2: 97% (05-01-20 @ 12:12) (97% - 97%)  I&O's Summary    30 Apr 2020 07:01  -  01 May 2020 07:00  --------------------------------------------------------  IN: 1435 mL / OUT: 900 mL / NET: 535 mL      GENERAL: NAD, well-developed  HEAD:  Atraumatic, Normocephalic, MMM  CHEST/LUNG: No use of accessory muscles, speaking in complete sentences   : Alvares in place draining clear urine  PSYCH: AAOx1  NEUROLOGY: CN II-XII grossly intact, moving all extremities  SKIN: No rashes or lesions    LABS:  CAPILLARY BLOOD GLUCOSE                              10.7   13.12 )-----------( 485      ( 01 May 2020 14:09 )             34.5     05-01    137  |  107  |  15  ----------------------------<  160<H>  4.5   |  16<L>  |  0.75    Ca    8.1<L>      01 May 2020 14:09      PT/INR - ( 01 May 2020 04:09 )   PT: 16.2 SEC;   INR: 1.39          PTT - ( 01 May 2020 04:09 )  PTT:44.5 SEC          Culture - Fungal, Body Fluid (collected 01 May 2020 00:34)  Source: .Body Fluid lt hip aspiration  Preliminary Report (01 May 2020 07:07):    Testing in progress    Culture - Body Fluid with Gram Stain (collected 01 May 2020 00:34)  Source: .Body Fluid left hip aspiration  Gram Stain (01 May 2020 05:09):    polymorphonuclear leukocytes    No organisms seen    by cytocentrifuge        RADIOLOGY & ADDITIONAL TESTS:    Telemetry Personally Reviewed -     Imaging Personally Reviewed -     Imaging Reviewed -     Consultant(s) Notes Reviewed -       Care Discussed with Consultants/Other Providers -

## 2020-05-01 NOTE — PROGRESS NOTE ADULT - SUBJECTIVE AND OBJECTIVE BOX
Orthopaedic Surgery Progress Note    Subjective:   Patient seen and examined today. No acute events overnight. NPO for OR this morning. Received 1U pRBCs last night.    Objective:  T(C): 36.3 (05-01-20 @ 04:29), Max: 36.7 (04-30-20 @ 06:23)  HR: 90 (05-01-20 @ 04:29) (79 - 90)  BP: 145/77 (05-01-20 @ 04:29) (126/61 - 145/77)  RR: 18 (05-01-20 @ 04:29) (18 - 18)  SpO2: 97% (05-01-20 @ 04:29) (96% - 97%)  Wt(kg): --    04-29 @ 07:01  -  04-30 @ 07:00  --------------------------------------------------------  IN: 150 mL / OUT: 200 mL / NET: -50 mL    04-30 @ 07:01  -  05-01 @ 06:18  --------------------------------------------------------  IN: 1435 mL / OUT: 900 mL / NET: 535 mL        PE  NAD  LLE:   leg shortened, IR  Amanda's traction boot in place  wiggles toes  exam limited by patient participation  WWP distally with good capillary refill                          10.5   9.37  )-----------( 562      ( 01 May 2020 04:09 )             32.0     05-01    137  |  104  |  17  ----------------------------<  93  3.9   |  23  |  0.75    Ca    8.9      01 May 2020 04:09  Phos  3.2     04-29  Mg     2.1     04-29      PT/INR - ( 01 May 2020 04:09 )   PT: 16.2 SEC;   INR: 1.39          PTT - ( 01 May 2020 04:09 )  PTT:44.5 SEC

## 2020-05-01 NOTE — PROGRESS NOTE ADULT - PROBLEM SELECTOR PLAN 2
positive for Staph epidermidis on 4/22 and 4/23, sensitive to Vancomycin  - blood cultures NGTD on 4/24    - appreciate ID recs   - daily blood cultures   - continue rifampin per ID due to concern for prosthetic joint infection   - continue vancomycin until 6 weeks from blood cultures are clear  - place PICC line for IV antibiotics prior to dc   - echo inconclusive - cannot rule out endocarditis with negative TTE  - will likely not be able to get CHIARA due to covid+ status

## 2020-05-01 NOTE — PROGRESS NOTE ADULT - ASSESSMENT
89y Female s/p with now chronically located L hip hemiarthroplasty with concern for PJI given Coag negative staph in blood cultures and elevated inflammatory markers (however, CoNS in urine as well and patient COVID-19+). Repeat COVID negative    - IR aspiration performed yesterday (0.25cc, not enought for CC): GS is negative. FU Culture  - Pain control  - NWB LLE, 10 lbs Amanda's traction  - Hold DVT ppx for OR  - OR today for closed versus open reduction of left hip, possible revision versus resection arthroplasty  - Continue abx per ID  - DNR with limited rescind for OR   - consent in chart

## 2020-05-01 NOTE — PROGRESS NOTE ADULT - ASSESSMENT
89 W h/o Dementia, HTN, and recent L hemiarthroplasty in 01/2020 presents from 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 Was found to be bacteremic (+ blood cx on 4/22 and 4/23) with staph epidermidis with concern for prosthetic joint infection and also found to have UTI with CoNS and E coli.    Updates can be given to daughter Dianne Kennedy, she can be reached at [phone]. Dianne states that she has some dementia and memory issues at baseline and her mental status can wax and wane.    We reviewed code status and patient is DNR/DNI.

## 2020-05-01 NOTE — PROGRESS NOTE ADULT - PROBLEM SELECTOR PLAN 3
COVID19 PCR positive 04/23/20 - Mild infection with normal O2 sat on room air, no respiratory symptoms, serum ESR and CRP elevation. Not a candidate for Plaquenil given absence of hypoxia   - inflammatory markers downtrending   - Supportive care.  - last known ecg (3/13/20) with QTc of 481

## 2020-05-01 NOTE — ADVANCED PRACTICE NURSE CONSULT - RECOMMEDATIONS
Topical Recommendations:     Areas at risk for IAD- Cleanse with skin cleanser, pat dry. Apply Chasidy moisture barrier cream twice a day or PRN with episodes of incontinence.     Left dorsal foot and left heel- Apply Betadine, Allow to air dry. Cover with gauze, abdominal pad and wrap with kerlix. Change every other day or PRN if compromised.       Continue low air loss bed therapy, continue or initiate seat cushion, heel elevation, turn & reposition q2h, continue moisture management with barrier creams & single breathable pad, continue measures to decrease friction/shear.     Please contact Wound Care Service Line if we can be of further assistance (ext [phone]).

## 2020-05-01 NOTE — ADVANCED PRACTICE NURSE CONSULT - ASSESSMENT
General: Patient alert, moaning, unable to assess orientation status, bedbound, indwelling urinary catheter, incontinent of stool. Patient on a warming blanket for hypothermia. Skin cool dry with increased moisture in intertriginous folds, poor skin turgor, scattered areas of ecchymosis on bilateral upper extremities. Blanchable erythema on right heel.     Risk for Incontinence associated dermatitis- Hypopigmentation surrounded by hyperpigmentation noted to sacrum extending to bilateral inner buttocks, previous site of skin impairment. No evidence of full thickness involvement.    Vascular: Bilateral lower extremities with Dry, flaky skin. Thin, Fragile skin. Good foot hygiene. No Edema. Capillary refill >3 seconds. (+)2 DP/PT pulses, with biphasic doppler sounds.     Left dorsal foot- Deep tissue pressure injury 2/2 traction- entire area measuring [id number] presenting as a reabsorbed blood filled blister with deep purple maroon discoloration of base. Well demarcated. No drainage. Periwound skin intact. No induration, no erythema, no increased warmth. Goals of care: Monitor for tissue type changes.     Left heel- Unstageable pressure injury measuring [id number]. Regular borders. Tissue type presenting as 100% black, firmly attached stable eschar. No induration, no erythema, no increased warmth. Goals of care: maintain stable eschar- conservative management as patient elderly, bacteremic and hypothermic.

## 2020-05-01 NOTE — PROGRESS NOTE ADULT - ASSESSMENT
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 Patient had a mechanical fall and underwent a left hip hemiarthroplasty in 1/2020 at [facility]. She tolerated surgery and discharged to [facility] rehab. She presented to [facility] on 3/13/2020 for painful vertebral compression fractures, once her pain was treated she was discharged to [facility] re[facility].    ER vitals:  T 97.6, P 87, /62, RR 18, 95% RA.  WBC 18.2 --> 9.9.   . .   D-dimer 1430, ferritin 206.  PCT 0.13.  UA Large LE, (+) wbc's.   Ucx >100K GNR and >100K CoNS.   Bcx (+) CoNS 4/4 bottles.   Covid pcr (+) 4/22.      Multiple imaging performed including CT pelvis and femur, showing  dislocated femoral component of the left hip hemiarthroplasty. Mild cortical irregularity and lucencies at the left greater trochanter, which is difficult to assess due to artifact. This may be in part postsurgical although nondisplaced fracture cannot be excluded.  Also with foci of air in the left mid lateral thigh soft tissue, without a discrete fluid collection. Differential diagnosis includes injection, penetrating trauma and infection.  Question sigmoid colon diverticulitis.     Pt seen by Ortho service, pt initially planned IR aspiration of L hip to rule out prosthetic joint infection, given CT findings and bacteremia with CoNS.  Also planned for OR for possible revision vs resection arthroplasty, now cancelled due to Covid (+) status.     ID consulted for further recommendations.     CoNS bacteremia:    - Pt with 4/4 blood cultures positive, also Ucx (+) CoNS (secondary to descending infection from bacetermia?).  Given elevated inflammatory markers, and new dislocated left hip hemiarthroplasty with lucency, concern for prosthetic joint infection.    - Cont vancomycin 1gm IV q24 and rifampin to treat possible PJI.  Monitor weekly LFTs.     - Recommend PICC line and 6 weeks of IV abx from date of blood culture clearance  Sensitivities reviewed, no oral options for chronic suppressive treatment due to resistance.      - s/pr IR aspiration of hip joint to evaluate for PJI on 4/30.     Send fluid for cell count with differential, gram stain, bacterial, AFB and fungal cultures.  (Only sent for cultures due to minimal fluid aspirated on 4/30).  Thus far IR cultures NGTD.      - Pt went to OR on 5/1 for open reduction, I&D, and head exchange.  f/u intra-op cultures.     - TTE no vegetations.  Repeat blood cultures cleared promptly, doubt CHIARA will change managment and have low suspicion for endocarditis.     - Monitor WBC, temp curve, trend inflammatory markers.       Covid (+):    - Cont supportive care.  Trend ferritin, D-dimer, CRP every 48-72 hours.  Monitor pulse ox.  Currently oxygenating adequately on room air.     - Cxr shows grossly clear lungs.      -  Repeat covid testing (-), d/c precautions.       UTI:    - Growing both E.coli and CoNS.  Cont rocephin (sensitive) x 7 days (through 4/27) and IV vanco x 6 weeks, f/u culture results.       - Pt with h/o pessary.  Alvares catheter placed in the hospital.  Recommend d/c and TOV    * f/u L hip culture data from IR aspiration and intra-op cultures.  d/c rocephin.  Repeat covid neg    Will follow,    Sue Armstrong  [phone]

## 2020-05-01 NOTE — PROGRESS NOTE ADULT - PROBLEM SELECTOR PLAN 1
Unclear chronicity of etiology but prior history of L. hemiarthroplasty in 01/2020 and hip was imaging after shows implant in place. s/p aspiration of L hip by IR to r/o infection.   - s/p L hip arthroplasty on 5/1   - pain control with acetaminophen (mild/mod pain) or oxycodone (severe pain) PRN  - appreciate ortho recs

## 2020-05-01 NOTE — PROGRESS NOTE ADULT - PROBLEM SELECTOR PLAN 4
UA positive with few WBCs Informed by patient that patient has a pessary - last exchanged in Oct/Nov 2019   - Continue ceftriaxone 1 g daily x 7 (4/23-4/30) days and Vancomycin.    - Cultures growing E coli (sensitive to Ceftriaxone) and Coag Neg Staph.    - Pt normally sees Dr. Jose BILLINGS in the [facility].

## 2020-05-01 NOTE — PROGRESS NOTE ADULT - SUBJECTIVE AND OBJECTIVE BOX
Infectious Diseases progress note:    Subjective:  s/p OR today, s/p L hip open reduction, I&D and head exchange    ROS:  CONSTITUTIONAL:  No fever, chills, rigors  CARDIOVASCULAR:  No chest pain or palpitations  RESPIRATORY:   No SOB, cough, dyspnea on exertion.  No wheezing  GASTROINTESTINAL:  No abd pain, N/V, diarrhea/constipation  EXTREMITIES:  No swelling or joint pain  GENITOURINARY:  No burning on urination, increased frequency or urgency.  No flank pain  NEUROLOGIC:  No HA, visual disturbances  SKIN: No rashes    Allergies    No Known Allergies    Intolerances        ANTIBIOTICS/RELEVANT:  antimicrobials  cefTRIAXone   IVPB 1000 milliGRAM(s) IV Intermittent every 24 hours  rifAMPin 300 milliGRAM(s) Oral two times a day  vancomycin  IVPB      vancomycin  IVPB 750 milliGRAM(s) IV Intermittent every 24 hours    immunologic:    OTHER:  amLODIPine   Tablet 5 milliGRAM(s) Oral daily  atorvastatin 10 milliGRAM(s) Oral at bedtime  enoxaparin Injectable 40 milliGRAM(s) SubCutaneous daily  lactated ringers. 1000 milliLiter(s) IV Continuous <Continuous>  melatonin 3 milliGRAM(s) Oral at bedtime  morphine  - Injectable 2 milliGRAM(s) IV Push every 4 hours PRN  oxyCODONE    IR 5 milliGRAM(s) Oral every 4 hours PRN  oxyCODONE    IR 2.5 milliGRAM(s) Oral every 4 hours PRN  pantoprazole    Tablet 40 milliGRAM(s) Oral before breakfast  senna 2 Tablet(s) Oral at bedtime PRN  traMADol 25 milliGRAM(s) Oral every 8 hours PRN      Objective:  Vital Signs Last 24 Hrs  T(C): 33.8 (01 May 2020 13:40), Max: 36.5 (30 Apr 2020 21:00)  T(F): 92.9 (01 May 2020 13:40), Max: 97.7 (30 Apr 2020 21:00)  HR: 78 (01 May 2020 12:12) (78 - 90)  BP: 127/56 (01 May 2020 12:12) (127/56 - 145/77)  BP(mean): --  RR: 18 (01 May 2020 12:12) (18 - 18)  SpO2: 97% (01 May 2020 12:12) (97% - 97%)    PHYSICAL EXAM:  Constitutional:NAD  Eyes:BINTA, EOMI  Ear/Nose/Throat: no thrush, mucositis.  Moist mucous membranes	  Neck:no JVD, no lymphadenopathy, supple  Respiratory: CTA sandra  Cardiovascular: S1S2 RRR, no murmurs  Gastrointestinal:soft, nontender,  nondistended (+) BS  Extremities:no e/e/c  Skin: L hip wound dsg in place  :  benson in place        LABS:                        10.7   13.12 )-----------( 485      ( 01 May 2020 14:09 )             34.5     05-01    137  |  107  |  15  ----------------------------<  160<H>  4.5   |  16<L>  |  0.75    Ca    8.1<L>      01 May 2020 14:09      PT/INR - ( 01 May 2020 04:09 )   PT: 16.2 SEC;   INR: 1.39          PTT - ( 01 May 2020 04:09 )  PTT:44.5 SEC      Procalcitonin, Serum: 0.08 (04-30 @ 04:12)  Procalcitonin, Serum: 0.12 (04-28 @ 10:00)  Procalcitonin, Serum: 0.12 (04-26 @ 06:09)  Procalcitonin, Serum: 0.13 (04-24 @ 07:08)    Vancomycin Level, Random:  ug/mL (04-27 @ 06:15)      Vancomycin Level, Trough: 19.6 ug/mL (04-29 @ 09:59)              MICROBIOLOGY:      Culture - Fungal, Body Fluid (05.01.20 @ 00:34)    Specimen Source: .Body Fluid lt hip aspiration    Culture Results:   Testing in progress    Culture - Body Fluid with Gram Stain (05.01.20 @ 00:34)    Gram Stain:   polymorphonuclear leukocytes  No organisms seen  by cytocentrifuge    Specimen Source: .Body Fluid left hip aspiration    Culture - Blood (04.24.20 @ 12:00)    Specimen Source: .Blood Blood-Venous    Culture Results:   No Growth Final    Culture - Blood (04.23.20 @ 00:56)    -  Trimethoprim/Sulfamethoxazole: R >2/38    -  Vancomycin: S 2    Gram Stain:   Growth in aerobic bottle: Gram Positive Cocci in Clusters  Growth in anaerobic bottle: Gram Positive Cocci in Clusters    -  Ampicillin/Sulbactam: R <=8/4    -  Cefazolin: R <=4    -  Gentamicin: R >8 Should not be used as monotherapy    -  Clindamycin: R >4    -  Erythromycin: R >4    -  Oxacillin: R >2    -  Penicillin: R >8    -  RIF- Rifampin: S <=1 Should not be used as monotherapy    -  Tetra/Doxy: R >8    Specimen Source: .Blood Blood-Peripheral    Organism: Gram Positive Cocci in Clusters    Culture Results:   Growth in aerobic and anaerobic bottles: Staphylococcus epidermidis    Organism Identification: Gram Positive Cocci in Clusters    Method Type: [place]              RADIOLOGY & ADDITIONAL STUDIES:    < from: Xray Pelvis AP only (05.01.20 @ 11:11) >  IMPRESSION:  Successfully reduced and anatomically realigned previously dislocated left hip hemiarthroplasty prosthesis with cemented femoral stem. No periprosthetic fractures or suspicious periprosthetic lucencies.     Surgical skin staples overlie lateral left gluteal/hip incision site.     Intact and unremarkable right hip and imaged pelvic osseous structures.     Generalized osteopenia vascular calcifications again noted.    < end of copied text >

## 2020-05-01 NOTE — ADVANCED PRACTICE NURSE CONSULT - REASON FOR CONSULT
Patient seen on skin care rounds after wound care referral received for assessment of skin impairment and recommendations of topical management. Chart reviewed. 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 Patient had a mechanical fall and underwent a left hip hemiarthroplasty in 1/2020 at [facility]. She tolerated surgery and discharged to [facility] rehab. She presented to [facility] on 3/13/2020 for painful vertebral compression fractures, once her pain was treated she was discharged to [place] rehab.    Multiple imaging performed including CT pelvis and femur, showing  dislocated femoral component of the left hip hemiarthroplasty. Mild cortical irregularity and lucencies at the left greater trochanter, which is difficult to assess due to artifact. This may be in part postsurgical although nondisplaced fracture cannot be excluded.  Also with foci of air in the left mid lateral thigh soft tissue, without a discrete fluid collection. Differential diagnosis includes injection, penetrating trauma and infection.  Question sigmoid colon diverticulitis.     Pt seen by Ortho service, pt initially planned IR aspiration of L hip to rule out prosthetic joint infection, given CT findings and bacteremia with CoNS. Patient s/p Open reduction, I&D, head exchange. Patient also being followed by Infectious disease for bacteremia. Patient seen on skin care rounds after wound care referral received for assessment of skin impairment and recommendations of topical management. Chart reviewed. 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 Patient had a mechanical fall and underwent a left hip hemiarthroplasty in 1/2020 at [facility]. She tolerated surgery and discharged to [facility] rehab. She presented to [facility] on 3/13/2020 for painful vertebral compression fractures, once her pain was treated she was discharged to [facility] rehab.    Multiple imaging performed including CT pelvis and femur, showing  dislocated femoral component of the left hip hemiarthroplasty. Mild cortical irregularity and lucencies at the left greater trochanter, which is difficult to assess due to artifact. This may be in part postsurgical although nondisplaced fracture cannot be excluded.  Also with foci of air in the left mid lateral thigh soft tissue, without a discrete fluid collection. Differential diagnosis includes injection, penetrating trauma and infection.  Question sigmoid colon diverticulitis.     Pt seen by Ortho service, pt initially planned IR aspiration of L hip to rule out prosthetic joint infection, given CT findings and bacteremia with CoNS. Patient s/p Open reduction, I&D, head exchange on 5/1 (OR previously postponed due to (+) COVID 19 status). Patient also being followed by Infectious disease for bacteremia.

## 2020-05-02 LAB
ALBUMIN SERPL ELPH-MCNC: 2 G/DL — LOW (ref 3.3–5)
ALP SERPL-CCNC: 130 U/L — HIGH (ref 40–120)
ALT FLD-CCNC: 8 U/L — SIGNIFICANT CHANGE UP (ref 4–33)
ANION GAP SERPL CALC-SCNC: 13 MMO/L — SIGNIFICANT CHANGE UP (ref 7–14)
ANION GAP SERPL CALC-SCNC: 13 MMO/L — SIGNIFICANT CHANGE UP (ref 7–14)
AST SERPL-CCNC: 17 U/L — SIGNIFICANT CHANGE UP (ref 4–32)
BILIRUB SERPL-MCNC: 0.4 MG/DL — SIGNIFICANT CHANGE UP (ref 0.2–1.2)
BUN SERPL-MCNC: 15 MG/DL — SIGNIFICANT CHANGE UP (ref 7–23)
BUN SERPL-MCNC: 15 MG/DL — SIGNIFICANT CHANGE UP (ref 7–23)
CALCIUM SERPL-MCNC: 8.4 MG/DL — SIGNIFICANT CHANGE UP (ref 8.4–10.5)
CALCIUM SERPL-MCNC: 8.4 MG/DL — SIGNIFICANT CHANGE UP (ref 8.4–10.5)
CHLORIDE SERPL-SCNC: 106 MMOL/L — SIGNIFICANT CHANGE UP (ref 98–107)
CHLORIDE SERPL-SCNC: 106 MMOL/L — SIGNIFICANT CHANGE UP (ref 98–107)
CO2 SERPL-SCNC: 21 MMOL/L — LOW (ref 22–31)
CO2 SERPL-SCNC: 21 MMOL/L — LOW (ref 22–31)
CREAT SERPL-MCNC: 1.02 MG/DL — SIGNIFICANT CHANGE UP (ref 0.5–1.3)
CREAT SERPL-MCNC: 1.02 MG/DL — SIGNIFICANT CHANGE UP (ref 0.5–1.3)
D DIMER BLD IA.RAPID-MCNC: 784 NG/ML — SIGNIFICANT CHANGE UP
FERRITIN SERPL-MCNC: 173.2 NG/ML — HIGH (ref 15–150)
GLUCOSE SERPL-MCNC: 80 MG/DL — SIGNIFICANT CHANGE UP (ref 70–99)
GLUCOSE SERPL-MCNC: 80 MG/DL — SIGNIFICANT CHANGE UP (ref 70–99)
HCT VFR BLD CALC: 30.3 % — LOW (ref 34.5–45)
HGB BLD-MCNC: 9.4 G/DL — LOW (ref 11.5–15.5)
MAGNESIUM SERPL-MCNC: 1.6 MG/DL — SIGNIFICANT CHANGE UP (ref 1.6–2.6)
MCHC RBC-ENTMCNC: 26.9 PG — LOW (ref 27–34)
MCHC RBC-ENTMCNC: 31 % — LOW (ref 32–36)
MCV RBC AUTO: 86.8 FL — SIGNIFICANT CHANGE UP (ref 80–100)
NRBC # FLD: 0.02 K/UL — SIGNIFICANT CHANGE UP (ref 0–0)
PHOSPHATE SERPL-MCNC: 2.9 MG/DL — SIGNIFICANT CHANGE UP (ref 2.5–4.5)
PLATELET # BLD AUTO: 541 K/UL — HIGH (ref 150–400)
PMV BLD: 9.4 FL — SIGNIFICANT CHANGE UP (ref 7–13)
POTASSIUM SERPL-MCNC: 5 MMOL/L — SIGNIFICANT CHANGE UP (ref 3.5–5.3)
POTASSIUM SERPL-MCNC: 5 MMOL/L — SIGNIFICANT CHANGE UP (ref 3.5–5.3)
POTASSIUM SERPL-SCNC: 5 MMOL/L — SIGNIFICANT CHANGE UP (ref 3.5–5.3)
POTASSIUM SERPL-SCNC: 5 MMOL/L — SIGNIFICANT CHANGE UP (ref 3.5–5.3)
PROCALCITONIN SERPL-MCNC: 0.28 NG/ML — HIGH (ref 0.02–0.1)
PROT SERPL-MCNC: 5.5 G/DL — LOW (ref 6–8.3)
RBC # BLD: 3.49 M/UL — LOW (ref 3.8–5.2)
RBC # FLD: 18.2 % — HIGH (ref 10.3–14.5)
SODIUM SERPL-SCNC: 140 MMOL/L — SIGNIFICANT CHANGE UP (ref 135–145)
SODIUM SERPL-SCNC: 140 MMOL/L — SIGNIFICANT CHANGE UP (ref 135–145)
WBC # BLD: 17.24 K/UL — HIGH (ref 3.8–10.5)
WBC # FLD AUTO: 17.24 K/UL — HIGH (ref 3.8–10.5)

## 2020-05-02 PROCEDURE — 99233 SBSQ HOSP IP/OBS HIGH 50: CPT

## 2020-05-02 RX ADMIN — PANTOPRAZOLE SODIUM 40 MILLIGRAM(S): 20 TABLET, DELAYED RELEASE ORAL at 05:24

## 2020-05-02 RX ADMIN — OXYCODONE HYDROCHLORIDE 5 MILLIGRAM(S): 5 TABLET ORAL at 12:17

## 2020-05-02 RX ADMIN — OXYCODONE HYDROCHLORIDE 5 MILLIGRAM(S): 5 TABLET ORAL at 23:37

## 2020-05-02 RX ADMIN — Medication 3 MILLIGRAM(S): at 22:27

## 2020-05-02 RX ADMIN — ENOXAPARIN SODIUM 40 MILLIGRAM(S): 100 INJECTION SUBCUTANEOUS at 10:37

## 2020-05-02 RX ADMIN — ATORVASTATIN CALCIUM 10 MILLIGRAM(S): 80 TABLET, FILM COATED ORAL at 22:27

## 2020-05-02 RX ADMIN — OXYCODONE HYDROCHLORIDE 5 MILLIGRAM(S): 5 TABLET ORAL at 02:30

## 2020-05-02 RX ADMIN — Medication 250 MILLIGRAM(S): at 12:25

## 2020-05-02 NOTE — OCCUPATIONAL THERAPY INITIAL EVALUATION ADULT - DIAGNOSIS, OT EVAL
s/p open reduction left hip hemiarthroplasty, s/p COVID-19; decreased functional mobility, decreased ADL performance, decreased ability to follow commands

## 2020-05-02 NOTE — PHYSICAL THERAPY INITIAL EVALUATION ADULT - MANUAL MUSCLE TESTING RESULTS, REHAB EVAL
bilateral UE and right LE 3-/5 left LE not assessed
unable to formally assess secondary to pt. presenting with confusion and difficulty following commands.

## 2020-05-02 NOTE — PHYSICAL THERAPY INITIAL EVALUATION ADULT - IMPAIRMENTS FOUND, PT EVAL
aerobic capacity/endurance/muscle strength/gait, locomotion, and balance/ROM
muscle strength/gait, locomotion, and balance/aerobic capacity/endurance

## 2020-05-02 NOTE — PHYSICAL THERAPY INITIAL EVALUATION ADULT - CRITERIA FOR SKILLED THERAPEUTIC INTERVENTIONS
bed mobility, transfers and ambulation/functional limitations in following categories
impairments found/rehab potential

## 2020-05-02 NOTE — PHYSICAL THERAPY INITIAL EVALUATION ADULT - RANGE OF MOTION EXAMINATION, REHAB EVAL
unable to formally assess Left LE secondary to pt. reporting 10/10 pain upon movement. left ankle Rom WFL, left hip ROM 0-45 degrees./bilateral upper extremity ROM was WFL (within functional limits)/Right LE ROM was WFL (within functional limits)

## 2020-05-02 NOTE — OCCUPATIONAL THERAPY INITIAL EVALUATION ADULT - PRECAUTIONS/LIMITATIONS, REHAB EVAL
[place]/CONTACT-COVID+, strict left posterior hip precautions/left hip precautions/isolation precautions/fall precautions

## 2020-05-02 NOTE — PROGRESS NOTE ADULT - PROBLEM SELECTOR PLAN 4
UA positive with few WBCs Informed by patient that patient has a pessary - last exchanged in Oct/Nov 2019   - Continue ceftriaxone 1 g daily x 7 (4/23-4/30) days and Vancomycin.    - Cultures growing E coli (sensitive to Ceftriaxone) and Coag Neg Staph.    - Pt normally sees Dr. Jose BILLINGS in the [facility].

## 2020-05-02 NOTE — PHYSICAL THERAPY INITIAL EVALUATION ADULT - GENERAL OBSERVATIONS, REHAB EVAL
pt. received supine in bed. Pt. refusing to wear abduction pillow and found in bed with right LE internally rotated and crossing midline. Nursing staff alerted. pt. educated on importance of maintain compliance with importance of wearing abduction pillow

## 2020-05-02 NOTE — OCCUPATIONAL THERAPY INITIAL EVALUATION ADULT - ANTICIPATED DISCHARGE DISPOSITION, OT EVAL
TBD pending assessment of functional mobility when appropriate, however, anticipating return to [facility] with OT services

## 2020-05-02 NOTE — PHYSICAL THERAPY INITIAL EVALUATION ADULT - LEVEL OF INDEPENDENCE, REHAB EVAL
unable to perform/due to +bucks traction ; patient on strict bedrest
secondary to reports of pain with movement and wanting to remain in bed. RN aware. Will assess as appropriate./unable to perform

## 2020-05-02 NOTE — PHYSICAL THERAPY INITIAL EVALUATION ADULT - ADDITIONAL COMMENTS
As per chart, was ambulating at [place] but recently stopped.
pt. presenting with confusion unable to provide therapy goals. as per medical documents, pt. was admitted to [facility] from [facility] where she was ambulating. Pt. left supine in bed with all tubes/lines intact, call bell in reach and in NAD. RN aware.

## 2020-05-02 NOTE — OCCUPATIONAL THERAPY INITIAL EVALUATION ADULT - ADDITIONAL COMMENTS
Patient unable to provide social history/PLOF due to confusion. Per care coordination assessment, patient transitioning to LTC at [facility]. Patient required assistance with ADLs and was able to ambulate with assistance prior to fall at [place].

## 2020-05-02 NOTE — PROGRESS NOTE ADULT - ASSESSMENT
89 W h/o Dementia, HTN, and recent L hemiarthroplasty in 01/2020 presents from [facility] for L prosthetic hip dislocation of unclear etiology and chronicity, found to have elevated inflammatory markers and mild COVID19 infection. Was found to be bacteremic (+ blood cx on 4/22 and 4/23) with staph epidermidis with concern for prosthetic joint infection and also found to have UTI with CoNS and E coli.    Updates can be given to daughter Dianne Kennedy, she can be reached at [phone]. Dianne states that she has some dementia and memory issues at baseline and her mental status can wax and wane.    We reviewed code status and patient is DNR/DNI.

## 2020-05-02 NOTE — OCCUPATIONAL THERAPY INITIAL EVALUATION ADULT - PERTINENT HX OF CURRENT PROBLEM, REHAB EVAL
89 year old female with history of Dementia, HTN, and recent L hemiarthroplasty in 01/2020 presents from [facility] for left prosthetic hip dislocation as well as mild COVID19 infection. Was found to be bacteremic with UTI. Patient is now s/p open reduction and head exchange left hip hemiarthroplasty on 5/1/2020.

## 2020-05-02 NOTE — PROGRESS NOTE ADULT - SUBJECTIVE AND OBJECTIVE BOX
Patient is a 89y old  Female who presents with a chief complaint of LE pain (02 May 2020 06:34)      SUBJECTIVE / OVERNIGHT EVENTS:    No events overnight. This AM, patient without n/v/d/cp/sob.     MEDICATIONS  (STANDING):  amLODIPine   Tablet 5 milliGRAM(s) Oral daily  atorvastatin 10 milliGRAM(s) Oral at bedtime  enoxaparin Injectable 40 milliGRAM(s) SubCutaneous daily  lactated ringers. 1000 milliLiter(s) (100 mL/Hr) IV Continuous <Continuous>  melatonin 3 milliGRAM(s) Oral at bedtime  pantoprazole    Tablet 40 milliGRAM(s) Oral before breakfast  rifAMPin 300 milliGRAM(s) Oral two times a day  vancomycin  IVPB      vancomycin  IVPB 750 milliGRAM(s) IV Intermittent every 24 hours    MEDICATIONS  (PRN):  oxyCODONE    IR 5 milliGRAM(s) Oral every 4 hours PRN Severe Pain (7 - 10)  oxyCODONE    IR 2.5 milliGRAM(s) Oral every 4 hours PRN Moderate Pain (4 - 6)  senna 2 Tablet(s) Oral at bedtime PRN Constipation  traMADol 25 milliGRAM(s) Oral every 8 hours PRN Mild Pain (1 - 3)      PHYSICAL EXAM:  T(C): 36.9 (05-02-20 @ 12:15), Max: 36.9 (05-02-20 @ 05:22)  HR: 90 (05-02-20 @ 12:15) (84 - 90)  BP: 105/59 (05-02-20 @ 12:15) (105/59 - 109/60)  RR: 18 (05-02-20 @ 12:15) (18 - 18)  SpO2: 97% (05-02-20 @ 12:15) (97% - 97%)  I&O's Summary    01 May 2020 07:01  -  02 May 2020 07:00  --------------------------------------------------------  IN: 0 mL / OUT: 400 mL / NET: -400 mL    02 May 2020 07:01  -  02 May 2020 13:28  --------------------------------------------------------  IN: 100 mL / OUT: 100 mL / NET: 0 mL      GENERAL: NAD, well-developed  HEAD:  Atraumatic, Normocephalic, MMM  CHEST/LUNG: No use of accessory muscles, speaking in complete sentences   : Alvares in place draining clear urine  PSYCH: AAOx1  NEUROLOGY: CN II-XII grossly intact, moving all extremities  SKIN: No rashes or lesions  Ext: wwp, L leg in bandage c/d/i    LABS:  CAPILLARY BLOOD GLUCOSE                              9.4    17.24 )-----------( 541      ( 02 May 2020 08:15 )             30.3     05-02    140  |  106  |  15  ----------------------------<  80  5.0   |  21<L>  |  1.02    Ca    8.4      02 May 2020 08:15  Phos  2.9     05-02  Mg     1.6     05-02    TPro  5.5<L>  /  Alb  2.0<L>  /  TBili  0.4  /  DBili  x   /  AST  17  /  ALT  8   /  AlkPhos  130<H>  05-02    PT/INR - ( 01 May 2020 04:09 )   PT: 16.2 SEC;   INR: 1.39          PTT - ( 01 May 2020 04:09 )  PTT:44.5 SEC          Culture - Fungal, Body Fluid (collected 01 May 2020 00:34)  Source: .Body Fluid lt hip aspiration  Preliminary Report (01 May 2020 07:07):    Testing in progress    Culture - Body Fluid with Gram Stain (collected 01 May 2020 00:34)  Source: .Body Fluid left hip aspiration  Gram Stain (01 May 2020 05:09):    polymorphonuclear leukocytes    No organisms seen    by cytocentrifuge  Preliminary Report (01 May 2020 20:16):    No growth        RADIOLOGY & ADDITIONAL TESTS:    Telemetry Personally Reviewed -     Imaging Personally Reviewed -     Imaging Reviewed -     Consultant(s) Notes Reviewed -       Care Discussed with Consultants/Other Providers -

## 2020-05-02 NOTE — PHYSICAL THERAPY INITIAL EVALUATION ADULT - PATIENT/FAMILY/SIGNIFICANT OTHER GOALS STATEMENT, PT EVAL
patient wants to move but can't right now
pt unable to provide therapy goal secondary confusion and difficulty with following commands.

## 2020-05-02 NOTE — OCCUPATIONAL THERAPY INITIAL EVALUATION ADULT - GENERAL OBSERVATIONS, REHAB EVAL
Patient received semisupine in bed with left LE crossed midline. Patient required education and repositioning in bed in order to maintain strict left posterior hip precautions. Hip abduction pillow placed. 0 -1.78

## 2020-05-02 NOTE — PROGRESS NOTE ADULT - SUBJECTIVE AND OBJECTIVE BOX
Orthopaedic Surgery Progress Note    Subjective:   Patient seen and examined  No acute events overnight  Pain well controlled    Objective:  T(C): 36.9 (05-02-20 @ 05:22), Max: 36.9 (05-02-20 @ 05:22)  HR: 84 (05-02-20 @ 05:22) (78 - 84)  BP: 109/60 (05-02-20 @ 05:22) (109/60 - 138/68)  RR: 18 (05-02-20 @ 05:22) (18 - 18)  SpO2: 97% (05-02-20 @ 05:22) (97% - 97%)    04-30 @ 07:01  -  05-01 @ 07:00  --------------------------------------------------------  IN: 1435 mL / OUT: 900 mL / NET: 535 mL    05-01 @ 07:01  -  05-02 @ 06:34  --------------------------------------------------------  IN: 0 mL / OUT: 400 mL / NET: -400 mL    PE  NAD  LLE:   dressing C/D/I  abduction pillow in place  wiggles toes but not compliant with formal exam  SILT grossly  WWP distally                        10.7   13.12 )-----------( 485      ( 01 May 2020 14:09 )             34.5     05-01    137  |  107  |  15  ----------------------------<  160<H>  4.5   |  16<L>  |  0.75    Ca    8.1<L>      01 May 2020 14:09    PT/INR - ( 01 May 2020 04:09 )   PT: 16.2 SEC;   INR: 1.39       PTT - ( 01 May 2020 04:09 )  PTT:44.5 SEC    89y Female POD1 s/p open reduction and head exchange L hip hemiarthroplasty    - Pain control  - WBAT  - STRICT posterior dislocation precautions  - PT/OT/OOB  - DVT ppx  - Abx per ID, however we doubt there is an infection of the arthroplasty  - FU OR cultures  - FU IR cultures  - FU OR pathology  - Wound care for LLE pressure ulcerations  - DNR  - Dispo planning    Fady Yoder MD

## 2020-05-02 NOTE — PHYSICAL THERAPY INITIAL EVALUATION ADULT - PERTINENT HX OF CURRENT PROBLEM, REHAB EVAL
Pt. is a 89 year old female presents with Left prosthetic hip dislocation of unknown etiology and chronicity.  s/p repair hip prosthetic on 5/1/20. PMH: HTN,

## 2020-05-02 NOTE — PHYSICAL THERAPY INITIAL EVALUATION ADULT - DISCHARGE DISPOSITION, PT EVAL
Anticipate [facility] to improve functional mobility and strength and to return to baseline functional status. However, functional evaluation pending due to patient currently on strict bedrest. Will continue to follow.
anticipate therapy recommendations to return to rehab center. To be determined upon further assessment of functional mobility. Please refer to PT notes for further assessment when feasible.

## 2020-05-03 ENCOUNTER — TRANSCRIPTION ENCOUNTER (OUTPATIENT)
Age: 85
End: 2020-05-03

## 2020-05-03 LAB
ALBUMIN SERPL ELPH-MCNC: 1.6 G/DL — LOW (ref 3.3–5)
ALP SERPL-CCNC: 128 U/L — HIGH (ref 40–120)
ALT FLD-CCNC: 12 U/L — SIGNIFICANT CHANGE UP (ref 4–33)
ANION GAP SERPL CALC-SCNC: 9 MMO/L — SIGNIFICANT CHANGE UP (ref 7–14)
APTT BLD: 50.3 SEC — HIGH (ref 27.5–36.3)
AST SERPL-CCNC: 12 U/L — SIGNIFICANT CHANGE UP (ref 4–32)
BILIRUB SERPL-MCNC: 0.3 MG/DL — SIGNIFICANT CHANGE UP (ref 0.2–1.2)
BLD GP AB SCN SERPL QL: NEGATIVE — SIGNIFICANT CHANGE UP
BUN SERPL-MCNC: 20 MG/DL — SIGNIFICANT CHANGE UP (ref 7–23)
CALCIUM SERPL-MCNC: 8.2 MG/DL — LOW (ref 8.4–10.5)
CHLORIDE SERPL-SCNC: 106 MMOL/L — SIGNIFICANT CHANGE UP (ref 98–107)
CO2 SERPL-SCNC: 22 MMOL/L — SIGNIFICANT CHANGE UP (ref 22–31)
CREAT SERPL-MCNC: 1.01 MG/DL — SIGNIFICANT CHANGE UP (ref 0.5–1.3)
GLUCOSE SERPL-MCNC: 74 MG/DL — SIGNIFICANT CHANGE UP (ref 70–99)
HCT VFR BLD CALC: 26.8 % — LOW (ref 34.5–45)
HGB BLD-MCNC: 8.3 G/DL — LOW (ref 11.5–15.5)
INR BLD: 2.76 — HIGH (ref 0.88–1.17)
INR BLD: 2.95 — HIGH (ref 0.88–1.17)
MAGNESIUM SERPL-MCNC: 1.7 MG/DL — SIGNIFICANT CHANGE UP (ref 1.6–2.6)
MCHC RBC-ENTMCNC: 27.2 PG — SIGNIFICANT CHANGE UP (ref 27–34)
MCHC RBC-ENTMCNC: 31 % — LOW (ref 32–36)
MCV RBC AUTO: 87.9 FL — SIGNIFICANT CHANGE UP (ref 80–100)
NRBC # FLD: 0 K/UL — SIGNIFICANT CHANGE UP (ref 0–0)
PHOSPHATE SERPL-MCNC: 3.2 MG/DL — SIGNIFICANT CHANGE UP (ref 2.5–4.5)
PLATELET # BLD AUTO: 458 K/UL — HIGH (ref 150–400)
PMV BLD: 9.5 FL — SIGNIFICANT CHANGE UP (ref 7–13)
POTASSIUM SERPL-MCNC: 4.4 MMOL/L — SIGNIFICANT CHANGE UP (ref 3.5–5.3)
POTASSIUM SERPL-SCNC: 4.4 MMOL/L — SIGNIFICANT CHANGE UP (ref 3.5–5.3)
PROT SERPL-MCNC: 4.9 G/DL — LOW (ref 6–8.3)
PROTHROM AB SERPL-ACNC: 32.7 SEC — HIGH (ref 9.8–13.1)
PROTHROM AB SERPL-ACNC: 34.8 SEC — HIGH (ref 9.8–13.1)
RBC # BLD: 3.05 M/UL — LOW (ref 3.8–5.2)
RBC # FLD: 18.7 % — HIGH (ref 10.3–14.5)
RH IG SCN BLD-IMP: NEGATIVE — SIGNIFICANT CHANGE UP
SODIUM SERPL-SCNC: 137 MMOL/L — SIGNIFICANT CHANGE UP (ref 135–145)
VANCOMYCIN TROUGH SERPL-MCNC: 20.5 UG/ML — HIGH (ref 10–20)
WBC # BLD: 13.88 K/UL — HIGH (ref 3.8–10.5)
WBC # FLD AUTO: 13.88 K/UL — HIGH (ref 3.8–10.5)

## 2020-05-03 PROCEDURE — 73552 X-RAY EXAM OF FEMUR 2/>: CPT | Mod: 26,LT

## 2020-05-03 PROCEDURE — 99233 SBSQ HOSP IP/OBS HIGH 50: CPT

## 2020-05-03 PROCEDURE — 73590 X-RAY EXAM OF LOWER LEG: CPT | Mod: 26,LT

## 2020-05-03 PROCEDURE — 73502 X-RAY EXAM HIP UNI 2-3 VIEWS: CPT | Mod: 26,LT

## 2020-05-03 RX ORDER — ENOXAPARIN SODIUM 100 MG/ML
40 INJECTION SUBCUTANEOUS DAILY
Refills: 0 | Status: DISCONTINUED | OUTPATIENT
Start: 2020-05-03 | End: 2020-05-03

## 2020-05-03 RX ORDER — SODIUM CHLORIDE 9 MG/ML
1000 INJECTION INTRAMUSCULAR; INTRAVENOUS; SUBCUTANEOUS
Refills: 0 | Status: DISCONTINUED | OUTPATIENT
Start: 2020-05-03 | End: 2020-05-07

## 2020-05-03 RX ORDER — DIAZEPAM 5 MG
5 TABLET ORAL ONCE
Refills: 0 | Status: DISCONTINUED | OUTPATIENT
Start: 2020-05-03 | End: 2020-05-03

## 2020-05-03 RX ORDER — PHYTONADIONE (VIT K1) 5 MG
10 TABLET ORAL ONCE
Refills: 0 | Status: COMPLETED | OUTPATIENT
Start: 2020-05-03 | End: 2020-05-03

## 2020-05-03 RX ORDER — MORPHINE SULFATE 50 MG/1
4 CAPSULE, EXTENDED RELEASE ORAL ONCE
Refills: 0 | Status: DISCONTINUED | OUTPATIENT
Start: 2020-05-03 | End: 2020-05-03

## 2020-05-03 RX ADMIN — TRAMADOL HYDROCHLORIDE 25 MILLIGRAM(S): 50 TABLET ORAL at 22:20

## 2020-05-03 RX ADMIN — Medication 3 MILLIGRAM(S): at 21:08

## 2020-05-03 RX ADMIN — ATORVASTATIN CALCIUM 10 MILLIGRAM(S): 80 TABLET, FILM COATED ORAL at 21:08

## 2020-05-03 RX ADMIN — PANTOPRAZOLE SODIUM 40 MILLIGRAM(S): 20 TABLET, DELAYED RELEASE ORAL at 05:57

## 2020-05-03 RX ADMIN — MORPHINE SULFATE 4 MILLIGRAM(S): 50 CAPSULE, EXTENDED RELEASE ORAL at 08:28

## 2020-05-03 RX ADMIN — Medication 5 MILLIGRAM(S): at 08:31

## 2020-05-03 RX ADMIN — Medication 250 MILLIGRAM(S): at 12:37

## 2020-05-03 RX ADMIN — AMLODIPINE BESYLATE 5 MILLIGRAM(S): 2.5 TABLET ORAL at 06:00

## 2020-05-03 NOTE — CHART NOTE - NSCHARTNOTEFT_GEN_A_CORE
Ortho    LLE shortened and internally rotated on AM rounds. She is POD#2 s/p open reduction of a dislocated L hip hemiarthroplasty with conversion to a dual mobility head. Xray demonstrates recurrent dislocation, atraumatic. Discussed with attending Dr. Mijares, who advised close reduction bedside.    Close reduction attempted at bedside with IV morphine and valium sedation. Monitoring with continuous pulse oximetry, which demonstrated transient decrease to 88 that improved with stimulation. Resting O2 consistently at 96-98% on RA during attempts at close reduction and stably as sedation wore off.    Post-procedure xrays performed demonstrates persistent dislocation.     Given the persistent instability, unclear benefit of return to OR for repeat attempts at close vs open reduction as she will likely dislocate again, particularly since she sustained an atraumatic dislocation despite conversion to dual mobility.  Will discuss with attending Dr. Mijares regarding further plans.     Camelia Teixeira PGY5  Ortho [id number] Ortho    LLE shortened and internally rotated on AM rounds. She is POD#2 s/p open reduction of a dislocated L hip hemiarthroplasty with conversion to a dual mobility head. Xray demonstrates recurrent dislocation, atraumatic. Discussed with attending Dr. Mijares, who advised close reduction bedside.    Close reduction attempted at bedside with IV morphine and valium sedation. Monitoring with continuous pulse oximetry, which demonstrated transient decrease to 88 that improved with stimulation. Resting O2 consistently at 96-98% on RA during attempts at close reduction and stably as sedation wore off.    Post-procedure xrays performed demonstrates persistent dislocation. Xrays of the femur and tibia demonstrate no acute fracture.     Given the persistent instability, unclear benefit of return to OR for repeat attempts at close vs open reduction as she will likely dislocate again, particularly since she sustained an atraumatic dislocation despite conversion to dual mobility.  Will discuss with attending Dr. Mijares regarding further plans.     Camelia Teixeira PGY5  Ortho [id number] Ortho    LLE shortened and internally rotated on AM rounds. She is POD#2 s/p open reduction of a dislocated L hip hemiarthroplasty with conversion to a dual mobility head. Xray demonstrates recurrent dislocation, atraumatic. Discussed with attending Dr. Mijares, who advised close reduction bedside.    Close reduction attempted at bedside with IV morphine and valium sedation. Monitoring with continuous pulse oximetry, which demonstrated transient decrease to 88 that improved with stimulation. Resting O2 consistently at 96-98% on RA during attempts at close reduction and stably as sedation wore off.    Post-procedure xrays performed demonstrates persistent dislocation. Xrays of the femur and tibia demonstrate no acute fracture.     Given the persistent instability, unclear benefit of reattempting reduction as she will likely dislocate again, particularly since she sustained an atraumatic dislocation despite conversion to dual mobility. Likely may require RTOR for revision.   Will discuss with attending Dr. Mijares regarding further plans.     Camelia Teixeira PGY5  Ortho [id number]

## 2020-05-03 NOTE — PROGRESS NOTE ADULT - ASSESSMENT
89 W h/o Dementia, HTN, and recent L hemiarthroplasty in 01/2020 presents from [facility] for L prosthetic hip dislocation of unclear etiology and chronicity, found to have elevated inflammatory markers and mild COVID19 infection. Was found to be bacteremic (+ blood cx on 4/22 and 4/23) with staph epidermidis with concern for prosthetic joint infection and also found to have UTI with CoNS and E coli.    Updates can be given to daughter Dianne Kennedy, she can be reached at [phone]. Dianne states that she has some dementia and memory issues at baseline and her mental status can wax and wane.    We reviewed code status and patient is DNR/DNI.

## 2020-05-03 NOTE — PROGRESS NOTE ADULT - SUBJECTIVE AND OBJECTIVE BOX
Patient is a 89y old  Female who presents with a chief complaint of LE pain (03 May 2020 06:51)      SUBJECTIVE / OVERNIGHT EVENTS:    No events overnight. This AM, patient without n/v/d. SOB improving with supplemental oxygen. No cp.    MEDICATIONS  (STANDING):  amLODIPine   Tablet 5 milliGRAM(s) Oral daily  atorvastatin 10 milliGRAM(s) Oral at bedtime  melatonin 3 milliGRAM(s) Oral at bedtime  pantoprazole    Tablet 40 milliGRAM(s) Oral before breakfast  rifAMPin 300 milliGRAM(s) Oral two times a day  vancomycin  IVPB      vancomycin  IVPB 750 milliGRAM(s) IV Intermittent every 24 hours    MEDICATIONS  (PRN):  oxyCODONE    IR 5 milliGRAM(s) Oral every 4 hours PRN Severe Pain (7 - 10)  oxyCODONE    IR 2.5 milliGRAM(s) Oral every 4 hours PRN Moderate Pain (4 - 6)  senna 2 Tablet(s) Oral at bedtime PRN Constipation  traMADol 25 milliGRAM(s) Oral every 8 hours PRN Mild Pain (1 - 3)      PHYSICAL EXAM:  T(C): 36.8 (05-03-20 @ 10:40), Max: 37 (05-02-20 @ 20:52)  HR: 95 (05-03-20 @ 10:40) (92 - 105)  BP: 125/61 (05-03-20 @ 10:40) (105/49 - 132/65)  RR: 20 (05-03-20 @ 10:40) (18 - 20)  SpO2: 96% (05-03-20 @ 10:40) (95% - 100%)  I&O's Summary    02 May 2020 07:01  -  03 May 2020 07:00  --------------------------------------------------------  IN: 100 mL / OUT: 100 mL / NET: 0 mL      GENERAL: NAD, well-developed  HEAD:  Atraumatic, Normocephalic, MMM  CHEST/LUNG: No use of accessory muscles, speaking in complete sentences   : Alvares in place draining clear urine  PSYCH: AAOx1  NEUROLOGY: CN II-XII grossly intact, moving all extremities  SKIN: No rashes or lesions  Ext: wwp, L leg in bandage c/d/i    LABS:  CAPILLARY BLOOD GLUCOSE                              8.3    13.88 )-----------( 458      ( 03 May 2020 06:10 )             26.8     05-03    137  |  106  |  20  ----------------------------<  74  4.4   |  22  |  1.01    Ca    8.2<L>      03 May 2020 06:10  Phos  3.2     05-03  Mg     1.7     05-03    TPro  4.9<L>  /  Alb  1.6<L>  /  TBili  0.3  /  DBili  x   /  AST  12  /  ALT  12  /  AlkPhos  128<H>  05-03    PT/INR - ( 03 May 2020 07:58 )   PT: 32.7 SEC;   INR: 2.76          PTT - ( 03 May 2020 07:58 )  PTT:50.3 SEC          Culture - Fungal, Body Fluid (collected 01 May 2020 00:34)  Source: .Body Fluid lt hip aspiration  Preliminary Report (01 May 2020 07:07):    Testing in progress    Culture - Body Fluid with Gram Stain (collected 01 May 2020 00:34)  Source: .Body Fluid left hip aspiration  Gram Stain (01 May 2020 05:09):    polymorphonuclear leukocytes    No organisms seen    by cytocentrifuge  Preliminary Report (01 May 2020 20:16):    No growth        RADIOLOGY & ADDITIONAL TESTS:    Telemetry Personally Reviewed -     Imaging Personally Reviewed -     Imaging Reviewed -     Consultant(s) Notes Reviewed -       Care Discussed with Consultants/Other Providers -

## 2020-05-03 NOTE — PROGRESS NOTE ADULT - PROBLEM SELECTOR PLAN 1
Unclear chronicity of etiology but prior history of L. hemiarthroplasty in 01/2020 and hip was imaging after shows implant in place. s/p aspiration of L hip by IR to r/o infection.   - s/p L hip arthroplasty on 5/1, but now with recurrent dislocation requiring return to OR  - pain control with acetaminophen (mild/mod pain) or oxycodone (severe pain) PRN  - appreciate ortho recs  - Revised Cardiac Risk Index is calculated to be 0.4%, not at elevated risk.  Pt has no know CAD, no CHF, no h/o Diabetes or stroke.  Clear from a medical standpoint for surgery.

## 2020-05-03 NOTE — PROGRESS NOTE ADULT - PROBLEM SELECTOR PLAN 4
UA positive with few WBCs Informed by patient that patient has a pessary - last exchanged in Oct/Nov 2019   - completed ceftriaxone 1 g daily x 7 (4/23-4/30) days; c/w Vancomycin.    - Cultures growing E coli (sensitive to Ceftriaxone) and Coag Neg Staph.    - Pt normally sees Dr. Jose BILLINGS in the [facility].

## 2020-05-03 NOTE — PROGRESS NOTE ADULT - ASSESSMENT
89y Female POD2 s/p open reduction and head exchange L hip hemiarthroplasty    - Pain control  - WBAT  - STRICT posterior dislocation precautions  - PT/OT/OOB  - DVT ppx  - Abx per ID, however we doubt there is an infection of the arthroplasty  - FU OR cultures  - FU IR cultures  - FU OR pathology  - Wound care for LLE pressure ulcerations  - DNR  - Dispo planning 89y Female POD2 s/p open reduction and head exchange L hip hemiarthroplasty    - FU AP pelvis to evaluate re-dislocation  - Pain control  - WBAT  - STRICT posterior dislocation precautions  - PT/OT/OOB  - DVT ppx  - Abx per ID, however we doubt there is an infection of the arthroplasty  - FU OR cultures  - FU IR cultures  - FU OR pathology  - Wound care for LLE pressure ulcerations  - DNR  - Dispo planning

## 2020-05-03 NOTE — PROGRESS NOTE ADULT - SUBJECTIVE AND OBJECTIVE BOX
Orthopedic Surgery Progress Note     S: Patient seen and examined today. No acute events overnight. Pain is well controlled.     MEDICATIONS  (STANDING):  amLODIPine   Tablet 5 milliGRAM(s) Oral daily  atorvastatin 10 milliGRAM(s) Oral at bedtime  enoxaparin Injectable 40 milliGRAM(s) SubCutaneous daily  melatonin 3 milliGRAM(s) Oral at bedtime  pantoprazole    Tablet 40 milliGRAM(s) Oral before breakfast  rifAMPin 300 milliGRAM(s) Oral two times a day  vancomycin  IVPB      vancomycin  IVPB 750 milliGRAM(s) IV Intermittent every 24 hours    MEDICATIONS  (PRN):  oxyCODONE    IR 5 milliGRAM(s) Oral every 4 hours PRN Severe Pain (7 - 10)  oxyCODONE    IR 2.5 milliGRAM(s) Oral every 4 hours PRN Moderate Pain (4 - 6)  senna 2 Tablet(s) Oral at bedtime PRN Constipation  traMADol 25 milliGRAM(s) Oral every 8 hours PRN Mild Pain (1 - 3)      Physical Exam:    Vital Signs Last 24 Hrs  T(C): 36.8 (03 May 2020 05:52), Max: 37 (02 May 2020 20:52)  T(F): 98.3 (03 May 2020 05:52), Max: 98.6 (02 May 2020 20:52)  HR: 92 (03 May 2020 05:52) (90 - 105)  BP: 132/65 (03 May 2020 05:52) (105/49 - 132/65)  BP(mean): --  RR: 18 (03 May 2020 05:52) (18 - 18)  SpO2: 95% (03 May 2020 05:52) (95% - 100%)    05-01-20 @ 07:01  -  05-02-20 @ 07:00  --------------------------------------------------------  IN: 0 mL / OUT: 400 mL / NET: -400 mL    05-02-20 @ 07:01  -  05-03-20 @ 06:52  --------------------------------------------------------  IN: 100 mL / OUT: 100 mL / NET: 0 mL        PE  NAD  LLE:   dressing C/D/I  abduction pillow in place  wiggles toes but not compliant with formal exam  SILT grossly  WWP distally            LABS:                        8.3    13.88 )-----------( 458      ( 03 May 2020 06:10 )             26.8     05-02    140  |  106  |  15  ----------------------------<  80  5.0   |  21<L>  |  1.02    Ca    8.4      02 May 2020 08:15  Phos  2.9     05-02  Mg     1.6     05-02    TPro  5.5<L>  /  Alb  2.0<L>  /  TBili  0.4  /  DBili  x   /  AST  17  /  ALT  8   /  AlkPhos  130<H>  05-02 Orthopedic Surgery Progress Note     S: Patient seen and examined today. No acute events overnight. Pain is well controlled.     MEDICATIONS  (STANDING):  amLODIPine   Tablet 5 milliGRAM(s) Oral daily  atorvastatin 10 milliGRAM(s) Oral at bedtime  enoxaparin Injectable 40 milliGRAM(s) SubCutaneous daily  melatonin 3 milliGRAM(s) Oral at bedtime  pantoprazole    Tablet 40 milliGRAM(s) Oral before breakfast  rifAMPin 300 milliGRAM(s) Oral two times a day  vancomycin  IVPB      vancomycin  IVPB 750 milliGRAM(s) IV Intermittent every 24 hours    MEDICATIONS  (PRN):  oxyCODONE    IR 5 milliGRAM(s) Oral every 4 hours PRN Severe Pain (7 - 10)  oxyCODONE    IR 2.5 milliGRAM(s) Oral every 4 hours PRN Moderate Pain (4 - 6)  senna 2 Tablet(s) Oral at bedtime PRN Constipation  traMADol 25 milliGRAM(s) Oral every 8 hours PRN Mild Pain (1 - 3)      Physical Exam:    Vital Signs Last 24 Hrs  T(C): 36.8 (03 May 2020 05:52), Max: 37 (02 May 2020 20:52)  T(F): 98.3 (03 May 2020 05:52), Max: 98.6 (02 May 2020 20:52)  HR: 92 (03 May 2020 05:52) (90 - 105)  BP: 132/65 (03 May 2020 05:52) (105/49 - 132/65)  BP(mean): --  RR: 18 (03 May 2020 05:52) (18 - 18)  SpO2: 95% (03 May 2020 05:52) (95% - 100%)    05-01-20 @ 07:01  -  05-02-20 @ 07:00  --------------------------------------------------------  IN: 0 mL / OUT: 400 mL / NET: -400 mL    05-02-20 @ 07:01  -  05-03-20 @ 06:52  --------------------------------------------------------  IN: 100 mL / OUT: 100 mL / NET: 0 mL        PE  NAD  LLE:   dressing C/D/I  abduction pillow in place, patient on right side with leg flexed, painful when extended. L leg looks internally rotated  wiggles toes but not compliant with formal exam  SILT grossly  WWP distally            LABS:                        8.3    13.88 )-----------( 458      ( 03 May 2020 06:10 )             26.8     05-02    140  |  106  |  15  ----------------------------<  80  5.0   |  21<L>  |  1.02    Ca    8.4      02 May 2020 08:15  Phos  2.9     05-02  Mg     1.6     05-02    TPro  5.5<L>  /  Alb  2.0<L>  /  TBili  0.4  /  DBili  x   /  AST  17  /  ALT  8   /  AlkPhos  130<H>  05-02

## 2020-05-03 NOTE — CHART NOTE - NSCHARTNOTEFT_GEN_A_CORE
INR/PT noted elevated this AM, will repeat this evening as per ortho recs  Spoke with Ortho Ever- pt tentatively may go for OR on monday, 5/4/20. NPO @ midnight, AM labs ordered. Attending informed to document medical clearance.  Per per Ortho, OR needs repeat COVID swab within 48hrs, sent this evening. Discussed with Dr. Butler

## 2020-05-04 LAB
ALBUMIN SERPL ELPH-MCNC: 1.7 G/DL — LOW (ref 3.3–5)
ALP SERPL-CCNC: 161 U/L — HIGH (ref 40–120)
ALT FLD-CCNC: 14 U/L — SIGNIFICANT CHANGE UP (ref 4–33)
ANION GAP SERPL CALC-SCNC: 13 MMO/L — SIGNIFICANT CHANGE UP (ref 7–14)
APTT BLD: 45.4 SEC — HIGH (ref 27.5–36.3)
AST SERPL-CCNC: 19 U/L — SIGNIFICANT CHANGE UP (ref 4–32)
BILIRUB SERPL-MCNC: 0.8 MG/DL — SIGNIFICANT CHANGE UP (ref 0.2–1.2)
BLD GP AB SCN SERPL QL: NEGATIVE — SIGNIFICANT CHANGE UP
BUN SERPL-MCNC: 22 MG/DL — SIGNIFICANT CHANGE UP (ref 7–23)
CALCIUM SERPL-MCNC: 8 MG/DL — LOW (ref 8.4–10.5)
CHLORIDE SERPL-SCNC: 108 MMOL/L — HIGH (ref 98–107)
CO2 SERPL-SCNC: 19 MMOL/L — LOW (ref 22–31)
CREAT SERPL-MCNC: 0.9 MG/DL — SIGNIFICANT CHANGE UP (ref 0.5–1.3)
CRP SERPL-MCNC: 433.3 MG/L — HIGH
D DIMER BLD IA.RAPID-MCNC: 831 NG/ML — SIGNIFICANT CHANGE UP
FERRITIN SERPL-MCNC: 255.3 NG/ML — HIGH (ref 15–150)
GLUCOSE SERPL-MCNC: 97 MG/DL — SIGNIFICANT CHANGE UP (ref 70–99)
HCT VFR BLD CALC: 32.8 % — LOW (ref 34.5–45)
HGB BLD-MCNC: 10.6 G/DL — LOW (ref 11.5–15.5)
INR BLD: 1.63 — HIGH (ref 0.88–1.17)
MAGNESIUM SERPL-MCNC: 1.9 MG/DL — SIGNIFICANT CHANGE UP (ref 1.6–2.6)
MCHC RBC-ENTMCNC: 27.7 PG — SIGNIFICANT CHANGE UP (ref 27–34)
MCHC RBC-ENTMCNC: 32.3 % — SIGNIFICANT CHANGE UP (ref 32–36)
MCV RBC AUTO: 85.9 FL — SIGNIFICANT CHANGE UP (ref 80–100)
NRBC # FLD: 0 K/UL — SIGNIFICANT CHANGE UP (ref 0–0)
PHOSPHATE SERPL-MCNC: 2.8 MG/DL — SIGNIFICANT CHANGE UP (ref 2.5–4.5)
PLATELET # BLD AUTO: 457 K/UL — HIGH (ref 150–400)
PMV BLD: 9.3 FL — SIGNIFICANT CHANGE UP (ref 7–13)
POTASSIUM SERPL-MCNC: 4.5 MMOL/L — SIGNIFICANT CHANGE UP (ref 3.5–5.3)
POTASSIUM SERPL-SCNC: 4.5 MMOL/L — SIGNIFICANT CHANGE UP (ref 3.5–5.3)
PROCALCITONIN SERPL-MCNC: 0.27 NG/ML — HIGH (ref 0.02–0.1)
PROT SERPL-MCNC: 5.3 G/DL — LOW (ref 6–8.3)
PROTHROM AB SERPL-ACNC: 18.9 SEC — HIGH (ref 9.8–13.1)
RBC # BLD: 3.82 M/UL — SIGNIFICANT CHANGE UP (ref 3.8–5.2)
RBC # FLD: 18.5 % — HIGH (ref 10.3–14.5)
RH IG SCN BLD-IMP: NEGATIVE — SIGNIFICANT CHANGE UP
SARS-COV-2 RNA SPEC QL NAA+PROBE: DETECTED
SODIUM SERPL-SCNC: 140 MMOL/L — SIGNIFICANT CHANGE UP (ref 135–145)
VANCOMYCIN FLD-MCNC: 15.6 UG/ML — SIGNIFICANT CHANGE UP
WBC # BLD: 12.22 K/UL — HIGH (ref 3.8–10.5)
WBC # FLD AUTO: 12.22 K/UL — HIGH (ref 3.8–10.5)

## 2020-05-04 PROCEDURE — 27266 TREAT HIP DISLOCATION: CPT | Mod: 78,LT

## 2020-05-04 PROCEDURE — 99233 SBSQ HOSP IP/OBS HIGH 50: CPT

## 2020-05-04 PROCEDURE — 73501 X-RAY EXAM HIP UNI 1 VIEW: CPT | Mod: 26,LT

## 2020-05-04 RX ORDER — MORPHINE SULFATE 50 MG/1
1 CAPSULE, EXTENDED RELEASE ORAL ONCE
Refills: 0 | Status: DISCONTINUED | OUTPATIENT
Start: 2020-05-04 | End: 2020-05-04

## 2020-05-04 RX ORDER — MORPHINE SULFATE 50 MG/1
0.5 CAPSULE, EXTENDED RELEASE ORAL ONCE
Refills: 0 | Status: DISCONTINUED | OUTPATIENT
Start: 2020-05-04 | End: 2020-05-04

## 2020-05-04 RX ORDER — MORPHINE SULFATE 50 MG/1
2 CAPSULE, EXTENDED RELEASE ORAL ONCE
Refills: 0 | Status: DISCONTINUED | OUTPATIENT
Start: 2020-05-04 | End: 2020-05-04

## 2020-05-04 RX ADMIN — MORPHINE SULFATE 2 MILLIGRAM(S): 50 CAPSULE, EXTENDED RELEASE ORAL at 17:20

## 2020-05-04 RX ADMIN — SODIUM CHLORIDE 75 MILLILITER(S): 9 INJECTION INTRAMUSCULAR; INTRAVENOUS; SUBCUTANEOUS at 09:33

## 2020-05-04 RX ADMIN — Medication 10 MILLIGRAM(S): at 00:22

## 2020-05-04 RX ADMIN — MORPHINE SULFATE 0.5 MILLIGRAM(S): 50 CAPSULE, EXTENDED RELEASE ORAL at 11:21

## 2020-05-04 RX ADMIN — Medication 250 MILLIGRAM(S): at 09:33

## 2020-05-04 RX ADMIN — MORPHINE SULFATE 1 MILLIGRAM(S): 50 CAPSULE, EXTENDED RELEASE ORAL at 09:33

## 2020-05-04 RX ADMIN — OXYCODONE HYDROCHLORIDE 5 MILLIGRAM(S): 5 TABLET ORAL at 14:51

## 2020-05-04 RX ADMIN — PANTOPRAZOLE SODIUM 40 MILLIGRAM(S): 20 TABLET, DELAYED RELEASE ORAL at 06:07

## 2020-05-04 NOTE — BRIEF OPERATIVE NOTE - NSICDXBRIEFPOSTOP_GEN_ALL_CORE_FT
POST-OP DIAGNOSIS:  Dislocation of internal hip prosthesis 01-May-2020 11:26:50  Camelia Teixeira
POST-OP DIAGNOSIS:  Dislocation of internal hip prosthesis 01-May-2020 11:26:50  Camelia Teixeira

## 2020-05-04 NOTE — PROGRESS NOTE ADULT - SUBJECTIVE AND OBJECTIVE BOX
Infectious Diseases progress note:    Subjective: Events noted.  Pt s/p OR for closed hip reduction today    ROS:  Pt moaning at times, poor historian, unable to assess    Allergies    No Known Allergies    Intolerances        ANTIBIOTICS/RELEVANT:  antimicrobials  rifAMPin 300 milliGRAM(s) Oral two times a day  vancomycin  IVPB      vancomycin  IVPB 750 milliGRAM(s) IV Intermittent every 24 hours    immunologic:    OTHER:  amLODIPine   Tablet 5 milliGRAM(s) Oral daily  atorvastatin 10 milliGRAM(s) Oral at bedtime  melatonin 3 milliGRAM(s) Oral at bedtime  oxyCODONE    IR 5 milliGRAM(s) Oral every 4 hours PRN  oxyCODONE    IR 2.5 milliGRAM(s) Oral every 4 hours PRN  pantoprazole    Tablet 40 milliGRAM(s) Oral before breakfast  senna 2 Tablet(s) Oral at bedtime PRN  sodium chloride 0.9%. 1000 milliLiter(s) IV Continuous <Continuous>  traMADol 25 milliGRAM(s) Oral every 8 hours PRN      Objective:  Vital Signs Last 24 Hrs  T(C): 37.1 (04 May 2020 11:59), Max: 37.1 (04 May 2020 11:20)  T(F): 98.7 (04 May 2020 11:59), Max: 98.7 (04 May 2020 11:20)  HR: 91 (04 May 2020 11:59) (91 - 109)  BP: 120/61 (04 May 2020 11:59) (120/50 - 142/67)  BP(mean): --  RR: 18 (04 May 2020 11:59) (18 - 20)  SpO2: 98% (04 May 2020 11:59) (96% - 99%)    PHYSICAL EXAM:  Constitutional:NAD  Eyes:BINTA, EOMI  Ear/Nose/Throat: no thrush, mucositis.  Moist mucous membranes	  Neck:no JVD, no lymphadenopathy, supple  Respiratory: CTA sandra  Cardiovascular: S1S2 RRR, no murmurs  Gastrointestinal:soft, nontender,  nondistended (+) BS  Extremities: L hip dsg in place  Skin:  no rashes, open wounds or ulcerations        LABS:                        10.6   12.22 )-----------( 457      ( 04 May 2020 04:20 )             32.8     05-04    140  |  108<H>  |  22  ----------------------------<  97  4.5   |  19<L>  |  0.90    Ca    8.0<L>      04 May 2020 04:20  Phos  2.8     05-04  Mg     1.9     05-04    TPro  5.3<L>  /  Alb  1.7<L>  /  TBili  0.8  /  DBili  x   /  AST  19  /  ALT  14  /  AlkPhos  161<H>  05-04    PT/INR - ( 04 May 2020 04:30 )   PT: 18.9 SEC;   INR: 1.63          PTT - ( 04 May 2020 04:30 )  PTT:45.4 SEC      Procalcitonin, Serum: 0.27 (05-04 @ 04:20)  Procalcitonin, Serum: 0.28 (05-02 @ 08:15)  Procalcitonin, Serum: 0.08 (04-30 @ 04:12)  Procalcitonin, Serum: 0.12 (04-28 @ 10:00)  Procalcitonin, Serum: 0.12 (04-26 @ 06:09)    Vancomycin Level, Random:  ug/mL (05-04 @ 04:30)      Vancomycin Level, Trough: 20.5 ug/mL (05-03 @ 10:50)              MICROBIOLOGY:    Culture - Fungal, Body Fluid (05.01.20 @ 00:34)    Specimen Source: .Body Fluid lt hip aspiration    Culture Results:   Testing in progress    Culture - Body Fluid with Gram Stain (05.01.20 @ 00:34)    Gram Stain:   polymorphonuclear leukocytes  No organisms seen  by cytocentrifuge    Specimen Source: .Body Fluid left hip aspiration    Culture Results:   No growth          RADIOLOGY & ADDITIONAL STUDIES:    < from: Xray Hip w/ Pelvis 1 View, Left (05.04.20 @ 13:30) >  IMPRESSION:  Successfully reduced and anatomically aligned previously dislocated cemented left hip hemiarthroplasty prosthesis. No periprosthetic fractures or suspicious periprosthetic lucencies.    Postsurgical changes in the overlying soft tissues with surgical skin staples overlying the incision site.    Osteopenic but otherwise intact and unremarkable remaining imaged osseous structures.    Correlate with intraoperative findings.    < end of copied text >        < from: Xray Hip w/ Pelvis 2 or 3 Views, Left (05.03.20 @ 09:56) >  IMPRESSION:  Currently superolaterally dislocated cemented left hip hemiarthroplasty prosthesis. No periprosthetic fractures or suspicious periprosthetic lucencies. Surgical skin staples remain over the incision site.    No dislocations or additional fractures in the remaining imaged regions.    Generalized marked osteopenia otherwise no discrete lytic or blastic lesions.    Lowerlumbar spine degenerative change again apparent.   Faint pessary ring shadow again noted in lower mid pelvis.  Vascular calcifications again seen.    < end of copied text >

## 2020-05-04 NOTE — PROGRESS NOTE ADULT - SUBJECTIVE AND OBJECTIVE BOX
ORTHO ATTENDING POST OP    S/P closed reduction L hip  strict posterior hip precautions  abduction pillow at all times until brace is obtained  abduction brace to be placed tomorrow  strict bed rest until pt has abduction brace placed  WBAT  OOB once pt has brace- 25 deg abduction, 10 deg ER, 15 deg flexion

## 2020-05-04 NOTE — PROGRESS NOTE ADULT - ASSESSMENT
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 Patient had a mechanical fall and underwent a left hip hemiarthroplasty in 1/2020 at [facility]. She tolerated surgery and discharged to [facility] rehab. She presented to [facility] on 3/13/2020 for painful vertebral compression fractures, once her pain was treated she was discharged to [place] rehab.    ER vitals:  T 97.6, P 87, /62, RR 18, 95% RA.  WBC 18.2 --> 9.9.   . .   D-dimer 1430, ferritin 206.  PCT 0.13.  UA Large LE, (+) wbc's.   Ucx >100K GNR and >100K CoNS.   Bcx (+) CoNS 4/4 bottles.   Covid pcr (+) 4/22.      Multiple imaging performed including CT pelvis and femur, showing  dislocated femoral component of the left hip hemiarthroplasty. Mild cortical irregularity and lucencies at the left greater trochanter, which is difficult to assess due to artifact. This may be in part postsurgical although nondisplaced fracture cannot be excluded.  Also with foci of air in the left mid lateral thigh soft tissue, without a discrete fluid collection. Differential diagnosis includes injection, penetrating trauma and infection.  Question sigmoid colon diverticulitis.     Pt seen by Ortho service, pt initially planned IR aspiration of L hip to rule out prosthetic joint infection, given CT findings and bacteremia with CoNS.  Also planned for OR for possible revision vs resection arthroplasty, now cancelled due to Covid (+) status.     ID consulted for further recommendations.     CoNS bacteremia:    - Pt with 4/4 blood cultures positive, also Ucx (+) CoNS (secondary to descending infection from bacetermia?).  Given elevated inflammatory markers, and new dislocated left hip hemiarthroplasty with lucency, concern for prosthetic joint infection.    - Cont vancomycin 1gm IV q24 and rifampin to treat possible PJI.  Monitor weekly LFTs.     - Recommend PICC line and 6 weeks of IV abx from date of blood culture clearance  Sensitivities reviewed, no oral options for chronic suppressive treatment due to resistance.      - s/pr IR aspiration of hip joint to evaluate for PJI on 4/30.     Cell count and diff unavailable.    IR cultures NGTD from 5/1    - Pt went to OR on 5/1 for open reduction, I&D, and head exchange.  Intra op cultures unavailable.   As per Ortho, low suspicion for hardware infection.     - TTE no vegetations.  Repeat blood cultures cleared promptly, doubt [facility] will change managment and have low suspicion for endocarditis.     - Monitor WBC, temp curve, trend inflammatory markers.       Covid (+):    - Cont supportive care.  Trend ferritin, D-dimer, CRP every 48-72 hours.  Monitor pulse ox.  Currently oxygenating adequately on room air.     - Cxr shows grossly clear lungs.      -  Repeat covid testing (-) on 2nd test.  3rd test (+).  Pt requires airborne/isolation.      UTI:    - Growing both E.coli and CoNS.  Cont rocephin (sensitive) x 7 days (through 4/27) and IV vanco x 6 weeks, f/u culture results.       - Pt with h/o pessary.  Benson catheter placed in the hospital.  d/c benson as appropriate.           Will follow,    Sue Armstrong  [phone]

## 2020-05-04 NOTE — PROGRESS NOTE ADULT - PROBLEM SELECTOR PLAN 1
Unclear chronicity of etiology but prior history of L. hemiarthroplasty in 01/2020 and hip was imaging after shows implant in place. s/p aspiration of L hip by IR to r/o infection.   - s/p L hip arthroplasty on 5/1, but now with recurrent dislocation requiring return to OR on 5/4  - pain control with acetaminophen (mild/mod pain) or oxycodone (severe pain) PRN  - appreciate ortho recs  - Revised Cardiac Risk Index is calculated to be 0.4%, not at elevated risk.  Pt has no know CAD, no CHF, no h/o Diabetes or stroke.  Clear from a medical standpoint for surgery.

## 2020-05-04 NOTE — PROGRESS NOTE ADULT - PROBLEM SELECTOR PLAN 4
UA positive with few WBCs Informed by patient that patient has a pessary - last exchanged in Oct/Nov 2019   - completed ceftriaxone 1 g daily x 7 (4/23-4/30) days; c/w Vancomycin.    - Cultures growing E coli (sensitive to Ceftriaxone) and Coag Neg Staph.    - Pt normally sees Dr. Jose BILLINGS in the [facility].

## 2020-05-04 NOTE — PROGRESS NOTE ADULT - SUBJECTIVE AND OBJECTIVE BOX
Patient is a 89y old  Female who presents with a chief complaint of LE pain (04 May 2020 07:00)      SUBJECTIVE / OVERNIGHT EVENTS:    No events overnight. This AM, patient without n/v/d/cp/sob.    MEDICATIONS  (STANDING):  amLODIPine   Tablet 5 milliGRAM(s) Oral daily  atorvastatin 10 milliGRAM(s) Oral at bedtime  melatonin 3 milliGRAM(s) Oral at bedtime  pantoprazole    Tablet 40 milliGRAM(s) Oral before breakfast  rifAMPin 300 milliGRAM(s) Oral two times a day  sodium chloride 0.9%. 1000 milliLiter(s) (75 mL/Hr) IV Continuous <Continuous>  vancomycin  IVPB      vancomycin  IVPB 750 milliGRAM(s) IV Intermittent every 24 hours    MEDICATIONS  (PRN):  oxyCODONE    IR 5 milliGRAM(s) Oral every 4 hours PRN Severe Pain (7 - 10)  oxyCODONE    IR 2.5 milliGRAM(s) Oral every 4 hours PRN Moderate Pain (4 - 6)  senna 2 Tablet(s) Oral at bedtime PRN Constipation  traMADol 25 milliGRAM(s) Oral every 8 hours PRN Mild Pain (1 - 3)      PHYSICAL EXAM:  T(C): 37.1 (05-04-20 @ 11:59), Max: 37.1 (05-04-20 @ 11:20)  HR: 91 (05-04-20 @ 11:59) (91 - 109)  BP: 120/61 (05-04-20 @ 11:59) (120/50 - 142/67)  RR: 18 (05-04-20 @ 11:59) (18 - 20)  SpO2: 98% (05-04-20 @ 11:59) (96% - 99%)  I&O's Summary    GENERAL: NAD, well-developed  HEAD:  Atraumatic, Normocephalic, MMM  CHEST/LUNG: No use of accessory muscles, speaking in complete sentences   PSYCH: AAOx1  NEUROLOGY: CN II-XII grossly intact, moving all extremities  SKIN: No rashes or lesions  Ext: wwp, L leg in bandage c/d/i    LABS:  CAPILLARY BLOOD GLUCOSE                              10.6   12.22 )-----------( 457      ( 04 May 2020 04:20 )             32.8     05-04    140  |  108<H>  |  22  ----------------------------<  97  4.5   |  19<L>  |  0.90    Ca    8.0<L>      04 May 2020 04:20  Phos  2.8     05-04  Mg     1.9     05-04    TPro  5.3<L>  /  Alb  1.7<L>  /  TBili  0.8  /  DBili  x   /  AST  19  /  ALT  14  /  AlkPhos  161<H>  05-04    PT/INR - ( 04 May 2020 04:30 )   PT: 18.9 SEC;   INR: 1.63          PTT - ( 04 May 2020 04:30 )  PTT:45.4 SEC            RADIOLOGY & ADDITIONAL TESTS:    Telemetry Personally Reviewed -     Imaging Personally Reviewed -     Imaging Reviewed -     Consultant(s) Notes Reviewed -       Care Discussed with Consultants/Other Providers -

## 2020-05-04 NOTE — BRIEF OPERATIVE NOTE - OPERATION/FINDINGS
Closed reduction L dislocated hip camilo
See dictated report.  Findings - Dislocated L hip hemiarthroplasty, no purulence encountered  Procedure - Open reduction, I&D, head exchange

## 2020-05-04 NOTE — PROGRESS NOTE ADULT - SUBJECTIVE AND OBJECTIVE BOX
Orthopaedic Surgery Progress Note    Subjective:   Patient seen and examined  No acute events overnight  Awaiting OR today for dislocated hemiarthroplasty    Objective:  T(C): 36.5 (05-04-20 @ 05:57), Max: 36.9 (05-03-20 @ 20:10)  HR: 96 (05-04-20 @ 05:57) (95 - 109)  BP: 129/60 (05-04-20 @ 05:57) (120/50 - 142/67)  RR: 18 (05-04-20 @ 05:57) (18 - 20)  SpO2: 98% (05-04-20 @ 05:57) (96% - 99%)    PE  NAD  LLE:   dressing C/D/I  wiggles toes  SILT S/S/SP/DP  exam limited by patient compliance  WWP distally, palpable DP pulse  Dressing over foot/ankle                        10.6   12.22 )-----------( 457      ( 04 May 2020 04:20 )             32.8     05-04    140  |  108<H>  |  22  ----------------------------<  97  4.5   |  19<L>  |  0.90    Ca    8.0<L>      04 May 2020 04:20  Phos  2.8     05-04  Mg     1.9     05-04    TPro  5.3<L>  /  Alb  1.7<L>  /  TBili  0.8  /  DBili  x   /  AST  19  /  ALT  14  /  AlkPhos  161<H>  05-04    PT/INR - ( 04 May 2020 04:30 )   PT: 18.9 SEC;   INR: 1.63       PTT - ( 04 May 2020 04:30 )  PTT:45.4 SEC    89y Female with dislocated L hip hemiarthroplasty     - Pain control  - NPO/IVF  - Consent in chart  - OR today for L hip closed reduction, possible open reduction, possible JORDON, possible resection arthroplasty  - Awaiting repeat COVID result  - DNR, limited rescind for OR  - hold DVT ppx  - Clear'd for surgery by medicine team  - Responded well to vitamin K yesterday, will hold off on KCentra today prior to OR    Fady Yoder MD

## 2020-05-04 NOTE — PROGRESS NOTE ADULT - ASSESSMENT
89 W h/o Dementia, HTN, and recent L hemiarthroplasty in 01/2020 presents from [facility] for L prosthetic hip dislocation of unclear etiology and chronicity, found to have elevated inflammatory markers and mild COVID19 infection. Was found to be bacteremic (+ blood cx on 4/22 and 4/23) with staph epidermidis with concern for prosthetic joint infection and also found to have UTI with CoNS and E coli.    Updates can be given to daughter Dianne Kennedy, she can be reached at [phone]. Dianne states that she has some dementia and memory issues at baseline and her mental status can wax and wane.    We reviewed code status and patient is DNR/DNI.

## 2020-05-04 NOTE — PRE-OP CHECKLIST - NOTHING BY MOUTH SINCE
Wello B Complex      Directions: 1 capsule twice daily  Why:B vitamins support adrenal, neurological, and stress-related functions in the body. They can also improve energy levels. Where: online ONLY at www. [employer] (or other [employer] B
Why: The primary function of vitamin K is to aid in the formation of clotting factors and bone proteins. It serves as a cofactor in the production of six proteins that regulate blood clotting, including prothrombin.  In addition, it helps to form osteocalci
01-May-2020 00:00
03-May-2020 11:59

## 2020-05-04 NOTE — BRIEF OPERATIVE NOTE - NSICDXBRIEFPREOP_GEN_ALL_CORE_FT
PRE-OP DIAGNOSIS:  Dislocation of internal left hip prosthesis 01-May-2020 11:26:40  Camelia Teixeira
PRE-OP DIAGNOSIS:  Dislocation of internal left hip prosthesis 01-May-2020 11:26:40  Camelia Teixeira

## 2020-05-05 LAB
ALBUMIN SERPL ELPH-MCNC: 1.6 G/DL — LOW (ref 3.3–5)
ALP SERPL-CCNC: 119 U/L — SIGNIFICANT CHANGE UP (ref 40–120)
ALT FLD-CCNC: 12 U/L — SIGNIFICANT CHANGE UP (ref 4–33)
ANION GAP SERPL CALC-SCNC: 13 MMO/L — SIGNIFICANT CHANGE UP (ref 7–14)
AST SERPL-CCNC: 23 U/L — SIGNIFICANT CHANGE UP (ref 4–32)
BILIRUB SERPL-MCNC: 0.5 MG/DL — SIGNIFICANT CHANGE UP (ref 0.2–1.2)
BUN SERPL-MCNC: 20 MG/DL — SIGNIFICANT CHANGE UP (ref 7–23)
CALCIUM SERPL-MCNC: 8.1 MG/DL — LOW (ref 8.4–10.5)
CHLORIDE SERPL-SCNC: 110 MMOL/L — HIGH (ref 98–107)
CO2 SERPL-SCNC: 18 MMOL/L — LOW (ref 22–31)
CREAT SERPL-MCNC: 0.85 MG/DL — SIGNIFICANT CHANGE UP (ref 0.5–1.3)
GLUCOSE SERPL-MCNC: 109 MG/DL — HIGH (ref 70–99)
HCT VFR BLD CALC: 29.9 % — LOW (ref 34.5–45)
HGB BLD-MCNC: 9.4 G/DL — LOW (ref 11.5–15.5)
MAGNESIUM SERPL-MCNC: 1.7 MG/DL — SIGNIFICANT CHANGE UP (ref 1.6–2.6)
MCHC RBC-ENTMCNC: 27.5 PG — SIGNIFICANT CHANGE UP (ref 27–34)
MCHC RBC-ENTMCNC: 31.4 % — LOW (ref 32–36)
MCV RBC AUTO: 87.4 FL — SIGNIFICANT CHANGE UP (ref 80–100)
NRBC # FLD: 0 K/UL — SIGNIFICANT CHANGE UP (ref 0–0)
PHOSPHATE SERPL-MCNC: 2.5 MG/DL — SIGNIFICANT CHANGE UP (ref 2.5–4.5)
PLATELET # BLD AUTO: 364 K/UL — SIGNIFICANT CHANGE UP (ref 150–400)
PMV BLD: 9.4 FL — SIGNIFICANT CHANGE UP (ref 7–13)
POTASSIUM SERPL-MCNC: 4.1 MMOL/L — SIGNIFICANT CHANGE UP (ref 3.5–5.3)
POTASSIUM SERPL-SCNC: 4.1 MMOL/L — SIGNIFICANT CHANGE UP (ref 3.5–5.3)
PROT SERPL-MCNC: 4.6 G/DL — LOW (ref 6–8.3)
RBC # BLD: 3.42 M/UL — LOW (ref 3.8–5.2)
RBC # FLD: 19.1 % — HIGH (ref 10.3–14.5)
SODIUM SERPL-SCNC: 141 MMOL/L — SIGNIFICANT CHANGE UP (ref 135–145)
VANCOMYCIN TROUGH SERPL-MCNC: 16.1 UG/ML — SIGNIFICANT CHANGE UP (ref 10–20)
WBC # BLD: 11.15 K/UL — HIGH (ref 3.8–10.5)
WBC # FLD AUTO: 11.15 K/UL — HIGH (ref 3.8–10.5)

## 2020-05-05 PROCEDURE — 99233 SBSQ HOSP IP/OBS HIGH 50: CPT

## 2020-05-05 RX ORDER — ENOXAPARIN SODIUM 100 MG/ML
40 INJECTION SUBCUTANEOUS DAILY
Refills: 0 | Status: DISCONTINUED | OUTPATIENT
Start: 2020-05-05 | End: 2020-05-08

## 2020-05-05 RX ADMIN — Medication 3 MILLIGRAM(S): at 21:35

## 2020-05-05 RX ADMIN — PANTOPRAZOLE SODIUM 40 MILLIGRAM(S): 20 TABLET, DELAYED RELEASE ORAL at 05:28

## 2020-05-05 RX ADMIN — Medication 3 MILLIGRAM(S): at 02:32

## 2020-05-05 RX ADMIN — OXYCODONE HYDROCHLORIDE 5 MILLIGRAM(S): 5 TABLET ORAL at 20:49

## 2020-05-05 RX ADMIN — ATORVASTATIN CALCIUM 10 MILLIGRAM(S): 80 TABLET, FILM COATED ORAL at 21:35

## 2020-05-05 RX ADMIN — ENOXAPARIN SODIUM 40 MILLIGRAM(S): 100 INJECTION SUBCUTANEOUS at 10:31

## 2020-05-05 RX ADMIN — ATORVASTATIN CALCIUM 10 MILLIGRAM(S): 80 TABLET, FILM COATED ORAL at 02:31

## 2020-05-05 RX ADMIN — Medication 250 MILLIGRAM(S): at 11:36

## 2020-05-05 RX ADMIN — AMLODIPINE BESYLATE 5 MILLIGRAM(S): 2.5 TABLET ORAL at 05:28

## 2020-05-05 NOTE — CHART NOTE - NSCHARTNOTEFT_GEN_A_CORE
Spoke with Orthotist and Dr Kauffman in regards to patients hip abduction brace. Will keep the following as the new setting.   WBAT and OOB once pt has hip abduction brace- 15 deg abduction, 10 deg ER, 15 deg flexion

## 2020-05-05 NOTE — PROGRESS NOTE ADULT - PROBLEM SELECTOR PLAN 4
UA positive with few WBCs Informed by patient that patient has a pessary - last exchanged in Oct/Nov 2019   - completed ceftriaxone 1 g daily x 7 (4/23-4/30) days; c/w Vancomycin.    - Cultures growing E coli (sensitive to Ceftriaxone) and Coag Neg Staph.    - Pt normally sees Dr. Jose BILLINGS in the [facility]

## 2020-05-05 NOTE — PROGRESS NOTE ADULT - PROBLEM SELECTOR PLAN 1
Unclear chronicity of etiology but prior history of L. hemiarthroplasty in 01/2020 and hip was imaging after shows implant in place. s/p aspiration of L hip by IR to r/o infection.   - s/p L hip arthroplasty on 5/1, repeat closed reduction 5/4  - pain control with acetaminophen (mild/mod pain) or oxycodone (severe pain) PRN  - Strict bed rest in abduction pillow until fiitted for abduction brace  - Orthotist aware, should fit patient with brace today  - appreciate ortho recs

## 2020-05-05 NOTE — PROGRESS NOTE ADULT - ASSESSMENT
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 Patient had a mechanical fall and underwent a left hip hemiarthroplasty in 1/2020 at [facility]. She tolerated surgery and discharged to [facility] rehab. She presented to [facility] on 3/13/2020 for painful vertebral compression fractures, once her pain was treated she was discharged to [facility] rehab.    ER vitals:  T 97.6, P 87, /62, RR 18, 95% RA.  WBC 18.2 --> 9.9.   . .   D-dimer 1430, ferritin 206.  PCT 0.13.  UA Large LE, (+) wbc's.   Ucx >100K GNR and >100K CoNS.   Bcx (+) CoNS 4/4 bottles.   Covid pcr (+) 4/22.      Multiple imaging performed including CT pelvis and femur, showing  dislocated femoral component of the left hip hemiarthroplasty. Mild cortical irregularity and lucencies at the left greater trochanter, which is difficult to assess due to artifact. This may be in part postsurgical although nondisplaced fracture cannot be excluded.  Also with foci of air in the left mid lateral thigh soft tissue, without a discrete fluid collection. Differential diagnosis includes injection, penetrating trauma and infection.  Question sigmoid colon diverticulitis.     Pt seen by Ortho service, pt initially planned IR aspiration of L hip to rule out prosthetic joint infection, given CT findings and bacteremia with CoNS.  Also planned for OR for possible revision vs resection arthroplasty, now cancelled due to Covid (+) status.     ID consulted for further recommendations.     CoNS bacteremia:    - Pt with 4/4 blood cultures positive, also Ucx (+) CoNS (secondary to descending infection from bacetermia?).  Given elevated inflammatory markers, and new dislocated left hip hemiarthroplasty with lucency, concern for prosthetic joint infection.    - Cont vancomycin 1gm IV q24 and rifampin to treat possible PJI.  Monitor weekly LFTs.  Goal trough between 15-20    - Recommend PICC line and 6 weeks of IV abx from date of blood culture clearance  Sensitivities reviewed, no oral options for chronic suppressive treatment due to resistance.      - s/pr IR aspiration of hip joint to evaluate for PJI on 4/30.     Cell count and diff unavailable.    IR cultures NGTD from 5/1    - Pt went to OR on 5/1 for open reduction, I&D, and head exchange.  Intra op cultures unavailable.   As per Ortho, low suspicion for hardware infection.     - TTE no vegetations.  Repeat blood cultures cleared promptly, doubt [facility] will change managment and have low suspicion for endocarditis.     - Monitor WBC, temp curve, trend inflammatory markers.       Covid (+):    - Cont supportive care.  Trend ferritin, D-dimer, CRP every 48-72 hours.  Monitor pulse ox.  Currently oxygenating adequately on room air.     - Cxr shows grossly clear lungs.      -  Repeat covid testing (-) on 2nd test.  3rd test (+).  Pt requires airborne/isolation.      UTI:    - Growing both E.coli and CoNS.  s/p rocephin    - Pt with h/o pessary.  Benson catheter placed in the hospital.  d/c benson as appropriate.       * Plan for picc line placement.  Check weekly cbc, cmp, esr, crp and vanco trough as outpt.     Will follow,    Sue Armstrong  [phone]

## 2020-05-05 NOTE — CHART NOTE - NSCHARTNOTEFT_GEN_A_CORE
Source: Patient [ ]    Family [ ]     other: [X] collateral obtained from chart review. Unable to conduct a face to face interview or nutrition-focused physical exam due to limited contact restrictions related to Pt's medical condition and isolation precautions.      Diet : Diet, Regular:   Low Sodium  Supplement Feeding Modality:  Oral  Ensure Enlive Cans or Servings Per Day:  1     Frequency:  Three Times a day (05-04-20 @ 14:46)    89y Female with dislocated L hip hemiarthroplasty s/p ORIF 5/1 and repeat closed reduction 5/4Collateral obtained from chart review. Unable to assess PO intake. No GI distress (nausea/vomiting/diarrhea/constipation) per chart. No chewing or swallowing difficulties at this time per chart. Admit weight (4/24/20) 54.2 kg. Current Weight: Weight (kg): 54.2 (05-04 @ 11:59) ? accuracy of newest weight.       Pertinent Medications: MEDICATIONS  (STANDING):  amLODIPine   Tablet 5 milliGRAM(s) Oral daily  atorvastatin 10 milliGRAM(s) Oral at bedtime  enoxaparin Injectable 40 milliGRAM(s) SubCutaneous daily  melatonin 3 milliGRAM(s) Oral at bedtime  pantoprazole    Tablet 40 milliGRAM(s) Oral before breakfast  rifAMPin 300 milliGRAM(s) Oral two times a day  sodium chloride 0.9%. 1000 milliLiter(s) (75 mL/Hr) IV Continuous <Continuous>  vancomycin  IVPB      vancomycin  IVPB 750 milliGRAM(s) IV Intermittent every 24 hours    MEDICATIONS  (PRN):  oxyCODONE    IR 5 milliGRAM(s) Oral every 4 hours PRN Severe Pain (7 - 10)  oxyCODONE    IR 2.5 milliGRAM(s) Oral every 4 hours PRN Moderate Pain (4 - 6)  senna 2 Tablet(s) Oral at bedtime PRN Constipation  traMADol 25 milliGRAM(s) Oral every 8 hours PRN Mild Pain (1 - 3)    Pertinent Labs:  05-05 Na141 mmol/L Glu 109 mg/dL<H> K+ 4.1 mmol/L Cr  0.85 mg/dL BUN 20 mg/dL 05-05 Phos 2.5 mg/dL 05-05 Alb 1.6 g/dL<L>    Estimated Needs:   [X] no change since previous assessment  [ ] recalculated:       Previous Nutrition Diagnosis: [X] Malnutrition          Nutrition Diagnosis is [X] ongoing  [ ] resolved [ ] not applicable          New Nutrition Diagnosis: [X] not applicable      Interventions:     Recommend    [X] C/W current diet order and  Nutrition Supplement regimen  [X] Please obtain current weight       Monitoring and Evaluation:     [X] PO intake [X] Tolerance to diet prescription [X] weights [X] follow up per protocol

## 2020-05-05 NOTE — PROGRESS NOTE ADULT - SUBJECTIVE AND OBJECTIVE BOX
Orthopaedic Surgery Progress Note    Subjective:   Patient seen and examined  No acute events overnight  Resting comfortably in bed    Objective:  Vitals 24hrs  Vital Signs Last 24 Hrs  T(C): 37 (05 May 2020 05:26), Max: 37.1 (04 May 2020 11:20)  T(F): 98.6 (05 May 2020 05:26), Max: 98.7 (04 May 2020 11:20)  HR: 98 (05 May 2020 05:26) (91 - 100)  BP: 122/58 (05 May 2020 05:26) (120/61 - 131/70)  BP(mean): --  RR: 18 (05 May 2020 05:26) (18 - 18)  SpO2: 100% (05 May 2020 05:26) (98% - 100%)        Lab Results 24hrs:                        10.6   12.22 )-----------( 457      ( 04 May 2020 04:20 )             32.8     05-04    140  |  108<H>  |  22  ----------------------------<  97  4.5   |  19<L>  |  0.90    Ca    8.0<L>      04 May 2020 04:20  Phos  2.8     05-04  Mg     1.9     05-04    TPro  5.3<L>  /  Alb  1.7<L>  /  TBili  0.8  /  DBili  x   /  AST  19  /  ALT  14  /  AlkPhos  161<H>  05-04            PE  NAD  LLE in abduction pillow  dressing C/D/I  wiggles toes  SILT S/S/SP/DP  exam limited by patient compliance  WWP distally, palpable DP pulse  Dressing over foot/ankle           89y Female with dislocated L hip hemiarthroplasty s/p ORIF 5/1 and repeat closed reduction 5/4    - Pain control  - Ok to restart chemical DVT ppx  - Strict bed rest in abduction pillow until fiitted for abduction brace  - Orthotist aware, should fit patient with brace today  - WBAT LLE in abduction brace  - posterior hip dislocation precautions  - PT/OT

## 2020-05-05 NOTE — PROGRESS NOTE ADULT - SUBJECTIVE AND OBJECTIVE BOX
Infectious Diseases progress note:    Subjective: NAD, afebrile.  WBC improving    ROS:  CONSTITUTIONAL:  No fever, chills, rigors  CARDIOVASCULAR:  No chest pain or palpitations  RESPIRATORY:   No SOB, cough, dyspnea on exertion.  No wheezing  GASTROINTESTINAL:  No abd pain, N/V, diarrhea/constipation  EXTREMITIES:  No swelling or joint pain  GENITOURINARY:  No burning on urination, increased frequency or urgency.  No flank pain  NEUROLOGIC:  No HA, visual disturbances  SKIN: No rashes    Allergies    No Known Allergies    Intolerances        ANTIBIOTICS/RELEVANT:  antimicrobials  rifAMPin 300 milliGRAM(s) Oral two times a day  vancomycin  IVPB      vancomycin  IVPB 750 milliGRAM(s) IV Intermittent every 24 hours    immunologic:    OTHER:  amLODIPine   Tablet 5 milliGRAM(s) Oral daily  atorvastatin 10 milliGRAM(s) Oral at bedtime  enoxaparin Injectable 40 milliGRAM(s) SubCutaneous daily  melatonin 3 milliGRAM(s) Oral at bedtime  oxyCODONE    IR 5 milliGRAM(s) Oral every 4 hours PRN  oxyCODONE    IR 2.5 milliGRAM(s) Oral every 4 hours PRN  pantoprazole    Tablet 40 milliGRAM(s) Oral before breakfast  senna 2 Tablet(s) Oral at bedtime PRN  sodium chloride 0.9%. 1000 milliLiter(s) IV Continuous <Continuous>  traMADol 25 milliGRAM(s) Oral every 8 hours PRN      Objective:  Vital Signs Last 24 Hrs  T(C): 36.7 (05 May 2020 13:23), Max: 37 (05 May 2020 05:26)  T(F): 98 (05 May 2020 13:23), Max: 98.6 (05 May 2020 05:26)  HR: 96 (05 May 2020 13:23) (96 - 100)  BP: 143/65 (05 May 2020 13:23) (122/58 - 143/65)  BP(mean): --  RR: 18 (05 May 2020 13:23) (18 - 18)  SpO2: 100% (05 May 2020 13:23) (98% - 100%)    PHYSICAL EXAM:  Constitutional:NAD  Eyes:BINTA, EOMI  Ear/Nose/Throat: no thrush, mucositis.  Moist mucous membranes	  Neck:no JVD, no lymphadenopathy, supple  Respiratory: CTA sandra  Cardiovascular: S1S2 RRR, no murmurs  Gastrointestinal:soft, nontender,  nondistended (+) BS  Extremities:no e/e/c  Skin:  no rashes, open wounds or ulcerations, L hip dsg c/d/i        LABS:                        9.4    11.15 )-----------( 364      ( 05 May 2020 05:50 )             29.9     05-05    141  |  110<H>  |  20  ----------------------------<  109<H>  4.1   |  18<L>  |  0.85    Ca    8.1<L>      05 May 2020 05:50  Phos  2.5     05-05  Mg     1.7     05-05    TPro  4.6<L>  /  Alb  1.6<L>  /  TBili  0.5  /  DBili  x   /  AST  23  /  ALT  12  /  AlkPhos  119  05-05    PT/INR - ( 04 May 2020 04:30 )   PT: 18.9 SEC;   INR: 1.63          PTT - ( 04 May 2020 04:30 )  PTT:45.4 SEC      Procalcitonin, Serum: 0.27 (05-04 @ 04:20)  Procalcitonin, Serum: 0.28 (05-02 @ 08:15)  Procalcitonin, Serum: 0.08 (04-30 @ 04:12)  Procalcitonin, Serum: 0.12 (04-28 @ 10:00)  Procalcitonin, Serum: 0.12 (04-26 @ 06:09)    Vancomycin Level, Random:  ug/mL (05-04 @ 04:30)      Vancomycin Level, Trough: 16.1 ug/mL (05-05 @ 10:00)  Vancomycin Level, Trough: 20.5 ug/mL (05-03 @ 10:50)              MICROBIOLOGY:    Culture - Tissue with Gram Stain (05.02.20 @ 01:01)    Gram Stain:   No polymorphonuclear cells seen per low power field  No organisms seen per oil power field    Specimen Source: .Tissue LT HIP #2    Culture Results:   No growth    Culture - Fungal, Body Fluid (05.01.20 @ 00:34)    Specimen Source: .Body Fluid lt hip aspiration    Culture Results:   Testing in progress          RADIOLOGY & ADDITIONAL STUDIES:    < from: Xray Hip w/ Pelvis 1 View, Left (05.04.20 @ 13:30) >  IMPRESSION:  Successfully reduced and anatomically aligned previously dislocated cemented left hip hemiarthroplasty prosthesis. No periprosthetic fractures or suspicious periprosthetic lucencies.    Postsurgical changes in the overlying soft tissues with surgical skin staples overlying the incision site.    Osteopenic but otherwise intact and unremarkable remaining imaged osseous structures.    Correlate with intraoperative findings.    < end of copied text >

## 2020-05-05 NOTE — CHART NOTE - NSCHARTNOTEFT_GEN_A_CORE
Notified by Ortho that patient is cleared to restart Lovenox.    Maddie Schrader PA-C  pager [phone] Notified by Ortho that patient is cleared to resume Lovenox.    Maddie Schrader PA-C  pager [phone]

## 2020-05-05 NOTE — PROGRESS NOTE ADULT - ASSESSMENT
89 W h/o Dementia, HTN, and recent L hemiarthroplasty in 01/2020 presents from [facility] for L prosthetic hip dislocation of unclear etiology and chronicity, found to have elevated inflammatory markers and mild COVID19 infection. Was found to be bacteremic (+ blood cx on 4/22 and 4/23) with staph epidermidis with concern for prosthetic joint infection and also found to have UTI with CoNS and E coli.    Updates can be given to daughter Dianne Kennedy, she can be reached at [phone]. Dianne states that she has some dementia and memory issues at baseline and her mental status can wax and wane.    We reviewed code status and patient is DNR/DNI.

## 2020-05-05 NOTE — PROGRESS NOTE ADULT - SUBJECTIVE AND OBJECTIVE BOX
[facility] Division of Hospital Medicine  Reema Yadav DO  Pager: [phone]  Other Times:  [phone]    Patient is a 89y old  Female who presents with a chief complaint of LE pain (05 May 2020 05:54)    SUBJECTIVE / OVERNIGHT EVENTS:  Patient seen denying any pain, resting comfortably.    ADDITIONAL REVIEW OF SYSTEMS:    MEDICATIONS  (STANDING):  amLODIPine   Tablet 5 milliGRAM(s) Oral daily  atorvastatin 10 milliGRAM(s) Oral at bedtime  enoxaparin Injectable 40 milliGRAM(s) SubCutaneous daily  melatonin 3 milliGRAM(s) Oral at bedtime  pantoprazole    Tablet 40 milliGRAM(s) Oral before breakfast  rifAMPin 300 milliGRAM(s) Oral two times a day  sodium chloride 0.9%. 1000 milliLiter(s) (75 mL/Hr) IV Continuous <Continuous>  vancomycin  IVPB      vancomycin  IVPB 750 milliGRAM(s) IV Intermittent every 24 hours    MEDICATIONS  (PRN):  oxyCODONE    IR 5 milliGRAM(s) Oral every 4 hours PRN Severe Pain (7 - 10)  oxyCODONE    IR 2.5 milliGRAM(s) Oral every 4 hours PRN Moderate Pain (4 - 6)  senna 2 Tablet(s) Oral at bedtime PRN Constipation  traMADol 25 milliGRAM(s) Oral every 8 hours PRN Mild Pain (1 - 3)      CAPILLARY BLOOD GLUCOSE        I&O's Summary      PHYSICAL EXAM:  Vital Signs Last 24 Hrs  T(C): 36.7 (05 May 2020 13:23), Max: 37 (05 May 2020 05:26)  T(F): 98 (05 May 2020 13:23), Max: 98.6 (05 May 2020 05:26)  HR: 96 (05 May 2020 13:23) (96 - 100)  BP: 143/65 (05 May 2020 13:23) (122/58 - 143/65)  BP(mean): --  RR: 18 (05 May 2020 13:23) (18 - 18)  SpO2: 100% (05 May 2020 13:23) (98% - 100%)  GENERAL: NAD, well-developed  HEAD:  Atraumatic, Normocephalic, MMM  CHEST/LUNG: No use of accessory muscles, speaking in complete sentences   PSYCH: AAOx1  NEUROLOGY: CN II-XII grossly intact, moving all extremities  SKIN: No rashes or lesions  Ext: wwp, L leg in bandage c/d/i    LABS:                        9.4    11.15 )-----------( 364      ( 05 May 2020 05:50 )             29.9     05-05    141  |  110<H>  |  20  ----------------------------<  109<H>  4.1   |  18<L>  |  0.85    Ca    8.1<L>      05 May 2020 05:50  Phos  2.5     05-05  Mg     1.7     05-05    TPro  4.6<L>  /  Alb  1.6<L>  /  TBili  0.5  /  DBili  x   /  AST  23  /  ALT  12  /  AlkPhos  119  05-05    PT/INR - ( 04 May 2020 04:30 )   PT: 18.9 SEC;   INR: 1.63          PTT - ( 04 May 2020 04:30 )  PTT:45.4 SEC            RADIOLOGY & ADDITIONAL TESTS:  Results Reviewed:   Imaging Personally Reviewed:  Electrocardiogram Personally Reviewed:    COORDINATION OF CARE:  Care Discussed with Consultants/Other Providers [Y/N]:  Prior or Outpatient Records Reviewed [Y/N]:

## 2020-05-06 LAB
ANION GAP SERPL CALC-SCNC: 12 MMO/L — SIGNIFICANT CHANGE UP (ref 7–14)
BUN SERPL-MCNC: 16 MG/DL — SIGNIFICANT CHANGE UP (ref 7–23)
CALCIUM SERPL-MCNC: 8.1 MG/DL — LOW (ref 8.4–10.5)
CHLORIDE SERPL-SCNC: 111 MMOL/L — HIGH (ref 98–107)
CO2 SERPL-SCNC: 19 MMOL/L — LOW (ref 22–31)
CREAT SERPL-MCNC: 0.97 MG/DL — SIGNIFICANT CHANGE UP (ref 0.5–1.3)
CRP SERPL-MCNC: 220.5 MG/L — HIGH
CULTURE RESULTS: NO GROWTH — SIGNIFICANT CHANGE UP
D DIMER BLD IA.RAPID-MCNC: 2493 NG/ML — SIGNIFICANT CHANGE UP
FERRITIN SERPL-MCNC: 299.6 NG/ML — HIGH (ref 15–150)
GLUCOSE SERPL-MCNC: 100 MG/DL — HIGH (ref 70–99)
HCT VFR BLD CALC: 28.6 % — LOW (ref 34.5–45)
HGB BLD-MCNC: 9 G/DL — LOW (ref 11.5–15.5)
MCHC RBC-ENTMCNC: 28 PG — SIGNIFICANT CHANGE UP (ref 27–34)
MCHC RBC-ENTMCNC: 31.5 % — LOW (ref 32–36)
MCV RBC AUTO: 89.1 FL — SIGNIFICANT CHANGE UP (ref 80–100)
NRBC # FLD: 0 K/UL — SIGNIFICANT CHANGE UP (ref 0–0)
PLATELET # BLD AUTO: 369 K/UL — SIGNIFICANT CHANGE UP (ref 150–400)
PMV BLD: 9.4 FL — SIGNIFICANT CHANGE UP (ref 7–13)
POTASSIUM SERPL-MCNC: 4 MMOL/L — SIGNIFICANT CHANGE UP (ref 3.5–5.3)
POTASSIUM SERPL-SCNC: 4 MMOL/L — SIGNIFICANT CHANGE UP (ref 3.5–5.3)
PROCALCITONIN SERPL-MCNC: 0.15 NG/ML — HIGH (ref 0.02–0.1)
RBC # BLD: 3.21 M/UL — LOW (ref 3.8–5.2)
RBC # FLD: 19.5 % — HIGH (ref 10.3–14.5)
SODIUM SERPL-SCNC: 142 MMOL/L — SIGNIFICANT CHANGE UP (ref 135–145)
SPECIMEN SOURCE: SIGNIFICANT CHANGE UP
WBC # BLD: 7.41 K/UL — SIGNIFICANT CHANGE UP (ref 3.8–10.5)
WBC # FLD AUTO: 7.41 K/UL — SIGNIFICANT CHANGE UP (ref 3.8–10.5)

## 2020-05-06 PROCEDURE — 99233 SBSQ HOSP IP/OBS HIGH 50: CPT

## 2020-05-06 RX ADMIN — Medication 250 MILLIGRAM(S): at 10:54

## 2020-05-06 RX ADMIN — ATORVASTATIN CALCIUM 10 MILLIGRAM(S): 80 TABLET, FILM COATED ORAL at 21:07

## 2020-05-06 RX ADMIN — PANTOPRAZOLE SODIUM 40 MILLIGRAM(S): 20 TABLET, DELAYED RELEASE ORAL at 06:02

## 2020-05-06 RX ADMIN — TRAMADOL HYDROCHLORIDE 25 MILLIGRAM(S): 50 TABLET ORAL at 22:36

## 2020-05-06 RX ADMIN — AMLODIPINE BESYLATE 5 MILLIGRAM(S): 2.5 TABLET ORAL at 06:02

## 2020-05-06 RX ADMIN — ENOXAPARIN SODIUM 40 MILLIGRAM(S): 100 INJECTION SUBCUTANEOUS at 11:53

## 2020-05-06 RX ADMIN — Medication 3 MILLIGRAM(S): at 21:07

## 2020-05-06 NOTE — CHART NOTE - NSCHARTNOTEFT_GEN_A_CORE
PRE-INTERVENTIONAL RADIOLOGY PROCEDURE NOTE      Patient Age: 89 year old    Patient Gender: Female    Procedure: PICC line placement    Diagnosis/Indication: Bacteremia    Interventional Radiology Attending Physician: Dr Reed    Ordering Attending Physician: Dr Asher    Pertinent Medical History: Dementia, Lt prosthetic hip s/p Arthroplasty, HTN    Pertinent labs:                      9.4    11.15 )-----------( 364      ( 05 May 2020 05:50 )             29.9       05-05    141  |  110<H>  |  20  ----------------------------<  109<H>  4.1   |  18<L>  |  0.85    Ca    8.1<L>      05 May 2020 05:50  Phos  2.5     05-05  Mg     1.7     05-05    TPro  4.6<L>  /  Alb  1.6<L>  /  TBili  0.5  /  DBili  x   /  AST  23  /  ALT  12  /  AlkPhos  119  05-05              Patient and Family Aware ? Yes

## 2020-05-06 NOTE — PROGRESS NOTE ADULT - SUBJECTIVE AND OBJECTIVE BOX
Orthopaedic Surgery Progress Note    Subjective:   Patient seen and examined  No acute events overnight    Objective:  T(C): 37.1 (05-05-20 @ 22:28), Max: 37.1 (05-05-20 @ 22:28)  HR: 122 (05-05-20 @ 22:28) (96 - 122)  BP: 147/57 (05-05-20 @ 22:28) (122/58 - 147/57)  RR: 19 (05-05-20 @ 22:28) (18 - 19)  SpO2: 98% (05-05-20 @ 22:28) (98% - 100%)  Wt(kg): --    PE  NAD  LLE in abduction pillow  dressing C/D/I  SILT S/S/SP/DP  exam limited by patient compliance, gross motor intact EHL/FHL/EDL  WWP distally, palpable DP pulse                        9.4    11.15 )-----------( 364      ( 05 May 2020 05:50 )             29.9     05-05    141  |  110<H>  |  20  ----------------------------<  109<H>  4.1   |  18<L>  |  0.85    Ca    8.1<L>      05 May 2020 05:50  Phos  2.5     05-05  Mg     1.7     05-05    TPro  4.6<L>  /  Alb  1.6<L>  /  TBili  0.5  /  DBili  x   /  AST  23  /  ALT  12  /  AlkPhos  119  05-05    PT/INR - ( 04 May 2020 04:30 )   PT: 18.9 SEC;   INR: 1.63          PTT - ( 04 May 2020 04:30 )  PTT:45.4 SEC      89y Female with dislocated L hip hemiarthroplasty s/p ORIF 5/1 and repeat closed reduction 5/4    - Pain control  - DVT ppx  - Strict bed rest in abduction pillow until abduction brace, to be applied 5/7  - WBAT LLE in abduction brace  - posterior hip dislocation precautions  - PT/OT

## 2020-05-06 NOTE — PROGRESS NOTE ADULT - SUBJECTIVE AND OBJECTIVE BOX
Infectious Diseases progress note:    Subjective: NAD, afebrile.  WBC normalized.  Pt satting adequately on RA.  Moaning at times.     ROS:  lethargic, confused.  Unable to assess    Allergies    No Known Allergies    Intolerances        ANTIBIOTICS/RELEVANT:  antimicrobials  rifAMPin 300 milliGRAM(s) Oral two times a day  vancomycin  IVPB      vancomycin  IVPB 750 milliGRAM(s) IV Intermittent every 24 hours    immunologic:    OTHER:  amLODIPine   Tablet 5 milliGRAM(s) Oral daily  atorvastatin 10 milliGRAM(s) Oral at bedtime  enoxaparin Injectable 40 milliGRAM(s) SubCutaneous daily  melatonin 3 milliGRAM(s) Oral at bedtime  pantoprazole    Tablet 40 milliGRAM(s) Oral before breakfast  senna 2 Tablet(s) Oral at bedtime PRN  sodium chloride 0.9%. 1000 milliLiter(s) IV Continuous <Continuous>  traMADol 25 milliGRAM(s) Oral every 8 hours PRN      Objective:  Vital Signs Last 24 Hrs  T(C): 36.9 (06 May 2020 06:00), Max: 37.1 (05 May 2020 22:28)  T(F): 98.4 (06 May 2020 06:00), Max: 98.7 (05 May 2020 22:28)  HR: 108 (06 May 2020 06:00) (108 - 122)  BP: 126/54 (06 May 2020 06:00) (126/54 - 147/57)  BP(mean): --  RR: 20 (06 May 2020 06:00) (19 - 20)  SpO2: 97% (06 May 2020 06:00) (97% - 98%)    PHYSICAL EXAM:  Constitutional:NAD  Eyes:BINTA, EOMI  Ear/Nose/Throat: no thrush, mucositis.  Moist mucous membranes	  Neck:no JVD, no lymphadenopathy, supple  Respiratory: CTA sandra  Cardiovascular: S1S2 RRR, no murmurs  Gastrointestinal:soft, nontender,  nondistended (+) BS  Extremities:no e/e/c  Skin:  no rashes, open wounds or ulcerations.  L hip skin staples c/d/i        LABS:                        9.0    7.41  )-----------( 369      ( 06 May 2020 16:13 )             28.6     05-05    141  |  110<H>  |  20  ----------------------------<  109<H>  4.1   |  18<L>  |  0.85    Ca    8.1<L>      05 May 2020 05:50  Phos  2.5     05-05  Mg     1.7     05-05    TPro  4.6<L>  /  Alb  1.6<L>  /  TBili  0.5  /  DBili  x   /  AST  23  /  ALT  12  /  AlkPhos  119  05-05          Procalcitonin, Serum: 0.27 (05-04 @ 04:20)  Procalcitonin, Serum: 0.28 (05-02 @ 08:15)  Procalcitonin, Serum: 0.08 (04-30 @ 04:12)  Procalcitonin, Serum: 0.12 (04-28 @ 10:00)  Procalcitonin, Serum: 0.12 (04-26 @ 06:09)    Vancomycin Level, Random:  ug/mL (05-04 @ 04:30)      Vancomycin Level, Trough: 16.1 ug/mL (05-05 @ 10:00)  Vancomycin Level, Trough: 20.5 ug/mL (05-03 @ 10:50)              MICROBIOLOGY:    Culture - Tissue with Gram Stain (05.02.20 @ 01:01)    Gram Stain:   No polymorphonuclear cells seen per low power field  No organisms seen per oil power field    Specimen Source: .Tissue LT HIP #2    Culture Results:   No growth    Culture - Fungal, Body Fluid (05.01.20 @ 00:34)    Specimen Source: .Body Fluid lt hip aspiration    Culture Results:   Testing in progress    Culture - Body Fluid with Gram Stain (05.01.20 @ 00:34)    Gram Stain:   polymorphonuclear leukocytes  No organisms seen  by cytocentrifuge    Specimen Source: .Body Fluid left hip aspiration    Culture Results:   No growth at 5 days          RADIOLOGY & ADDITIONAL STUDIES:    < from: Xray Hip w/ Pelvis 1 View, Left (05.04.20 @ 13:30) >    IMPRESSION:  Successfully reduced and anatomically aligned previously dislocated cemented left hip hemiarthroplasty prosthesis. No periprosthetic fractures or suspicious periprosthetic lucencies.    Postsurgical changes in the overlying soft tissues with surgical skin staples overlying the incision site.    Osteopenic but otherwise intact and unremarkable remaining imaged osseous structures.    Correlate with intraoperative findings.    < end of copied text >

## 2020-05-06 NOTE — PROGRESS NOTE ADULT - ASSESSMENT
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 Patient had a mechanical fall and underwent a left hip hemiarthroplasty in 1/2020 at [facility]. She tolerated surgery and discharged to [facility] rehab. She presented to [facility] on 3/13/2020 for painful vertebral compression fractures, once her pain was treated she was discharged to [facility] rehab.    ER vitals:  T 97.6, P 87, /62, RR 18, 95% RA.  WBC 18.2 --> 9.9.   . .   D-dimer 1430, ferritin 206.  PCT 0.13.  UA Large LE, (+) wbc's.   Ucx >100K GNR and >100K CoNS.   Bcx (+) CoNS 4/4 bottles.   Covid pcr (+) 4/22.      Multiple imaging performed including CT pelvis and femur, showing  dislocated femoral component of the left hip hemiarthroplasty. Mild cortical irregularity and lucencies at the left greater trochanter, which is difficult to assess due to artifact. This may be in part postsurgical although nondisplaced fracture cannot be excluded.  Also with foci of air in the left mid lateral thigh soft tissue, without a discrete fluid collection. Differential diagnosis includes injection, penetrating trauma and infection.  Question sigmoid colon diverticulitis.     Pt seen by Ortho service, pt initially planned IR aspiration of L hip to rule out prosthetic joint infection, given CT findings and bacteremia with CoNS.  Also planned for OR for possible revision vs resection arthroplasty, now cancelled due to Covid (+) status.     ID consulted for further recommendations.     CoNS bacteremia:    - Pt with 4/4 blood cultures positive, also Ucx (+) CoNS (secondary to descending infection from bacetermia?).  Given elevated inflammatory markers, and new dislocated left hip hemiarthroplasty with lucency, concern for prosthetic joint infection.    - Cont vancomycin 1gm IV q24 and rifampin to treat possible PJI.  Monitor weekly LFTs.  Goal trough between 15-20    - Recommend PICC line and 6 weeks of IV abx from date of blood culture clearance  Sensitivities reviewed, no oral options for chronic suppressive treatment due to resistance.      - s/pr IR aspiration of hip joint to evaluate for PJI on 4/30.     Cell count and diff unavailable.    IR cultures NGTD from 5/1    - Pt went to OR on 5/1 for open reduction, I&D, and head exchange.  Intra op cultures unavailable.   As per Ortho, low suspicion for hardware infection.     - TTE no vegetations.  Repeat blood cultures cleared promptly, doubt CHIARA will change managment and have low suspicion for endocarditis.     - Monitor WBC, temp curve, trend inflammatory markers.       Covid (+):    - Cont supportive care.  Monitor pulse ox.  Currently oxygenating adequately on room air.     - Cxr shows grossly clear lungs.      -  Repeat covid testing (-) on 2nd test.  3rd test (+).  Pt requires airborne/isolation.  Recent inflammatory markers are elevated, however pt appears stable from oxygenation standpoint      UTI:    - Growing both E.coli and CoNS.  s/p rocephin    - Pt with h/o pessary.  Benson catheter placed in the hospital.  d/c benson as appropriate.       * picc line placement.  Check weekly cbc, cmp, esr, crp and vanco trough as outpt.  Maintain trough between 10-15.  Complete 6 week course.     Will followSue Rai  [phone]

## 2020-05-06 NOTE — PROGRESS NOTE ADULT - ASSESSMENT
89 W h/o Dementia, HTN, and recent L hemiarthroplasty in 01/2020 presents from [facility] for L prosthetic hip dislocation of unclear etiology and chronicity, found to have elevated inflammatory markers and mild COVID19 infection. Was found to be bacteremic (+ blood cx on 4/22 and 4/23) with staph epidermidis with concern for prosthetic joint infection and also found to have UTI with CoNS and E coli.    Updates can be given to daughter Dianne Kennedy, she can be reached at [phone]. Dianne states that she has some dementia and memory issues at baseline and her mental status can wax and wane.    We reviewed code status and patient is DNR/DNI.

## 2020-05-06 NOTE — PROGRESS NOTE ADULT - PROBLEM SELECTOR PLAN 1
Unclear chronicity of etiology but prior history of L. hemiarthroplasty in 01/2020 and hip was imaging after shows implant in place. s/p aspiration of L hip by IR to r/o infection.   - s/p L hip arthroplasty on 5/1, repeat closed reduction 5/4  - pain control with acetaminophen (mild/mod pain) or oxycodone (severe pain) PRN  - Strict bed rest in abduction pillow until fitted for abduction brace  - Orthotist aware, should fit patient with brace today  - appreciate ortho recs

## 2020-05-06 NOTE — PROGRESS NOTE ADULT - SUBJECTIVE AND OBJECTIVE BOX
[facility] Division of Hospital Medicine  Reema Yadav DO  Pager: [phone]  Other Times:  [phone]    Patient is a 89y old  Female who presents with a chief complaint of LE pain (06 May 2020 00:02)    SUBJECTIVE / OVERNIGHT EVENTS:  Patient seen denying any pain, resting comfortably.    ADDITIONAL REVIEW OF SYSTEMS:    MEDICATIONS  (STANDING):  amLODIPine   Tablet 5 milliGRAM(s) Oral daily  atorvastatin 10 milliGRAM(s) Oral at bedtime  enoxaparin Injectable 40 milliGRAM(s) SubCutaneous daily  melatonin 3 milliGRAM(s) Oral at bedtime  pantoprazole    Tablet 40 milliGRAM(s) Oral before breakfast  rifAMPin 300 milliGRAM(s) Oral two times a day  sodium chloride 0.9%. 1000 milliLiter(s) (75 mL/Hr) IV Continuous <Continuous>  vancomycin  IVPB      vancomycin  IVPB 750 milliGRAM(s) IV Intermittent every 24 hours    MEDICATIONS  (PRN):  senna 2 Tablet(s) Oral at bedtime PRN Constipation  traMADol 25 milliGRAM(s) Oral every 8 hours PRN Mild Pain (1 - 3)      CAPILLARY BLOOD GLUCOSE        I&O's Summary      PHYSICAL EXAM:  Vital Signs Last 24 Hrs  T(C): 36.9 (06 May 2020 06:00), Max: 37.1 (05 May 2020 22:28)  T(F): 98.4 (06 May 2020 06:00), Max: 98.7 (05 May 2020 22:28)  HR: 108 (06 May 2020 06:00) (96 - 122)  BP: 126/54 (06 May 2020 06:00) (126/54 - 147/57)  BP(mean): --  RR: 20 (06 May 2020 06:00) (18 - 20)  SpO2: 97% (06 May 2020 06:00) (97% - 100%)  GENERAL: NAD, well-developed  HEAD:  Atraumatic, Normocephalic, MMM  CHEST/LUNG: No use of accessory muscles, speaking in complete sentences   PSYCH: AAOx1  NEUROLOGY: CN II-XII grossly intact, moving all extremities  SKIN: No rashes or lesions  Ext: wwp, L leg in bandage c/d/i    LABS:                        9.4    11.15 )-----------( 364      ( 05 May 2020 05:50 )             29.9     05-05    141  |  110<H>  |  20  ----------------------------<  109<H>  4.1   |  18<L>  |  0.85    Ca    8.1<L>      05 May 2020 05:50  Phos  2.5     05-05  Mg     1.7     05-05    TPro  4.6<L>  /  Alb  1.6<L>  /  TBili  0.5  /  DBili  x   /  AST  23  /  ALT  12  /  AlkPhos  119  05-05                RADIOLOGY & ADDITIONAL TESTS:  Results Reviewed:   Imaging Personally Reviewed:  Electrocardiogram Personally Reviewed:    COORDINATION OF CARE:  Care Discussed with Consultants/Other Providers [Y/N]:  Prior or Outpatient Records Reviewed [Y/N]:

## 2020-05-07 ENCOUNTER — TRANSCRIPTION ENCOUNTER (OUTPATIENT)
Age: 85
End: 2020-05-07

## 2020-05-07 DIAGNOSIS — E87.0 HYPEROSMOLALITY AND HYPERNATREMIA: ICD-10-CM

## 2020-05-07 LAB
ANION GAP SERPL CALC-SCNC: 13 MMO/L — SIGNIFICANT CHANGE UP (ref 7–14)
BUN SERPL-MCNC: 16 MG/DL — SIGNIFICANT CHANGE UP (ref 7–23)
CALCIUM SERPL-MCNC: 8.3 MG/DL — LOW (ref 8.4–10.5)
CHLORIDE SERPL-SCNC: 114 MMOL/L — HIGH (ref 98–107)
CO2 SERPL-SCNC: 19 MMOL/L — LOW (ref 22–31)
CREAT SERPL-MCNC: 0.9 MG/DL — SIGNIFICANT CHANGE UP (ref 0.5–1.3)
CRP SERPL-MCNC: 190.1 MG/L — HIGH
FERRITIN SERPL-MCNC: 294.6 NG/ML — HIGH (ref 15–150)
GLUCOSE SERPL-MCNC: 105 MG/DL — HIGH (ref 70–99)
HCT VFR BLD CALC: 28.9 % — LOW (ref 34.5–45)
HGB BLD-MCNC: 9 G/DL — LOW (ref 11.5–15.5)
INR BLD: 0.95 — SIGNIFICANT CHANGE UP (ref 0.88–1.17)
MAGNESIUM SERPL-MCNC: 1.8 MG/DL — SIGNIFICANT CHANGE UP (ref 1.6–2.6)
MCHC RBC-ENTMCNC: 27.4 PG — SIGNIFICANT CHANGE UP (ref 27–34)
MCHC RBC-ENTMCNC: 31.1 % — LOW (ref 32–36)
MCV RBC AUTO: 87.8 FL — SIGNIFICANT CHANGE UP (ref 80–100)
NRBC # FLD: 0 K/UL — SIGNIFICANT CHANGE UP (ref 0–0)
PHOSPHATE SERPL-MCNC: 2.4 MG/DL — LOW (ref 2.5–4.5)
PLATELET # BLD AUTO: 372 K/UL — SIGNIFICANT CHANGE UP (ref 150–400)
PMV BLD: 9.3 FL — SIGNIFICANT CHANGE UP (ref 7–13)
POTASSIUM SERPL-MCNC: 4 MMOL/L — SIGNIFICANT CHANGE UP (ref 3.5–5.3)
POTASSIUM SERPL-SCNC: 4 MMOL/L — SIGNIFICANT CHANGE UP (ref 3.5–5.3)
PROCALCITONIN SERPL-MCNC: 0.14 NG/ML — HIGH (ref 0.02–0.1)
PROTHROM AB SERPL-ACNC: 10.9 SEC — SIGNIFICANT CHANGE UP (ref 9.8–13.1)
RBC # BLD: 3.29 M/UL — LOW (ref 3.8–5.2)
RBC # FLD: 19.4 % — HIGH (ref 10.3–14.5)
SODIUM SERPL-SCNC: 146 MMOL/L — HIGH (ref 135–145)
WBC # BLD: 6.88 K/UL — SIGNIFICANT CHANGE UP (ref 3.8–10.5)
WBC # FLD AUTO: 6.88 K/UL — SIGNIFICANT CHANGE UP (ref 3.8–10.5)

## 2020-05-07 PROCEDURE — 36573 INSJ PICC RS&I 5 YR+: CPT

## 2020-05-07 PROCEDURE — 99233 SBSQ HOSP IP/OBS HIGH 50: CPT

## 2020-05-07 RX ORDER — SODIUM CHLORIDE 9 MG/ML
1000 INJECTION, SOLUTION INTRAVENOUS
Refills: 0 | Status: DISCONTINUED | OUTPATIENT
Start: 2020-05-07 | End: 2020-05-08

## 2020-05-07 RX ORDER — TRAMADOL HYDROCHLORIDE 50 MG/1
0.5 TABLET ORAL
Qty: 0 | Refills: 0 | DISCHARGE
Start: 2020-05-07

## 2020-05-07 RX ORDER — VANCOMYCIN HCL 1 G
750 VIAL (EA) INTRAVENOUS
Qty: 0 | Refills: 0 | DISCHARGE
Start: 2020-05-07

## 2020-05-07 RX ORDER — SODIUM,POTASSIUM PHOSPHATES 278-250MG
1 POWDER IN PACKET (EA) ORAL ONCE
Refills: 0 | Status: COMPLETED | OUTPATIENT
Start: 2020-05-07 | End: 2020-05-07

## 2020-05-07 RX ORDER — LANOLIN ALCOHOL/MO/W.PET/CERES
1 CREAM (GRAM) TOPICAL
Qty: 0 | Refills: 0 | DISCHARGE
Start: 2020-05-07

## 2020-05-07 RX ORDER — VANCOMYCIN HCL 1 G
750 VIAL (EA) INTRAVENOUS EVERY 24 HOURS
Refills: 0 | Status: DISCONTINUED | OUTPATIENT
Start: 2020-05-07 | End: 2020-05-08

## 2020-05-07 RX ADMIN — AMLODIPINE BESYLATE 5 MILLIGRAM(S): 2.5 TABLET ORAL at 05:54

## 2020-05-07 RX ADMIN — Medication 250 MILLIGRAM(S): at 10:08

## 2020-05-07 RX ADMIN — ENOXAPARIN SODIUM 40 MILLIGRAM(S): 100 INJECTION SUBCUTANEOUS at 16:48

## 2020-05-07 RX ADMIN — SODIUM CHLORIDE 75 MILLILITER(S): 9 INJECTION, SOLUTION INTRAVENOUS at 10:40

## 2020-05-07 RX ADMIN — ATORVASTATIN CALCIUM 10 MILLIGRAM(S): 80 TABLET, FILM COATED ORAL at 21:51

## 2020-05-07 RX ADMIN — PANTOPRAZOLE SODIUM 40 MILLIGRAM(S): 20 TABLET, DELAYED RELEASE ORAL at 05:54

## 2020-05-07 RX ADMIN — Medication 1 PACKET(S): at 15:21

## 2020-05-07 RX ADMIN — Medication 3 MILLIGRAM(S): at 21:51

## 2020-05-07 RX ADMIN — TRAMADOL HYDROCHLORIDE 25 MILLIGRAM(S): 50 TABLET ORAL at 21:51

## 2020-05-07 NOTE — DISCHARGE NOTE PROVIDER - NSDCMRMEDTOKEN_GEN_ALL_CORE_FT
amLODIPine 5 mg oral tablet: 1 tab(s) orally once a day  aspirin 81 mg oral delayed release tablet: 1 tab(s) orally once a day  atorvastatin 10 mg oral tablet: 1 tab(s) orally once a day (at bedtime)  Lasix 20 mg oral tablet: 1 tab(s) orally once a day  melatonin 3 mg oral tablet: 1 tab(s) orally once a day (at bedtime)  pantoprazole 40 mg oral delayed release tablet: 1 tab(s) orally once a day (before a meal)  rifAMPin 300 mg oral capsule: 1 cap(s) orally 2 times a day  senna oral tablet: 2 tab(s) orally once a day (at bedtime), As needed, Constipation  traMADol 50 mg oral tablet: 0.5 tab(s) orally every 8 hours, As needed, Mild Pain (1 - 3)  vancomycin 750 mg intravenous injection: 750 milligram(s) intravenous every 24 hours amLODIPine 5 mg oral tablet: 1 tab(s) orally once a day  aspirin 81 mg oral delayed release tablet: 1 tab(s) orally once a day  atorvastatin 10 mg oral tablet: 1 tab(s) orally once a day (at bedtime)  melatonin 3 mg oral tablet: 1 tab(s) orally once a day (at bedtime)  pantoprazole 40 mg oral delayed release tablet: 1 tab(s) orally once a day (before a meal)  rifAMPin 300 mg oral capsule: 1 cap(s) orally 2 times a day, last day 6/5/20   to treat possible PJI.  Monitor weekly LFTs  senna oral tablet: 2 tab(s) orally once a day (at bedtime), As needed, Constipation  traMADol 50 mg oral tablet: 0.5 tab(s) orally every 8 hours, As needed, Mild Pain (1 - 3)  vancomycin 750 mg intravenous injection: 750 milligram(s) intravenous every 24 hours   total 6 wks till 6/5/20   goal trough between 15-20

## 2020-05-07 NOTE — PROGRESS NOTE ADULT - PROBLEM SELECTOR PLAN 5
- continue home lipitor UA positive with few WBCs Informed by patient that patient has a pessary - last exchanged in Oct/Nov 2019   - completed ceftriaxone 1 g daily x 7 (4/23-4/30) days; c/w Vancomycin.    - Cultures growing E coli (sensitive to Ceftriaxone) and Coag Neg Staph.    - Pt normally sees Dr. Jose BILLINGS in the [facility]

## 2020-05-07 NOTE — PROGRESS NOTE ADULT - PROBLEM SELECTOR PLAN 8
- DASH diet   - fall precautions   - home senna   - home melatonin   - DVT with 40 mg daily Lovenox    Dispo: likely [facility] - continue home protonix

## 2020-05-07 NOTE — DISCHARGE NOTE PROVIDER - CARE PROVIDER_API CALL
Jose Urban)  Obstetrics and Gynecology  [address] [place] [address]  Phone: [phone]  Fax: [phone]  Follow Up Time:     PCP,   Phone: (   )    -  Fax: (   )    -  Follow Up Time:     Sue Armstrong)  Infectious Disease; Internal Medicine  [id number] [place] [address]  Phone: [phone]  Fax: [phone]  Follow Up Time: Jose Urban)  Obstetrics and Gynecology  [address], [address] [place] [address]  Phone: [phone]  Fax: [phone]  Follow Up Time:     Sue Armstrong)  Infectious Disease; Internal Medicine  [address] [facility] [place] [facility] [address]  [place] [address]  Phone: [phone]  Fax: [phone]  Follow Up Time:     PCP,   Phone: (   )    -  Fax: (   )    -  Follow Up Time:     Vivek Kauffman)  Orthopaedic Surgery  [address], [address]  [place] [address]  Phone: [phone]  Fax: [phone]  Follow Up Time:

## 2020-05-07 NOTE — DISCHARGE NOTE PROVIDER - HOSPITAL COURSE
89 W h/o Dementia, HTN, and recent L hemiarthroplasty in 01/2020 [facility] hip dislocation of unclear etiology and chronicity, found to have elevated inflammatory markers and mild COVID19 infection. Was found to be bacteremic (+ blood cx on 4/22 and 4/23) with staph epidermidis with concern for prosthetic joint infection and also found to have UTI with CoNS and E coli.        Hip dislocation, left- Unclear chronicity of etiology but prior history of L. hemiarthroplasty in 01/2020 and hip imaging after shows implant in place    - s/p aspiration of L hip joint by IR to r/o infection. IR cultures NGTD from 5/1    - s/p L hip arthroplasty on 5/1, repeat closed reduction 5/4    - Intra op cultures unavailable. As per Ortho, low suspicion for hardware infection    - pain control with Tylenol or Oxycodone (severe pain) PRN    - Strict bed rest in abduction pillow until fiitted for abduction brace    - Ortho - WBAT and OOB once pt has brace        Bacteremia- positive for Staph epidermidis on 4/22 and 4/23, sensitive to Vancomycin, blood cultures NGTD on 4/24     - ID Dr Armstrong consulted- continue Rifampin per ID due to concern for prosthetic joint infection    - continue Vancomycin until 6 weeks, Goal trough between 15-20    - Echo inconclusive - cannot rule out endocarditis     - PICC line. Check weekly cbc, cmp, esr, crp and vanco trough as outpt        Coronavirus infection- COVID19 PCR positive 04/23/20, normal O2 sat on room air, no resp symptoms, serum ESR and CRP elevation    - Not a candidate for Plaquenil given absence of hypoxia. Supportive care.    - last known ecg (3/13/20) with QTc of 481        UTI - UA positive with few WBCs Informed by patient that patient has a pessary, last exchanged in Oct/Nov 2019    - completed ceftriaxone 1 g daily x 7 (4/23-4/30) days; c/w Vancomycin.     - Cultures growing E coli (sensitive to Ceftriaxone) and Coag Neg Staph     - Pt normally sees Dr. Jose BILLINGS in the [facility]        HTN- continue home Amlodipine, Lipitor        DVT with 40 mg daily Lovenox

## 2020-05-07 NOTE — PROGRESS NOTE ADULT - ASSESSMENT
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 Patient had a mechanical fall and underwent a left hip hemiarthroplasty in 1/2020 at [facility]. She tolerated surgery and discharged to [facility] rehab. She presented to [facility] on 3/13/2020 for painful vertebral compression fractures, once her pain was treated she was discharged to [facility] rehab.    ER vitals:  T 97.6, P 87, /62, RR 18, 95% RA.  WBC 18.2 --> 9.9.   . .   D-dimer 1430, ferritin 206.  PCT 0.13.  UA Large LE, (+) wbc's.   Ucx >100K GNR and >100K CoNS.   Bcx (+) CoNS 4/4 bottles.   Covid pcr (+) 4/22.      Multiple imaging performed including CT pelvis and femur, showing  dislocated femoral component of the left hip hemiarthroplasty. Mild cortical irregularity and lucencies at the left greater trochanter, which is difficult to assess due to artifact. This may be in part postsurgical although nondisplaced fracture cannot be excluded.  Also with foci of air in the left mid lateral thigh soft tissue, without a discrete fluid collection. Differential diagnosis includes injection, penetrating trauma and infection.  Question sigmoid colon diverticulitis.     Pt seen by Ortho service, pt initially planned IR aspiration of L hip to rule out prosthetic joint infection, given CT findings and bacteremia with CoNS.  Also planned for OR for possible revision vs resection arthroplasty, now cancelled due to Covid (+) status.     ID consulted for further recommendations.     CoNS bacteremia:    - Pt with 4/4 blood cultures positive, also Ucx (+) CoNS (secondary to descending infection from bacetermia?).  Given elevated inflammatory markers, and new dislocated left hip hemiarthroplasty with lucency, concern for prosthetic joint infection.    - Cont vancomycin 1gm IV q24 and rifampin to treat possible PJI.  Monitor weekly LFTs.  Goal trough between 15-20    - Recommend PICC line and 6 weeks of IV abx from date of blood culture clearance  Sensitivities reviewed, no oral options for chronic suppressive treatment due to resistance.      - s/pr IR aspiration of hip joint to evaluate for PJI on 4/30.     Cell count and diff unavailable.    IR cultures NGTD from 5/1    - Pt went to OR on 5/1 for open reduction, I&D, and head exchange.  Intra op cultures unavailable.   As per Ortho, low suspicion for hardware infection.     - TTE no vegetations.  Repeat blood cultures cleared promptly, doubt CHIARA will change managment and have low suspicion for endocarditis.     - Monitor WBC, temp curve, trend inflammatory markers.       Covid (+):    - Cont supportive care.  Monitor pulse ox.  Currently oxygenating adequately on room air.     - Cxr shows grossly clear lungs.      -  Repeat covid testing (-) on 2nd test.  3rd test (+).  Pt requires airborne/isolation.  Recent inflammatory markers are elevated, however pt appears stable from oxygenation standpoint      UTI:    - Growing both E.coli and CoNS.  s/p rocephin    - Pt with h/o pessary.  Benson catheter placed in the hospital.  d/c benson as appropriate.       * picc line placement.  Check weekly cbc, cmp, esr, crp and vanco trough as outpt.  Maintain trough between 10-15.  Complete 6 week course from neg bcx.     Will follow,    Sue Armstrong  [phone]

## 2020-05-07 NOTE — DISCHARGE NOTE PROVIDER - CARE PROVIDERS DIRECT ADDRESSES
[email or web address],DirectAddress_Unknown,DirectAddress_Unknown ,[email or web address],DirectAddress_Unknown,DirectAddress_Unknown,[email or web address]

## 2020-05-07 NOTE — PROGRESS NOTE ADULT - PROBLEM SELECTOR PLAN 1
Unclear chronicity of etiology but prior history of L. hemiarthroplasty in 01/2020 and hip was imaging after shows implant in place. s/p aspiration of L hip by IR to r/o infection.   s/p L hip arthroplasty on 5/1, repeat closed reduction 5/4  s/p abduction brace placement   - pain control with acetaminophen (mild/mod pain) or oxycodone (severe pain) PRN  - appreciate ortho recs

## 2020-05-07 NOTE — PROGRESS NOTE ADULT - ASSESSMENT
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 Was found to be bacteremic (+ blood cx on 4/22 and 4/23) with staph epidermidis with concern for prosthetic joint infection and also found to have UTI with CoNS and E coli.    Updates can be given to daughter Dianne Kennedy, she can be reached at [phone]. Dianne states that she has some dementia and memory issues at baseline and her mental status can wax and wane.    We reviewed code status and patient is DNR/DNI.

## 2020-05-07 NOTE — DISCHARGE NOTE PROVIDER - PROVIDER TOKENS
PROVIDER:[TOKEN:[[id number]]],FREE:[LAST:[PCP],PHONE:[(   )    -],FAX:[(   )    -]],PROVIDER:[TOKEN:[[id number]]] PROVIDER:[TOKEN:[[id number]]],PROVIDER:[TOKEN:[[id number]]],FREE:[LAST:[PCP],PHONE:[(   )    -],FAX:[(   )    -]],PROVIDER:[TOKEN:[[id number]]]

## 2020-05-07 NOTE — PROGRESS NOTE ADULT - SUBJECTIVE AND OBJECTIVE BOX
Infectious Diseases progress note:    Subjective:  NAD, afebrile.   No acute events o/n    ROS:  Pt confused at baseline, unable to assess    Allergies    No Known Allergies    Intolerances        ANTIBIOTICS/RELEVANT:  antimicrobials  rifAMPin 300 milliGRAM(s) Oral two times a day  vancomycin  IVPB 750 milliGRAM(s) IV Intermittent every 24 hours    immunologic:    OTHER:  amLODIPine   Tablet 5 milliGRAM(s) Oral daily  atorvastatin 10 milliGRAM(s) Oral at bedtime  dextrose 5% + sodium chloride 0.45%. 1000 milliLiter(s) IV Continuous <Continuous>  enoxaparin Injectable 40 milliGRAM(s) SubCutaneous daily  melatonin 3 milliGRAM(s) Oral at bedtime  pantoprazole    Tablet 40 milliGRAM(s) Oral before breakfast  senna 2 Tablet(s) Oral at bedtime PRN  traMADol 25 milliGRAM(s) Oral every 8 hours PRN      Objective:  Vital Signs Last 24 Hrs  T(C): 36.8 (07 May 2020 14:39), Max: 37.3 (06 May 2020 20:51)  T(F): 98.2 (07 May 2020 14:39), Max: 99.2 (06 May 2020 20:51)  HR: 96 (07 May 2020 14:39) (95 - 102)  BP: 132/62 (07 May 2020 14:39) (132/62 - 152/71)  BP(mean): --  RR: 16 (07 May 2020 14:39) (16 - 18)  SpO2: 96% (07 May 2020 14:39) (95% - 96%)    PHYSICAL EXAM:    Constitutional:NAD  Eyes:BINTA, EOMI  Ear/Nose/Throat: no thrush, mucositis.  Moist mucous membranes	  Neck:no JVD, no lymphadenopathy, supple  Respiratory: CTA sandra  Cardiovascular: S1S2 RRR, no murmurs  Gastrointestinal:soft, nontender,  nondistended (+) BS  Extremities:no e/e/c  Skin:  L hip dsg in place.         LABS:                        9.0    6.88  )-----------( 372      ( 07 May 2020 05:30 )             28.9     05-07    146<H>  |  114<H>  |  16  ----------------------------<  105<H>  4.0   |  19<L>  |  0.90    Ca    8.3<L>      07 May 2020 05:30  Phos  2.4     05-07  Mg     1.8     05-07      PT/INR - ( 07 May 2020 05:30 )   PT: 10.9 SEC;   INR: 0.95                Procalcitonin, Serum: 0.14 (05-07 @ 05:30)  Procalcitonin, Serum: 0.15 (05-06 @ 16:13)  Procalcitonin, Serum: 0.27 (05-04 @ 04:20)  Procalcitonin, Serum: 0.28 (05-02 @ 08:15)  Procalcitonin, Serum: 0.08 (04-30 @ 04:12)    Vancomycin Level, Random:  ug/mL (05-04 @ 04:30)      Vancomycin Level, Trough: 16.1 ug/mL (05-05 @ 10:00)  Vancomycin Level, Trough: 20.5 ug/mL (05-03 @ 10:50)              MICROBIOLOGY:    Culture - Tissue with Gram Stain (05.02.20 @ 01:01)    Gram Stain:   No polymorphonuclear cells seen per low power field  No organisms seen per oil power field    Specimen Source: .Tissue LT HIP #2    Culture Results:   No growth at 5 days    Culture - Surgical Swab (05.02.20 @ 01:00)    Specimen Source: .Surgical Swab LT HIP # 5 SWAB    Culture Results:   No growth          RADIOLOGY & ADDITIONAL STUDIES:    < from: Xray Hip w/ Pelvis 1 View, Left (05.04.20 @ 13:30) >  IMPRESSION:  Successfully reduced and anatomically aligned previously dislocated cemented left hip hemiarthroplasty prosthesis. No periprosthetic fractures or suspicious periprosthetic lucencies.    Postsurgical changes in the overlying soft tissues with surgical skin staples overlying the incision site.    Osteopenic but otherwise intact and unremarkable remaining imaged osseous structures.    Correlate with intraoperative findings.    < end of copied text >

## 2020-05-07 NOTE — PROGRESS NOTE ADULT - PROBLEM SELECTOR PLAN 4
UA positive with few WBCs Informed by patient that patient has a pessary - last exchanged in Oct/Nov 2019   - completed ceftriaxone 1 g daily x 7 (4/23-4/30) days; c/w Vancomycin.    - Cultures growing E coli (sensitive to Ceftriaxone) and Coag Neg Staph.    - Pt normally sees Dr. Jose BILLINGS in the [facility] Likely 2/2 poor po intake  - c/w IVF, encourage free water intake  - monitor BMP

## 2020-05-07 NOTE — DISCHARGE NOTE PROVIDER - NSDCCPCAREPLAN_GEN_ALL_CORE_FT
PRINCIPAL DISCHARGE DIAGNOSIS  Diagnosis: Hip dislocation, left  Assessment and Plan of Treatment: - s/p aspiration of Left hip joint by IR to r/o infection. IR cultures negative from 5/1  - s/p Left hip arthroplasty on 5/1, repeat closed reduction 5/4  - As per Ortho, low suspicion for hardware infection  - pain control with Tylenol or Oxycodone (severe pain) as needed  - Strict bed rest in abduction pillow until fiitted for abduction brace  - Ortho -recommends Weight Bearing as tolerated and out of bed with brace      SECONDARY DISCHARGE DIAGNOSES  Diagnosis: Bacteremia  Assessment and Plan of Treatment: - positive for Staph epidermidis on 4/22 and 4/23, sensitive to Vancomycin, blood cultures negative on 4/24   - ID Dr Armstrong consulted- continue Rifampin per ID due to concern for prosthetic joint infection  - continue Vancomycin until 6 wks till 6/5/20, goal trough between 15-20  - Echo inconclusive - cannot rule out endocarditis   - PICC line care as per protocol. Check weekly cbc, cmp, esr, crp and vanco trough as outpt    Diagnosis: Coronavirus infection  Assessment and Plan of Treatment: - COVID19 PCR positive 04/23/20, normal oxygen saturation on room air, no resp symptoms, no serum ESR and CRP elevation  - Not a candidate for Plaquenil given absence of hypoxia. Supportive care. Follow up with PCP as outpatient for further management    Diagnosis: UTI (urinary tract infection)  Assessment and Plan of Treatment: - UA positive, patient has a pessary, last exchanged in Oct/Nov 2019  - completed Ceftriaxone 1 g daily x 7 (4/23-4/30) days   - Cultures grew E coli (sensitive to Ceftriaxone) and Coag Neg Staph   - Pt normally sees [facility] Jose [facility] in the [facility]. Follow up as outpatient    Diagnosis: HTN (hypertension)  Assessment and Plan of Treatment: - continue taking Amlodipine as prescribed  - Monitor for any visual changes, headaches or dizziness.  Monitor blood pressure regularly  - Follow up with your PCP for further management for high blood pressure, please call to make appointment within 1 week of discharge PRINCIPAL DISCHARGE DIAGNOSIS  Diagnosis: Hip dislocation, left  Assessment and Plan of Treatment: - s/p aspiration of Left hip joint by IR to r/o infection. IR cultures negative from 5/1  - s/p Left hip arthroplasty on 5/1, repeat closed reduction 5/4  - Ortho -recommends Weight Bearing as tolerated and out of bed with brace  - WBAT LLE in abduction brace  - posterior hip dislocation precautions  - PT/OT  - May follow up with Dr. Kauffman in 2 weeks following discharge. Call office at [phone] to make appointment      SECONDARY DISCHARGE DIAGNOSES  Diagnosis: UTI (urinary tract infection)  Assessment and Plan of Treatment: - UA positive, patient has a pessary, last exchanged in Oct/Nov 2019  - completed Ceftriaxone 1 g daily x 7 (4/23-4/30) days   - Cultures grew E coli (sensitive to Ceftriaxone) and Coag Neg Staph   - Pt normally sees [facility] Jose [facility]ILLINGS in the [facility]. Follow up as outpatient    Diagnosis: Coronavirus infection  Assessment and Plan of Treatment: - COVID19 PCR positive 04/23/20, normal oxygen saturation on room air, no resp symptoms, no serum ESR and CRP elevation  - Not a candidate for Plaquenil given absence of hypoxia. Supportive care. Follow up with PCP as outpatient for further management    Diagnosis: Bacteremia  Assessment and Plan of Treatment: - positive for Staph epidermidis on 4/22 and 4/23, sensitive to Vancomycin, blood cultures negative on 4/24   - ID Dr Armstrong consulted- continue Rifampin per ID due to concern for prosthetic joint infection  - continue Vancomycin until 6 wks till 6/5/20, goal trough between 15-20  - Echo inconclusive - cannot rule out endocarditis   - PICC line care as per protocol. Check weekly cbc, cmp, esr, crp and vanco trough as outpt    Diagnosis: HTN (hypertension)  Assessment and Plan of Treatment: - continue taking Amlodipine as prescribed  - Monitor for any visual changes, headaches or dizziness.  Monitor blood pressure regularly  - Follow up with your PCP for further management for high blood pressure, please call to make appointment within 1 week of discharge PRINCIPAL DISCHARGE DIAGNOSIS  Diagnosis: Hip dislocation, left  Assessment and Plan of Treatment: - s/p aspiration of Left hip joint by IR to r/o infection. IR cultures negative from 5/1  - s/p Left hip arthroplasty on 5/1, repeat closed reduction 5/4  - Ortho -recommends Weight Bearing as tolerated and out of bed with brace  - WBAT LLE in abduction brace  - posterior hip dislocation precautions  - PT/OT  - May follow up with Dr. Kauffman in 2 weeks following discharge. Call office at [phone] to make appointment      SECONDARY DISCHARGE DIAGNOSES  Diagnosis: UTI (urinary tract infection)  Assessment and Plan of Treatment: - UA positive, patient has a pessary, last exchanged in Oct/Nov 2019  - completed Ceftriaxone 1 g daily x 7 (4/23-4/30) days   - Cultures grew E coli (sensitive to Ceftriaxone) and Coag Neg Staph   - Pt normally sees Dr. Jose BILLINGS in the [facility]. Follow up as outpatient for pessary eval/exchange    Diagnosis: Coronavirus infection  Assessment and Plan of Treatment: - COVID19 PCR positive 04/23/20, normal oxygen saturation on room air, no resp symptoms, no serum ESR and CRP elevation  - Not a candidate for Plaquenil given absence of hypoxia. Supportive care. Follow up with PCP as outpatient for further management    Diagnosis: Bacteremia  Assessment and Plan of Treatment: - positive for Staph epidermidis on 4/22 and 4/23, sensitive to Vancomycin, blood cultures negative on 4/24   - continue Vancomycin until 6 wks till 6/5/20, goal trough between 15-20  -C/w RifAMPin 300 mg oral capsule: 1 cap(s) orally 2 times a day, last day until 6/5/20 to treat possible PJI.  Monitor weekly LFTs   ID Dr Armstrong was consulted   - Echo inconclusive - cannot rule out endocarditis   - PICC line care as per protocol. Check weekly cbc, cmp, esr, crp and vanco trough as outpt    Diagnosis: HTN (hypertension)  Assessment and Plan of Treatment: - continue taking Amlodipine as prescribed  - Monitor for any visual changes, headaches or dizziness.  Monitor blood pressure regularly  - Follow up with your PCP for further management for high blood pressure, please call to make appointment within 1 week of discharge

## 2020-05-07 NOTE — PROGRESS NOTE ADULT - SUBJECTIVE AND OBJECTIVE BOX
Pt seen/examined. Doing well. Pain controlled. No acute overnight complaints or events.    T(C): 36.7 (05-06-20 @ 22:30), Max: 37.3 (05-06-20 @ 20:51)  HR: 100 (05-06-20 @ 22:30) (100 - 108)  BP: 147/75 (05-06-20 @ 22:30) (126/54 - 152/71)  RR: 18 (05-06-20 @ 22:30) (17 - 20)  SpO2: 95% (05-06-20 @ 22:30) (95% - 97%)  Wt(kg): --  - Gen: NAD  -     89yFemale s/p    - Pain control  - mechanical/DVT ppx  - OOB/PT Pt seen/examined. Doing well. Pain controlled. No acute overnight complaints or events.    T(C): 36.7 (05-06-20 @ 22:30), Max: 37.3 (05-06-20 @ 20:51)  HR: 100 (05-06-20 @ 22:30) (100 - 108)  BP: 147/75 (05-06-20 @ 22:30) (126/54 - 152/71)  RR: 18 (05-06-20 @ 22:30) (17 - 20)  SpO2: 95% (05-06-20 @ 22:30) (95% - 97%)  Wt(kg): --    Gen: awake, NAD  Resp: no increased work of breathing  LLE:  LLE in hip abduction brace  limited exam due to patient compliance, +EHL/FHL/TA/GS  SILT L3-S1  +DP Pulses  Compartments soft  No calf TTP                          9.0    7.41  )-----------( 369      ( 06 May 2020 16:13 )             28.6       05-06    142  |  111<H>  |  16  ----------------------------<  100<H>  4.0   |  19<L>  |  0.97          89y Female with dislocated L hip hemiarthroplasty s/p ORIF 5/1 and repeat closed reduction 5/4    - Pain control  - DVT ppx  - Hip abduction brace in place   - WBAT LLE in abduction brace  - posterior hip dislocation precautions  - PT/OT  - May follow up with Dr. Kauffman in 2 weeks following discharge. Call office at [phone] to make appointment  - Please page back as needed if you have questions.

## 2020-05-07 NOTE — PROGRESS NOTE ADULT - SUBJECTIVE AND OBJECTIVE BOX
[facility] Division of Hospital Medicine  Reema Yadav DO  Pager: [phone]  Other Times:  [phone]    Patient is a 89y old  Female who presents with a chief complaint of LE pain (07 May 2020 11:54)    SUBJECTIVE / OVERNIGHT EVENTS:  Patient seen denying any pain, resting comfortably.    ADDITIONAL REVIEW OF SYSTEMS:    MEDICATIONS  (STANDING):  amLODIPine   Tablet 5 milliGRAM(s) Oral daily  atorvastatin 10 milliGRAM(s) Oral at bedtime  dextrose 5% + sodium chloride 0.45%. 1000 milliLiter(s) (75 mL/Hr) IV Continuous <Continuous>  enoxaparin Injectable 40 milliGRAM(s) SubCutaneous daily  melatonin 3 milliGRAM(s) Oral at bedtime  pantoprazole    Tablet 40 milliGRAM(s) Oral before breakfast  potassium phosphate / sodium phosphate powder 1 Packet(s) Oral once  rifAMPin 300 milliGRAM(s) Oral two times a day  vancomycin  IVPB 750 milliGRAM(s) IV Intermittent every 24 hours    MEDICATIONS  (PRN):  senna 2 Tablet(s) Oral at bedtime PRN Constipation  traMADol 25 milliGRAM(s) Oral every 8 hours PRN Mild Pain (1 - 3)      CAPILLARY BLOOD GLUCOSE        I&O's Summary      PHYSICAL EXAM:  Vital Signs Last 24 Hrs  T(C): 36.6 (07 May 2020 05:52), Max: 37.3 (06 May 2020 20:51)  T(F): 97.9 (07 May 2020 05:52), Max: 99.2 (06 May 2020 20:51)  HR: 95 (07 May 2020 05:52) (95 - 105)  BP: 137/60 (07 May 2020 05:52) (131/69 - 152/71)  BP(mean): --  RR: 18 (07 May 2020 05:52) (17 - 20)  SpO2: 95% (07 May 2020 05:52) (95% - 95%)  GENERAL: NAD, well-developed  HEAD:  Atraumatic, Normocephalic, MMM  CHEST/LUNG: No use of accessory muscles, speaking in complete sentences   PSYCH: AAOx1  NEUROLOGY: CN II-XII grossly intact, moving all extremities  SKIN: No rashes or lesions  Ext: wwp, abductor brace on, c/d/i    LABS:                        9.0    6.88  )-----------( 372      ( 07 May 2020 05:30 )             28.9     05-07    146<H>  |  114<H>  |  16  ----------------------------<  105<H>  4.0   |  19<L>  |  0.90    Ca    8.3<L>      07 May 2020 05:30  Phos  2.4     05-07  Mg     1.8     05-07      PT/INR - ( 07 May 2020 05:30 )   PT: 10.9 SEC;   INR: 0.95                      RADIOLOGY & ADDITIONAL TESTS:  Results Reviewed:   Imaging Personally Reviewed:  Electrocardiogram Personally Reviewed:    COORDINATION OF CARE:  Care Discussed with Consultants/Other Providers [Y/N]:  Prior or Outpatient Records Reviewed [Y/N]:

## 2020-05-08 ENCOUNTER — TRANSCRIPTION ENCOUNTER (OUTPATIENT)
Age: 85
End: 2020-05-08

## 2020-05-08 VITALS
SYSTOLIC BLOOD PRESSURE: 134 MMHG | OXYGEN SATURATION: 98 % | RESPIRATION RATE: 18 BRPM | DIASTOLIC BLOOD PRESSURE: 68 MMHG | HEART RATE: 88 BPM | TEMPERATURE: 98 F

## 2020-05-08 LAB
ANION GAP SERPL CALC-SCNC: 9 MMO/L — SIGNIFICANT CHANGE UP (ref 7–14)
BUN SERPL-MCNC: 14 MG/DL — SIGNIFICANT CHANGE UP (ref 7–23)
CALCIUM SERPL-MCNC: 8 MG/DL — LOW (ref 8.4–10.5)
CHLORIDE SERPL-SCNC: 113 MMOL/L — HIGH (ref 98–107)
CO2 SERPL-SCNC: 20 MMOL/L — LOW (ref 22–31)
CREAT SERPL-MCNC: 0.89 MG/DL — SIGNIFICANT CHANGE UP (ref 0.5–1.3)
GLUCOSE SERPL-MCNC: 157 MG/DL — HIGH (ref 70–99)
HCT VFR BLD CALC: 30.4 % — LOW (ref 34.5–45)
HGB BLD-MCNC: 9.5 G/DL — LOW (ref 11.5–15.5)
MCHC RBC-ENTMCNC: 27.9 PG — SIGNIFICANT CHANGE UP (ref 27–34)
MCHC RBC-ENTMCNC: 31.3 % — LOW (ref 32–36)
MCV RBC AUTO: 89.1 FL — SIGNIFICANT CHANGE UP (ref 80–100)
NRBC # FLD: 0 K/UL — SIGNIFICANT CHANGE UP (ref 0–0)
PLATELET # BLD AUTO: 335 K/UL — SIGNIFICANT CHANGE UP (ref 150–400)
PMV BLD: 9.6 FL — SIGNIFICANT CHANGE UP (ref 7–13)
POTASSIUM SERPL-MCNC: 3.8 MMOL/L — SIGNIFICANT CHANGE UP (ref 3.5–5.3)
POTASSIUM SERPL-SCNC: 3.8 MMOL/L — SIGNIFICANT CHANGE UP (ref 3.5–5.3)
RBC # BLD: 3.41 M/UL — LOW (ref 3.8–5.2)
RBC # FLD: 19.6 % — HIGH (ref 10.3–14.5)
SODIUM SERPL-SCNC: 142 MMOL/L — SIGNIFICANT CHANGE UP (ref 135–145)
VANCOMYCIN TROUGH SERPL-MCNC: 16.9 UG/ML — SIGNIFICANT CHANGE UP (ref 10–20)
WBC # BLD: 6.14 K/UL — SIGNIFICANT CHANGE UP (ref 3.8–10.5)
WBC # FLD AUTO: 6.14 K/UL — SIGNIFICANT CHANGE UP (ref 3.8–10.5)

## 2020-05-08 PROCEDURE — 99239 HOSP IP/OBS DSCHRG MGMT >30: CPT

## 2020-05-08 RX ORDER — FUROSEMIDE 40 MG
1 TABLET ORAL
Qty: 0 | Refills: 0 | DISCHARGE

## 2020-05-08 RX ADMIN — Medication 250 MILLIGRAM(S): at 10:12

## 2020-05-08 RX ADMIN — AMLODIPINE BESYLATE 5 MILLIGRAM(S): 2.5 TABLET ORAL at 07:23

## 2020-05-08 RX ADMIN — ENOXAPARIN SODIUM 40 MILLIGRAM(S): 100 INJECTION SUBCUTANEOUS at 10:13

## 2020-05-08 RX ADMIN — PANTOPRAZOLE SODIUM 40 MILLIGRAM(S): 20 TABLET, DELAYED RELEASE ORAL at 07:24

## 2020-05-08 NOTE — PROGRESS NOTE ADULT - ASSESSMENT
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 Patient had a mechanical fall and underwent a left hip hemiarthroplasty in 1/2020 at [facility]. She tolerated surgery and discharged to [facility] rehab. She presented to [facility] on 3/13/2020 for painful vertebral compression fractures, once her pain was treated she was discharged to [place] rehab.    ER vitals:  T 97.6, P 87, /62, RR 18, 95% RA.  WBC 18.2 --> 9.9.   . .   D-dimer 1430, ferritin 206.  PCT 0.13.  UA Large LE, (+) wbc's.   Ucx >100K GNR and >100K CoNS.   Bcx (+) CoNS 4/4 bottles.   Covid pcr (+) 4/22.      Multiple imaging performed including CT pelvis and femur, showing  dislocated femoral component of the left hip hemiarthroplasty. Mild cortical irregularity and lucencies at the left greater trochanter, which is difficult to assess due to artifact. This may be in part postsurgical although nondisplaced fracture cannot be excluded.  Also with foci of air in the left mid lateral thigh soft tissue, without a discrete fluid collection. Differential diagnosis includes injection, penetrating trauma and infection.  Question sigmoid colon diverticulitis.     Pt seen by Ortho service, pt initially planned IR aspiration of L hip to rule out prosthetic joint infection, given CT findings and bacteremia with CoNS.  Also planned for OR for possible revision vs resection arthroplasty, now cancelled due to Covid (+) status.     ID consulted for further recommendations.     CoNS bacteremia:    - Pt with 4/4 blood cultures positive, also Ucx (+) CoNS (secondary to descending infection from bacetermia?).  Given elevated inflammatory markers, and new dislocated left hip hemiarthroplasty with lucency, concern for prosthetic joint infection.    - Cont vancomycin 1gm IV q24 and rifampin to treat possible PJI.  Monitor weekly LFTs.  Goal trough between 15-20    - Recommend PICC line and 6 weeks of IV abx from date of blood culture clearance  Sensitivities reviewed, no oral options for chronic suppressive treatment due to resistance.      - s/pr IR aspiration of hip joint to evaluate for PJI on 4/30.     Cell count and diff unavailable.    IR cultures NGTD from 5/1    - Pt went to OR on 5/1 for open reduction, I&D, and head exchange.  Intra op cultures unavailable.   As per Ortho, low suspicion for hardware infection.     - TTE no vegetations.  Repeat blood cultures cleared promptly, doubt CHIARA will change managment and have low suspicion for endocarditis.     - Monitor WBC, temp curve, trend inflammatory markers.       Covid (+):    - Cont supportive care.  Monitor pulse ox.  Currently oxygenating adequately on room air.     - Cxr shows grossly clear lungs.      -  Repeat covid testing (-) on 2nd test.  3rd test (+).  Pt requires airborne/isolation.  Recent inflammatory markers are elevated, however pt appears stable from oxygenation standpoint      UTI:    - Growing both E.coli and CoNS.  s/p rocephin    - Pt with h/o pessary.  Benson catheter placed in the hospital.  d/c benson as appropriate.       * picc line placement.  Check weekly cbc, cmp, esr, crp and vanco trough as outpt.  Maintain trough between 10-15.  Complete 6 week course from neg bcx.       D/c planning in place.          Sue Armstrong  [phone]

## 2020-05-08 NOTE — PROGRESS NOTE ADULT - ATTENDING COMMENTS
Pt for closed, possible open reduction of dislocated hip prosthesis. discussion had w family
Pt seen and examined.  Exam and plan as above
Agree with the above assessment and plan. On exam today Mrs. Conway seems  alittle more confused/tired. She remains on room air with her left leg in Amanda's traction. I spoke to the ortho resident today in hopes of defining a plan for her to discharge this week back to [place]. Her hip is still dislocated and needs to be reduced. Waiting for final ortho recs.     Next of kin is daughter Dianne Petersen [phone]. The number in [place] is wrong.
Agree with the above assessment and plan. Pt was very pleasant on exam today, answered questions appropriately. Seems to have less moaning today. States her pain is well controlled.  Still in Amanda's traction.     Will add Rifampin today per ID recommendations and con't vancomycin.  Will call monday to expedite echocardiogram to rule out endocarditis. Will need PICC line and plan for 6 weeks of IV antibiotics.  We will also need final recommendations from Orthopedics regarding her traction--what would they recommend for her at discharge? Will try to FU with them.
I spent 40 minutes coordinating this patient's discharge, reviewed medications.
Agree with the above assessment and plan. Will continue antibiotics and follow up with ortho regarding more definitive therapy for the hip dislocation. We can't do much about a pessary exchange until her hip is relocated.  She will need antibiotics long term, so we will aim for PICC line placement in the next day or so.
I agree with the above assessment and plan.  Plan is for OR on 5/1/20. Prognosis is guarded. I spoke to Daughter Dianne on 4/27 and 4/28 and offered comfort care and no surgery or hip reduction, but she wanted us to go ahead and do surgery. Pt is DNR/DNI.  Will need PICC line and set up for antibiotics prior to discharge per ID recommendations

## 2020-05-08 NOTE — PROGRESS NOTE ADULT - SUBJECTIVE AND OBJECTIVE BOX
Infectious Diseases progress note:    Subjective:  NAD, afebrile.      ROS:  CONSTITUTIONAL:  No fever, chills, rigors  CARDIOVASCULAR:  No chest pain or palpitations  RESPIRATORY:   No SOB, cough, dyspnea on exertion.  No wheezing  GASTROINTESTINAL:  No abd pain, N/V, diarrhea/constipation  EXTREMITIES:  No swelling or joint pain  GENITOURINARY:  No burning on urination, increased frequency or urgency.  No flank pain  NEUROLOGIC:  No HA, visual disturbances  SKIN: No rashes    Allergies    No Known Allergies    Intolerances        ANTIBIOTICS/RELEVANT:  antimicrobials  rifAMPin 300 milliGRAM(s) Oral two times a day  vancomycin  IVPB 750 milliGRAM(s) IV Intermittent every 24 hours    immunologic:    OTHER:  amLODIPine   Tablet 5 milliGRAM(s) Oral daily  atorvastatin 10 milliGRAM(s) Oral at bedtime  dextrose 5% + sodium chloride 0.45%. 1000 milliLiter(s) IV Continuous <Continuous>  enoxaparin Injectable 40 milliGRAM(s) SubCutaneous daily  melatonin 3 milliGRAM(s) Oral at bedtime  pantoprazole    Tablet 40 milliGRAM(s) Oral before breakfast  senna 2 Tablet(s) Oral at bedtime PRN  traMADol 25 milliGRAM(s) Oral every 8 hours PRN      Objective:  Vital Signs Last 24 Hrs  T(C): 36.8 (08 May 2020 14:45), Max: 37 (08 May 2020 06:39)  T(F): 98.2 (08 May 2020 14:45), Max: 98.6 (08 May 2020 06:39)  HR: 88 (08 May 2020 14:45) (81 - 96)  BP: 134/68 (08 May 2020 14:45) (134/68 - 153/69)  BP(mean): --  RR: 18 (08 May 2020 14:45) (18 - 18)  SpO2: 98% (08 May 2020 14:45) (98% - 98%)    PHYSICAL EXAM:  Constitutional:NAD, moaning at times  Eyes:BINTA, EOMI  Ear/Nose/Throat: no thrush, mucositis.  Moist mucous membranes	  Neck:no JVD, no lymphadenopathy, supple  Respiratory: CTA sandra  Cardiovascular: S1S2 RRR, no murmurs  Gastrointestinal:soft, nontender,  nondistended (+) BS  Extremities:no e/e/c.  L hip dsg c/d/i  Skin:  no rashes, open wounds or ulcerations        LABS:                        9.5    6.14  )-----------( 335      ( 08 May 2020 06:58 )             30.4     05-08    142  |  113<H>  |  14  ----------------------------<  157<H>  3.8   |  20<L>  |  0.89    Ca    8.0<L>      08 May 2020 06:58  Phos  2.4     05-07  Mg     1.8     05-07      PT/INR - ( 07 May 2020 05:30 )   PT: 10.9 SEC;   INR: 0.95                Procalcitonin, Serum: 0.14 (05-07 @ 05:30)  Procalcitonin, Serum: 0.15 (05-06 @ 16:13)  Procalcitonin, Serum: 0.27 (05-04 @ 04:20)  Procalcitonin, Serum: 0.28 (05-02 @ 08:15)  Procalcitonin, Serum: 0.08 (04-30 @ 04:12)    Vancomycin Level, Random:  ug/mL (05-04 @ 04:30)      Vancomycin Level, Trough: 16.9 ug/mL (05-08 @ 06:58)  Vancomycin Level, Trough: 16.1 ug/mL (05-05 @ 10:00)              MICROBIOLOGY:    Culture - Tissue with Gram Stain (05.02.20 @ 01:01)    Gram Stain:   No polymorphonuclear cells seen per low power field  No organisms seen per oil power field    Specimen Source: .Tissue LT HIP #2    Culture Results:   No growth at 5 days    Culture - Surgical Swab (05.02.20 @ 01:00)    Specimen Source: .Surgical Swab LT HIP # 5 SWAB    Culture Results:   No growth          RADIOLOGY & ADDITIONAL STUDIES:    < from: Xray Hip w/ Pelvis 1 View, Left (05.04.20 @ 13:30) >  IMPRESSION:  Successfully reduced and anatomically aligned previously dislocated cemented left hip hemiarthroplasty prosthesis. No periprosthetic fractures or suspicious periprosthetic lucencies.    Postsurgical changes in the overlying soft tissues with surgical skin staples overlying the incision site.    Osteopenic but otherwise intact and unremarkable remaining imaged osseous structures.    Correlate with intraoperative findings.    < end of copied text >

## 2020-05-08 NOTE — DISCHARGE NOTE NURSING/CASE MANAGEMENT/SOCIAL WORK - PATIENT PORTAL LINK FT
You can access the FollowMyHealth Patient Portal offered by [facility] by registering at the following website: [email or web address]. By joining [facility]’s FollowMyHealth portal, you will also be able to view your health information using other applications (apps) compatible with our system.

## 2020-05-08 NOTE — PROGRESS NOTE ADULT - ASSESSMENT
89 W h/o Dementia, HTN, and recent L hemiarthroplasty in 01/2020 presents from [facility] for L prosthetic hip dislocation of unclear etiology and chronicity, found to have elevated inflammatory markers and mild COVID19 infection. Was found to be bacteremic (+ blood cx on 4/22 and 4/23) with staph epidermidis with concern for prosthetic joint infection and also found to have UTI with CoNS and E coli.    Updates can be given to daughter Dianne Kennedy, she can be reached at [phone]. Dianne states that she has some dementia and memory issues at baseline and her mental status can wax and wane.    We reviewed code status and patient is DNR/DNI.

## 2020-05-08 NOTE — PROGRESS NOTE ADULT - SUBJECTIVE AND OBJECTIVE BOX
[facility] Division of Hospital Medicine  Reema Yadav DO  Pager: [phone]  Other Times:  [phone]    Patient is a 89y old  Female who presents with a chief complaint of LE pain (07 May 2020 17:45)    SUBJECTIVE / OVERNIGHT EVENTS:  Patient seen denying any pain, resting comfortably.    ADDITIONAL REVIEW OF SYSTEMS:    MEDICATIONS  (STANDING):  amLODIPine   Tablet 5 milliGRAM(s) Oral daily  atorvastatin 10 milliGRAM(s) Oral at bedtime  dextrose 5% + sodium chloride 0.45%. 1000 milliLiter(s) (75 mL/Hr) IV Continuous <Continuous>  enoxaparin Injectable 40 milliGRAM(s) SubCutaneous daily  melatonin 3 milliGRAM(s) Oral at bedtime  pantoprazole    Tablet 40 milliGRAM(s) Oral before breakfast  rifAMPin 300 milliGRAM(s) Oral two times a day  vancomycin  IVPB 750 milliGRAM(s) IV Intermittent every 24 hours    MEDICATIONS  (PRN):  senna 2 Tablet(s) Oral at bedtime PRN Constipation  traMADol 25 milliGRAM(s) Oral every 8 hours PRN Mild Pain (1 - 3)      CAPILLARY BLOOD GLUCOSE        I&O's Summary      PHYSICAL EXAM:  Vital Signs Last 24 Hrs  T(C): 37 (08 May 2020 06:39), Max: 37 (08 May 2020 06:39)  T(F): 98.6 (08 May 2020 06:39), Max: 98.6 (08 May 2020 06:39)  HR: 81 (08 May 2020 06:39) (81 - 96)  BP: 136/58 (08 May 2020 06:39) (132/62 - 153/69)  BP(mean): --  RR: 18 (08 May 2020 06:39) (16 - 18)  SpO2: 98% (08 May 2020 06:39) (96% - 98%)  GENERAL: NAD, well-developed  HEAD:  Atraumatic, Normocephalic, MMM  CHEST/LUNG: No use of accessory muscles, speaking in complete sentences   PSYCH: AAOx1  NEUROLOGY: CN II-XII grossly intact, moving all extremities  SKIN: No rashes or lesions  Ext: wwp, abductor brace on, c/d/i    LABS:                        9.5    6.14  )-----------( 335      ( 08 May 2020 06:58 )             30.4     05-08    142  |  113<H>  |  14  ----------------------------<  157<H>  3.8   |  20<L>  |  0.89    Ca    8.0<L>      08 May 2020 06:58  Phos  2.4     05-07  Mg     1.8     05-07      PT/INR - ( 07 May 2020 05:30 )   PT: 10.9 SEC;   INR: 0.95                      RADIOLOGY & ADDITIONAL TESTS:  Results Reviewed:   Imaging Personally Reviewed:  Electrocardiogram Personally Reviewed:    COORDINATION OF CARE:  Care Discussed with Consultants/Other Providers [Y/N]:  Prior or Outpatient Records Reviewed [Y/N]:

## 2020-05-08 NOTE — PROGRESS NOTE ADULT - PROBLEM SELECTOR PROBLEM 1
Hip dislocation, left

## 2020-05-08 NOTE — PROGRESS NOTE ADULT - PROBLEM SELECTOR PLAN 2
positive for Staph epidermidis on 4/22 and 4/23, sensitive to Vancomycin  - blood cultures NGTD on 4/24    - appreciate ID recs   - daily blood cultures   - continue rifampin per ID due to concern for prosthetic joint infection   - continue vancomycin until 6 weeks from blood cultures are clear  - place PICC line for IV antibiotics prior to dc, Monitor weekly LFTs.  Goal trough between 15-20  - echo inconclusive - cannot rule out endocarditis with negative TTE  - will likely not be able to get CHIARA due to covid+ status

## 2020-05-08 NOTE — PROGRESS NOTE ADULT - PROBLEM SELECTOR PLAN 9
- DASH diet   - fall precautions   - home senna   - home melatonin   - DVT with 40 mg daily Lovenox    Dispo: likely back to [place] for GEORGE
- DASH diet   - fall precautions   - home senna   - home melatonin   - DVT with 40 mg daily Lovenox    Dispo: likely back to [place] for [facility]ORGE

## 2020-05-08 NOTE — PROGRESS NOTE ADULT - REASON FOR ADMISSION
LE pain

## 2020-05-08 NOTE — PROGRESS NOTE ADULT - PROBLEM SELECTOR PLAN 5
UA positive with few WBCs Informed by patient that patient has a pessary - last exchanged in Oct/Nov 2019   - completed ceftriaxone 1 g daily x 7 (4/23-4/30) days; c/w Vancomycin.    - Cultures growing E coli (sensitive to Ceftriaxone) and Coag Neg Staph.    - Pt normally sees Dr. Jose BILLINGS in the [facility]

## 2020-05-14 NOTE — DIETITIAN INITIAL EVALUATION ADULT. - PROBLEM/PLAN-5
Spoke with MICHAEL Rios at [facility] regarding pt's hospice admission. She would like orders for admission, she states that this cannot wait until tomorrow. Discussed PCP not in office today. Blanca will then receive orders from their medical director Dr. Nicole Hammonds. Blanca believes that these orders will include comfort medications:   lorazepam PRN  Tylenol suppositories PRN  Bisacodyl PRN  Morphine for SOB and pain PRN    As well as discontinue the following meds:  Ensure  Cerovit Tab   Pravastatin  Vit D   Ibandronate   Mometasone    If Dr. Kimbrough has any concerns/questions can contact [facility] office at [phone]   DISPLAY PLAN FREE TEXT

## 2020-05-15 LAB
CULTURE RESULTS: SIGNIFICANT CHANGE UP
GRAM STN FLD: SIGNIFICANT CHANGE UP
SPECIMEN SOURCE: SIGNIFICANT CHANGE UP

## 2020-05-22 ENCOUNTER — OUTPATIENT (OUTPATIENT)
Dept: OUTPATIENT SERVICES | Facility: HOSPITAL | Age: 85
LOS: 1 days | End: 2020-05-22
Payer: MEDICARE

## 2020-05-22 DIAGNOSIS — R78.81 BACTEREMIA: ICD-10-CM

## 2020-05-22 PROCEDURE — 36573 INSJ PICC RS&I 5 YR+: CPT

## 2020-05-27 ENCOUNTER — OUTPATIENT (OUTPATIENT)
Dept: OUTPATIENT SERVICES | Facility: HOSPITAL | Age: 85
LOS: 1 days | End: 2020-05-27
Payer: MEDICARE

## 2020-05-27 ENCOUNTER — APPOINTMENT (OUTPATIENT)
Dept: ULTRASOUND IMAGING | Facility: HOSPITAL | Age: 85
End: 2020-05-27

## 2020-05-27 DIAGNOSIS — M79.89 OTHER SPECIFIED SOFT TISSUE DISORDERS: ICD-10-CM

## 2020-05-27 PROCEDURE — 93971 EXTREMITY STUDY: CPT | Mod: 26,LT

## 2020-05-28 DIAGNOSIS — R78.81 BACTEREMIA: ICD-10-CM

## 2020-05-28 DIAGNOSIS — Z45.2 ENCOUNTER FOR ADJUSTMENT AND MANAGEMENT OF VASCULAR ACCESS DEVICE: ICD-10-CM

## 2020-06-17 ENCOUNTER — APPOINTMENT (OUTPATIENT)
Dept: ORTHOPEDIC SURGERY | Facility: CLINIC | Age: 85
End: 2020-06-17
Payer: MEDICARE

## 2020-06-17 DIAGNOSIS — T84.029D DISLOCATION OF UNSPECIFIED INTERNAL JOINT PROSTHESIS, SUBSEQUENT ENCOUNTER: ICD-10-CM

## 2020-06-17 PROCEDURE — 99024 POSTOP FOLLOW-UP VISIT: CPT

## 2020-06-17 PROCEDURE — 72170 X-RAY EXAM OF PELVIS: CPT

## 2020-06-19 PROBLEM — T84.029D: Status: ACTIVE | Noted: 2020-06-16

## 2020-10-29 ENCOUNTER — APPOINTMENT (OUTPATIENT)
Dept: ORTHOPEDIC SURGERY | Facility: CLINIC | Age: 85
End: 2020-10-29
Payer: MEDICARE

## 2020-10-29 VITALS
OXYGEN SATURATION: 96 % | HEART RATE: 89 BPM | SYSTOLIC BLOOD PRESSURE: 126 MMHG | BODY MASS INDEX: 21.18 KG/M2 | HEIGHT: 61 IN | WEIGHT: 112.2 LBS | DIASTOLIC BLOOD PRESSURE: 80 MMHG

## 2020-10-29 DIAGNOSIS — Z96.642 PRESENCE OF LEFT ARTIFICIAL HIP JOINT: ICD-10-CM

## 2020-10-29 PROCEDURE — 99072 ADDL SUPL MATRL&STAF TM PHE: CPT

## 2020-10-29 PROCEDURE — 73502 X-RAY EXAM HIP UNI 2-3 VIEWS: CPT | Mod: LT

## 2020-10-29 PROCEDURE — 99213 OFFICE O/P EST LOW 20 MIN: CPT

## 2020-10-29 NOTE — DATA REVIEWED
[Imaging Present] : Present [de-identified] : X-rays today AP pelvis left hip show the hemiarthroplasty completely dislocated and riding very high superiorly and laterally.

## 2020-10-29 NOTE — REASON FOR VISIT
[FreeTextEntry1] : Patient sent over from [facility].\par 5/1/2020 open reductin of left hip hemiarthroplasty after unknown time of dislocation, (rereduced 5/4 - Jamari)\par original surgery 1/6/2020 with Dr. Encarnacion

## 2020-10-29 NOTE — PHYSICAL EXAM
[General Appearance - Well-Appearing] : Well appearing [General Appearance - Well Nourished] : well nourished [Sclera] : the sclera and conjunctiva were normal [Neck Cervical Mass (___cm)] : no neck mass was observed [Heart Rate And Rhythm] : heart rate was normal and rhythm regular [] : No respiratory distress [Abdomen Soft] : Soft [Antalgic Gait Right] : not antalgic on the right [Normal Station and Gait] : gait and station were normal [Wheelchair] : Uses wheelchair for mobility. [Tenderness] : no tenderness [Swelling] : swelling [Skin Changes - Describe changes:] : No skin changes noted [Incision Healed - Describe:] : Incision is healed ~M [LE  Motor Strength Normal unless otherwise noted:] : 5/5 strength in bilateral lower extemities unless otherwise noted. [Normal] : Sensation intact to light touch. [2+] : left 2+ [FreeTextEntry1] : shortened

## 2020-10-29 NOTE — HISTORY OF PRESENT ILLNESS
[FreeTextEntry1] : Patient still has occasional pain in her hip however overall is barely oriented.  She can have a conversation but does not know that she lives at the nursing home.  She clearly has shortened deformed leg however has not followed up in over 4 months. [Stable] : stable [___ mths] : [unfilled] month(s) ago [1] : currently ~his/her~ pain is 1 out of 10 [Bending] : worsened by bending [Direct Pressure] : worsened by direct pressure [None] : No relieving factors are noted

## 2020-10-29 NOTE — REVIEW OF SYSTEMS
[Joint Pain] : no joint pain [Feeling Weak] : no feelings of weakness [Hot Flashes] : no hot flashes

## 2020-10-29 NOTE — DISCUSSION/SUMMARY
[All Questions Answered] : Patient (and family) had all questions answered to an agreeable level of satisfaction [Interested in Proceeding] : Patient (and family) expressed understanding and interest in proceeding with the plan as outlined [de-identified] : Patient is a chronic dislocation of the left hemiarthroplasty.  Currently skin is intact and she is not at any risk for the implant coming out with the resection arthroplasty however as she is minimally symptomatic if at all I would leave this alone.  I have written back to the nursing home that she does not need any braces for this as it is not holding anything in.  Unfortunately the patient will not be ambulating with this dislocated hip however she is not ambulating approximately 6 months or more.  Follow-up again as needed.\par \par \par If imaging was ordered, the patient was told to make an appointment to review findings right after all imaging is completed.\par \par We discussed risks, benefits and alternatives. Rationale of care was reviewed and all questions were answered. Patient (and family) had all questions answered to her degree of the level of satisfaction. Patient (and family) expressed understanding and interest in proceeding with the plan as outlined.\par \par \par \par \par This note was done with a voice recognition transcription software and any typos are related to this rather than medical error. Surgical risks reviewed. Patient (and family) had all questions answered to an agreeable level of satisfaction. Patient (and family) expressed understanding and interest in proceeding with the plan as outlined.  \par

## 2020-12-15 NOTE — PATIENT PROFILE ADULT - NSPROEDAABILITYLEARN_GEN_A_NUR
none Ilumya Counseling: I discussed with the patient the risks of tildrakizumab including but not limited to immunosuppression, malignancy, posterior leukoencephalopathy syndrome, and serious infections.  The patient understands that monitoring is required including a PPD at baseline and must alert us or the primary physician if symptoms of infection or other concerning signs are noted.

## 2021-05-29 NOTE — ED PROVIDER NOTE - PRO INTERPRETER NEED 2
Consults                                                                         [facility] HEART CARDIOLOGY CONSULTATION NOTE    PATIENT NAME: Antonette Delgadillo  PATIENT MRN: [id number]  SERVICE DATE:  5/29/2021  SERVICE TIME: 3:22 PM    PRIMARY CARE PHYSICIAN: Yariel Huynh MD  CONSULTING CARDIOLOGIST : Julio Cesar Clark MD [facility]  PRIMARY CARDIOLOGIST: Anil Sky MD [facility]  ================================================================    REASON FOR CONSULTATION:      Refractory angina pectoris     HPI:   Antonette Delgadillo is a 68 y.o. male who presented with stuttering chest discomfort that started the day prior to admission. Initially it was a burning sensation, today became retrosternal chest pressure, 8-9 out of 10 in intensity, and then it started radiating down his left arm. He does have a history of atherosclerotic native vessel coronary artery disease having undergone PCI and stenting to the left anterior descending artery by myself several years ago. Has history of hypertension. Reports medication compliance. No fever chills cough orthopnea PND or lower extremity edema. EKG initially was unremarkable, but troponin was slightly elevated. Abrupt arterial hypotension in the emergency department with up titration of IV nitroglycerin, and I was called as the interventionalist on-call, to discuss case, and it was decided the patient should be taken to the  cardiac catheterization laboratory on an urgent basis. Patient was up to 50 mics per minute of nitroglycerin. His blood pressure dropped into the low 90s and the nitroglycerin had to be titrated down. Allergic to penicillin  Medications vitals reviewed, patient seen in the cardiac catheterization laboratory, was laying flat in bed, appears to be a stoic gentleman, he was not diaphoretic but uncomfortable, complaining of 4 out of 10 chest discomfort.     His lungs were clear I did not appreciate jugular venous distention no carotid bruit heart sounds were distant regular without murmur rub or gallop breath sounds were distant abdomen soft and nontender extremities showed no edema distal pulses were strong and symmetric to palpation. Neurologically he was alert oriented x3 no gross cranial nerve deficit, able to follow commands and move all extremities. Skin showed no petechia or rash. Patient is known to have preserved LV systolic function, perfusion study about 2 years ago was negative for ischemia. Please refer to cardiac catheterization report for details. Assessment:  1. Patient with history of PCI and stenting of the left anterior descending artery several years ago. 2.  Patient with crescendo/unstable angina pectoris refractory to medical therapy, with arterial hypotension making it not possible to further uptitrate medications  3. Mild elevation of troponin, EKG without ST segment elevation. 4.  History of hypertension    Recommendations:  1. Emergent coronary angiography possible intervention  Additional recommendations to be based on clinical course and findings procedure risk benefits alternatives discussed with patient, risk discussed included but were not limited to MI, stroke, death, peripheral vascular compromise and allergic reaction to dye. He has had the procedure before, understands and agrees. After the procedure I met with family to discuss findings and plan of care. Films were reviewed with the patient's wife and daughter. They agreed with plan of care. They were extremely appreciative. Dr. Angelique Hernandez updated as well.   Thank you for allowing us to participate in Mr. Rupert Riedel care, please do not hesitate to call if further questions arise,  Sincerely,    Rosa Wolff MD [address]:    Past Medical History:   Diagnosis Date    BPH (benign prostatic hyperplasia)     ED (erectile dysfunction)     Hematuria     History of heart attack        PAST SURGICAL HISTORY:  Past Surgical History:   Procedure Laterality Date    CARDIAC CATHETERIZATION  2011    6 STENTS    FOOT SURGERY      HERNIA REPAIR      NOSE SURGERY      TONSILLECTOMY         FAMILY HISTORY:  History reviewed. No pertinent family history. SOCIAL HISTORY:    Social History     Socioeconomic History    Marital status:      Spouse name: Not on file    Number of children: Not on file    Years of education: Not on file    Highest education level: Not on file   Occupational History    Not on file   Tobacco Use    Smoking status: Never Smoker    Smokeless tobacco: Never Used   Substance and Sexual Activity    Alcohol use: No    Drug use: No    Sexual activity: Not on file   Other Topics Concern    Not on file   Social History Narrative    Not on file     Social Determinants of Health     Financial Resource Strain:     Difficulty of Paying Living Expenses:    Food Insecurity:     Worried About Running Out of Food in the Last Year:     [address] in the Last Year:    Transportation Needs:     Lack of Transportation (Medical):      Lack of Transportation (Non-Medical):    Physical Activity:     Days of Exercise per Week:     Minutes of Exercise per Session:    Stress:     Feeling of Stress :    Social Connections:     Frequency of Communication with Friends and Family:     Frequency of Social Gatherings with Friends and Family:     Attends Mandaen Services:     Active Member of Clubs or Organizations:     Attends Club or Organization Meetings:     Marital Status:    Intimate Partner Violence:     Fear of Current or Ex-Partner:     Emotionally Abused:     Physically Abused:     Sexually Abused:        MEDICATIONS:  Current Facility-Administered Medications   Medication Dose Route Frequency Provider Last Rate Last Admin    morphine (PF) injection 4 mg  4 mg Intravenous Q15 Min PRN Lexis Huerta MD   4 mg at 05/29/21 1021    nitroGLYCERIN 50 mg in dextrose 5% 250 mL infusion  5-200 mcg/min Intravenous Single                Low risk**                     <6 mm     No routine follow-up                               6-8 mm     CT at 6-12 months, then consider CT at 18-24 months                 >8 mm     Consider CT at 3 months, PET/CT, or tissue sampling Nodules <6 mm do not require routine follow-up, but certain patients at high risk with suspicious nodule morphology, upper lobe location, or both may warrant 12-month follow-up (recommendation 1A). High risk**                <6 mm     Optional CT at 12 months               6-8 mm     CT at 6-12 months, then CT at 18-24 months                 >8 mm     Consider CT at 3 months, PET/CT, or tissue sampling Nodules <6 mm do not require routine follow-up, but certain patients at high risk with suspicious nodule morphology, upper lobe location, or both may warrant 12-month follow-up (recommendation 1A). Multiple                         Low risk**                <6 mm     No routine follow-up               6-8 mm     CT at 3-6 months, then consider CT at 18-24 months                >8 mm     CT at 3-6 months, then  consider CT at 18-24 months Use most suspicious nodule as guide to management. Follow-up intervals may vary according to size and risk (recommendation 2A). High risk**                <6 mm     Optional CT at 12 months               6-8 mm     CT at 3-6 months, then at 18-24 months                >8 mm     CT at 3-6 months, then at 18-24 months Use most suspicious nodule as guide to management. Follow-up intervals may vary according to size and risk (recommendation 2A). B: Subsolid Nodules*      Single                  Ground glass                <6 mm     No routine follow-up              >=6 mm     CT at 6-12 months to confirm persistence, then CT  every 2 years until 5 years In certain suspicious nodules <6 mm, consider follow-up at 2 and 4 years. If solid component(s) or growth develops, consider resection. (Recommendations 3A and 4A). Part solid                <6 mm     No routine follow-up              >=6 mm     CT at 3-6 months to confirm persistence. If unchanged and solid component remains <6 mm, annual CT  should be performed for 5 years. In practice, part-solid nodules cannot be defined as such until >=6 mm, and nodules <6 mm do not usually require follow-up. Persistent part-solid nodules with solid components >=6 mm should be considered highly suspicious (recommendations 4A-4C)      Multiple                <6 mm     CT at 3-6 months. If stable, consider CT at 2 and 4 years. >=6 mm     CT at 3-6 months. Subsequent management based  on the most suspicious nodule(s). Multiple <6 mm pure ground-glass nodules are usually benign, but consider follow-up in selected patients at high risk at 2 and 4 years (recommendation 5A). Note. --These recommendations do not apply to lung cancer screening, patients with immunosuppression, or patients with known primary cancer. * Dimensions are average of long and short axes, rounded to the nearest millimeter. ** Consider all relevant risk factors (see Risk Factors). ____________________________________________________________ All CT scans at this facility use dose modulation, iterative reconstruction, and/or weight based dosing when appropriate to reduce radiation dose to as low as reasonably achievable.         LABS:  Recent Labs     05/29/21 0930 05/29/21 0938   WBC 6.0  --    HCT 42.7  --      --    INR  --  1.1     --    K 3.8  --    CO2 28  --    BUN 18  --    MG 1.9  --      CBC:   Recent Labs     05/29/21 0930   WBC 6.0   HGB 14.5   HCT 42.7        BMP:  Recent Labs     05/29/21 0930      K 3.8   CL 96   CO2 28   BUN 18   CREATININE 1.03   LABGLOM >60.0     ABGs: No results found for: PH, PO2, PCO2  INR:   Recent Labs     05/29/21 0938   INR 1.1     PRO-BNP: No results found for: PROBNP   TSH: No results found for: TSH Cardiac Injury Profile:   Recent Labs     05/29/21  0930   TROPONINI 0.097*      Lipid Profile:   Lab Results   Component Value Date    TRIG 150 01/13/2020    HDL 43.0 01/13/2020    LDLCALC 89 11/20/2018    CHOL 150 01/13/2020    CHOL 168 11/20/2018      Hemoglobin A1C: No components found for: HGBA1C     No intake or output data in the 24 hours ending 05/29/21 1522         ================================================================      Thank you for your consideration for this consultation.     SIGNATURE: Electronically signed by Chey Jaramillo MD on 5/29/2021 at 3:22 PM English

## 2021-06-17 NOTE — PATIENT PROFILE ADULT - FALL HARM RISK
[employer] linq ev  Since 6-11   55 pause  2 jovany     bones(Osteoporosis,prev fx,steroid use,metastatic bone ca)/age(85 years old or older)

## 2021-07-15 NOTE — ED ADULT NURSE NOTE - CAS DISCH CONDITION
Emergency Department Nursing Plan of Care       The Nursing Plan of Care is developed from the Nursing assessment and Emergency Department Attending provider initial evaluation. The plan of care may be reviewed in the ED Provider note.     The Plan of Care was developed with the following considerations:   Patient / Family readiness to learn indicated by:verbalized understanding  Persons(s) to be included in education: patient  Barriers to Learning/Limitations:NO      Signed     Fariha Aguilar RN    7/15/2021   6:58 PM Stable

## 2021-08-06 NOTE — DISCHARGE NOTE NURSING/CASE MANAGEMENT/SOCIAL WORK - NSDPDISTO_GEN_ALL_CORE
Sub-Acute rehab Retention Suture Text: Retention sutures were placed to support the closure and prevent dehiscence.

## 2021-08-18 NOTE — ED ADULT NURSE NOTE - CCCP TRG CHIEF CMPLNT
This patient contacted office for the following prescriptions to be filled:    Medication requested :   Requested Prescriptions     Pending Prescriptions Disp Refills    butalbital-acetaminophen-caffeine (FIORICET, ESGIC) -40 mg per tablet 60 Tablet 1     Sig: Take 1 Tablet by mouth two (2) times daily as needed for Headache. PCP: Dr. Zia Rausch or Print: [facility]  Mail order or Local pharmacy [phone]    Scheduled appointment if not seen by current providers in office: NAVA 06/28/21 No Upcoming Scheduled.
fall

## 2022-06-23 NOTE — PROGRESS NOTE ADULT - PROBLEM/PLAN-3
DISPLAY PLAN FREE TEXT
Spontaneous, unlabored and symmetrical

## 2022-09-26 NOTE — PROGRESS NOTE ADULT - SUBJECTIVE AND OBJECTIVE BOX
To Dr. Yoli Watt-      NIFEdipine (PROCARDIA XL) 30 MG extended release tablet     And   primidone (MYSOLINE) 50 MG tablet       Drug Interaction with these above two medicines. Please advise. Zev Herrera is there at [facility] until 4:00 pm today. Infectious Diseases progress note:    Subjective:  Afebrile.  Satting adequately on room air    ROS:  CONSTITUTIONAL:  No fever, chills, rigors  CARDIOVASCULAR:  No chest pain or palpitations  RESPIRATORY:   No SOB, cough, dyspnea on exertion.  No wheezing  GASTROINTESTINAL:  No abd pain, N/V, diarrhea/constipation  EXTREMITIES:  No swelling or joint pain  GENITOURINARY:  No burning on urination, increased frequency or urgency.  No flank pain  NEUROLOGIC:  No HA, visual disturbances  SKIN: No rashes    Allergies    No Known Allergies    Intolerances        ANTIBIOTICS/RELEVANT:  antimicrobials  cefTRIAXone   IVPB 1000 milliGRAM(s) IV Intermittent every 24 hours  rifAMPin 300 milliGRAM(s) Oral two times a day  vancomycin  IVPB      vancomycin  IVPB 750 milliGRAM(s) IV Intermittent once    immunologic:    OTHER:  acetaminophen   Tablet .. 650 milliGRAM(s) Oral every 8 hours PRN  amLODIPine   Tablet 5 milliGRAM(s) Oral daily  atorvastatin 10 milliGRAM(s) Oral at bedtime  enoxaparin Injectable 40 milliGRAM(s) SubCutaneous daily  melatonin 3 milliGRAM(s) Oral at bedtime  oxycodone    5 mG/acetaminophen 325 mG 1 Tablet(s) Oral every 8 hours PRN  pantoprazole    Tablet 40 milliGRAM(s) Oral before breakfast  senna 2 Tablet(s) Oral at bedtime PRN      Objective:  Vital Signs Last 24 Hrs  T(C): 36.7 (27 Apr 2020 12:48), Max: 36.9 (26 Apr 2020 16:18)  T(F): 98 (27 Apr 2020 12:48), Max: 98.4 (26 Apr 2020 16:18)  HR: 92 (27 Apr 2020 12:48) (90 - 97)  BP: 124/58 (27 Apr 2020 12:48) (106/59 - 128/52)  BP(mean): --  RR: 17 (27 Apr 2020 12:48) (17 - 18)  SpO2: 99% (27 Apr 2020 12:48) (95% - 100%)    PHYSICAL EXAM:  Constitutional:NAD  Eyes:BINTA, EOMI  Ear/Nose/Throat: no thrush, mucositis.  Moist mucous membranes	  Neck:no JVD, no lymphadenopathy, supple  Respiratory: CTA sandra  Cardiovascular: S1S2 RRR, no murmurs  Gastrointestinal:soft, nontender,  nondistended (+) BS  Extremities:no e/e/c, LLE bucks device in place  Skin:  no rashes, open wounds or ulcerations        LABS:                        8.3    8.69  )-----------( 529      ( 27 Apr 2020 06:15 )             27.7     04-27    143  |  108<H>  |  22  ----------------------------<  75  4.6   |  22  |  0.87    Ca    9.1      27 Apr 2020 06:15  Mg     2.0     04-27    TPro  6.1  /  Alb  2.3<L>  /  TBili  0.4  /  DBili  x   /  AST  15  /  ALT  13  /  AlkPhos  136<H>  04-27          Procalcitonin, Serum: 0.12 (04-26 @ 06:09)  Procalcitonin, Serum: 0.13 (04-24 @ 07:08)    Vancomycin Level, Random:  ug/mL (04-27 @ 06:15)  Vancomycin Level, Random:  ug/mL (04-26 @ 04:42)      Vancomycin Level, Trough: 0.5 ug/mL (04-24 @ 16:30)              MICROBIOLOGY:    Culture - Blood (04.24.20 @ 16:20)    Specimen Source: .Blood Blood-Venous    Culture Results:   No growth to date.    Culture - Blood (04.24.20 @ 16:20)    Specimen Source: .Blood Blood-Peripheral    Culture Results:   No growth to date.          RADIOLOGY & ADDITIONAL STUDIES:    < from: CT Pelvis Bony Only No Cont (04.22.20 @ 21:54) >  FINDINGS:    Bones: Osteopenia. Cemented left hip hemiarthroplasty with artifact degrading images. Again noted, superolaterally and posteriorly dislocated left femoral component. Small densities along the medial aspect of the left medial acetabular fossa, noted on the prior study.     Mild cortical irregularity and lucencies at the left greater trochanter. Somewhat well-corticated densities at the left lesser trochanter, suggestive of heterotopic ossification.    Bilateral hip osteoarthrosis. Degenerative changes of the visualized lower lumbar spine, pubic symphysis and sacroiliac joints.    Soft tissue: Asymmetric enlargement and mildly increased attenuation of the left gluteus structure with stranding, suggesting edema/hematoma. Possible left hip joint effusion. Diffuse muscle bulk loss with fatty replacement. Small fat-containing umbilical hernia. Foci of air in the left mid lateral thigh soft tissue.    Additional: Colon diverticulosis. Prominent signal colon wall with mild surrounding stranding and trace pelvic free fluid, raising a question of diverticulitis. Alvares catheter and pessary in place. Atherosclerosis.    IMPRESSION:    Supralaterally and posteriorly dislocated femoral component of the left hip hemiarthroplasty. Mild cortical irregularity and lucencies at the left greater trochanter, which is difficult to assess due to artifact. This may be in part postsurgical although nondisplaced fracture cannot be excluded. If there is continued clinical suspicion, additional imaging (nuclear scan and/or MRI with metal artifact reduction protocol) may be obtained for further evaluation.    Foci of air in the left mid lateral thigh soft tissue, without a discrete fluid collection. Differential diagnosis includes injection, penetrating trauma and infection. Recommend clinical correlation for further evaluation.    Question sigmoid colon diverticulitis. Recommend clinical correlation and follow-up colonoscopy to exclude potential underlying neoplasm.    Additional findings as described.    < end of copied text >

## 2023-01-03 NOTE — PROGRESS NOTE ADULT - SUBJECTIVE AND OBJECTIVE BOX
Pulmonary/Critical Care  Progress Note      Patient name: Doris Hoover  MRN: [id number]  Date: 2023    Admit Date: 2022  Consult Requested By: Dirk Amaro MD    Reason for Consult: Pneumonia    HPI:    2022 - 64 yo sick for 4-5 days - went to Urgent Care and treated with po zithromax didn't get better and came to ER.  She has tested negative for Covid and flu.  CTA negative for clots but has extensive right pneumonia.  ROS as below.  Has required 4-7 LPM.  Did have a family member who was sick.  Denies HIV risk factors    2023 - Stable overnight, no new issues reported, feels better.  Has some cough with sputum production.  No new labs this AM.  On 10 LPM O2    2023 - Stable overnight, no new issues reported and feels better.  Has been on O2 about 8 LPM this AM.  Does better when encouraged to take deep breaths through her nose  Had increased temp last night but decreased this AM    1/3/23: No acute events overnight. No fevers. Weaned to 3 LPM. CXR yesterday shows improvement in right-sided opacities.    REVIEW OF SYSTEMS  GENERAL: Feeling better.  EYES: Vision is good.  ENT: No sinusitis or pharyngitis.   HEART: No chest pain or palpitations.  LUNGS: No SOB, cough, sputum, or wheezing.  GI: No abdominal pain, nausea, vomiting, or diarrhea.  : No urinary urgency, pain, or change in frequency.  SKIN: No lesions or rashes.  MUSCULOSKELETAL: No joint pain or myalgias.  NEURO: No headaches or neuropathy.  LYMPH: No edema or adenopathy.  PSYCH: No anxiety or depression.  ENDO: No weight change.    Past Medical History    Past Medical History:   Diagnosis Date    Allergy     Diabetes mellitus, type 2     Hypertension        Past Surgical History    Past Surgical History:   Procedure Laterality Date     SECTION      3    HYSTERECTOMY      TONSILLECTOMY         Medications (scheduled):      albuterol-ipratropium  3 mL Nebulization Q6H    enoxparin  40 mg Subcutaneous Q24H     "guaiFENesin  600 mg Oral BID    levoFLOXacin  750 mg Intravenous Q24H    pantoprazole  40 mg Oral Daily    senna-docusate 8.6-50 mg  1 tablet Oral BID       Medications (infusions):           Medications (prn):     acetaminophen, albuterol-ipratropium, ALPRAZolam, benzonatate, dextrose 10%, dextrose 10%, glucagon (human recombinant), glucose, glucose, ibuprofen, insulin aspart U-100, magnesium oxide, magnesium oxide, ondansetron, potassium bicarbonate, potassium bicarbonate, potassium bicarbonate, potassium, sodium phosphates, potassium, sodium phosphates, potassium, sodium phosphates    Family History:   Family History   Problem Relation Age of Onset    Hypertension Mother        Social History: Tobacco:   Social History     Tobacco Use   Smoking Status Never   Smokeless Tobacco Never                                EtOH:   Social History     Substance and Sexual Activity   Alcohol Use Not Currently                                Drugs:   Social History     Substance and Sexual Activity   Drug Use Not on file         Physical Exam    Vital signs:  Temp:  [97.5 °F (36.4 °C)-99.2 °F (37.3 °C)]   Pulse:  [71-86]   Resp:  [16-19]   BP: (111-142)/(67-81)   SpO2:  [92 %-100 %]     Intake/Output:   Intake/Output Summary (Last 24 hours) at 1/3/2023 1210  Last data filed at 1/3/2023 1132  Gross per 24 hour   Intake 1030 ml   Output --   Net 1030 ml          BMI: Estimated body mass index is 26.3 kg/m² as calculated from the following:    Height as of this encounter: 5' 6" (1.676 m).    Weight as of this encounter: 73.9 kg (162 lb 14.7 oz).    PHYSICAL EXAM  GENERAL:  NAD.  HEENT: EOMI. PERRLA.  NECK: No cervical adenopathy palpated. No JVD or HJR.  HEART: Regular rate and rhythm. No murmur or gallop auscultated.  LUNGS: crackles at right upper and b/l bases. Lung excursion symmetrical.   ABDOMEN: Bowel sounds present. Soft, non-tender, non-distended.  EXTREMITIES: Normal muscle tone and joint movement, no cyanosis or " 89 y/o presents to ED s/p fall this morning. Pt states she does not recall how she fell. Pt's left leg collapsed under her and she fell on right side. Pt normally ambulates alone, the event was unwitnessed and she denies head trauma, +left hip pain, and dry mouth. Pt could not ambulate after fall secondary to pain. Pt denies neck, back pain, Ha, dizziness, abdominal pain (-) chest pain (-) sob Pt reports last bowel movement unknown. Pt lives at home   Admitted with fracture Left Hip. (2020 15:04)      Chief Complaint:  Patient is a 89y old  Female who presents with a chief complaint of Fall at home. Fracture left hip. (2020 09:37)      Review of Systems:    General:  No wt loss, fevers, chills, night sweats  Eyes:  Good vision, no reported pain  ENT:  No sore throat, pain, runny nose, dysphagia  CV:  No pain, palpitations, hypo/hypertension  Resp:  No dyspnea, cough, tachypnea, wheezing  GI:  No pain, nausea, vomiting, diarrhea, constipation         Social History/Family History  SOCHX:   tobacco,  -  alcohol    FMHX: FA/MO  - contributory       Discussed with:  PMD, Family    Physical Exam:    Vital Signs:  Vital Signs Last 24 Hrs  T(C): 36.5 (2020 17:05), Max: 36.8 (2020 00:10)  T(F): 97.7 (2020 17:05), Max: 98.3 (2020 00:10)  HR: 112 (2020 17:05) (94 - 112)  BP: 153/76 (2020 17:05) (126/57 - 153/76)  BP(mean): --  RR: 18 (2020 17:05) (17 - 18)  SpO2: 94% (2020 17:05) (90% - 94%)  Daily     Daily Weight in k.2 (2020 05:15)  I&O's Summary    2020 07:01  -  2020 22:46  --------------------------------------------------------  IN: 100 mL / OUT: 0 mL / NET: 100 mL          Chest:  Full & symmetric excursion, no increased effort, breath sounds clear  Cardiovascular:  Regular rhythm, S1, S2, no murmur/rub/S3/S4, no carotid/femoral/abdominal bruit, radial/pedal pulses 2+  Abdomen:  Soft, non-tender, non-distended, normoactive bowel sounds, no HSM    Laboratory:                          11.2   9.91  )-----------( 321      ( 2020 07:39 )             35.6     01-05    140  |  103  |  13  ----------------------------<  114<H>  3.7   |  27  |  1.37<H>    Ca    7.9<L>      2020 07:39    TPro  6.4  /  Alb  2.4<L>  /  TBili  0.5  /  DBili  x   /  AST  14<L>  /  ALT  11<L>  /  AlkPhos  218<H>  01-04          CAPILLARY BLOOD GLUCOSE        LIVER FUNCTIONS - ( 2020 11:14 )  Alb: 2.4 g/dL / Pro: 6.4 gm/dL / ALK PHOS: 218 U/L / ALT: 11 U/L / AST: 14 U/L / GGT: x           PT/INR - ( 2020 11:14 )   PT: 11.1 sec;   INR: 0.99 ratio         PTT - ( 2020 11:14 )  PTT:32.7 sec          Assessment:  I am asked to assess this patient for cardiovascular risk assessment for left hip surgery  The patient's EKG is without acute changes  The patient's lab testing and other diagnostic testing are reviewed  There is no significant murmur on exam  post hip surgery, stable clubbing.   LYMPHATICS: No adenopathy palpated, no edema.  SKIN: Dry, intact, no lesions.   NEURO: No gross abnormalities.  PSYCH: Appropriate affect.    Laboratory    Recent Labs   Lab 01/03/23  0633   WBC 3.37*   RBC 3.68*   HGB 11.0*   HCT 35.1*      MCV 95   MCH 29.9   MCHC 31.3*         Recent Labs   Lab 01/03/23  0633   CALCIUM 8.7   PROT 6.0      K 3.7   CO2 34*   CL 97   BUN 7*   CREATININE 0.6   ALKPHOS 53*   ALT 19   AST 39   BILITOT 0.7         No results for input(s): PT, INR, APTT in the last 24 hours.    No results for input(s): CPK, CPKMB, TROPONINI, MB in the last 24 hours.      Additional labs: reviewed    Microbiology:       Microbiology Results (last 7 days)       Procedure Component Value Units Date/Time    Blood culture x two cultures. Draw prior to antibiotics. [[id number]] Collected: 12/30/22 1924    Order Status: Completed Specimen: Blood from Peripheral, Antecubital, Left Updated: 01/02/23 2032     Blood Culture, Routine No Growth to date      No Growth to date      No Growth to date      No Growth to date    Narrative:      Aerobic and anaerobic    Blood culture x two cultures. Draw prior to antibiotics. [[id number]] Collected: 12/30/22 1940    Order Status: Completed Specimen: Blood from Peripheral, Antecubital, Right Updated: 01/02/23 2032     Blood Culture, Routine No Growth to date      No Growth to date      No Growth to date      No Growth to date    Narrative:      Aerobic and anaerobic    Urine culture [[id number]] Collected: 12/30/22 2140    Order Status: Completed Specimen: Urine Updated: 01/02/23 0659     Urine Culture, Routine No growth    Narrative:      Specimen Source->Urine    MRSA Screen by PCR [[id number]] Collected: 12/31/22 0910    Order Status: Completed Specimen: Nasopharyngeal Swab from Nasal Updated: 12/31/22 1051     MRSA SCREEN BY PCR Negative            Radiology    X-Ray Chest 1 View  HISTORY: pneumonia    COMPARISON:Comparison made with patient's  previous imaging study of 12/31/2022.    FINDINGS:Cardiomediastinal silhouette appears normal in size to capacity as compared to previous. Diffuse groundglass opacity changes with regional zone of alveolar consolidation with juxta fissural contact in the in the right upper lobe redemonstrated with partial clearance. Consolidation in the right lower lobe has likewise resolved in the interval.. The pulmonary vascular is normal.. The tracheal lumen appears midline without evidence of shift. Mild bilateral AC joint osteoarthritis with tapered margin of the distal acromion. The upper abdominal cavity appears unremarkable.    IMPRESSION:Improvement as compared to previous with resolving right upper and lower lobe pneumonic infiltrates.    Electronically signed by:  Bijan Christina MD  1/2/2023 9:45 AM CST Workstation: [id number]        Additional Studies    reviewed    Ventilator Information              No results for input(s): PH, PCO2, PO2, HCO3, POCSATURATED, BE in the last 72 hours.      Impression    Active Hospital Problems    Diagnosis  POA    *Pneumonia due to infectious organism [J18.9]  Unknown    Hypoxemia requiring supplemental oxygen [R09.02, Z99.81]  Yes    Acute hypoxemic respiratory failure [J96.01]  Unknown    Essential hypertension [I10]  Yes    Type 2 diabetes mellitus, without long-term current use of insulin [E11.9]  Yes      Resolved Hospital Problems   No resolved problems to display.       Plan    Continue levofloxacin to complete 5 days Abx pending continued improvement  Prn respiratory treatments  CXR reviewed  Prophylactic lovenox  OOB as able, increase activity  PT consult  Home O2 eval if still needing O2 prior to d/c    Pulmonary & Critical Care Medicine will sign off at this time.   Please call with any further questions or concerns.    Anand Norwood MD  Pulmonary and Critical Care Medicine  [facility] / [facility]  Date of Service: 01/03/2023   12:11 PM

## 2023-02-02 NOTE — PROGRESS NOTE ADULT - PROBLEM SELECTOR PLAN 4
UA positive with few WBCs Informed by patient that patient has a pessary - last exchanged in Oct/Nov 2019   - Continue ceftriaxone 1 g daily x 7 days and Vancomycin.    - Cultures growing E coli (sensitive to Ceftriaxone) and Coag Neg Staph.    - Vanco trough in am    - Will consider GYN consult for pessary exchange prior to discharge. Cimzia Counseling:  I discussed with the patient the risks of Cimzia including but not limited to immunosuppression, allergic reactions and infections.  The patient understands that monitoring is required including a PPD at baseline and must alert us or the primary physician if symptoms of infection or other concerning signs are noted.

## 2023-08-24 NOTE — OCCUPATIONAL THERAPY INITIAL EVALUATION ADULT - TRANSFER SAFETY CONCERNS NOTED: BED/CHAIR, REHAB EVAL
Epley Maneuver can fix vertigo if you feel safe to do so at home, otherwise I will put in a PT referral     Please do follow up with sleep medicine as scheduled - 12/18/23    Labs already in the system pending from 5/17/2023 for prior to the MRI  MRI brain ordered    Recommend following up with eye doctor for dilated eye exam and let me know if any signs of papilledema or other concerning findings and do not trust that they will send me the report. Headache/migraine treatment:   Abortive medications (for immediate treatment of a headache): It is ok to take ibuprofen, acetaminophen or naproxen (Advil, Tylenol,  Aleve, Excedrin) if they help your headaches you should limit these to No more than 3 times a week to avoid medication overuse/rebound headaches. For your more moderate to severe migraines take this medication early  Prochlorperazine/Compazine 10 mg as needed for migraine. This is technically a nausea medication but can be used for migraine or nausea. Try not to use more than 3 days per week      Prescription preventive medications for headaches/migraines   (to take every day to help prevent headaches - not to take at the time of headache):  [x]      We have options if needed       *Typically these types of medications take time untill you see the benefit, although some may see improvement in days, often it may take weeks, especially if the medication is being titrated up to a beneficial level. Please contact us if there are any concerns or questions regarding the medication. Lifestyle Recommendations:  [x] SLEEP - Maintain a regular sleep schedule: Adults need at least 7-8 hours of uninterrupted a night. Maintain good sleep hygiene:  Going to bed and waking up at consistent times, avoiding excessive daytime naps, avoiding caffeinated beverages in the evening, avoid excessive stimulation in the evening and generally using bed primarily for sleeping.   One hour before bedtime would recommend turning lights down lower, decreasing your activity (may read quietly, listen to music at a low volume). When you get into bed, should eliminate all technology (no texting, emailing, playing with your phone, iPad or tablet in bed). [x] HYDRATION - Maintain good hydration. Drink  2L of fluid a day (4 typical small water bottles)  [x] DIET - Maintain good nutrition. In particular don't skip meals and try and eat healthy balanced meals regularly. [x] TRIGGERS - Look for other triggers and avoid them: Limit caffeine to 1-2 cups a day or less. Avoid dietary triggers that you have noticed bring on your headaches (this could include aged cheese, peanuts, MSG, aspartame and nitrates). [x] EXERCISE - physical exercise as we all know is good for you in many ways, and not only is good for your heart, but also is beneficial for your mental health, cognitive health and  chronic pain/headaches. I would encourage at the least 5 days of physical exercise weekly for at least 30 minutes. Education and Follow-up  [x] Please call with any questions or concerns. Of course if any new concerning symptoms go to the emergency department. [x] Follow up 3-4 months, sooner if needed      - As we discussed if there are no abnormalities on the brain MRI that need urgent intervention (very often there are incidental findings that may not mean anything significant and are better discussed in person), you will not necessarily receive a call from us, but feel free to call in to check on the status of the report (since not knowing can be anxiety provoking) and the nurses will let you know the result or make sure I have nothing to pass on regarding the results first.  Ideally, all of this will be discussed in detail in the follow-up visit. decreased balance during turns/decreased weight-shifting ability/decreased step length

## 2024-01-24 NOTE — PROGRESS NOTE ADULT - SUBJECTIVE AND OBJECTIVE BOX
Quality 226: Preventive Care And Screening: Tobacco Use: Screening And Cessation Intervention: Patient screened for tobacco use and is an ex/non-smoker Patient is a 89y old  Female who presents with a chief complaint of acute pain (17 Mar 2020 11:59), less pain today.       OVERNIGHT EVENTS:none       MEDICATIONS  (STANDING):  amLODIPine   Tablet 5 milliGRAM(s) Oral daily  aspirin enteric coated 81 milliGRAM(s) Oral daily  atorvastatin 10 milliGRAM(s) Oral at bedtime  heparin  Injectable 5000 Unit(s) SubCutaneous every 12 hours  influenza   Vaccine 0.5 milliLiter(s) IntraMuscular once  pantoprazole    Tablet 40 milliGRAM(s) Oral before breakfast    MEDICATIONS  (PRN):  acetaminophen   Tablet .. 650 milliGRAM(s) Oral every 6 hours PRN Temp greater or equal to 38C (100.4F), Mild Pain (1 - 3)  LORazepam     Tablet 0.5 milliGRAM(s) Oral every 12 hours PRN Anxiety  morphine  - Injectable 2 milliGRAM(s) IV Push every 6 hours PRN Severe Pain (7 - 10)  senna 2 Tablet(s) Oral at bedtime PRN Constipation  traMADol 50 milliGRAM(s) Oral every 8 hours PRN Moderate Pain (4 - 6)    Vital Signs Last 24 Hrs  T(C): 36.3 (20 Mar 2020 06:45), Max: 37 (19 Mar 2020 13:56)  T(F): 97.3 (20 Mar 2020 06:45), Max: 98.6 (19 Mar 2020 13:56)  HR: 89 (20 Mar 2020 06:45) (89 - 100)  BP: 141/74 (20 Mar 2020 06:45) (135/71 - 150/78)  BP(mean): --  RR: 18 (20 Mar 2020 06:45) (18 - 18)  SpO2: 96% (20 Mar 2020 06:45) (96% - 98%)      PHYSICAL EXAM:  GENERAL: NAD, well-groomed, well-developed, elderly   HEAD:  Atraumatic, Normocephalic  EYES: EOMI, PERRLA, conjunctiva and sclera clear  ENMT: No tonsillar erythema, exudates, or enlargement; Moist mucous membranes.  NECK: Supple, No JVD   NERVOUS SYSTEM:  Alert & Oriented,    CHEST/LUNG: CTA bilaterally; No rales, rhonchi, wheezing, or rubs  HEART: Regular rate and rhythm; No murmurs, rubs, or gallops  ABDOMEN: Soft, Nontender, Nondistended; Bowel sounds present  EXTREMITIES:  2+ Peripheral Pulses, left arm ecchymosis at left more prominent at left elbow .  SKIN: No petechiae    LABS:                          9.8    5.11  )-----------( 363      ( 19 Mar 2020 06:51 )             33.3   03-19    143  |  109<H>  |  16  ----------------------------<  76  4.2   |  28  |  0.99    Ca    8.3<L>      19 Mar 2020 06:51  Phos  2.9     03-19  Mg     2.1     03-19             cardiac markers     CAPILLARY BLOOD GLUCOSE        Cultures    RADIOLOGY & ADDITIONAL TESTS:  < from: Xray Elbow AP + Lateral + Oblique, Left (03.19.20 @ 16:36) >  IMPRESSION: Proximal ulnar fracture.        < end of copied text >      Imaging Personally Reviewed:  [ ] YES  [ ] NO    Consultant(s) Notes Reviewed:  [ ] YES  [ ] NO    Care Discussed with Consultants/Other Providers [ ] YES  [ ] NO Quality 130: Documentation Of Current Medications In The Medical Record: Current Medications Documented Detail Level: Detailed Quality 431: Preventive Care And Screening: Unhealthy Alcohol Use - Screening: Patient not identified as an unhealthy alcohol user when screened for unhealthy alcohol use using a systematic screening method

## 2024-02-04 NOTE — ED ADULT NURSE NOTE - NS ED NURSE RECORD ANOTHER HT AND WT
0810 - Pt assisted to bedside commode. While sitting on bedside commode, pt pulled IV out of RT arm. Bleeding noted. Direct pressure applied to IV site x 5 minutes.     1000 - Received PTT report of 180. Heparin held, to resume at 1100.    1030 - Pt's daughter visiting at bedside and notifies nurse that pt has pulled the 2nd IV (Cardizem line) out of her right arm. Nurses Mela RN & Jane RN at bedside to re-start IV, patient refused.  Attempted x 2 to encouraged patient to have IV restarted, patient refused. Pt upset and fussing with daughter. Daughter attempting to calm pt down.    1300 - Patient soiled with urine. Pt cleaned and bed changed. Pt refusing to have IV restarted. Will make another attempt to restart IV ASAP.       Yes

## 2024-03-22 NOTE — PROVIDER CONTACT NOTE (OTHER) - NAME OF MD/NP/PA/DO NOTIFIED:
Injectable Filler Procedure Note:    Patient reports today for Injectable fillers to nasolabial folds and marionette line areas on the face. The risks, benefits and options were discussed with the pt. The risks included but not limited to pain, bleeding, bruising, allergic reactions, infection, rare risk of blindness, asymmetry, and need for further procedures. All of Her questions were answered to She satisfaction and She agrees to proceed with the procedure.    The area was cleansed with alcohol and the desired region of filling was marked. A dental block was not used. After cooling the skin with ice:     Two 1cc syringe of Juvederm XC (agreed upon with the patient) was used with a 30 gauge needle to crosshatch the product and thereby gain filling of the soft tissues. The product did  contain xylocaine within.     The injected areas were gently massaged. The patient tolerated the procedure well without complications.    The patient was educated to keep the head elevated at least 30 degrees for the remainder of the day, and was educated to apply a cold compress to the injected areas for 15 minutes on a 15 minutes off as desired to counteract swelling. The patient was educated that bruising could last from 10 days to 2 weeks. Makeup can be applied beginning the following day.     Follow up in 2 weeks or sooner with any concerns.      This document is generated, in part, by voice recognition software and thus  syntax and grammatical errors are possible.    Cayden Street MD  10:07 AM  4/2/2024        
Juvederm Ultra x 2 syringes-Cosmetic  GTIN [id number]  Lot: [id number]  Exp: 2024-11-02    
LOUISE Licea
LOUISE Licea

## 2024-04-12 NOTE — PHYSICAL THERAPY INITIAL EVALUATION ADULT - GENERAL OBSERVATIONS, REHAB EVAL
Pt encountered supine, alert and oriented x3, iv in place, dressing to L hip c/d/i, abduction pillow and pneumatic teds in place, NAD. Pt is POD#1 L hip camilo, WBAT.
<<--- Click to launch

## 2024-09-26 NOTE — ED ADULT NURSE NOTE - PRO INTERPRETER NEED 2
Pio Mallory  Urology  [address] [place] [address]  Phone: [phone]  Fax: [phone]  Follow Up Time: Routine  
English

## 2024-12-11 NOTE — ED ADULT TRIAGE NOTE - SPO2 (%)
Pt reported to the athletic training room to update on her foot. Pts foot healed well and has no issues. Pt will be discharged from the athletic training room.  
91

## 2025-01-11 NOTE — H&P ADULT - NSHPLABSRESULTS_GEN_ALL_CORE
Bed/Stretcher in lowest position, wheels locked, appropriate side rails in place/Call bell, personal items and telephone in reach/Instruct patient to call for assistance before getting out of bed/chair/stretcher/Non-slip footwear applied when patient is off stretcher/[place] to call system/Physically safe environment - no spills, clutter or unnecessary equipment/Purposeful proactive rounding/Room/bathroom lighting operational, light cord in reach LABS:                        10.3   7.98  )-----------( 349      ( 13 Mar 2020 16:39 )             34.3     03-13    141  |  104  |  22  ----------------------------<  129<H>  3.8   |  27  |  1.39<H>    Ca    8.9      13 Mar 2020 16:39    TPro  6.3  /  Alb  2.6<L>  /  TBili  0.6  /  DBili  x   /  AST  11<L>  /  ALT  10<L>  /  AlkPhos  149<H>  03-13       cardiac markers     CAPILLARY BLOOD GLUCOSE

## 2025-01-31 NOTE — ED PROCEDURE NOTE - NS ED PROCEDURE ASSISTED BY
Group Acupuncture Visit:     Subjective   Patient ID: Sunni Hearn is a 76 y.o. female who presents for Numbness  Walking improving, feels more stable    Right medial knee pain today near Lv8    Left arm spasms along Small intestine channel      Session Information  Is this acupuncture treatment being billed to the patient's insurance company: No  Visit Type: Follow-up visit  Medical History Reviewed: I have reviewed pertinent medical history in EHR, and no contraindications are present to provide treatment         Review of Systems         Provider reviewed plan for the acupuncture session, precautions and contraindications. Patient/guardian/hospital staff has given consent to treat with full understanding of what to expect during the session. Before acupuncture began, provider explained to the patient to communicate at any time if the procedure was causing discomfort past their tolerance level. Patient agreed to advise acupuncturist. The acupuncturist counseled the patient on the risks of acupuncture treatment including pain, infection, bleeding, and no relief of pain. The patient was positioned comfortably. There was no evidence of infection at the site of needle insertions.    Objective   Physical Exam              Acupuncture Treatment  Patient Position: Seated and reclining  Acupuncture Needling: Yes  Needle Guage: 40 guage /.16/ Red seirin, 36 guage /.20/ Blue seirin  Body Points: With retention  Body Points - Left: Si7, Lv3, Sp6  Body Points - Bilateral: Upper sensory 1/5  Body Points - Right: Upper sensory 2/5, Gb34, Sp4  Auricular Points: No  Acupuncture Treatment Change: No changes  Electroacupuncture Used: No  Needle Count In: 7  Needle Count Out: 7  Needle Retention Time (min): 25 minutes  Total Face to Face Time (min): 10 minutes              Assessment/Plan        The procedure was performed independently

## 2025-06-09 NOTE — ED PROVIDER NOTE - INTERNATIONAL TRAVEL
Called home #, spoke with Tracey Canada.  Informed of Palliative Care referral received from Rosana Nelson MD. Discussed palliative care program at length. Informed that service some visit may be offered virtually with further explanation to be provided by the clinician at the first visit.  Pt YES* capable of using a smart device  Tracey Canada voiced understanding and agreed to services.    Verified patient info and address.    SCHEDULED 6/11/25 @ 1PM    Informed APC will f/u to further assess.  Palliative APC Sabra Moreno assigned to eval/ care. Tracey Canada voiced understanding.    
No